# Patient Record
Sex: FEMALE | Race: WHITE | NOT HISPANIC OR LATINO | Employment: OTHER | URBAN - METROPOLITAN AREA
[De-identification: names, ages, dates, MRNs, and addresses within clinical notes are randomized per-mention and may not be internally consistent; named-entity substitution may affect disease eponyms.]

---

## 2017-12-14 ENCOUNTER — TRANSFERRED RECORDS (OUTPATIENT)
Dept: HEALTH INFORMATION MANAGEMENT | Facility: CLINIC | Age: 72
End: 2017-12-14

## 2018-03-21 ENCOUNTER — TRANSFERRED RECORDS (OUTPATIENT)
Dept: HEALTH INFORMATION MANAGEMENT | Facility: CLINIC | Age: 73
End: 2018-03-21

## 2018-03-26 ENCOUNTER — MEDICAL CORRESPONDENCE (OUTPATIENT)
Dept: HEALTH INFORMATION MANAGEMENT | Facility: CLINIC | Age: 73
End: 2018-03-26

## 2018-06-08 NOTE — TELEPHONE ENCOUNTER
FUTURE VISIT INFORMATION      FUTURE VISIT INFORMATION:    Date: 6/13/18    Time: 1400    Location: ORTHO  REFERRAL INFORMATION:    Referring provider:  DR RUGGIERO    Referring providers clinic:  [facility]    Reason for visit/diagnosis  LUMBAR SPINE      RECORDS STATUS        RECORDS RECEIVED FROM: [facility]   DATE RECEIVED: 6/8/18   NOTES STATUS DETAILS   OFFICE NOTE from referring provider Care Everywhere 12/14/17*   OFFICE NOTE from other specialist Care Everywhere 10/27/17*11/13/17*3/15/18*   DISCHARGE SUMMARY from hospital N/A    DISCHARGE REPORT from the ER N/A    OPERATIVE REPORT N/A    MEDICATION LIST Care Everywhere    IMPLANT RECORD/STICKER N/A    LABS     CBC/DIFF Care Everywhere    CULTURES N/A    INJECTIONS DONE IN RADIOLOGY N/A    MRI Care Everywhere 3/21/18*   CT SCAN N/A    XRAYS (IMAGES & REPORTS) Care Everywhere 12/14/17*10/27/17*   TUMOR     PATHOLOGY  Slides & report N/A

## 2018-06-13 ENCOUNTER — PRE VISIT (OUTPATIENT)
Dept: ORTHOPEDICS | Facility: CLINIC | Age: 73
End: 2018-06-13

## 2018-06-13 ENCOUNTER — RADIANT APPOINTMENT (OUTPATIENT)
Dept: GENERAL RADIOLOGY | Facility: CLINIC | Age: 73
End: 2018-06-13
Attending: PHYSICIAN ASSISTANT
Payer: COMMERCIAL

## 2018-06-13 ENCOUNTER — OFFICE VISIT (OUTPATIENT)
Dept: ORTHOPEDICS | Facility: CLINIC | Age: 73
End: 2018-06-13
Payer: COMMERCIAL

## 2018-06-13 VITALS — BODY MASS INDEX: 28.28 KG/M2 | HEIGHT: 66 IN | RESPIRATION RATE: 16 BRPM | WEIGHT: 176 LBS

## 2018-06-13 DIAGNOSIS — M43.10 ACQUIRED SPONDYLOLISTHESIS: ICD-10-CM

## 2018-06-13 DIAGNOSIS — M43.10 ACQUIRED SPONDYLOLISTHESIS: Primary | ICD-10-CM

## 2018-06-13 DIAGNOSIS — Q76.49 TRANSITIONAL VERTEBRA: ICD-10-CM

## 2018-06-13 DIAGNOSIS — M70.61 GREATER TROCHANTERIC BURSITIS, RIGHT: ICD-10-CM

## 2018-06-13 RX ORDER — MULTIVIT WITH MINERALS/LUTEIN
1 TABLET ORAL
COMMUNITY
Start: 2010-04-19 | End: 2020-01-10

## 2018-06-13 RX ORDER — BUSPIRONE HYDROCHLORIDE 10 MG/1
10 TABLET ORAL
COMMUNITY
Start: 2018-01-08 | End: 2020-01-10

## 2018-06-13 RX ORDER — FLUOXETINE 40 MG/1
40 CAPSULE ORAL
COMMUNITY
Start: 2017-07-31 | End: 2020-01-10

## 2018-06-13 RX ORDER — TRETINOIN 0.25 MG/G
CREAM TOPICAL
Refills: 0 | COMMUNITY
Start: 2018-03-08 | End: 2020-01-10

## 2018-06-13 RX ORDER — METHYLPREDNISOLONE ACETATE 40 MG/ML
40 INJECTION, SUSPENSION INTRA-ARTICULAR; INTRALESIONAL; INTRAMUSCULAR; SOFT TISSUE ONCE
Qty: 1 ML | Refills: 0 | OUTPATIENT
Start: 2018-06-13 | End: 2018-06-13

## 2018-06-13 ASSESSMENT — ENCOUNTER SYMPTOMS
INSOMNIA: 1
NERVOUS/ANXIOUS: 1
PANIC: 0
DEPRESSION: 1
DECREASED CONCENTRATION: 0

## 2018-06-13 NOTE — NURSING NOTE
[address] [address]  Dept: [phone]  ______________________________________________________________________________    Patient: Kimberly Stephenson   : 1945   MRN: [id number]   2018    INVASIVE PROCEDURE SAFETY CHECKLIST    Date: 18   Procedure:Right greater trochanteric bursa injection with Depo-Medrol  Patient Name: Kimberly Stephenson  MRN: [id number]  YOB: 1945    Action: Complete sections as appropriate. Any discrepancy results in a HARD COPY until resolved.     PRE PROCEDURE:  Patient ID verified with 2 identifiers (name and  or MRN): Yes  Procedure and site verified with patient/designee (when able): Yes  Accurate consent documentation in medical record: Yes  H&P (or appropriate assessment) documented in medical record: Yes  H&P must be up to 20 days prior to procedure and updates within 24 hours of procedure as applicable: NA  Relevant diagnostic and radiology test results appropriately labeled and displayed as applicable: Yes  Procedure site(s) marked with provider initials: NA    TIMEOUT:  Time-Out performed immediately prior to starting procedure, including verbal and active participation of all team members addressing the following:Yes  * Correct patient identify  * Confirmed that the correct side and site are marked  * An accurate procedure consent form  * Agreement on the procedure to be done  * Correct patient position  * Relevant images and results are properly labeled and appropriately displayed  * The need to administer antibiotics or fluids for irrigation purposes during the procedure as applicable   * Safety precautions based on patient history or medication use    DURING PROCEDURE: Verification of correct person, site, and procedures any time the responsibility for care of the patient is transferred to another member of the care team.     The following medication was given:     MEDICATION:  Depo Medrol  40mg; 4ml of 1% lidocaine; 4ml Bupivacaine   ROUTE: Bursa  SITE: Right hip  DOSE:  Depo Medrol 40mg; 4ml of 1% lidocaine; 4ml Bupivacaine   LOT #: Depo Medrol: [id number]; Lidocaine: [id number]; Bupivacaine: -DK  : Depo-Medrol: Kurtis; Lidocaine: [employer]; Bup  EXPIRATION DATE: Depo Medrol: 3/19; Lidocaine: 2/22; Bupivacaine: 5/20  NDC#: Depo Medrol: 8559-8531-82; Lidocaine: 94034-344-94; Bupivacaine: 1376-2246-49   Was there drug waste? Yes  Amount of drug waste (mL): 1 &6.  Reason for waste:  Single use vial Lidocaine and Bupivacaine       Chapo Diaz, GABRIEL  June 13, 2018

## 2018-06-13 NOTE — PROGRESS NOTES
[facility]  Orthopedics  Stephane Sullivan MD  2018     Name: Kimberly Stephenson  MRN: [id number]  Age: 73 year old  : 1945  Referring provider: Aleah Cortes     Chief Complaint: Back pain     History of Present Illness:   Kimberly Stephenson is a 73 year old female who presents today for evaluation of right sided low back pain.  This started about three months ago without a specific injury.  She localizes the pain to the right side at the level of the belt line.  She also reports right buttock pain and radiation down the lateral aspect of the leg to the ankle.  This occasionally affects the left side as well.  She would divide her pain into 70% back pain and 30% leg pain.  She also feels weaker on the right leg and thinks it may give out on her at times.  These symptoms are much worse at night, and she cannot sleep for more than 2-4 hours at a time.  This is her biggest complaint.  The only position that is helpful is laying on her right side.  Her symptoms are better during the day.  After she has used a heating pad for a few hours in the morning, she can get her tasks done without pain.  She would prefer to sit or stand rather than lay down.  She denies needing to use a shopping cart to lean on at the store.  She has recently tried physical therapy without relief.  She has not tried injections.  She has not tried any other complimentary or alternative treatments.  She would like to avoid surgery if possible.      She is currently taking ibuprofen and turmeric at night for pain control.  She reports a history of diabetes, type 1, and she is taking insulin.  She denies other medical problems, including heart disease, history of stroke, cancer, history of blood clot, kidney disease, and liver disease.  She is not currently taking any blood thinners.    KWASI: 24.4  Ojrrpt92: 27  Pain scale:1    Review of Systems:   A 14-point review of systems was obtained and is negative except for as noted in the HPI.     Answers  for HPI/ROS submitted by the patient on 6/13/2018   General Symptoms: No  Skin Symptoms: No  HENT Symptoms: No  EYE SYMPTOMS: No  HEART SYMPTOMS: No  LUNG SYMPTOMS: No  INTESTINAL SYMPTOMS: No  URINARY SYMPTOMS: No  GYNECOLOGIC SYMPTOMS: No  BREAST SYMPTOMS: No  SKELETAL SYMPTOMS: No  BLOOD SYMPTOMS: No  NERVOUS SYSTEM SYMPTOMS: No  MENTAL HEALTH SYMPTOMS: Yes  Nervous or Anxious: Yes  Depression: Yes  Trouble sleeping: Yes  Trouble thinking or concentrating: No  Mood changes: No  Panic attacks: No    Medications:      aspirin 81 MG tablet, Take  by mouth daily., Disp: , Rfl:      atorvastatin (LIPITOR) 40 MG tablet, Take 40 mg by mouth daily., Disp: , Rfl:      calcium-vitamin D (CALTRATE) 600-400 MG-UNIT per tablet, Take 1 tablet by mouth 2 times daily., Disp: , Rfl:      Citalopram Hydrobromide (CELEXA PO), Take 40 mg by mouth daily., Disp: , Rfl:      fish oil-omega-3 fatty acids 1000 MG capsule, Take 1 g by mouth daily., Disp: , Rfl:      Glucosamine Sulfate 1000 MG TABS, Take  by mouth daily., Disp: , Rfl:      HYDROcodone-acetaminophen 5-325 MG per tablet, Take 1-2 tablets by mouth every 4 hours as needed for other (Moderate to Severe Pain)., Disp: 60 tablet, Rfl: 0     ibuprofen (ADVIL,MOTRIN) 200 MG tablet, Take 200 mg by mouth every 4 hours as needed., Disp: , Rfl:      insulin glargine (LANTUS) 100 UNIT/ML injection, Inject 40 Units Subcutaneous every morning., Disp: , Rfl:      insulin lispro (HUMALOG KWIKPEN 100 UNIT/ML SC SOLN) 100 UNIT/ML injection, Inject  Subcutaneous 4 times daily (with meals and nightly)., Disp: , Rfl:      lisinopril (PRINIVIL,ZESTRIL) 10 MG tablet, Take 10 mg by mouth daily., Disp: , Rfl:      multivitamin, therapeutic with minerals (MULTI-VITAMIN) TABS, Take 1 tablet by mouth daily., Disp: , Rfl:      ORDER FOR DME, [employer]. Patient can use for 6 months, Disp: 1 each, Rfl: 0    Allergies:  Fentanyl   Versed     Past Medical History:  Depression   Tyle 1 diabetes  "mellitus   Degenerative joint disease of left knee  Hyperlipidemia  Hypertension   Postoperative nausea and vomiting   TMJ syndrome     Past Surgical History:  Left knee arthoplasty   Open reduction internal ankle fixation     Social History:  Patient is retired. She admits to former tobacco use and admits to occasional alcohol use.      Family History:  Negative for bleeding or clotting disorders or adverse reactions to anesthesia.    Physical Examination:  Resp. rate 16, height 1.676 m (5' 6\"), weight 79.8 kg (176 lb).   Constitutional - Patient is healthy, well-nourished and appears stated age.   Respiratory - Patient is breathing normally and in no respiratory distress.   Skin - No suspicious rashes or lesions.   Psychiatric - Normal mood and affect.   Cardiovascular - Extremities warm and well perfused.   Eyes - Visual acuity is normal to the written word.   ENT - Hearing intact to the spoken word.   GI - No abdominal distention.   Musculoskeletal - Non-antalgic gait without use of assistive devices.  Stands with slightly stooped posture.  Tender to palpation over the greater trochanter on the right.            Lumbar Spine:    Appearance - Normal    Palpation - Non-tender to palpation in the midline    ROM - Pain with forced extension of the lumbar spine    Motor -        LOWER EXTREMITY Left Right   Hip flexion 5/5 5/5   Knee flexion 5/5 5/5   Knee extension 5/5 5/5   Ankle dorsiflexion 5/5 5/5   Ankle plantarflexion 5/5 5/5   Great toe extension 5/5 5/5        Special tests -     Straight leg raise - Negative bilaterally     Pain with hip ROM - Negative bilaterally     Facet loading - Negative bilaterally     Neurologic - Sensation intact to light touch bilaterally in the lower extremities.      REFLEXES Left Right   Patella 1+ 1+   Ankle jerk 1+ 1+       Imaging:   MRI Spine (03/21/2018) was independently reviewed and demonstrates spinal stenosis at L3-4 and more significant spinal stenosis at L5-S1 with " degenerative spondylolisthesis and Bertolotti's syndrome.     Radiography of the lumbar spine and pelvis was obtained today and demonstrates a wedge compression fracture and arthritic changes in the facets.     I have independently reviewed the above imaging studies; the results were discussed with the patient.     Assessment:   73 year old female with spinal stenosis secondary to degenerative spondylolisthesis, old osteoporotic compression fracture, right trochanteric bursitis, and flat back syndrome.     Plan:   We discussed that we could watch her symptoms if they were not causing significant pain. I recommended alternatively seeing physical therapy to establish a stretching and icing protocol for the bursitis. We could also consider epidural steroid injections. I recommended doing an injection into the right greater trochanter today with anesthetic and steroid. If none of the above interventions improve her pain, we can consider surgical intervention at that time.      Procedure:   After written informed consent was obtained, the patient's right hip was prepped with chloraprep.  A combination of 40 mg of Depo-Medrol and 4cc 1% lidocaine and 4cc of 1/4% marcaine were injected into the right greater trochanteric bursa.  There were no immediate complications.  She tolerated the procedure well.    60 minutes were spent with the patient, >50% of which was spent in discussion regarding the diagnosis, treatment options, and answering questions.    Scribe Disclosure:   I, Roseline Haddad, am serving as a scribe to document services personally performed by Stephane Sullivan MD at this visit, based upon the provider's statements to me. All documentation has been reviewed by the aforementioned provider prior to being entered into the official medical record.     Portions of this medical record were completed by a scribe. UPON MY REVIEW AND AUTHENTICATION BY ELECTRONIC SIGNATURE, this confirms (a) I performed the  applicable clinical services, and (b) the record is accurate.  I saw and evaluated the patient on the day of the visit and I formulated the plan.  MD Stephane Alfonso MD

## 2018-06-13 NOTE — LETTER
2018       RE: Kimberly Stephenson  [address]  [facility] [address]     Dear Colleague,    Thank you for referring your patient, Kimberly Stephenson, to the [facility] at [facility]. Please see a copy of my visit note below.    [facility]  Orthopedics  Stephane Sullivan MD  2018     Name: Kimberly Stephenson  MRN: [id number]  Age: 73 year old  : 1945  Referring provider: Aleah Cortes     Chief Complaint: Back pain     History of Present Illness:   Kimberly Stephenson is a 73 year old female who presents today for evaluation of right sided low back pain.  This started about three months ago without a specific injury.  She localizes the pain to the right side at the level of the belt line.  She also reports right buttock pain and radiation down the lateral aspect of the leg to the ankle.  This occasionally affects the left side as well.  She would divide her pain into 70% back pain and 30% leg pain.  She also feels weaker on the right leg and thinks it may give out on her at times.  These symptoms are much worse at night, and she cannot sleep for more than 2-4 hours at a time.  This is her biggest complaint.  The only position that is helpful is laying on her right side.  Her symptoms are better during the day.  After she has used a heating pad for a few hours in the morning, she can get her tasks done without pain.  She would prefer to sit or stand rather than lay down.  She denies needing to use a shopping cart to lean on at the store.  She has recently tried physical therapy without relief.  She has not tried injections.  She has not tried any other complimentary or alternative treatments.  She would like to avoid surgery if possible.      She is currently taking ibuprofen and turmeric at night for pain control.  She reports a history of diabetes, type 1, and she is taking insulin.  She denies other medical problems, including heart disease, history of  stroke, cancer, history of blood clot, kidney disease, and liver disease.  She is not currently taking any blood thinners.    KWASI: 24.4  Ogplms63: 27  Pain scale:1    Review of Systems:   A 14-point review of systems was obtained and is negative except for as noted in the HPI.     Medications:      aspirin 81 MG tablet, Take  by mouth daily., Disp: , Rfl:      atorvastatin (LIPITOR) 40 MG tablet, Take 40 mg by mouth daily., Disp: , Rfl:      calcium-vitamin D (CALTRATE) 600-400 MG-UNIT per tablet, Take 1 tablet by mouth 2 times daily., Disp: , Rfl:      Citalopram Hydrobromide (CELEXA PO), Take 40 mg by mouth daily., Disp: , Rfl:      fish oil-omega-3 fatty acids 1000 MG capsule, Take 1 g by mouth daily., Disp: , Rfl:      Glucosamine Sulfate 1000 MG TABS, Take  by mouth daily., Disp: , Rfl:      HYDROcodone-acetaminophen 5-325 MG per tablet, Take 1-2 tablets by mouth every 4 hours as needed for other (Moderate to Severe Pain)., Disp: 60 tablet, Rfl: 0     ibuprofen (ADVIL,MOTRIN) 200 MG tablet, Take 200 mg by mouth every 4 hours as needed., Disp: , Rfl:      insulin glargine (LANTUS) 100 UNIT/ML injection, Inject 40 Units Subcutaneous every morning., Disp: , Rfl:      insulin lispro (HUMALOG KWIKPEN 100 UNIT/ML SC SOLN) 100 UNIT/ML injection, Inject  Subcutaneous 4 times daily (with meals and nightly)., Disp: , Rfl:      lisinopril (PRINIVIL,ZESTRIL) 10 MG tablet, Take 10 mg by mouth daily., Disp: , Rfl:      multivitamin, therapeutic with minerals (MULTI-VITAMIN) TABS, Take 1 tablet by mouth daily., Disp: , Rfl:      ORDER FOR DME, [employer]. Patient can use for 6 months, Disp: 1 each, Rfl: 0    Allergies:  Fentanyl   Versed     Past Medical History:  Depression   Tyle 1 diabetes mellitus   Degenerative joint disease of left knee  Hyperlipidemia  Hypertension   Postoperative nausea and vomiting   TMJ syndrome     Past Surgical History:  Left knee arthoplasty   Open reduction internal ankle fixation  "    Social History:  Patient is retired. She admits to former tobacco use and admits to occasional alcohol use.      Family History:  Negative for bleeding or clotting disorders or adverse reactions to anesthesia.    Physical Examination:  Resp. rate 16, height 1.676 m (5' 6\"), weight 79.8 kg (176 lb).   Constitutional - Patient is healthy, well-nourished and appears stated age.   Respiratory - Patient is breathing normally and in no respiratory distress.   Skin - No suspicious rashes or lesions.   Psychiatric - Normal mood and affect.   Cardiovascular - Extremities warm and well perfused.   Eyes - Visual acuity is normal to the written word.   ENT - Hearing intact to the spoken word.   GI - No abdominal distention.   Musculoskeletal - Non-antalgic gait without use of assistive devices.  Stands with slightly stooped posture.  Tender to palpation over the greater trochanter on the right.            Lumbar Spine:    Appearance - Normal    Palpation - Non-tender to palpation in the midline    ROM - Pain with forced extension of the lumbar spine    Motor -        LOWER EXTREMITY Left Right   Hip flexion 5/5 5/5   Knee flexion 5/5 5/5   Knee extension 5/5 5/5   Ankle dorsiflexion 5/5 5/5   Ankle plantarflexion 5/5 5/5   Great toe extension 5/5 5/5        Special tests -     Straight leg raise - Negative bilaterally     Pain with hip ROM - Negative bilaterally     Facet loading - Negative bilaterally     Neurologic - Sensation intact to light touch bilaterally in the lower extremities.      REFLEXES Left Right   Patella 1+ 1+   Ankle jerk 1+ 1+       Imaging:   MRI Spine (03/21/2018) was independently reviewed and demonstrates spinal stenosis at L3-4 and more significant spinal stenosis at L5-S1 with degenerative spondylolisthesis and Bertolotti's syndrome.     Radiography of the lumbar spine and pelvis was obtained today and demonstrates a wedge compression fracture and arthritic changes in the facets.     I have " independently reviewed the above imaging studies; the results were discussed with the patient.     Assessment:   73 year old female with spinal stenosis secondary to degenerative spondylolisthesis, old osteoporotic compression fracture, right trochanteric bursitis, and flat back syndrome.     Plan:   We discussed that we could watch her symptoms if they were not causing significant pain. I recommended alternatively seeing physical therapy to establish a stretching and icing protocol for the bursitis. We could also consider epidural steroid injections. I recommended doing an injection into the right greater trochanter today with anesthetic and steroid. If none of the above interventions improve her pain, we can consider surgical intervention at that time.      Procedure:   After written informed consent was obtained, the patient's right hip was prepped with chloraprep.  A combination of 40 mg of Depo-Medrol and 4cc 1% lidocaine and 4cc of 1/4% marcaine were injected into the right greater trochanteric bursa.  There were no immediate complications.  She tolerated the procedure well.    60 minutes were spent with the patient, >50% of which was spent in discussion regarding the diagnosis, treatment options, and answering questions.    Scribe Disclosure:   I, Roseline Haddad, am serving as a scribe to document services personally performed by Stephane Sullivan MD at this visit, based upon the provider's statements to me. All documentation has been reviewed by the aforementioned provider prior to being entered into the official medical record.     Portions of this medical record were completed by a scribe. UPON MY REVIEW AND AUTHENTICATION BY ELECTRONIC SIGNATURE, this confirms (a) I performed the applicable clinical services, and (b) the record is accurate.    Stephane Sullivan MD

## 2018-06-13 NOTE — MR AVS SNAPSHOT
After Visit Summary   6/13/2018    Kimberly Stephenson    MRN: [id number]           Patient Information     Date Of Birth          1945        Visit Information        Provider Department      6/13/2018 2:00 PM Stephane Sullivan MD [facility]        Today's Diagnoses     Acquired spondylolisthesis    -  1    Transitional vertebra        Greater trochanteric bursitis, right           Follow-ups after your visit        Additional Services     PHYSICAL THERAPY REFERRAL (External-Prints)       Physical Therapy Referral                  Follow-up notes from your care team     Return if symptoms worsen or fail to improve.      Your next 10 appointments already scheduled     Jun 22, 2018  8:50 AM CDT   (Arrive by 8:35 AM)   PARKER Extremity with [facility]nsen [facility]   [facility]    [address] [place] [address] [id number] [phone]            Jun 29, 2018 10:50 AM CDT   PARKER Extremity with Khurram Flynn PT   [facility] ([facility])    [address] [place] [id number] [phone]              Who to contact     Please call your clinic at [phone] to:    Ask questions about your health    Make or cancel appointments    Discuss your medicines    Learn about your test results    Speak to your doctor            Additional Information About Your Visit        MyChart Information     TerraLUX is an electronic gateway that provides easy, online access to your medical records. With TerraLUX, you can request a clinic appointment, read your test results, renew a prescription or communicate with your care team.     To sign up for CheckBonust visit the website at [email or web address]   You will be asked to enter the access code listed below, as well as some personal information. Please follow the directions to create your username and password.     Your access  "code is: [email or web address]  Expires: 2018  6:30 AM     Your access code will  in 90 days. If you need help or a new code, please contact your [facility] or call [phone] for assistance.        Care EveryWhere ID     This is your Care EveryWhere ID. This could be used by other organizations to access your [place] medical records  [id number]        Your Vitals Were     Respirations Height BMI (Body Mass Index)             16 1.676 m (5' 6\") 28.41 kg/m2          Blood Pressure from Last 3 Encounters:   13 92/52   13 146/74    Weight from Last 3 Encounters:   18 79.8 kg (176 lb)   13 77.1 kg (170 lb)   13 77.1 kg (170 lb)              We Performed the Following     Large Joint/Bursa injection and/or drainage - Unilateral (Shoulder, Knee) []     METHYLPREDNISOLONE 40 MG INJ     PHYSICAL THERAPY REFERRAL (External-Prints)          Today's Medication Changes          These changes are accurate as of 18 11:59 PM.  If you have any questions, ask your nurse or doctor.               Start taking these medicines.        Dose/Directions    methylPREDNISolone acetate 40 MG/ML injection   Commonly known as:  DEPO-MEDROL   Used for:  Greater trochanteric bursitis, right   Started by:  Stephane Sullivan MD        Dose:  40 mg   Inject 1 mL (40 mg) into the muscle once for 1 dose   Quantity:  1 mL   Refills:  0            Where to get your medicines      Some of these will need a paper prescription and others can be bought over the counter.  Ask your nurse if you have questions.     You don't need a prescription for these medications     methylPREDNISolone acetate 40 MG/ML injection                Primary Care Provider Office Phone # Fax #    Stephane Galvez [phone]       XX RETIRED XX  [facility] [id number]        Equal Access to Services     OLIVERIO ODELL AH: Conrad Dawson, milton jones, roseann grigsby " tru swain gonzalonehemiah tip mcknightaan ah. So [facility] [phone].    ATENCIÓN: Si harrisla chi, tiene a wolf disposición servicios gratuitos de asistencia lingüística. Ismael al [phone].    We comply with applicable federal civil rights laws and [place] laws. We do not discriminate on the basis of race, color, national origin, age, disability, sex, sexual orientation, or gender identity.            Thank you!     Thank you for choosing [facility]  for your care. Our goal is always to provide you with excellent care. Hearing back from our patients is one way we can continue to improve our services. Please take a few minutes to complete the written survey that you may receive in the mail after your visit with us. Thank you!             Your Updated Medication List - Protect others around you: Learn how to safely use, store and throw away your medicines at www.disposemymeds.org.          This list is accurate as of 6/13/18 11:59 PM.  Always use your most recent med list.                   Brand Name Dispense Instructions for use Diagnosis    apixaban ANTICOAGULANT 5 MG tablet    ELIQUIS     Take 5 mg by mouth        aspirin 81 MG tablet      Take  by mouth daily.        atorvastatin 40 MG tablet    LIPITOR     Take 40 mg by mouth daily.        blood glucose monitoring test strip    no brand specified     Dispense item covered by pt ins. E10.9 IDDM type I - Test 5 times/day. Reason: before meals        busPIRone 10 MG tablet    BUSPAR     Take 10 mg by mouth        calcium-vitamin D 600-400 MG-UNIT per tablet    CALTRATE     Take 1 tablet by mouth 2 times daily.        CELEXA PO      Take 40 mg by mouth daily.        diclofenac 1 % Gel topical gel    VOLTAREN     Apply 1 gram to lower back twice per day as needed        * fish oil-omega-3 fatty acids 1000 MG capsule      Take 1 g by mouth daily.        * FISH OIL PO      Take 2 capsules by mouth        FLUoxetine 40 MG capsule    PROzac      Take 40 mg by mouth        Glucosamine Sulfate 1000 MG Tabs      Take  by mouth daily.        HumaLOG KWIKpen 100 UNIT/ML injection   Generic drug:  insulin lispro      Inject  Subcutaneous 4 times daily (with meals and nightly).        HYDROcodone-acetaminophen 5-325 MG per tablet    NORCO    60 tablet    Take 1-2 tablets by mouth every 4 hours as needed for other (Moderate to Severe Pain).    Closed right ankle fracture       ibuprofen 200 MG tablet    ADVIL/MOTRIN     Take 200 mg by mouth every 4 hours as needed.        insulin glargine 100 UNIT/ML injection    LANTUS     Inject 40 Units Subcutaneous every morning.        lisinopril 10 MG tablet    PRINIVIL/ZESTRIL     Take 10 mg by mouth daily.        methylPREDNISolone acetate 40 MG/ML injection    DEPO-MEDROL    1 mL    Inject 1 mL (40 mg) into the muscle once for 1 dose    Greater trochanteric bursitis, right       * Multi-vitamin Tabs tablet      Take 1 tablet by mouth daily.        * CENTRUM SILVER per tablet      Take 1 tablet by mouth        NovoLOG FLEXPEN 100 UNIT/ML injection   Generic drug:  insulin aspart           order for DME     1 each    Roll-A-Bout Walker. Patient can use for 6 months    Closed right ankle fracture       tretinoin 0.025 % cream    RETIN-A     USE A PEA SIZE AMOUNT AS NEEDED NIGHTLY        VITAMIN D-3 PO           * Notice:  This list has 4 medication(s) that are the same as other medications prescribed for you. Read the directions carefully, and ask your doctor or other care provider to review them with you.

## 2020-01-08 RX ORDER — QUETIAPINE FUMARATE 25 MG/1
25 TABLET, FILM COATED ORAL DAILY
COMMUNITY
End: 2020-01-13

## 2020-01-08 RX ORDER — ACETAMINOPHEN 325 MG/1
650 TABLET ORAL EVERY 4 HOURS PRN
COMMUNITY

## 2020-01-08 RX ORDER — BUSPIRONE HYDROCHLORIDE 7.5 MG/1
7.5 TABLET ORAL 2 TIMES DAILY
COMMUNITY
End: 2021-01-27

## 2020-01-08 RX ORDER — INSULIN LISPRO 100 U/ML
INJECTION, SOLUTION SUBCUTANEOUS
COMMUNITY
End: 2020-02-20

## 2020-01-08 RX ORDER — CEPHALEXIN 500 MG/1
500 CAPSULE ORAL 3 TIMES DAILY
COMMUNITY
Start: 2020-01-08 | End: 2020-01-13

## 2020-01-08 RX ORDER — VENLAFAXINE HYDROCHLORIDE 150 MG/1
150 CAPSULE, EXTENDED RELEASE ORAL EVERY MORNING
COMMUNITY
End: 2020-07-28

## 2020-01-08 RX ORDER — ESCITALOPRAM OXALATE 10 MG/1
10 TABLET ORAL EVERY MORNING
COMMUNITY
End: 2020-07-28

## 2020-01-08 RX ORDER — INSULIN GLARGINE 100 [IU]/ML
INJECTION, SOLUTION SUBCUTANEOUS
COMMUNITY
End: 2020-01-10

## 2020-01-08 RX ORDER — AMLODIPINE BESYLATE 10 MG/1
10 TABLET ORAL EVERY EVENING
COMMUNITY
End: 2020-09-25

## 2020-01-09 ENCOUNTER — NURSING HOME VISIT (OUTPATIENT)
Dept: GERIATRICS | Facility: CLINIC | Age: 75
End: 2020-01-09
Payer: COMMERCIAL

## 2020-01-09 VITALS
SYSTOLIC BLOOD PRESSURE: 140 MMHG | OXYGEN SATURATION: 92 % | HEART RATE: 82 BPM | RESPIRATION RATE: 15 BRPM | DIASTOLIC BLOOD PRESSURE: 69 MMHG | TEMPERATURE: 98.3 F

## 2020-01-09 DIAGNOSIS — R41.89 COGNITIVE DECLINE: ICD-10-CM

## 2020-01-09 DIAGNOSIS — I10 ESSENTIAL HYPERTENSION: ICD-10-CM

## 2020-01-09 DIAGNOSIS — S31.109A WOUND OF LEFT GROIN, INITIAL ENCOUNTER: ICD-10-CM

## 2020-01-09 DIAGNOSIS — A41.2 STAPHYLOCOCCAL SEPSIS (H): ICD-10-CM

## 2020-01-09 DIAGNOSIS — N17.9 AKI (ACUTE KIDNEY INJURY) (H): ICD-10-CM

## 2020-01-09 DIAGNOSIS — G93.40 ACUTE ENCEPHALOPATHY: ICD-10-CM

## 2020-01-09 DIAGNOSIS — I21.4 NSTEMI (NON-ST ELEVATED MYOCARDIAL INFARCTION) (H): ICD-10-CM

## 2020-01-09 DIAGNOSIS — S81.009D OPEN WOUND OF KNEE, LEG, AND ANKLE, UNSPECIFIED LATERALITY, SUBSEQUENT ENCOUNTER: ICD-10-CM

## 2020-01-09 DIAGNOSIS — S91.009D OPEN WOUND OF KNEE, LEG, AND ANKLE, UNSPECIFIED LATERALITY, SUBSEQUENT ENCOUNTER: ICD-10-CM

## 2020-01-09 DIAGNOSIS — E10.11 DIABETIC KETOACIDOSIS WITH COMA ASSOCIATED WITH TYPE 1 DIABETES MELLITUS (H): Primary | ICD-10-CM

## 2020-01-09 DIAGNOSIS — I48.0 PAF (PAROXYSMAL ATRIAL FIBRILLATION) (H): ICD-10-CM

## 2020-01-09 DIAGNOSIS — F41.1 GAD (GENERALIZED ANXIETY DISORDER): ICD-10-CM

## 2020-01-09 DIAGNOSIS — S81.809D OPEN WOUND OF KNEE, LEG, AND ANKLE, UNSPECIFIED LATERALITY, SUBSEQUENT ENCOUNTER: ICD-10-CM

## 2020-01-09 DIAGNOSIS — E10.65 TYPE 1 DIABETES MELLITUS WITH HYPERGLYCEMIA (H): ICD-10-CM

## 2020-01-09 DIAGNOSIS — F32.A DEPRESSION, UNSPECIFIED DEPRESSION TYPE: ICD-10-CM

## 2020-01-09 DIAGNOSIS — Z91.81 PERSONAL HISTORY OF FALL: ICD-10-CM

## 2020-01-09 DIAGNOSIS — Z79.01 LONG TERM CURRENT USE OF ANTICOAGULANT THERAPY: ICD-10-CM

## 2020-01-09 DIAGNOSIS — S52.532D CLOSED COLLES' FRACTURE OF LEFT RADIUS WITH ROUTINE HEALING, SUBSEQUENT ENCOUNTER: ICD-10-CM
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
## 2020-01-09 PROBLEM — B95.7 BACTEREMIA DUE TO COAGULASE-NEGATIVE STAPHYLOCOCCUS: Status: ACTIVE | Noted: 2020-01-02

## 2020-01-09 PROBLEM — R78.81 BACTEREMIA DUE TO COAGULASE-NEGATIVE STAPHYLOCOCCUS: Status: ACTIVE | Noted: 2020-01-02

## 2020-01-09 PROBLEM — E11.11: Status: ACTIVE | Noted: 2019-12-28

## 2020-01-09 PROCEDURE — 99310 SBSQ NF CARE HIGH MDM 45: CPT | Performed by: NURSE PRACTITIONER

## 2020-01-09 NOTE — LETTER
1/9/2020        RE: Kimberly Stephenson  [address]  [facility] [id number]        [facility]  PRIMARY CARE PROVIDER AND CLINIC:  Stephane Galvez MD, XX RETIRED XX / [facility] [id number]  Chief Complaint   Patient presents with     Hospital F/U     [facility] Medical Record Number:  [id number]  Place of Service where encounter took place:  [facility] [[id number]]    Kimberly Stephenson  is a 74 year old  (1945), admitted to the above facility from  [facility] . Hospital stay 12/28/19 through 1/8/2020..  Admitted to this facility for  rehab, medical management and nursing care.    HPI:    HPI information obtained from: facility chart records, facility staff, patient report, [facility] chart review and Care Everywhere [facility] chart review.   Brief Summary of Hospital Course: Kimberly Stephenson has a past medical history of DM type 1 not well controlled, afib on apixaban, hypertension, HLD, depression, anxiety, history of cognitive changes recently, L radius fracture in 9/24/19 due to fall and re-injured on 12/26/19 after slipping on the ice while walking her dog.  S/he was admitted to the hospital after no one had seen/heard from her in 24 h and police were called for welfare check.  She was found down on the floor in her home and was unresponsive with blood sugar >600, hypotensive, hypothermic and found to have DKA, PATRICIA with creat to 3.55, sepsis.  She was intubated 12/28-1/2.  S/he underwent IV hydration, antibiotics.  Troponin was elevated-thought to be demand ischemia and will need cardiology follow up as outpatient . The hospital stay was complicated and she is at high risk of decompensation.    Updates on Status Since Skilled nursing Admission: Kimberly reports she is generally feeling well and is not sure why she is in TCU, she admits significant memory problems and cannot recall recent events clearly.  Daughter in law is present and reports significant  difficulty over last several months, not doing well at home due to memory loss, depression and they were unaware of this until now, plan now to be more involved.  Nursing reports diarrhea over last several days.       CODE STATUS/ADVANCE DIRECTIVES DISCUSSION:   CPR/Full code   Patient's living condition: lives alone  ALLERGIES: Cymbalta; Fentanyl; and Versed [midazolam], blood products  PAST MEDICAL HISTORY:  has a past medical history of Depression, Diabetes mellitus (H), DJD (degenerative joint disease) of knee, Hyperlipidemia, Hypertension, PONV (postoperative nausea and vomiting), and TMJ syndrome.  PAST SURGICAL HISTORY:   has a past surgical history that includes orthopedic surgery; Arthroplasty knee; and Open reduction internal fixation ankle (3/27/2013).  FAMILY HISTORY: family history is not on file.  SOCIAL HISTORY:   reports that she has quit smoking. She does not have any smokeless tobacco history on file. She reports current alcohol use. She reports that she does not use drugs.    Post Discharge Medication Reconciliation Status: discharge medications reconciled and changed, per note/orders (see AVS)    Current Outpatient Medications   Medication Sig Dispense Refill     acetaminophen (TYLENOL) 325 MG tablet Take 650 mg by mouth every 4 hours as needed for mild pain       amLODIPine (NORVASC) 10 MG tablet Take 10 mg by mouth daily       apixaban ANTICOAGULANT (ELIQUIS) 5 MG tablet Take 2.5 mg by mouth 2 times daily        atorvastatin (LIPITOR) 40 MG tablet Take 40 mg by mouth daily.       busPIRone (BUSPAR) 7.5 MG tablet Take 7.5 mg by mouth 2 times daily       cephALEXin (KEFLEX) 500 MG capsule Take 500 mg by mouth 3 times daily       escitalopram (LEXAPRO) 10 MG tablet Take 10 mg by mouth daily       insulin aspart (NOVOLOG PEN) 100 UNIT/ML pen Inject as per sliding scale:if 0 - 69 = - Refer to hypoglycemia treatment protocol;70 - 149 = 0 units No corrective insulin, administer full dose of prandial  insulin if ordered;150 - 199 = 2 units;200 - 249 = 4 units;250 - 299 = 6 units;300 - 349 = 8 units;350 - 399 = 10 units;400 - 999 = 12 units and notify MD,subcutaneously with meals related to TYPE 2 DIABETES MELLITUS WITH KETOACIDOSIS WITHOUT COMA (E11.10)       insulin aspart (NOVOLOG PEN) 100 UNIT/ML pen Inject 4 unit subcutaneously before meals related to TYPE 1 DIABETES MELLITUS WITHOUT COMPLICATIONS (E10.9)       insulin detemir (LEVEMIR PEN) 100 UNIT/ML pen Inject 16 Units Subcutaneous 2 times daily       insulin glargine (BASAGLAR KWIKPEN) 100 UNIT/ML pen INJECT 16 UNITS SUBCUTANEOUSLY TWICE DAILY *THERAPEUTIC INTERCHANGE FOR: LEVEMIR*       insulin lispro (ADMELOG SOLOSTAR) 100 UNIT/ML pen INJECT 4 UNITS SUBCUTANEOUSLY THREE TIMES DAILY BEFORE MEALS *THERAPEUTIC INTERCHANGE FOR: NVOLOG*;INJECT 0-12 UNITS SUBCUTANEOUSLY THREE TI       QUEtiapine (SEROQUEL) 25 MG tablet Take 25 mg by mouth daily       Skin Protectants, Misc. (EUCERIN) cream Apply topically daily       venlafaxine (EFFEXOR-XR) 150 MG 24 hr capsule Take 150 mg by mouth daily       aspirin 81 MG tablet Take  by mouth daily.       blood glucose monitoring (NO BRAND SPECIFIED) test strip Dispense item covered by pt ins. E10.9 IDDM type I - Test 5 times/day. Reason: before meals       busPIRone (BUSPAR) 10 MG tablet Take 10 mg by mouth       calcium-vitamin D (CALTRATE) 600-400 MG-UNIT per tablet Take 1 tablet by mouth 2 times daily.       Cholecalciferol (VITAMIN D-3 PO)        Citalopram Hydrobromide (CELEXA PO) Take 40 mg by mouth daily.       diclofenac (VOLTAREN) 1 % GEL topical gel Apply 1 gram to lower back twice per day as needed       fish oil-omega-3 fatty acids 1000 MG capsule Take 1 g by mouth daily.       FLUoxetine (PROZAC) 40 MG capsule Take 40 mg by mouth       Glucosamine Sulfate 1000 MG TABS Take  by mouth daily.       HYDROcodone-acetaminophen 5-325 MG per tablet Take 1-2 tablets by mouth every 4 hours as needed for other  (Moderate to Severe Pain). (Patient not taking: Reported on 6/13/2018) 60 tablet 0     ibuprofen (ADVIL,MOTRIN) 200 MG tablet Take 200 mg by mouth every 4 hours as needed.       insulin aspart (NOVOLOG FLEXPEN) 100 UNIT/ML injection        insulin glargine (LANTUS) 100 UNIT/ML injection Inject 40 Units Subcutaneous every morning.       insulin lispro (HUMALOG KWIKPEN 100 UNIT/ML SC SOLN) 100 UNIT/ML injection Inject  Subcutaneous 4 times daily (with meals and nightly).       lisinopril (PRINIVIL,ZESTRIL) 10 MG tablet Take 10 mg by mouth daily.       Multiple Vitamins-Minerals (CENTRUM SILVER) per tablet Take 1 tablet by mouth       multivitamin, therapeutic with minerals (MULTI-VITAMIN) TABS Take 1 tablet by mouth daily.       Omega-3 Fatty Acids (FISH OIL PO) Take 2 capsules by mouth       ORDER FOR DME Roll-A-Bout Walker. Patient can use for 6 months (Patient not taking: Reported on 6/13/2018) 1 each 0     tretinoin (RETIN-A) 0.025 % cream USE A PEA SIZE AMOUNT AS NEEDED NIGHTLY  0     ROS:  10 point ROS of systems including Constitutional, Eyes, Respiratory, Cardiovascular, Gastroenterology, Genitourinary, Integumentary, Musculoskeletal, Psychiatric were all negative except for pertinent positives noted in my HPI.    Vitals:  BP (!) 140/69   Pulse 82   Temp 98.3  F (36.8  C)   Resp 15   SpO2 92%   Exam:  GENERAL APPEARANCE:  Alert, in no acute distress , sitting easily in recliner  HEAD:  Normal, normocephalic, atraumatic  EYE EXAM: normal external eye, conjunctiva, lids, MICHEL  NECK EXAM: supple, no JVD  CHEST/RESP:  respiratory effort normal, lung sounds CTA  , no respiratory distress  CV:  Rate reg, rhythm reg, no murmur, trace peripheral edema bilateral feet  M/S:   extremities normal, gait normal-weak, unsteady, normal muscle tone, and range of motion normal   SKIN EXAM:     L lateral ankle- superficial partially scabbed, 50% slough covered with no surrounding erythema, no drainage, about 1 cm in  diameter    L lateral heel- mild blanchable redness with tenderness     R lateral ankle- superficial, partially scabbed, 50% slough covered with no surrounding erythema, no drainage, about 1 cm in diameter    R lateral heel- mild blanchable redness with no tenderness    L groin- small 0.2 cm round open area with slough in the wound bed, dry skin and scratches surrounding.   NEUROLOGIC EXAM: Normal gross motor movement, tone and coordination. No tremor. Cranial nerves 2-12 are normal tested and grossly at patient's baseline  PSYCH:  Alert and oriented to self and family with forgetfulness, affect pleasant      Lab/Diagnostic data:  Recent labs in [facility] reviewed by me today. and Care Everywhere     ASSESSMENT/PLAN:  Diabetic ketoacidosis with coma associated with type 1 diabetes mellitus (H)  Type 1 diabetes mellitus with hyperglycemia (H)  Patient with longstanding history of DM, reported as DM1 with chronic insulin use, reports she developed DM in her 40's.  Recently hospitalized with DKA, blood sugar over 600 on admission, she was comatose and intubated x6 days.  Per review of Care Everywhere, has elevated A1C consistently over last several years (since 2017), trending about 10-11%, indicating marginal blood sugar control.  Currently on levemir BID and aspart meal time replacement as well as sliding scale. Blood sugar since admission 130-140.  Her insulin has been titrated over last months, per review of Care Everywhere,  Most recently at 36 unit(s) daily in am, now 16 unit(s) bid.   -monitor blood sugar closely, titrate insulin for goal A1C of 7-8%  -diabetic education re diet    Staphylococcal sepsis (H)  Developed sepsis, has x3 days of po cephalexin to complete course of treatment, afebrile today and no new symptoms     PATRICIA (acute kidney injury) (H)  Baseline creat 0.8-0.9.  Elevated to 3.55 on hospital admission, thought due to sepsis and DKA.  Resolved with treatment.   -Avoid nephrotoxic medications  -Renal  dosing of medications  -monitor kidney function 1/13/20      NSTEMI (non-ST elevated myocardial infarction) (H)  Hospital records indicate troponin elevation to 9.06 on 12/29/19, thought to be stress/demand ischemia. TTE on 12/29 with normal EF and no wall motion abnormalities.   -consider cardiology consult with lexiscan myoview as outpatient     PAF (paroxysmal atrial fibrillation) (H)  Long term current use of anticoagulant therapy  Has been on apixaban for years, no evidence of afib during most recent hospital stay.  No elevated HR today.  Stable. Continue     Acute encephalopathy  Cognitive decline  Patient with reports of memory loss over last months, has been seen by her PCP, neurology.  CT head with no acute changes and some mild changes consistent with age.  Neurology notes, from [facility], not available to me.  She was to undergo neuropsych testing but failed to show up for this appointment.  She was started on quetiapine during most recent hospital stay due to significant encephalopathy. Today, she is alert, but quite forgetful, able to make her needs known.  Per review of Care Everywhere, she had SLUMS at her PCP clinic on 10/3/19 which was 25/30.  Family reports her friends have been worried about how she is doing at home over last months.   -OT and ST for cognitive testing  -family to make follow up appointment for neuropsych testing     Personal history of fall  Closed Colles' fracture of left radius with routine healing, subsequent encounter  Originally fell on 9/24/19 with fracture to L wrist and was treated with cast/conservatively.  She then fell again on 12/26/19 on the ice, causing re-fracture of the L wrist.  She has had follow up with Dr. Alex, ortho, and is due for follow up with him this week for hard cast placement.  Family is considering moving her ortho care closer to [facility] for ease of transport.     CEILA (Generalized anxiety disorder)  Depression, unspecified depression type  Long  standing history of depression, anxiety. Has been followed closely by psychiatry at her primary care clinic.  Is on lexapro, venlafaxine, buspar at baseline.  She exhibits no anxiety today.  Family is concerned that falling could be related to use of these medications. She reports she is not feeling anxious today, does endorse some depression.   -complete PHQ9  -consider titration of medications during TCU stay    Essential hypertension  Patient with known history of hypertension, on amlodipine, atorvastatin. The current medical regimen is effective;  continue present plan and medications.     bilateral lateral malleolus pressure injury  Lateral heel redness  Bilateral lateral malleolus with small likely pressure related wounds.  She was intubated and in ICU for 6 days during most recent hospital stay-this is likely contributory.  The wounds are clean, partially scabbed and depth is not able to be evaluated completed, but appears to be somewhat superficial.    -mepilex to these areas    Also noted to have bilateral areas of blanchable redness with tenderness on the L lateral heel-unclear etiology for these, could certainly be pressure related but unable to determine now  -monitor, skin prep to these areas    Wound of left groin, initial encounter  Noted small round slough covered open area in the L groin with surrounding dry skin/scratch marks on the left upper thigh. Again, unclear etiology for this, suspect simply irritation.   -mupirocin and dressing til resolved        transcribed by : Annmarie Miller  1. Bilateral Lateral heel skin care BID   - Skin prep red blanchable skin on lateral aspect of each heel  2. Bilateral lateral malleolus open areas every 3 days + prn   -Cleanse    - apply mepilex   3. Left groin small wound in groin fold daily skin care   -cleanse   -Apply mupirocin 2% ointment scant amount   -cover with prim pore  4. Eucerin cream or covered alternative to Right thigh + any areas of  dry skin daily. Dx: Xerosis  5. Lab draw 1/13/20   BMP. Dx: HTN   CBC. Dx: Anemia  6. Diagnosis clarified on OSR  7. Speech therapy to eval + treat.   8.  Imodium 2 mg QID prn po for diarrhea    Total time spent with patient visit at the [facility] nursing facility was 40 min including patient visit, review of past records and in-person discussion with family who is present today. Greater than 50% of total time spent with counseling and coordinating care due to multiple medical comorbid conditions and significant risk for complications, as well as complicated course during most recent hospital stay, concerns re planning ortho, neurology follow up, concerns re ongoing living situation and need for additional services upon discharge, discharge planning with family.     Electronically signed by:  VANDANA Mckinley CNP                         Sincerely,        VANDANA Mckinley CNP

## 2020-01-09 NOTE — PROGRESS NOTES
[facility]  PRIMARY CARE PROVIDER AND CLINIC:  Stephane Galvez MD, XX RETIRED XX / [facility] [address]  Chief Complaint   Patient presents with     Hospital F/U     [place] Medical Record Number:  [id number]  Place of Service where encounter took place:  [facility] [[id number]]    Kimberly Stephenson  is a 74 year old  (1945), admitted to the above facility from  [facility] . Hospital stay 12/28/19 through 1/8/2020..  Admitted to this facility for  rehab, medical management and nursing care.    HPI:    HPI information obtained from: facility chart records, facility staff, patient report, [facility] chart review and Care Everywhere [facility] chart review.   Brief Summary of Hospital Course: Kimberly Stephenson has a past medical history of DM type 1 not well controlled, afib on apixaban, hypertension, HLD, depression, anxiety, history of cognitive changes recently, L radius fracture in 9/24/19 due to fall and re-injured on 12/26/19 after slipping on the ice while walking her dog.  S/he was admitted to the hospital after no one had seen/heard from her in 24 h and police were called for welfare check.  She was found down on the floor in her home and was unresponsive with blood sugar >600, hypotensive, hypothermic and found to have DKA, PATRICIA with creat to 3.55, sepsis.  She was intubated 12/28-1/2.  S/he underwent IV hydration, antibiotics.  Troponin was elevated-thought to be demand ischemia and will need cardiology follow up as outpatient . The hospital stay was complicated and she is at high risk of decompensation.    Updates on Status Since Skilled nursing Admission: Kimberly reports she is generally feeling well and is not sure why she is in TCU, she admits significant memory problems and cannot recall recent events clearly.  Daughter in law is present and reports significant difficulty over last several months, not doing well at home due to memory loss, depression and  they were unaware of this until now, plan now to be more involved.  Nursing reports diarrhea over last several days.       CODE STATUS/ADVANCE DIRECTIVES DISCUSSION:   CPR/Full code   Patient's living condition: lives alone  ALLERGIES: Cymbalta; Fentanyl; and Versed [midazolam], blood products  PAST MEDICAL HISTORY:  has a past medical history of Depression, Diabetes mellitus (H), DJD (degenerative joint disease) of knee, Hyperlipidemia, Hypertension, PONV (postoperative nausea and vomiting), and TMJ syndrome.  PAST SURGICAL HISTORY:   has a past surgical history that includes orthopedic surgery; Arthroplasty knee; and Open reduction internal fixation ankle (3/27/2013).  FAMILY HISTORY: family history is not on file.  SOCIAL HISTORY:   reports that she has quit smoking. She does not have any smokeless tobacco history on file. She reports current alcohol use. She reports that she does not use drugs.    Post Discharge Medication Reconciliation Status: discharge medications reconciled and changed, per note/orders (see AVS)    Current Outpatient Medications   Medication Sig Dispense Refill     acetaminophen (TYLENOL) 325 MG tablet Take 650 mg by mouth every 4 hours as needed for mild pain       amLODIPine (NORVASC) 10 MG tablet Take 10 mg by mouth daily       apixaban ANTICOAGULANT (ELIQUIS) 5 MG tablet Take 2.5 mg by mouth 2 times daily        atorvastatin (LIPITOR) 40 MG tablet Take 40 mg by mouth daily.       busPIRone (BUSPAR) 7.5 MG tablet Take 7.5 mg by mouth 2 times daily       cephALEXin (KEFLEX) 500 MG capsule Take 500 mg by mouth 3 times daily       escitalopram (LEXAPRO) 10 MG tablet Take 10 mg by mouth daily       insulin aspart (NOVOLOG PEN) 100 UNIT/ML pen Inject as per sliding scale:if 0 - 69 = - Refer to hypoglycemia treatment protocol;70 - 149 = 0 units No corrective insulin, administer full dose of prandial insulin if ordered;150 - 199 = 2 units;200 - 249 = 4 units;250 - 299 = 6 units;300 - 349 = 8  units;350 - 399 = 10 units;400 - 999 = 12 units and notify MD,subcutaneously with meals related to TYPE 2 DIABETES MELLITUS WITH KETOACIDOSIS WITHOUT COMA (E11.10)       insulin aspart (NOVOLOG PEN) 100 UNIT/ML pen Inject 4 unit subcutaneously before meals related to TYPE 1 DIABETES MELLITUS WITHOUT COMPLICATIONS (E10.9)       insulin detemir (LEVEMIR PEN) 100 UNIT/ML pen Inject 16 Units Subcutaneous 2 times daily       insulin glargine (BASAGLAR KWIKPEN) 100 UNIT/ML pen INJECT 16 UNITS SUBCUTANEOUSLY TWICE DAILY *THERAPEUTIC INTERCHANGE FOR: LEVEMIR*       insulin lispro (ADMELOG SOLOSTAR) 100 UNIT/ML pen INJECT 4 UNITS SUBCUTANEOUSLY THREE TIMES DAILY BEFORE MEALS *THERAPEUTIC INTERCHANGE FOR: NVOLOG*;INJECT 0-12 UNITS SUBCUTANEOUSLY THREE TI       QUEtiapine (SEROQUEL) 25 MG tablet Take 25 mg by mouth daily       Skin Protectants, Misc. (EUCERIN) cream Apply topically daily       venlafaxine (EFFEXOR-XR) 150 MG 24 hr capsule Take 150 mg by mouth daily       aspirin 81 MG tablet Take  by mouth daily.       blood glucose monitoring (NO BRAND SPECIFIED) test strip Dispense item covered by pt ins. E10.9 IDDM type I - Test 5 times/day. Reason: before meals       busPIRone (BUSPAR) 10 MG tablet Take 10 mg by mouth       calcium-vitamin D (CALTRATE) 600-400 MG-UNIT per tablet Take 1 tablet by mouth 2 times daily.       Cholecalciferol (VITAMIN D-3 PO)        Citalopram Hydrobromide (CELEXA PO) Take 40 mg by mouth daily.       diclofenac (VOLTAREN) 1 % GEL topical gel Apply 1 gram to lower back twice per day as needed       fish oil-omega-3 fatty acids 1000 MG capsule Take 1 g by mouth daily.       FLUoxetine (PROZAC) 40 MG capsule Take 40 mg by mouth       Glucosamine Sulfate 1000 MG TABS Take  by mouth daily.       HYDROcodone-acetaminophen 5-325 MG per tablet Take 1-2 tablets by mouth every 4 hours as needed for other (Moderate to Severe Pain). (Patient not taking: Reported on 6/13/2018) 60 tablet 0     ibuprofen  (ADVIL,MOTRIN) 200 MG tablet Take 200 mg by mouth every 4 hours as needed.       insulin aspart (NOVOLOG FLEXPEN) 100 UNIT/ML injection        insulin glargine (LANTUS) 100 UNIT/ML injection Inject 40 Units Subcutaneous every morning.       insulin lispro (HUMALOG KWIKPEN 100 UNIT/ML SC SOLN) 100 UNIT/ML injection Inject  Subcutaneous 4 times daily (with meals and nightly).       lisinopril (PRINIVIL,ZESTRIL) 10 MG tablet Take 10 mg by mouth daily.       Multiple Vitamins-Minerals (CENTRUM SILVER) per tablet Take 1 tablet by mouth       multivitamin, therapeutic with minerals (MULTI-VITAMIN) TABS Take 1 tablet by mouth daily.       Omega-3 Fatty Acids (FISH OIL PO) Take 2 capsules by mouth       ORDER FOR DME [employer]. Patient can use for 6 months (Patient not taking: Reported on 6/13/2018) 1 each 0     tretinoin (RETIN-A) 0.025 % cream USE A PEA SIZE AMOUNT AS NEEDED NIGHTLY  0     ROS:  10 point ROS of systems including Constitutional, Eyes, Respiratory, Cardiovascular, Gastroenterology, Genitourinary, Integumentary, Musculoskeletal, Psychiatric were all negative except for pertinent positives noted in my HPI.    Vitals:  BP (!) 140/69   Pulse 82   Temp 98.3  F (36.8  C)   Resp 15   SpO2 92%   Exam:  GENERAL APPEARANCE:  Alert, in no acute distress , sitting easily in recliner  HEAD:  Normal, normocephalic, atraumatic  EYE EXAM: normal external eye, conjunctiva, lids, MICHEL  NECK EXAM: supple, no JVD  CHEST/RESP:  respiratory effort normal, lung sounds CTA  , no respiratory distress  CV:  Rate reg, rhythm reg, no murmur, trace peripheral edema bilateral feet  M/S:   extremities normal, gait normal-weak, unsteady, normal muscle tone, and range of motion normal   SKIN EXAM:     L lateral ankle- superficial partially scabbed, 50% slough covered with no surrounding erythema, no drainage, about 1 cm in diameter    L lateral heel- mild blanchable redness with tenderness     R lateral ankle- superficial,  partially scabbed, 50% slough covered with no surrounding erythema, no drainage, about 1 cm in diameter    R lateral heel- mild blanchable redness with no tenderness    L groin- small 0.2 cm round open area with slough in the wound bed, dry skin and scratches surrounding.   NEUROLOGIC EXAM: Normal gross motor movement, tone and coordination. No tremor. Cranial nerves 2-12 are normal tested and grossly at patient's baseline  PSYCH:  Alert and oriented to self and family with forgetfulness, affect pleasant      Lab/Diagnostic data:  Recent labs in [facility] reviewed by me today. and Care Everywhere     ASSESSMENT/PLAN:  Diabetic ketoacidosis with coma associated with type 1 diabetes mellitus (H)  Type 1 diabetes mellitus with hyperglycemia (H)  Patient with longstanding history of DM, reported as DM1 with chronic insulin use, reports she developed DM in her 40's.  Recently hospitalized with DKA, blood sugar over 600 on admission, she was comatose and intubated x6 days.  Per review of Care Everywhere, has elevated A1C consistently over last several years (since 2017), trending about 10-11%, indicating marginal blood sugar control.  Currently on levemir BID and aspart meal time replacement as well as sliding scale. Blood sugar since admission 130-140.  Her insulin has been titrated over last months, per review of Care Everywhere,  Most recently at 36 unit(s) daily in am, now 16 unit(s) bid.   -monitor blood sugar closely, titrate insulin for goal A1C of 7-8%  -diabetic education re diet    Staphylococcal sepsis (H)  Developed sepsis, has x3 days of po cephalexin to complete course of treatment, afebrile today and no new symptoms     PATRICIA (acute kidney injury) (H)  Baseline creat 0.8-0.9.  Elevated to 3.55 on hospital admission, thought due to sepsis and DKA.  Resolved with treatment.   -Avoid nephrotoxic medications  -Renal dosing of medications  -monitor kidney function 1/13/20      NSTEMI (non-ST elevated myocardial  infarction) (H)  Hospital records indicate troponin elevation to 9.06 on 12/29/19, thought to be stress/demand ischemia. TTE on 12/29 with normal EF and no wall motion abnormalities.   -consider cardiology consult with lexiscan myoview as outpatient     PAF (paroxysmal atrial fibrillation) (H)  Long term current use of anticoagulant therapy  Has been on apixaban for years, no evidence of afib during most recent hospital stay.  No elevated HR today.  Stable. Continue     Acute encephalopathy  Cognitive decline  Patient with reports of memory loss over last months, has been seen by her PCP, neurology.  CT head with no acute changes and some mild changes consistent with age.  Neurology notes, from [facility], not available to me.  She was to undergo neuropsych testing but failed to show up for this appointment.  She was started on quetiapine during most recent hospital stay due to significant encephalopathy. Today, she is alert, but quite forgetful, able to make her needs known.  Per review of Care Everywhere, she had SLUMS at her PCP clinic on 10/3/19 which was 25/30.  Family reports her friends have been worried about how she is doing at home over last months.   -OT and ST for cognitive testing  -family to make follow up appointment for neuropsych testing     Personal history of fall  Closed Colles' fracture of left radius with routine healing, subsequent encounter  Originally fell on 9/24/19 with fracture to L wrist and was treated with cast/conservatively.  She then fell again on 12/26/19 on the ice, causing re-fracture of the L wrist.  She has had follow up with Dr. Alex, ortho, and is due for follow up with him this week for hard cast placement.  Family is considering moving her ortho care closer to [facility] for ease of transport.     CELIA (Generalized anxiety disorder)  Depression, unspecified depression type  Long standing history of depression, anxiety. Has been followed closely by psychiatry at her primary  care clinic.  Is on lexapro, venlafaxine, buspar at baseline.  She exhibits no anxiety today.  Family is concerned that falling could be related to use of these medications. She reports she is not feeling anxious today, does endorse some depression.   -complete PHQ9  -consider titration of medications during TCU stay    Essential hypertension  Patient with known history of hypertension, on amlodipine, atorvastatin. The current medical regimen is effective;  continue present plan and medications.     bilateral lateral malleolus pressure injury  Lateral heel redness  Bilateral lateral malleolus with small likely pressure related wounds.  She was intubated and in ICU for 6 days during most recent hospital stay-this is likely contributory.  The wounds are clean, partially scabbed and depth is not able to be evaluated completed, but appears to be somewhat superficial.    -mepilex to these areas    Also noted to have bilateral areas of blanchable redness with tenderness on the L lateral heel-unclear etiology for these, could certainly be pressure related but unable to determine now  -monitor, skin prep to these areas    Wound of left groin, initial encounter  Noted small round slough covered open area in the L groin with surrounding dry skin/scratch marks on the left upper thigh. Again, unclear etiology for this, suspect simply irritation.   -mupirocin and dressing til resolved        transcribed by : Annmarie Miller  1. Bilateral Lateral heel skin care BID   - Skin prep red blanchable skin on lateral aspect of each heel  2. Bilateral lateral malleolus open areas every 3 days + prn   -Cleanse    - apply mepilex   3. Left groin small wound in groin fold daily skin care   -cleanse   -Apply mupirocin 2% ointment scant amount   -cover with prim pore  4. Eucerin cream or covered alternative to Right thigh + any areas of dry skin daily. Dx: Xerosis  5. Lab draw 1/13/20   BMP. Dx: HTN   CBC. Dx: Anemia  6. Diagnosis  clarified on OSR  7. Speech therapy to eval + treat.   8.  Imodium 2 mg QID prn po for diarrhea    Total time spent with patient visit at the skilled nursing facility was 40 min including patient visit, review of past records and in-person discussion with family who is present today. Greater than 50% of total time spent with counseling and coordinating care due to multiple medical comorbid conditions and significant risk for complications, as well as complicated course during most recent hospital stay, concerns re planning ortho, neurology follow up, concerns re ongoing living situation and need for additional services upon discharge, discharge planning with family.     Electronically signed by:  VANDANA Mckinley CNP

## 2020-01-10 ENCOUNTER — HOSPITAL LABORATORY (OUTPATIENT)
Facility: OTHER | Age: 75
End: 2020-01-10

## 2020-01-10 RX ORDER — LOPERAMIDE HCL 2 MG
2 CAPSULE ORAL 4 TIMES DAILY PRN
COMMUNITY
End: 2020-01-13

## 2020-01-11 ENCOUNTER — HOSPITAL LABORATORY (OUTPATIENT)
Facility: OTHER | Age: 75
End: 2020-01-11

## 2020-01-11 LAB
C DIFF TOX B STL QL: NEGATIVE
SPECIMEN SOURCE: NORMAL

## 2020-01-12 VITALS
DIASTOLIC BLOOD PRESSURE: 79 MMHG | OXYGEN SATURATION: 96 % | SYSTOLIC BLOOD PRESSURE: 166 MMHG | RESPIRATION RATE: 18 BRPM | TEMPERATURE: 97.6 F | HEART RATE: 88 BPM | BODY MASS INDEX: 30.02 KG/M2 | WEIGHT: 186 LBS

## 2020-01-12 LAB — PH STL: 7.5 PH (ref 7–7.5)

## 2020-01-13 ENCOUNTER — HOSPITAL LABORATORY (OUTPATIENT)
Facility: OTHER | Age: 75
End: 2020-01-13

## 2020-01-13 ENCOUNTER — NURSING HOME VISIT (OUTPATIENT)
Dept: GERIATRICS | Facility: CLINIC | Age: 75
End: 2020-01-13
Payer: COMMERCIAL

## 2020-01-13 DIAGNOSIS — R19.7 DIARRHEA, UNSPECIFIED TYPE: Primary | ICD-10-CM

## 2020-01-13 DIAGNOSIS — I48.0 PAF (PAROXYSMAL ATRIAL FIBRILLATION) (H): ICD-10-CM

## 2020-01-13 DIAGNOSIS — S52.532D CLOSED COLLES' FRACTURE OF LEFT RADIUS WITH ROUTINE HEALING, SUBSEQUENT ENCOUNTER: ICD-10-CM

## 2020-01-13 DIAGNOSIS — N17.9 AKI (ACUTE KIDNEY INJURY) (H): ICD-10-CM

## 2020-01-13 DIAGNOSIS — E10.65 TYPE 1 DIABETES MELLITUS WITH HYPERGLYCEMIA (H): ICD-10-CM

## 2020-01-13 DIAGNOSIS — E10.11 DIABETIC KETOACIDOSIS WITH COMA ASSOCIATED WITH TYPE 1 DIABETES MELLITUS (H): ICD-10-CM

## 2020-01-13 DIAGNOSIS — Z79.01 LONG TERM CURRENT USE OF ANTICOAGULANT THERAPY: ICD-10-CM

## 2020-01-13 DIAGNOSIS — G93.40 ACUTE ENCEPHALOPATHY: ICD-10-CM

## 2020-01-13 LAB
ANION GAP SERPL CALCULATED.3IONS-SCNC: 7 MMOL/L (ref 3–14)
BUN SERPL-MCNC: 5 MG/DL (ref 7–30)
CALCIUM SERPL-MCNC: 9 MG/DL (ref 8.5–10.1)
CHLORIDE SERPL-SCNC: 101 MMOL/L (ref 94–109)
CO2 SERPL-SCNC: 27 MMOL/L (ref 20–32)
CREAT SERPL-MCNC: 0.76 MG/DL (ref 0.52–1.04)
ERYTHROCYTE [DISTWIDTH] IN BLOOD BY AUTOMATED COUNT: 13.2 % (ref 10–15)
GFR SERPL CREATININE-BSD FRML MDRD: 77 ML/MIN/{1.73_M2}
GLUCOSE SERPL-MCNC: 282 MG/DL (ref 70–99)
HCT VFR BLD AUTO: 39 % (ref 35–47)
HGB BLD-MCNC: 12.8 G/DL (ref 11.7–15.7)
MCH RBC QN AUTO: 30.6 PG (ref 26.5–33)
MCHC RBC AUTO-ENTMCNC: 32.8 G/DL (ref 31.5–36.5)
MCV RBC AUTO: 93 FL (ref 78–100)
PLATELET # BLD AUTO: 336 10E9/L (ref 150–450)
POTASSIUM SERPL-SCNC: 3.2 MMOL/L (ref 3.4–5.3)
RBC # BLD AUTO: 4.18 10E12/L (ref 3.8–5.2)
SODIUM SERPL-SCNC: 135 MMOL/L (ref 133–144)
WBC # BLD AUTO: 5.5 10E9/L (ref 4–11)

## 2020-01-13 PROCEDURE — 99309 SBSQ NF CARE MODERATE MDM 30: CPT | Performed by: NURSE PRACTITIONER

## 2020-01-13 RX ORDER — DIPHENOXYLATE HCL/ATROPINE 2.5-.025MG
2 TABLET ORAL 4 TIMES DAILY PRN
Qty: 30 TABLET | Refills: 3 | Status: SHIPPED | OUTPATIENT
Start: 2020-01-13 | End: 2020-04-04

## 2020-01-13 RX ORDER — DIPHENOXYLATE HCL/ATROPINE 2.5-.025MG
2 TABLET ORAL 4 TIMES DAILY PRN
COMMUNITY
End: 2020-01-13

## 2020-01-13 NOTE — LETTER
1/13/2020        RE: Kimberly Stephenson  [address]  [facility] [address]        [facility]  [place] Medical Record Number:  [id number]  Place of Service where encounter took place:  [facility] ([facility] [[id number]]  Chief Complaint   Patient presents with     Nursing Home Acute       HPI:    Kimberly Stephenson  is a 74 year old (1945), who is being seen today for an episodic care visit.  HPI information obtained from: facility chart records, facility staff, patient report, [facility] chart review and family/first contact daughter in law report. Today's concern is:     Diarrhea, unspecified type  Type 1 diabetes mellitus with hyperglycemia (H)  Diabetic ketoacidosis with coma associated with type 1 diabetes mellitus (H)  PATRICIA (acute kidney injury) (H)  Acute encephalopathy  PAF (paroxysmal atrial fibrillation) (H)  Long term current use of anticoagulant therapy  Closed Colles' fracture of left radius with routine healing, subsequent encounter     Kimberly reports she has had diarrhea for several days, not improved with imodium and is causing her stomach pain, decreased appetite, poor sleep.  She is not having cramps.  Nursing reports cdiff sample negative, also note irratic blood sugar last few days.       Past Medical and Surgical History reviewed in Epic today.    MEDICATIONS:  Current Outpatient Medications   Medication Sig Dispense Refill     acetaminophen (TYLENOL) 325 MG tablet Take 650 mg by mouth every 4 hours as needed for mild pain       amLODIPine (NORVASC) 10 MG tablet Take 10 mg by mouth daily       apixaban ANTICOAGULANT (ELIQUIS) 5 MG tablet Take 2.5 mg by mouth 2 times daily        atorvastatin (LIPITOR) 40 MG tablet Take 40 mg by mouth daily.       busPIRone (BUSPAR) 7.5 MG tablet Take 7.5 mg by mouth 2 times daily       diphenoxylate-atropine (LOMOTIL) 2.5-0.025 MG tablet Take 2 tablets by mouth 4 times daily as needed for diarrhea 30 tablet 3     escitalopram  (LEXAPRO) 10 MG tablet Take 10 mg by mouth daily       insulin aspart (NOVOLOG PEN) 100 UNIT/ML pen Inject as per sliding scale:if 0 - 69 = - Refer to hypoglycemia treatment protocol;70 - 149 = 0 units No corrective insulin, administer full dose of prandial insulin if ordered;150 - 199 = 2 units;200 - 249 = 4 units;250 - 299 = 6 units;300 - 349 = 8 units;350 - 399 = 10 units;400 - 999 = 12 units and notify MD,subcutaneously with meals related to TYPE 2 DIABETES MELLITUS WITH KETOACIDOSIS WITHOUT COMA (E11.10)       insulin aspart (NOVOLOG PEN) 100 UNIT/ML pen Inject 4 unit subcutaneously before meals related to TYPE 1 DIABETES MELLITUS WITHOUT COMPLICATIONS (E10.9)       insulin detemir (LEVEMIR PEN) 100 UNIT/ML pen Inject 16 Units Subcutaneous 2 times daily       insulin lispro (ADMELOG SOLOSTAR) 100 UNIT/ML pen INJECT 4 UNITS SUBCUTANEOUSLY THREE TIMES DAILY BEFORE MEALS *THERAPEUTIC INTERCHANGE FOR: NVOLOG*;INJECT 0-12 UNITS SUBCUTANEOUSLY THREE TI       Skin Protectants, Misc. (EUCERIN) cream Apply topically daily       venlafaxine (EFFEXOR-XR) 150 MG 24 hr capsule Take 150 mg by mouth daily         REVIEW OF SYSTEMS:  4 point ROS including Respiratory, CV, GI and , other than that noted in the HPI,  is negative    Objective:  BP (!) 166/79   Pulse 88   Temp 97.6  F (36.4  C)   Resp 18   Wt 84.4 kg (186 lb)   SpO2 96%   BMI 30.02 kg/m     Exam:  GENERAL APPEARANCE:  Alert, in mild distress - disheveled and fatigued.   HEAD:  Normal, normocephalic, atraumatic  ENT:  Mouth and posterior oropharynx normal, moist mucous membranes, hearing acuity - within normal limits   EYE EXAM:  EOM, conjunctivae, lids, pupils and irises normal   CHEST/RESP:  respiratory effort normal, no respiratory distress, lung sounds CTA    CV:  Rate and rhythm reg, no murmur/rub/gallop, no peripheral edema  M/S:   extremities normal, gait normal-walking a few steps with therapy and staff, digits and nails within normal limits   SKIN:   Bilateral lateral ankles improving in size and depth, no surrounding erythema, no drainage on dressing  NEUROLOGIC EXAM: Normal gross motor movement, tone and coordination. No tremor. Cranial nerves 2-12 are normal tested and grossly at patient's baseline  PSYCH:  Alert and oriented to person-place-time , affect pleasant      Labs:     Most Recent 3 CBC's:  Recent Labs   Lab Test 01/13/20  0945 03/12/13  0547   WBC 5.5 6.4   HGB 12.8 13.3   MCV 93 93    157     Most Recent 3 BMP's:  Recent Labs   Lab Test 01/13/20  0945 03/12/13  0547    141   POTASSIUM 3.2* 4.4   CHLORIDE 101 105   CO2 27 24   BUN 5* 18   CR 0.76 0.60   ANIONGAP 7 11   SLIME 9.0 9.1   * 456*       ASSESSMENT/PLAN:  Diarrhea, unspecified type  Still having diarrhea without evidence of infection, cdiff sample negative.  Labs reassuring - K+ a bit low but likely due to diarrhea.  Imodium has not been helpful. Will try lomotil, clear liquid diet, get flat plate abdomen to rule out obstruction/ileus  - diphenoxylate-atropine (LOMOTIL) 2.5-0.025 MG tablet; Take 2 tablets by mouth 4 times daily as needed for diarrhea  -clear liquid diet  -flat plate abd xray stat  -replace K+  -recheck K+ next lab day  -monitor weight     Type 1 diabetes mellitus with hyperglycemia (H)  Diabetic ketoacidosis with coma associated with type 1 diabetes mellitus (H)  Blood sugar variable, likely due in part to poor appetite due to diarrhea.  Has had some low blood sugar.    -monitor blood sugar closely-high risk for hypoglycemia versus hyperglycemia    PATRICIA (acute kidney injury) (H)  Kidney function improved, with creat today 0.76, now within normal limits.   -resolving PATRICIA    Acute encephalopathy  Resolved, she is alert, oriented, not confused.    -discontinue seroquel-this may be contributing to diarrhea    PAF (paroxysmal atrial fibrillation) (H)  Long term current use of anticoagulant therapy  On apixaban, HR reg today, no palpitations, no new dyspnea.   Stable.     Closed Colles' fracture of left radius with routine healing, subsequent encounter  Stable, has + CMS to L fingers, doing well. Follow up with ortho is scheduled for next week       transcribed by : Annmarie Miller  1. Flat plate abdominal x ray today stat. Dx: diarrhea  2. Clear liquid diet x 12 hours after last diarrhea stool  3. Discontinue Seroquel  4. Lomotil (diphenylate/atropine) 2.5mg/0.025 mg 2 tabs QID prn po diarrhea  5. Discontinue Imodium  6. Weights 3 x this week, then resume routine weekly weights.   7. Potassium 20 mEq every day po. Dx: hypokalemia  8. Recheck Potassium on 1/15/2020.         Electronically signed by:  VANDANA Mckinley CNP               Sincerely,        VANDANA Mckinley CNP

## 2020-01-13 NOTE — PROGRESS NOTES
[place] [facility]  [place] Medical Record Number:  [id number]  Place of Service where encounter took place:  [facility] ([facility]) [[id number]]  Chief Complaint   Patient presents with     Nursing Home Acute       HPI:    Kimberly Stephenson  is a 74 year old (1945), who is being seen today for an episodic care visit.  HPI information obtained from: facility chart records, facility staff, patient report, [facility] chart review and family/first contact daughter in law report. Today's concern is:     Diarrhea, unspecified type  Type 1 diabetes mellitus with hyperglycemia (H)  Diabetic ketoacidosis with coma associated with type 1 diabetes mellitus (H)  PATRICIA (acute kidney injury) (H)  Acute encephalopathy  PAF (paroxysmal atrial fibrillation) (H)  Long term current use of anticoagulant therapy  Closed Colles' fracture of left radius with routine healing, subsequent encounter     Kimberly reports she has had diarrhea for several days, not improved with imodium and is causing her stomach pain, decreased appetite, poor sleep.  She is not having cramps.  Nursing reports cdiff sample negative, also note irratic blood sugar last few days.       Past Medical and Surgical History reviewed in Epic today.    MEDICATIONS:  Current Outpatient Medications   Medication Sig Dispense Refill     acetaminophen (TYLENOL) 325 MG tablet Take 650 mg by mouth every 4 hours as needed for mild pain       amLODIPine (NORVASC) 10 MG tablet Take 10 mg by mouth daily       apixaban ANTICOAGULANT (ELIQUIS) 5 MG tablet Take 2.5 mg by mouth 2 times daily        atorvastatin (LIPITOR) 40 MG tablet Take 40 mg by mouth daily.       busPIRone (BUSPAR) 7.5 MG tablet Take 7.5 mg by mouth 2 times daily       diphenoxylate-atropine (LOMOTIL) 2.5-0.025 MG tablet Take 2 tablets by mouth 4 times daily as needed for diarrhea 30 tablet 3     escitalopram (LEXAPRO) 10 MG tablet Take 10 mg by mouth daily       insulin aspart (NOVOLOG PEN) 100  UNIT/ML pen Inject as per sliding scale:if 0 - 69 = - Refer to hypoglycemia treatment protocol;70 - 149 = 0 units No corrective insulin, administer full dose of prandial insulin if ordered;150 - 199 = 2 units;200 - 249 = 4 units;250 - 299 = 6 units;300 - 349 = 8 units;350 - 399 = 10 units;400 - 999 = 12 units and notify MD,subcutaneously with meals related to TYPE 2 DIABETES MELLITUS WITH KETOACIDOSIS WITHOUT COMA (E11.10)       insulin aspart (NOVOLOG PEN) 100 UNIT/ML pen Inject 4 unit subcutaneously before meals related to TYPE 1 DIABETES MELLITUS WITHOUT COMPLICATIONS (E10.9)       insulin detemir (LEVEMIR PEN) 100 UNIT/ML pen Inject 16 Units Subcutaneous 2 times daily       insulin lispro (ADMELOG SOLOSTAR) 100 UNIT/ML pen INJECT 4 UNITS SUBCUTANEOUSLY THREE TIMES DAILY BEFORE MEALS *THERAPEUTIC INTERCHANGE FOR: NVOLOG*;INJECT 0-12 UNITS SUBCUTANEOUSLY THREE TI       Skin Protectants, Misc. (EUCERIN) cream Apply topically daily       venlafaxine (EFFEXOR-XR) 150 MG 24 hr capsule Take 150 mg by mouth daily         REVIEW OF SYSTEMS:  4 point ROS including Respiratory, CV, GI and , other than that noted in the HPI,  is negative    Objective:  BP (!) 166/79   Pulse 88   Temp 97.6  F (36.4  C)   Resp 18   Wt 84.4 kg (186 lb)   SpO2 96%   BMI 30.02 kg/m    Exam:  GENERAL APPEARANCE:  Alert, in mild distress - disheveled and fatigued.   HEAD:  Normal, normocephalic, atraumatic  ENT:  Mouth and posterior oropharynx normal, moist mucous membranes, hearing acuity - within normal limits   EYE EXAM:  EOM, conjunctivae, lids, pupils and irises normal   CHEST/RESP:  respiratory effort normal, no respiratory distress, lung sounds CTA    CV:  Rate and rhythm reg, no murmur/rub/gallop, no peripheral edema  M/S:   extremities normal, gait normal-walking a few steps with therapy and staff, digits and nails within normal limits   SKIN:  Bilateral lateral ankles improving in size and depth, no surrounding erythema, no  drainage on dressing  NEUROLOGIC EXAM: Normal gross motor movement, tone and coordination. No tremor. Cranial nerves 2-12 are normal tested and grossly at patient's baseline  PSYCH:  Alert and oriented to person-place-time , affect pleasant      Labs:     Most Recent 3 CBC's:  Recent Labs   Lab Test 01/13/20  0945 03/12/13  0547   WBC 5.5 6.4   HGB 12.8 13.3   MCV 93 93    157     Most Recent 3 BMP's:  Recent Labs   Lab Test 01/13/20  0945 03/12/13  0547    141   POTASSIUM 3.2* 4.4   CHLORIDE 101 105   CO2 27 24   BUN 5* 18   CR 0.76 0.60   ANIONGAP 7 11   SLIME 9.0 9.1   * 456*       ASSESSMENT/PLAN:  Diarrhea, unspecified type  Still having diarrhea without evidence of infection, cdiff sample negative.  Labs reassuring - K+ a bit low but likely due to diarrhea.  Imodium has not been helpful. Will try lomotil, clear liquid diet, get flat plate abdomen to rule out obstruction/ileus  - diphenoxylate-atropine (LOMOTIL) 2.5-0.025 MG tablet; Take 2 tablets by mouth 4 times daily as needed for diarrhea  -clear liquid diet  -flat plate abd xray stat  -replace K+  -recheck K+ next lab day  -monitor weight     Type 1 diabetes mellitus with hyperglycemia (H)  Diabetic ketoacidosis with coma associated with type 1 diabetes mellitus (H)  Blood sugar variable, likely due in part to poor appetite due to diarrhea.  Has had some low blood sugar.    -monitor blood sugar closely-high risk for hypoglycemia versus hyperglycemia    PATRICIA (acute kidney injury) (H)  Kidney function improved, with creat today 0.76, now within normal limits.   -resolving PATRICIA    Acute encephalopathy  Resolved, she is alert, oriented, not confused.    -discontinue seroquel-this may be contributing to diarrhea    PAF (paroxysmal atrial fibrillation) (H)  Long term current use of anticoagulant therapy  On apixaban, HR reg today, no palpitations, no new dyspnea.  Stable.     Closed Colles' fracture of left radius with routine healing, subsequent  encounter  Stable, has + CMS to L fingers, doing well. Follow up with ortho is scheduled for next week       transcribed by : Annmarie Miller  1. Flat plate abdominal x ray today stat. Dx: diarrhea  2. Clear liquid diet x 12 hours after last diarrhea stool  3. Discontinue Seroquel  4. Lomotil (diphenylate/atropine) 2.5mg/0.025 mg 2 tabs QID prn po diarrhea  5. Discontinue Imodium  6. Weights 3 x this week, then resume routine weekly weights.   7. Potassium 20 mEq every day po. Dx: hypokalemia  8. Recheck Potassium on 1/15/2020.         Electronically signed by:  VANDANA Mkcinley CNP

## 2020-01-15 ENCOUNTER — HOSPITAL LABORATORY (OUTPATIENT)
Facility: OTHER | Age: 75
End: 2020-01-15

## 2020-01-15 ENCOUNTER — NURSING HOME VISIT (OUTPATIENT)
Dept: GERIATRICS | Facility: CLINIC | Age: 75
End: 2020-01-15
Payer: COMMERCIAL

## 2020-01-15 VITALS
TEMPERATURE: 97.4 F | DIASTOLIC BLOOD PRESSURE: 73 MMHG | WEIGHT: 186 LBS | OXYGEN SATURATION: 96 % | HEART RATE: 85 BPM | SYSTOLIC BLOOD PRESSURE: 139 MMHG | RESPIRATION RATE: 18 BRPM | BODY MASS INDEX: 30.02 KG/M2

## 2020-01-15 DIAGNOSIS — G93.40 ACUTE ENCEPHALOPATHY: ICD-10-CM

## 2020-01-15 DIAGNOSIS — E10.65 TYPE 1 DIABETES MELLITUS WITH HYPERGLYCEMIA (H): ICD-10-CM

## 2020-01-15 DIAGNOSIS — E10.11 DIABETIC KETOACIDOSIS WITH COMA ASSOCIATED WITH TYPE 1 DIABETES MELLITUS (H): ICD-10-CM

## 2020-01-15 DIAGNOSIS — R41.89 COGNITIVE DECLINE: ICD-10-CM

## 2020-01-15 DIAGNOSIS — R19.7 DIARRHEA, UNSPECIFIED TYPE: Primary | ICD-10-CM

## 2020-01-15 LAB — POTASSIUM SERPL-SCNC: 3.6 MMOL/L (ref 3.4–5.3)

## 2020-01-15 PROCEDURE — 99309 SBSQ NF CARE MODERATE MDM 30: CPT | Performed by: NURSE PRACTITIONER

## 2020-01-15 RX ORDER — POTASSIUM CHLORIDE 1.5 G/1.58G
20 POWDER, FOR SOLUTION ORAL EVERY MORNING
COMMUNITY
End: 2021-03-09

## 2020-01-15 NOTE — LETTER
1/15/2020        RE: Kimberly Stephenson  [address]  [facility] [address]        [place] [facility]  [place] Medical Record Number:  [id number]  Place of Service where encounter took place:  [facility] ([facility] [[id number]]  Chief Complaint   Patient presents with     Nursing Home Acute       HPI:    Kimberly Stephenson  is a 74 year old (1945), who is being seen today for an episodic care visit.  HPI information obtained from: facility chart records, facility staff, patient report and [facility] chart review. Today's concern is:     Diarrhea, unspecified type  Type 1 diabetes mellitus with hyperglycemia (H)  Diabetic ketoacidosis with coma associated with type 1 diabetes mellitus (H)  Acute encephalopathy  Cognitive decline     Kimberly reports her diarrhea has improved, and she is generally feeling better. She is sleeping well.  Nursing reports no new concerns .       Past Medical and Surgical History reviewed in Epic today.    MEDICATIONS:  Current Outpatient Medications   Medication Sig Dispense Refill     acetaminophen (TYLENOL) 325 MG tablet Take 650 mg by mouth every 4 hours as needed for mild pain       amLODIPine (NORVASC) 10 MG tablet Take 10 mg by mouth daily       apixaban ANTICOAGULANT (ELIQUIS) 5 MG tablet Take 2.5 mg by mouth 2 times daily        atorvastatin (LIPITOR) 40 MG tablet Take 40 mg by mouth daily.       busPIRone (BUSPAR) 7.5 MG tablet Take 7.5 mg by mouth 2 times daily       diphenoxylate-atropine (LOMOTIL) 2.5-0.025 MG tablet Take 2 tablets by mouth 4 times daily as needed for diarrhea 30 tablet 3     escitalopram (LEXAPRO) 10 MG tablet Take 10 mg by mouth daily       insulin aspart (NOVOLOG PEN) 100 UNIT/ML pen Inject as per sliding scale:if 0 - 69 = - Refer to hypoglycemia treatment protocol;70 - 149 = 0 units No corrective insulin, administer full dose of prandial insulin if ordered;150 - 199 = 2 units;200 - 249 = 4 units;250 - 299 = 6 units;300 - 349 = 8  units;350 - 399 = 10 units;400 - 999 = 12 units and notify MD,subcutaneously with meals related to TYPE 2 DIABETES MELLITUS WITH KETOACIDOSIS WITHOUT COMA (E11.10)       insulin aspart (NOVOLOG PEN) 100 UNIT/ML pen Inject 4 unit subcutaneously before meals related to TYPE 1 DIABETES MELLITUS WITHOUT COMPLICATIONS (E10.9)       [START ON 1/16/2020] insulin glargine (LANTUS PEN) 100 UNIT/ML pen Inject 30 Units Subcutaneous every morning       insulin lispro (ADMELOG SOLOSTAR) 100 UNIT/ML pen INJECT 4 UNITS SUBCUTANEOUSLY THREE TIMES DAILY BEFORE MEALS *THERAPEUTIC INTERCHANGE FOR: NVOLOG*;INJECT 0-12 UNITS SUBCUTANEOUSLY THREE TI       potassium chloride (KLOR-CON) 20 MEQ packet Take 20 mEq by mouth daily       Skin Protectants, Misc. (EUCERIN) cream Apply topically daily       venlafaxine (EFFEXOR-XR) 150 MG 24 hr capsule Take 150 mg by mouth daily       insulin detemir (LEVEMIR PEN) 100 UNIT/ML pen Inject 16 Units Subcutaneous 2 times daily       REVIEW OF SYSTEMS:  4 point ROS including Respiratory, CV, GI and , other than that noted in the HPI,  is negative    Objective:  /73   Pulse 85   Temp 97.4  F (36.3  C)   Resp 18   Wt 84.4 kg (186 lb)   SpO2 96%   BMI 30.02 kg/m     Exam:  GENERAL APPEARANCE:  Alert, in no distress   HEAD:  Normal, normocephalic, atraumatic  ENT:  Mouth and posterior oropharynx normal, moist mucous membranes, hearing acuity - within normal limits   EYE EXAM:  EOM, conjunctivae, lids, pupils and irises normal   CHEST/RESP:  respiratory effort normal, no respiratory distress, lung sounds CTA    CV:  Rate and rhythm reg, no murmur/rub/gallop, no peripheral edema  M/S:   extremities normal, gait normal-walking independently without device, digits and nails within normal limits   NEUROLOGIC EXAM: Normal gross motor movement, tone and coordination. No tremor. Cranial nerves 2-12 are normal tested and grossly at patient's baseline  PSYCH:  Alert and oriented to person-place-time  with mild forgetfulness, affect pleasant and appropriate      Labs:   Potassium   Date Value Ref Range Status   01/15/2020 3.6 3.4 - 5.3 mmol/L Final        ASSESSMENT/PLAN:  Diarrhea, unspecified type  Improved, eating better, feeling better.  Resolved.     Type 1 diabetes mellitus with hyperglycemia (H)  Diabetic ketoacidosis with coma associated with type 1 diabetes mellitus (H)  Fasting blood sugars trending 120 - 450, on levemir and aspart meal time and sliding scale. PTA, she was living alone and was taking long acting insulin once daily.  She was reportedly having difficulty with remembering the details of her activities, question medication compliance with most recent A1C 11.3%.  She is going to need close follow up upon discharge, but simplifying regimen may be helpful for her.   -change levemir to glargine for once daily dosing  -continue to monitor sliding scale and meal time replacement-adjusting prn to improve overall blood sugar control.    Acute encephalopathy  Cognitive decline  Has resolved nicely, with BIMS 14/15, SLUMS 20/30, MOCA 17/30.  Therapy is still working on cognitive testing, CPT scoring to determine next steps and best living arrangements upon discharge from TCU.  -continue cognitive testing      transcribed by : Annmarie Miller  1. Discontinue Levemir 16 units bid  2. Starting 1/16/20 am, give Glargine 30 units subcutaneous every morning. Dx: DM2  3. Labs 1/22/20   BMP. Dx: HTN      Electronically signed by:  VANDANA Mckinley CNP               Sincerely,        VANDANA Mckinley CNP

## 2020-01-15 NOTE — PROGRESS NOTES
[place] [facility]  [place] Medical Record Number:  [id number]  Place of Service where encounter took place:  [facility] ([facility] [[id number]]  Chief Complaint   Patient presents with     Nursing Home Acute       HPI:    Kimberly Stephenson  is a 74 year old (1945), who is being seen today for an episodic care visit.  HPI information obtained from: facility chart records, facility staff, patient report and [facility] chart review. Today's concern is:     Diarrhea, unspecified type  Type 1 diabetes mellitus with hyperglycemia (H)  Diabetic ketoacidosis with coma associated with type 1 diabetes mellitus (H)  Acute encephalopathy  Cognitive decline     Kimberly reports her diarrhea has improved, and she is generally feeling better. She is sleeping well.  Nursing reports no new concerns .       Past Medical and Surgical History reviewed in Epic today.    MEDICATIONS:  Current Outpatient Medications   Medication Sig Dispense Refill     acetaminophen (TYLENOL) 325 MG tablet Take 650 mg by mouth every 4 hours as needed for mild pain       amLODIPine (NORVASC) 10 MG tablet Take 10 mg by mouth daily       apixaban ANTICOAGULANT (ELIQUIS) 5 MG tablet Take 2.5 mg by mouth 2 times daily        atorvastatin (LIPITOR) 40 MG tablet Take 40 mg by mouth daily.       busPIRone (BUSPAR) 7.5 MG tablet Take 7.5 mg by mouth 2 times daily       diphenoxylate-atropine (LOMOTIL) 2.5-0.025 MG tablet Take 2 tablets by mouth 4 times daily as needed for diarrhea 30 tablet 3     escitalopram (LEXAPRO) 10 MG tablet Take 10 mg by mouth daily       insulin aspart (NOVOLOG PEN) 100 UNIT/ML pen Inject as per sliding scale:if 0 - 69 = - Refer to hypoglycemia treatment protocol;70 - 149 = 0 units No corrective insulin, administer full dose of prandial insulin if ordered;150 - 199 = 2 units;200 - 249 = 4 units;250 - 299 = 6 units;300 - 349 = 8 units;350 - 399 = 10 units;400 - 999 = 12 units and notify MD,subcutaneously with meals  related to TYPE 2 DIABETES MELLITUS WITH KETOACIDOSIS WITHOUT COMA (E11.10)       insulin aspart (NOVOLOG PEN) 100 UNIT/ML pen Inject 4 unit subcutaneously before meals related to TYPE 1 DIABETES MELLITUS WITHOUT COMPLICATIONS (E10.9)       [START ON 1/16/2020] insulin glargine (LANTUS PEN) 100 UNIT/ML pen Inject 30 Units Subcutaneous every morning       insulin lispro (ADMELOG SOLOSTAR) 100 UNIT/ML pen INJECT 4 UNITS SUBCUTANEOUSLY THREE TIMES DAILY BEFORE MEALS *THERAPEUTIC INTERCHANGE FOR: NVOLOG*;INJECT 0-12 UNITS SUBCUTANEOUSLY THREE TI       potassium chloride (KLOR-CON) 20 MEQ packet Take 20 mEq by mouth daily       Skin Protectants, Misc. (EUCERIN) cream Apply topically daily       venlafaxine (EFFEXOR-XR) 150 MG 24 hr capsule Take 150 mg by mouth daily       insulin detemir (LEVEMIR PEN) 100 UNIT/ML pen Inject 16 Units Subcutaneous 2 times daily       REVIEW OF SYSTEMS:  4 point ROS including Respiratory, CV, GI and , other than that noted in the HPI,  is negative    Objective:  /73   Pulse 85   Temp 97.4  F (36.3  C)   Resp 18   Wt 84.4 kg (186 lb)   SpO2 96%   BMI 30.02 kg/m    Exam:  GENERAL APPEARANCE:  Alert, in no distress   HEAD:  Normal, normocephalic, atraumatic  ENT:  Mouth and posterior oropharynx normal, moist mucous membranes, hearing acuity - within normal limits   EYE EXAM:  EOM, conjunctivae, lids, pupils and irises normal   CHEST/RESP:  respiratory effort normal, no respiratory distress, lung sounds CTA    CV:  Rate and rhythm reg, no murmur/rub/gallop, no peripheral edema  M/S:   extremities normal, gait normal-walking independently without device, digits and nails within normal limits   NEUROLOGIC EXAM: Normal gross motor movement, tone and coordination. No tremor. Cranial nerves 2-12 are normal tested and grossly at patient's baseline  PSYCH:  Alert and oriented to person-place-time with mild forgetfulness, affect pleasant and appropriate      Labs:   Potassium   Date Value  Ref Range Status   01/15/2020 3.6 3.4 - 5.3 mmol/L Final        ASSESSMENT/PLAN:  Diarrhea, unspecified type  Improved, eating better, feeling better.  Resolved.     Type 1 diabetes mellitus with hyperglycemia (H)  Diabetic ketoacidosis with coma associated with type 1 diabetes mellitus (H)  Fasting blood sugars trending 120 - 450, on levemir and aspart meal time and sliding scale. PTA, she was living alone and was taking long acting insulin once daily.  She was reportedly having difficulty with remembering the details of her activities, question medication compliance with most recent A1C 11.3%.  She is going to need close follow up upon discharge, but simplifying regimen may be helpful for her.   -change levemir to glargine for once daily dosing  -continue to monitor sliding scale and meal time replacement-adjusting prn to improve overall blood sugar control.    Acute encephalopathy  Cognitive decline  Has resolved nicely, with BIMS 14/15, SLUMS 20/30, MOCA 17/30.  Therapy is still working on cognitive testing, CPT scoring to determine next steps and best living arrangements upon discharge from TCU.  -continue cognitive testing      transcribed by : Annmarie Miller  1. Discontinue Levemir 16 units bid  2. Starting 1/16/20 am, give Glargine 30 units subcutaneous every morning. Dx: DM2  3. Labs 1/22/20   BMP. Dx: HTN      Electronically signed by:  VANDANA Mckinley CNP

## 2020-01-16 ENCOUNTER — TELEPHONE (OUTPATIENT)
Dept: GERIATRICS | Facility: CLINIC | Age: 75
End: 2020-01-16

## 2020-01-17 NOTE — TELEPHONE ENCOUNTER
SONIDO Smith called today about hyperglycemia.    She reports Mrs. Stephenson (whom is a DM I) had a lower glucose of 88 at 1710:   Thus they held they planned 4 units Aspart scheduled and she got NO correction scale.   She did end up eating dinner around 17:30.    Now at 2000 glucose is 472, she does not have at bedtime correction scale ordered.    EMR notes ongoing erratic PO intake and DM I issues, been a few changes lately.     RN did report she did get her 30 units of Glargine in the AM today.   RN reported glucoses have been ongoing erratic.   RN does not think she will eat more tonight.    Plan:  -Give 6 units of Aspart x1 now. (20:10)  -Recheck Glucose at 12/midnight.   --If Gluc <150 or >300 call the on call tonight.

## 2020-01-20 VITALS
OXYGEN SATURATION: 93 % | SYSTOLIC BLOOD PRESSURE: 163 MMHG | RESPIRATION RATE: 18 BRPM | TEMPERATURE: 97.6 F | HEART RATE: 90 BPM | DIASTOLIC BLOOD PRESSURE: 84 MMHG | WEIGHT: 183 LBS | BODY MASS INDEX: 29.54 KG/M2

## 2020-01-21 ENCOUNTER — NURSING HOME VISIT (OUTPATIENT)
Dept: GERIATRICS | Facility: CLINIC | Age: 75
End: 2020-01-21
Payer: COMMERCIAL

## 2020-01-21 VITALS
TEMPERATURE: 97.6 F | BODY MASS INDEX: 29.54 KG/M2 | WEIGHT: 183 LBS | RESPIRATION RATE: 18 BRPM | OXYGEN SATURATION: 93 % | SYSTOLIC BLOOD PRESSURE: 163 MMHG | DIASTOLIC BLOOD PRESSURE: 84 MMHG | HEART RATE: 90 BPM

## 2020-01-21 DIAGNOSIS — R41.89 COGNITIVE DECLINE: ICD-10-CM

## 2020-01-21 DIAGNOSIS — I48.0 PAF (PAROXYSMAL ATRIAL FIBRILLATION) (H): ICD-10-CM

## 2020-01-21 DIAGNOSIS — I21.4 NSTEMI (NON-ST ELEVATED MYOCARDIAL INFARCTION) (H): ICD-10-CM

## 2020-01-21 DIAGNOSIS — E10.65 TYPE 1 DIABETES MELLITUS WITH HYPERGLYCEMIA (H): ICD-10-CM

## 2020-01-21 DIAGNOSIS — S81.809D OPEN WOUND OF KNEE, LEG, AND ANKLE, UNSPECIFIED LATERALITY, SUBSEQUENT ENCOUNTER: ICD-10-CM

## 2020-01-21 DIAGNOSIS — F32.5 MAJOR DEPRESSIVE DISORDER WITH SINGLE EPISODE, IN FULL REMISSION (H): ICD-10-CM

## 2020-01-21 DIAGNOSIS — S52.532D CLOSED COLLES' FRACTURE OF LEFT RADIUS WITH ROUTINE HEALING, SUBSEQUENT ENCOUNTER: ICD-10-CM

## 2020-01-21 DIAGNOSIS — S91.009D OPEN WOUND OF KNEE, LEG, AND ANKLE, UNSPECIFIED LATERALITY, SUBSEQUENT ENCOUNTER: ICD-10-CM

## 2020-01-21 DIAGNOSIS — S81.009D OPEN WOUND OF KNEE, LEG, AND ANKLE, UNSPECIFIED LATERALITY, SUBSEQUENT ENCOUNTER: ICD-10-CM

## 2020-01-21 DIAGNOSIS — I10 ESSENTIAL HYPERTENSION: ICD-10-CM

## 2020-01-21 DIAGNOSIS — Z79.01 LONG TERM CURRENT USE OF ANTICOAGULANT THERAPY: ICD-10-CM

## 2020-01-21 DIAGNOSIS — E10.11 DIABETIC KETOACIDOSIS WITH COMA ASSOCIATED WITH TYPE 1 DIABETES MELLITUS (H): Primary | ICD-10-CM

## 2020-01-21 DIAGNOSIS — F41.1 GAD (GENERALIZED ANXIETY DISORDER): ICD-10-CM

## 2020-01-21 PROCEDURE — 99306 1ST NF CARE HIGH MDM 50: CPT | Performed by: FAMILY MEDICINE

## 2020-01-21 NOTE — LETTER
1/21/2020        RE: Kimberly Stephenson  [address]  [facility] [address]        [place] [facility]  PRIMARY CARE PROVIDER AND CLINIC:  Sherri Fitzgerald MD, None  Chief Complaint   Patient presents with     Hospital F/U     [facility] Medical Record Number:  [id number]  Place of Service where encounter took place:  [facility] [[id number]]    Kimberly Stephenson  is a 74 year old  (1945), admitted to the above facility from  [facility] . Hospital stay 12/28/19 through 1/8/20..  Admitted to this facility for  rehab, medical management and nursing care.    HPI:    HPI information obtained from: facility staff, patient's daughter in law and from the patient's limited report, [facility] chart review, Care Everywhere Epic chart review and GNP.     Brief Summary of Hospital Course:   Patient with past medical history of type 1 diabetes, A. fib on Eliquis, progressive cognitive decline,, who was found unresponsive and had uppercase admitted to the hospital with metabolic encephalopathy, required intubation, DKA, AK, treated with IV antibiotic, demand ischemia,     Updates on Status Since Skilled nursing Admission:   - Patient was seen and examined today in the presence of her daughter-in-law who reports that her mother-in-law memory prior to the last hospitalization are gone, and current memory is limited.  -Patient reported that her memory is not that great and has no recall of the events prior to the last hospitalization.  Denies pain in left hand.  Reports therapy is going well, walks independently.  Daughter-in-law reports that they are looking for couple assisted living facilities.  -Reports appetite and sleep are fine.  Daughter-in-law reports that-Ms. Stephenson will be seeing psychiatrist tomorrow.  Added that her  noticed that his mom mood is more stable since she has been taking the meds on regular basis.  Although asking if Ms. Stephenson can be taken off  1 of the medication, seems to be talking about BuSpar.    CODE STATUS/ADVANCE DIRECTIVES DISCUSSION:   CPR/Full code   Patient's living condition: lives alone  ALLERGIES: Cymbalta; Fentanyl; and Versed [midazolam]  PAST MEDICAL HISTORY:  has a past medical history of Anxiety, Depression, Diabetes mellitus (H), DJD (degenerative joint disease) of knee, Hyperlipidemia, Hypertension, PONV (postoperative nausea and vomiting), and TMJ syndrome.  PAST SURGICAL HISTORY:   has a past surgical history that includes orthopedic surgery; Arthroplasty knee; and Open reduction internal fixation ankle (3/27/2013).  FAMILY HISTORY:   Good Health Brother       Cancer Father   lung   Diabetes Father       Other Father   Lung CA   Unknown Maternal Grandfather       Unknown Maternal Grandmother       Cancer-ovarian Mother       Other Mother   stomach CA    Stroke Mother       Unknown Paternal Grandfather       Unknown Paternal Grandmother       Other Sister    at 48 yo of Myotonic dystrophy    Diabetes Son       Good Health Son       Cancer-breast No Family History         Relation Name Status Comments   Brother   Alive     Father    (Age 66)     Maternal Grandfather        Maternal Grandmother        Mother    (Age 62) cancer    Paternal Grandfather        Paternal Grandmother        Sister    (Age 49) myatonic dystrophy   Son   Alive     Son   Alive        SOCIAL HISTORY:   reports that she has quit smoking. She has never used smokeless tobacco. She reports current alcohol use. She reports that she does not use drugs.    Post Discharge Medication Reconciliation Status: discharge medications reconciled and changed, per note/orders (see AVS)  Current Outpatient Medications   Medication Sig Dispense Refill     acetaminophen (TYLENOL) 325 MG tablet Take 650 mg by mouth every 4 hours as needed for mild pain       amLODIPine (NORVASC) 10 MG tablet Take 10 mg by mouth daily        apixaban ANTICOAGULANT (ELIQUIS) 5 MG tablet Take 2.5 mg by mouth 2 times daily        atorvastatin (LIPITOR) 40 MG tablet Take 40 mg by mouth daily.       busPIRone (BUSPAR) 7.5 MG tablet Take 7.5 mg by mouth 2 times daily       diphenoxylate-atropine (LOMOTIL) 2.5-0.025 MG tablet Take 2 tablets by mouth 4 times daily as needed for diarrhea 30 tablet 3     escitalopram (LEXAPRO) 10 MG tablet Take 10 mg by mouth daily       insulin aspart (NOVOLOG PEN) 100 UNIT/ML pen Inject as per sliding scale:if 0 - 69 = - Refer to hypoglycemia treatment protocol;70 - 149 = 0 units No corrective insulin, administer full dose of prandial insulin if ordered;150 - 199 = 2 units;200 - 249 = 4 units;250 - 299 = 6 units;300 - 349 = 8 units;350 - 399 = 10 units;400 - 999 = 12 units and notify MD,subcutaneously with meals related to TYPE 2 DIABETES MELLITUS WITH KETOACIDOSIS WITHOUT COMA (E11.10)       insulin aspart (NOVOLOG PEN) 100 UNIT/ML pen Inject 4 unit subcutaneously before meals related to TYPE 1 DIABETES MELLITUS WITHOUT COMPLICATIONS (E10.9)       insulin glargine (LANTUS PEN) 100 UNIT/ML pen Inject 30 Units Subcutaneous every morning       insulin lispro (ADMELOG SOLOSTAR) 100 UNIT/ML pen INJECT 4 UNITS SUBCUTANEOUSLY THREE TIMES DAILY BEFORE MEALS *THERAPEUTIC INTERCHANGE FOR: NVOLOG*;INJECT 0-12 UNITS SUBCUTANEOUSLY THREE TI       potassium chloride (KLOR-CON) 20 MEQ packet Take 20 mEq by mouth daily       Skin Protectants, Misc. (EUCERIN) cream Apply topically daily       venlafaxine (EFFEXOR-XR) 150 MG 24 hr capsule Take 150 mg by mouth daily       ROS: Very limited due to retrograde amnesia and short term memory    Vitals:  BP (!) 163/84   Pulse 90   Temp 97.6  F (36.4  C)   Resp 18   Wt 83 kg (183 lb)   SpO2 93%   BMI 29.54 kg/m     Exam:  GENERAL APPEARANCE:  in no distress, cooperative  ENT:  Mouth and posterior oropharynx normal, moist mucous membranes, oral mucosa moist, no lesion noted.   EYES:  EOMI, Pupil  rounded and equal.  RESP:  lungs clear to auscultation   CV:  S1S2 audible, regular HR, no murmur appreciated.   ABDOMEN:  soft, NT/ND, BS audible. no mass appreciated on palpation.   M/S:   Cast over left forearm and distal neurovascular bundle intact  SKIN:  No rash.   NEURO:   No NFD appreciated on observation. Hand  5/5 b/l  PSYCH:  poor  insight, judgement and memory, affect and mood normal      Lab/Diagnostic data: Reviewed in the chart and EHR.      ASSESSMENT/PLAN:  ----------------------------------  Diabetic ketoacidosis with coma associated with type 1 diabetes mellitus (H)  Type 1 diabetes mellitus with hyperglycemia (H)  Metabolic encephalopathy, required intubation  - DKA resolved. Diabetes uncontrolled historically based on medical record. Here better.   - recommended level for this patient is around 8 given limited life expectancy.   - Continue to monitor blood glucose while at the facility    PAF (paroxysmal atrial fibrillation) (H)  Long term current use of anticoagulant therapy  - on Eliquis. CVR. Not on rate control    bilateral lateral malleolus pressure injury: , no concern today    General Weakness  Personal history of fall  Closed Colles' fracture of left radius with routine healing, subsequent encounter  - started rehab program, making a progress, continue until desired goal is achieved.   - analgesia optimal    Progressive Memory Loss:  Hx of major depressive d/o with single episode, full remission (H)  - BIMS 14/15, SLUM 20/30, and MOCA 17/30.  Counseled daughter in law to start rebuilding up her memory, a slow process, and patience is required.   - discussed with DIL about meds Kimberly currently is on, lexapro, Buspar and venlafaxine, and advised to follow up with psychiatrist in tomorrow about the best next step of management.   - regarding amnesia, had a follow up with neurlogy. Query cerebral ischemia.     PATRICIA; Sepsis: resolved.   Demand ischemia at the hospital: stable, Cardio consult  on outpatient basis    Order: See above, otherwise, continue the rest of the current POC.     Total time spent with patient visit at the skilled nursing facility was 45 min including patient visit, review of past records and reviewing GNP's notes since admission. Greater than 50% of total time spent with counseling the daughter in law (see above), and discussing above A/P. Questions answered in detail, verbalized understanding. .     Electronically signed by:  Marifer Field MD                         Sincerely,        Marifer Field MD

## 2020-01-21 NOTE — PROGRESS NOTES
[place] [facility]  PRIMARY CARE PROVIDER AND CLINIC:  Sherri Fitzgerald MD, None  Chief Complaint   Patient presents with     Hospital F/U     [facility] Medical Record Number:  [id number]  Place of Service where encounter took place:  [facility] ([facility] [[id number]]    Kimberly Stephenson  is a 74 year old  (1945), admitted to the above facility from  [facility] . Hospital stay 12/28/19 through 1/8/20..  Admitted to this facility for  rehab, medical management and nursing care.    HPI:    HPI information obtained from: facility staff, patient's daughter in law and from the patient's limited report, [facility] chart review, Care Everywhere Epic chart review and GNP.     Brief Summary of Hospital Course:   Patient with past medical history of type 1 diabetes, A. fib on Eliquis, progressive cognitive decline,, who was found unresponsive and had uppercase admitted to the hospital with metabolic encephalopathy, required intubation, DKA, AK, treated with IV antibiotic, demand ischemia,     Updates on Status Since Skilled nursing Admission:   - Patient was seen and examined today in the presence of her daughter-in-law who reports that her mother-in-law memory prior to the last hospitalization are gone, and current memory is limited.  -Patient reported that her memory is not that great and has no recall of the events prior to the last hospitalization.  Denies pain in left hand.  Reports therapy is going well, walks independently.  Daughter-in-law reports that they are looking for couple assisted living facilities.  -Reports appetite and sleep are fine.  Daughter-in-law reports that-Ms. Stephenson will be seeing psychiatrist tomorrow.  Added that her  noticed that his mom mood is more stable since she has been taking the meds on regular basis.  Although asking if Ms. Stephenson can be taken off 1 of the medication, seems to be talking about BuSpar.    CODE STATUS/ADVANCE DIRECTIVES  DISCUSSION:   CPR/Full code   Patient's living condition: lives alone  ALLERGIES: Cymbalta; Fentanyl; and Versed [midazolam]  PAST MEDICAL HISTORY:  has a past medical history of Anxiety, Depression, Diabetes mellitus (H), DJD (degenerative joint disease) of knee, Hyperlipidemia, Hypertension, PONV (postoperative nausea and vomiting), and TMJ syndrome.  PAST SURGICAL HISTORY:   has a past surgical history that includes orthopedic surgery; Arthroplasty knee; and Open reduction internal fixation ankle (3/27/2013).  FAMILY HISTORY:   Good Health Brother       Cancer Father   lung   Diabetes Father       Other Father   Lung CA   Unknown Maternal Grandfather       Unknown Maternal Grandmother       Cancer-ovarian Mother       Other Mother   stomach CA    Stroke Mother       Unknown Paternal Grandfather       Unknown Paternal Grandmother       Other Sister    at 50 yo of Myotonic dystrophy    Diabetes Son       Good Health Son       Cancer-breast No Family History         Relation Name Status Comments   Brother   Alive     Father    (Age 66)     Maternal Grandfather        Maternal Grandmother        Mother    (Age 62) cancer    Paternal Grandfather        Paternal Grandmother        Sister    (Age 49) myatonic dystrophy   Son   Alive     Son   Alive        SOCIAL HISTORY:   reports that she has quit smoking. She has never used smokeless tobacco. She reports current alcohol use. She reports that she does not use drugs.    Post Discharge Medication Reconciliation Status: discharge medications reconciled and changed, per note/orders (see AVS)  Current Outpatient Medications   Medication Sig Dispense Refill     acetaminophen (TYLENOL) 325 MG tablet Take 650 mg by mouth every 4 hours as needed for mild pain       amLODIPine (NORVASC) 10 MG tablet Take 10 mg by mouth daily       apixaban ANTICOAGULANT (ELIQUIS) 5 MG tablet Take 2.5 mg by mouth 2 times daily         atorvastatin (LIPITOR) 40 MG tablet Take 40 mg by mouth daily.       busPIRone (BUSPAR) 7.5 MG tablet Take 7.5 mg by mouth 2 times daily       diphenoxylate-atropine (LOMOTIL) 2.5-0.025 MG tablet Take 2 tablets by mouth 4 times daily as needed for diarrhea 30 tablet 3     escitalopram (LEXAPRO) 10 MG tablet Take 10 mg by mouth daily       insulin aspart (NOVOLOG PEN) 100 UNIT/ML pen Inject as per sliding scale:if 0 - 69 = - Refer to hypoglycemia treatment protocol;70 - 149 = 0 units No corrective insulin, administer full dose of prandial insulin if ordered;150 - 199 = 2 units;200 - 249 = 4 units;250 - 299 = 6 units;300 - 349 = 8 units;350 - 399 = 10 units;400 - 999 = 12 units and notify MD,subcutaneously with meals related to TYPE 2 DIABETES MELLITUS WITH KETOACIDOSIS WITHOUT COMA (E11.10)       insulin aspart (NOVOLOG PEN) 100 UNIT/ML pen Inject 4 unit subcutaneously before meals related to TYPE 1 DIABETES MELLITUS WITHOUT COMPLICATIONS (E10.9)       insulin glargine (LANTUS PEN) 100 UNIT/ML pen Inject 30 Units Subcutaneous every morning       insulin lispro (ADMELOG SOLOSTAR) 100 UNIT/ML pen INJECT 4 UNITS SUBCUTANEOUSLY THREE TIMES DAILY BEFORE MEALS *THERAPEUTIC INTERCHANGE FOR: NVOLOG*;INJECT 0-12 UNITS SUBCUTANEOUSLY THREE TI       potassium chloride (KLOR-CON) 20 MEQ packet Take 20 mEq by mouth daily       Skin Protectants, Misc. (EUCERIN) cream Apply topically daily       venlafaxine (EFFEXOR-XR) 150 MG 24 hr capsule Take 150 mg by mouth daily       ROS: Very limited due to retrograde amnesia and short term memory    Vitals:  BP (!) 163/84   Pulse 90   Temp 97.6  F (36.4  C)   Resp 18   Wt 83 kg (183 lb)   SpO2 93%   BMI 29.54 kg/m    Exam:  GENERAL APPEARANCE:  in no distress, cooperative  ENT:  Mouth and posterior oropharynx normal, moist mucous membranes, oral mucosa moist, no lesion noted.   EYES:  EOMI, Pupil rounded and equal.  RESP:  lungs clear to auscultation   CV:  S1S2 audible, regular HR,  no murmur appreciated.   ABDOMEN:  soft, NT/ND, BS audible. no mass appreciated on palpation.   M/S:   Cast over left forearm and distal neurovascular bundle intact  SKIN:  No rash.   NEURO:   No NFD appreciated on observation. Hand  5/5 b/l  PSYCH:  poor  insight, judgement and memory, affect and mood normal      Lab/Diagnostic data: Reviewed in the chart and EHR.      ASSESSMENT/PLAN:  ----------------------------------  Diabetic ketoacidosis with coma associated with type 1 diabetes mellitus (H)  Type 1 diabetes mellitus with hyperglycemia (H)  Metabolic encephalopathy, required intubation  - DKA resolved. Diabetes uncontrolled historically based on medical record. Here better.   - recommended level for this patient is around 8 given limited life expectancy.   - Continue to monitor blood glucose while at the facility    PAF (paroxysmal atrial fibrillation) (H)  Long term current use of anticoagulant therapy  - on Eliquis. CVR. Not on rate control    bilateral lateral malleolus pressure injury: , no concern today    General Weakness  Personal history of fall  Closed Colles' fracture of left radius with routine healing, subsequent encounter  - started rehab program, making a progress, continue until desired goal is achieved.   - analgesia optimal    Progressive Memory Loss:  Hx of major depressive d/o with single episode, full remission (H)  - BIMS 14/15, SLUM 20/30, and MOCA 17/30.  Counseled daughter in law to start rebuilding up her memory, a slow process, and patience is required.   - discussed with DIL about meds Kimberly currently is on, lexapro, Buspar and venlafaxine, and advised to follow up with psychiatrist in tomorrow about the best next step of management.   - regarding amnesia, had a follow up with neurlogy. Query cerebral ischemia.     PATRICIA; Sepsis: resolved.   Demand ischemia at the hospital: stable, Cardio consult on outpatient basis    Order: See above, otherwise, continue the rest of the current  POC.     Total time spent with patient visit at the [facility] nursing facility was 45 min including patient visit, review of past records and reviewing GNP's notes since admission. Greater than 50% of total time spent with counseling the daughter in law (see above), and discussing above A/P. Questions answered in detail, verbalized understanding. .     Electronically signed by:  Marifer Field MD

## 2020-01-22 ENCOUNTER — DISCHARGE SUMMARY NURSING HOME (OUTPATIENT)
Dept: GERIATRICS | Facility: CLINIC | Age: 75
End: 2020-01-22
Payer: COMMERCIAL

## 2020-01-22 ENCOUNTER — HOSPITAL LABORATORY (OUTPATIENT)
Facility: OTHER | Age: 75
End: 2020-01-22

## 2020-01-22 DIAGNOSIS — A41.2 STAPHYLOCOCCAL SEPSIS (H): ICD-10-CM

## 2020-01-22 DIAGNOSIS — N17.9 AKI (ACUTE KIDNEY INJURY) (H): ICD-10-CM

## 2020-01-22 DIAGNOSIS — S52.532D CLOSED COLLES' FRACTURE OF LEFT RADIUS WITH ROUTINE HEALING, SUBSEQUENT ENCOUNTER: ICD-10-CM

## 2020-01-22 DIAGNOSIS — F41.1 GAD (GENERALIZED ANXIETY DISORDER): ICD-10-CM

## 2020-01-22 DIAGNOSIS — I10 ESSENTIAL HYPERTENSION: ICD-10-CM

## 2020-01-22 DIAGNOSIS — Z79.01 LONG TERM CURRENT USE OF ANTICOAGULANT THERAPY: ICD-10-CM

## 2020-01-22 DIAGNOSIS — F32.5 MAJOR DEPRESSIVE DISORDER WITH SINGLE EPISODE, IN FULL REMISSION (H): ICD-10-CM

## 2020-01-22 DIAGNOSIS — E10.11 DIABETIC KETOACIDOSIS WITH COMA ASSOCIATED WITH TYPE 1 DIABETES MELLITUS (H): Primary | ICD-10-CM

## 2020-01-22 DIAGNOSIS — G93.40 ACUTE ENCEPHALOPATHY: ICD-10-CM

## 2020-01-22 DIAGNOSIS — E10.65 TYPE 1 DIABETES MELLITUS WITH HYPERGLYCEMIA (H): ICD-10-CM

## 2020-01-22 DIAGNOSIS — I48.0 PAF (PAROXYSMAL ATRIAL FIBRILLATION) (H): ICD-10-CM

## 2020-01-22 DIAGNOSIS — R41.89 COGNITIVE DECLINE: ICD-10-CM

## 2020-01-22 DIAGNOSIS — I21.4 NSTEMI (NON-ST ELEVATED MYOCARDIAL INFARCTION) (H): ICD-10-CM

## 2020-01-22 LAB
ANION GAP SERPL CALCULATED.3IONS-SCNC: 4 MMOL/L (ref 3–14)
BUN SERPL-MCNC: 12 MG/DL (ref 7–30)
CALCIUM SERPL-MCNC: 9.5 MG/DL (ref 8.5–10.1)
CHLORIDE SERPL-SCNC: 103 MMOL/L (ref 94–109)
CO2 SERPL-SCNC: 29 MMOL/L (ref 20–32)
CREAT SERPL-MCNC: 0.71 MG/DL (ref 0.52–1.04)
GFR SERPL CREATININE-BSD FRML MDRD: 83 ML/MIN/{1.73_M2}
GLUCOSE SERPL-MCNC: 274 MG/DL (ref 70–99)
POTASSIUM SERPL-SCNC: 4.2 MMOL/L (ref 3.4–5.3)
SODIUM SERPL-SCNC: 136 MMOL/L (ref 133–144)

## 2020-01-22 PROCEDURE — 99316 NF DSCHRG MGMT 30 MIN+: CPT | Performed by: NURSE PRACTITIONER

## 2020-01-22 NOTE — LETTER
1/22/2020        RE: Kimberly Stephenson  [address]  [facility] [address]        [facility] DISCHARGE SUMMARY  PATIENT'S NAME: Kimberly Stephenson  YOB: 1945  MEDICAL RECORD NUMBER:  [id number]  Place of Service where encounter took place:  [facility] ([facility] [[id number]]    PRIMARY CARE PROVIDER AND CLINIC RESPONSIBLE AFTER TRANSFER:   Shreri Fitzgerald MD  [facility] [place]    [address] [place] [address]    [phone] (fax)   Non-[facility] Provider     Transferring providers: VANDANA Mckinley CNP, Dr. Emir MD  Recent Hospitalization/ED:  [facility]  stay 12/28/19 to 1/8/20.  Date of SNF Admission: January / 08 / 2020  Date of SNF (anticipated) Discharge: January / 23 / 2020  Discharged to: home with family  Cognitive Scores: SLUMS: 20/30, CPT: 4.9/5.6 and MOCA: 17/30  Physical Function: Douglas Balance Scale: 54/56, TUG 10 and Ambulating 500 ft with no device  DME: none    CODE STATUS/ADVANCE DIRECTIVES DISCUSSION:  Full Code   ALLERGIES: Cymbalta; Fentanyl; and Versed [midazolam]    DISCHARGE DIAGNOSIS/NURSING FACILITY COURSE:     Kimberly Stephenson has a past medical history of DM type 1 not well controlled, afib on apixaban, hypertension, HLD, depression, anxiety, history of cognitive changes recently, L radius fracture in 9/24/19 due to fall and re-injured on 12/26/19 after slipping on the ice while walking her dog.  S/he was admitted to the hospital after no one had seen/heard from her in 24 h and police were called for welfare check.  She was found down on the floor in her home and was unresponsive with blood sugar >600, hypotensive, hypothermic and found to have DKA, PATRICIA with creat to 3.55, sepsis.  She was intubated 12/28-1/2.  S/he underwent IV hydration, antibiotics.  Troponin was elevated-thought to be demand ischemia and will need cardiology follow up as outpatient.      Diabetic  ketoacidosis with coma associated with type 1 diabetes mellitus (H)  Type 1 diabetes mellitus with hyperglycemia (H)  Long standing history of DM, reported as DM1 with chronic insulin use, reports she developed this in her 40's.  Blood sugar have been somewhat difficult to control.  She is unclear how much insulin she was using at home prior to fall, and family reports they are not sure she was taking good care of herself.  Most recent A1C was 11.3% and has been trending elevated since 2017.  She is having difficulty remembering how/when to administer insulin or check blood sugars, so this will negatively impact her management at home.  In light of this difficulty with management in the past, did change her to basaglar for longer lasting control.  She does continue to receive some meal time replacement and sliding scale insulin as well  Blood sugars as follows   - 415 4 unit(s) and sliding scale 6-12 unit(s) on average  Noon 151 - 203 4 unit(s) and sliding scale 2-4 unit(s) on average  Supper 81 - 181 4 unit(s) and sliding scale 0-2 unit(s) on average   - 330 No sliding scale    -she should have ongoing diabetic education if she will be resuming control of her insulin.   -she will need ongoing close monitoring of her blood sugar and titration of medications as appropriate  -goal is for her to move to assisted living facility, at which she will likely be unable to use sliding scale insulin  -family has been instructed to monitor blood sugar closely for now in order to better calculate ongoing insulin needs    PATRICIA (acute kidney injury) (H)  Baseline creat trending 0.8-0.9.  Elevated to 3.55 on hospital admission, thought due to sepsis and DKA.  Resolved with treatment and creat has trended within normal limits since admission  -Avoid nephrotoxic medications  -Renal dosing of medications  -monitor kidney function routinely      NSTEMI (non-ST elevated myocardial infarction) (H)  Hospital records indicate  troponin elevation t0o 9.06 on 12/29/19, thought stress/demand ischemia.  TTE on 12/29/19 with normal EF and no wall motion abnormalities  -consider cardiology consult with lexiscan myoview as out patient as deemed appropriate by PCP    PAF (paroxysmal atrial fibrillation) (H)  Long term current use of anticoagulant therapy  Chronic afib, on apixaban for years.  She demonstrated no afib during most recent hospital stay.  HR remained stable during TCU stay.     Staphylococcal sepsis (H)  Resolved, no new symptoms, and has completed course of cephalexin.  No further symptoms.     Major depressive disorder with single episode, in full remission (H)  CELIA (generalized anxiety disorder)  Patient with long standing history of depression, anxiety, on lexapro, venlafaxine, buspar at baseline.  She has been followed closely by psychiatry at her primary care clinic-actually seeing him 1/22/2020 as well.  She does not appear to have anxiety, does endorse some depression and most recent PHQ9 done in TCU indicates a score of 6/27. She is on multiple medications, no changes made in TCU, but will defer to psychiatry for ongoing follow up and medication changes.     Cognitive decline  Acute encephalopathy  Patient, her family, and her neighbors have noted some memory loss over last months, has been seen by neurology and by PCP.  SLUMS at her primary care clinic in 10/2019 was 25/30.  CT head with no acute changes and some mild changes consistent with age.  Neurology follow up done at [facility], and those notes are not available to [facility].  She was to undergo neuropsych testing but failed to show up for this appointment.      She did have some delirium during her hospital stay, was started on quetiapine, and this was successfully weaned during her TCU stay to discontinuation.  She has been alert, pleasant, able to make needs known, forgetful.  BIMS 14/15, SLUMS 20/30, CPT 4.9/5.6 which indicates a need for daily check  in.  -family has been instructed to make follow up appointment for neuropsych testing as she failed to appear at last scheduled appointment    -she will be living with family for now, until assisted living facility can be found    Closed Colles' fracture of left radius with routine healing, subsequent encounter  Originally fell on 9/24/19 with fracture to L wrist, treated with a cast.  She then fell again on 12/26/19 causing re-fracture of the L wrist.  She is being followed by Dr. Alex, seeing him 1/22/2020 in follow up.      Essential hypertension  Known history of hypertension, on amlodipine, atrovastatin.  She has had some elevated BP during her TCU stay, most recent BP as follows:    -follow up with PCP and home care-may need titration of her BP medications as outpatient     Past Medical History:  has a past medical history of Anxiety, Depression, Diabetes mellitus (H), DJD (degenerative joint disease) of knee, Hyperlipidemia, Hypertension, PONV (postoperative nausea and vomiting), and TMJ syndrome.    Discharge Medications:  Current Outpatient Medications   Medication Sig Dispense Refill     acetaminophen (TYLENOL) 325 MG tablet Take 650 mg by mouth every 4 hours as needed for mild pain       amLODIPine (NORVASC) 10 MG tablet Take 10 mg by mouth daily       apixaban ANTICOAGULANT (ELIQUIS) 5 MG tablet Take 2.5 mg by mouth 2 times daily        atorvastatin (LIPITOR) 40 MG tablet Take 40 mg by mouth daily.       busPIRone (BUSPAR) 7.5 MG tablet Take 7.5 mg by mouth 2 times daily       diphenoxylate-atropine (LOMOTIL) 2.5-0.025 MG tablet Take 2 tablets by mouth 4 times daily as needed for diarrhea 30 tablet 3     escitalopram (LEXAPRO) 10 MG tablet Take 10 mg by mouth daily       insulin aspart (NOVOLOG PEN) 100 UNIT/ML pen Inject as per sliding scale:if 0 - 69 = - Refer to hypoglycemia treatment protocol;70 - 149 = 0 units No corrective insulin, administer full dose of prandial insulin if ordered;150 - 199 = 2  units;200 - 249 = 4 units;250 - 299 = 6 units;300 - 349 = 8 units;350 - 399 = 10 units;400 - 999 = 12 units and notify MD,subcutaneously with meals related to TYPE 2 DIABETES MELLITUS WITH KETOACIDOSIS WITHOUT COMA (E11.10)       insulin glargine (LANTUS PEN) 100 UNIT/ML pen Inject 30 Units Subcutaneous every morning       insulin lispro (ADMELOG SOLOSTAR) 100 UNIT/ML pen Inject 4 unit subcutaneously before meals for Type 1 DM       potassium chloride (KLOR-CON) 20 MEQ packet Take 20 mEq by mouth daily       Skin Protectants, Misc. (EUCERIN) cream Apply topically daily       venlafaxine (EFFEXOR-XR) 150 MG 24 hr capsule Take 150 mg by mouth daily       Medication Changes/Rationale:     Completed course of cephalexin    Successfully weaned off quetiapine    levemir changed to glargine, titrated to 30 unit(s) subcutaneous daily     Controlled medications sent with patient:   not applicable/none     ROS:   4 point ROS including Respiratory, CV, GI and , other than that noted in the HPI,  is negative    Physical Exam:   Vitals: BP (!) 163/84   Pulse 90   Temp 97.6  F (36.4  C)   Resp 18   Wt 83 kg (183 lb)   SpO2 93%   BMI 29.54 kg/m     BMI= Body mass index is 29.54 kg/m .  GENERAL APPEARANCE:  Alert, in no acute distress   CHEST/RESP:  respiratory effort normal, lung sounds CTA  , no respiratory distress  CV:  Rate and rhythm reg, no murmur, no peripheral edema   PSYCH:  Alert and oriented to self and surroundings with forgetfulnes, affect pleasant , judgement impaired       SNF labs:   Most Recent 3 CBC's:  Recent Labs   Lab Test 01/13/20  0945 03/12/13  0547   WBC 5.5 6.4   HGB 12.8 13.3   MCV 93 93    157     Most Recent 3 BMP's:  Recent Labs   Lab Test 01/22/20  0800 01/15/20  0948 01/13/20  0945 03/12/13  0547     --  135 141   POTASSIUM 4.2 3.6 3.2* 4.4   CHLORIDE 103  --  101 105   CO2 29  --  27 24   BUN 12  --  5* 18   CR 0.71  --  0.76 0.60   ANIONGAP 4  --  7 11   SLIME 9.5  --  9.0 9.1    *  --  282* 456*       DISCHARGE PLAN:    Follow up labs: No labs orders/due    Medical Follow Up:      -Follow up with PCP in 1-2 weeks   -diabetic educator consult    -Consider cardiology consult for demand ischemia   -Follow up with Orthopedics per their recommendations   -Follow up with neuropsych testing as already discussed    MTM referral needed and placed by this provider: No    Current [place] scheduled appointments:    None     Discharge Services: Home Care:  Occupational Therapy, Physical Therapy, Speech Therapy , Registered Nurse, Home Health Aide,  and From:  [facility]        TOTAL DISCHARGE TIME:   Greater than 30 minutes  Electronically signed by:  VANDANA Mckinley CNP     Documentation of Face to Face and Certification for Home Health Services    I certify that patient: Kimberly Stephenson is under my care and that I, or a nurse practitioner or physician's assistant working with me, had a face-to-face encounter that meets the physician face-to-face encounter requirements with this patient on: 1/22/2020.    This encounter with the patient was in whole, or in part, for the following medical condition, which is the primary reason for home health care: Diabetic ketoacidosis with coma associated with type 1 diabetes mellitus (H) [E10.11] .    I certify that, based on my findings, the following services are medically necessary home health services: Nursing, Occupational Therapy, Physical Therapy, Speech Language Therapy, Social Work and HHA.    My clinical findings support the need for the above services because: Nurse is needed: To assess blood sugars, vitals  after changes in medications or other medical regimen.., Occupational Therapy Services are needed to assess and treat cognitive ability and address ADL safety due to impairment in cognition. and Physical Therapy Services are needed to assess and treat the following functional impairments: weakness, balance  concerns.    Further, I certify that my clinical findings support that this patient is homebound (i.e. absences from home require considerable and taxing effort and are for medical reasons or [facility] services or infrequently or of short duration when for other reasons) because: Requires assistance of another person or specialized equipment to access medical services because patient: Requires supervision of another for safe transfer...    Based on the above findings. I certify that this patient is confined to the home and needs intermittent skilled nursing care, physical therapy and/or speech therapy.  The patient is under my care, and I have initiated the establishment of the plan of care.  This patient will be followed by a physician who will periodically review the plan of care.  Physician/Provider to provide follow up care: Sherri Espinal    Attending hospital physician (the Medicare certified [place] provider): VANDANA Mckinley CNP   Physician Signature: See electronic signature associated with these discharge orders.  Date: 1/22/2020                    Sincerely,        VANDANA Mckinley CNP

## 2020-01-22 NOTE — PROGRESS NOTES
[facility] DISCHARGE SUMMARY  PATIENT'S NAME: Kimberly Stephenson  YOB: 1945  MEDICAL RECORD NUMBER:  [id number]  Place of Service where encounter took place:  [facility] ([facility]) [[id number]]    PRIMARY CARE PROVIDER AND CLINIC RESPONSIBLE AFTER TRANSFER:   Sherri Fitzgerald MD  [address] [place] [address]    [phone] (fax)   Non-[facility] Provider     Transferring providers: VANDANA Mckinley CNP, Dr. Emir MD  Recent Hospitalization/ED:  [facility]  stay 12/28/19 to 1/8/20.  Date of SNF Admission: January / 08 / 2020  Date of SNF (anticipated) Discharge: January / 23 / 2020  Discharged to: home with family  Cognitive Scores: SLUMS: 20/30, CPT: 4.9/5.6 and MOCA: 17/30  Physical Function: Douglas Balance Scale: 54/56, TUG 10 and Ambulating 500 ft with no device  DME: none    CODE STATUS/ADVANCE DIRECTIVES DISCUSSION:  Full Code   ALLERGIES: Cymbalta; Fentanyl; and Versed [midazolam]    DISCHARGE DIAGNOSIS/NURSING FACILITY COURSE:     Kimberly Stephenson has a past medical history of DM type 1 not well controlled, afib on apixaban, hypertension, HLD, depression, anxiety, history of cognitive changes recently, L radius fracture in 9/24/19 due to fall and re-injured on 12/26/19 after slipping on the ice while walking her dog.  S/he was admitted to the hospital after no one had seen/heard from her in 24 h and police were called for welfare check.  She was found down on the floor in her home and was unresponsive with blood sugar >600, hypotensive, hypothermic and found to have DKA, PATRICIA with creat to 3.55, sepsis.  She was intubated 12/28-1/2.  S/he underwent IV hydration, antibiotics.  Troponin was elevated-thought to be demand ischemia and will need cardiology follow up as outpatient.      Diabetic ketoacidosis with coma associated with type 1 diabetes mellitus (H)  Type 1 diabetes mellitus  with hyperglycemia (H)  Long standing history of DM, reported as DM1 with chronic insulin use, reports she developed this in her 40's.  Blood sugar have been somewhat difficult to control.  She is unclear how much insulin she was using at home prior to fall, and family reports they are not sure she was taking good care of herself.  Most recent A1C was 11.3% and has been trending elevated since 2017.  She is having difficulty remembering how/when to administer insulin or check blood sugars, so this will negatively impact her management at home.  In light of this difficulty with management in the past, did change her to basaglar for longer lasting control.  She does continue to receive some meal time replacement and sliding scale insulin as well  Blood sugars as follows   - 415 4 unit(s) and sliding scale 6-12 unit(s) on average  Noon 151 - 203 4 unit(s) and sliding scale 2-4 unit(s) on average  Supper 81 - 181 4 unit(s) and sliding scale 0-2 unit(s) on average   - 330 No sliding scale    -she should have ongoing diabetic education if she will be resuming control of her insulin.   -she will need ongoing close monitoring of her blood sugar and titration of medications as appropriate  -goal is for her to move to assisted living facility, at which she will likely be unable to use sliding scale insulin  -family has been instructed to monitor blood sugar closely for now in order to better calculate ongoing insulin needs    PATRICIA (acute kidney injury) (H)  Baseline creat trending 0.8-0.9.  Elevated to 3.55 on hospital admission, thought due to sepsis and DKA.  Resolved with treatment and creat has trended within normal limits since admission  -Avoid nephrotoxic medications  -Renal dosing of medications  -monitor kidney function routinely      NSTEMI (non-ST elevated myocardial infarction) (H)  Hospital records indicate troponin elevation t0o 9.06 on 12/29/19, thought stress/demand ischemia.  TTE on 12/29/19 with  normal EF and no wall motion abnormalities  -consider cardiology consult with lexiscan myoview as out patient as deemed appropriate by PCP    PAF (paroxysmal atrial fibrillation) (H)  Long term current use of anticoagulant therapy  Chronic afib, on apixaban for years.  She demonstrated no afib during most recent hospital stay.  HR remained stable during TCU stay.     Staphylococcal sepsis (H)  Resolved, no new symptoms, and has completed course of cephalexin.  No further symptoms.     Major depressive disorder with single episode, in full remission (H)  CELIA (generalized anxiety disorder)  Patient with long standing history of depression, anxiety, on lexapro, venlafaxine, buspar at baseline.  She has been followed closely by psychiatry at her primary care clinic-actually seeing him 1/22/2020 as well.  She does not appear to have anxiety, does endorse some depression and most recent PHQ9 done in TCU indicates a score of 6/27. She is on multiple medications, no changes made in TCU, but will defer to psychiatry for ongoing follow up and medication changes.     Cognitive decline  Acute encephalopathy  Patient, her family, and her neighbors have noted some memory loss over last months, has been seen by neurology and by PCP.  SLUMS at her primary care clinic in 10/2019 was 25/30.  CT head with no acute changes and some mild changes consistent with age.  Neurology follow up done at [facility], and those notes are not available to [facility].  She was to undergo neuropsych testing but failed to show up for this appointment.      She did have some delirium during her hospital stay, was started on quetiapine, and this was successfully weaned during her TCU stay to discontinuation.  She has been alert, pleasant, able to make needs known, forgetful.  BIMS 14/15, SLUMS 20/30, CPT 4.9/5.6 which indicates a need for daily check in.  -family has been instructed to make follow up appointment for neuropsych testing as she  failed to appear at last scheduled appointment    -she will be living with family for now, until assisted living facility can be found    Closed Colles' fracture of left radius with routine healing, subsequent encounter  Originally fell on 9/24/19 with fracture to L wrist, treated with a cast.  She then fell again on 12/26/19 causing re-fracture of the L wrist.  She is being followed by Dr. Alex, seeing him 1/22/2020 in follow up.      Essential hypertension  Known history of hypertension, on amlodipine, atrovastatin.  She has had some elevated BP during her TCU stay, most recent BP as follows:    -follow up with PCP and home care-may need titration of her BP medications as outpatient     Past Medical History:  has a past medical history of Anxiety, Depression, Diabetes mellitus (H), DJD (degenerative joint disease) of knee, Hyperlipidemia, Hypertension, PONV (postoperative nausea and vomiting), and TMJ syndrome.    Discharge Medications:  Current Outpatient Medications   Medication Sig Dispense Refill     acetaminophen (TYLENOL) 325 MG tablet Take 650 mg by mouth every 4 hours as needed for mild pain       amLODIPine (NORVASC) 10 MG tablet Take 10 mg by mouth daily       apixaban ANTICOAGULANT (ELIQUIS) 5 MG tablet Take 2.5 mg by mouth 2 times daily        atorvastatin (LIPITOR) 40 MG tablet Take 40 mg by mouth daily.       busPIRone (BUSPAR) 7.5 MG tablet Take 7.5 mg by mouth 2 times daily       diphenoxylate-atropine (LOMOTIL) 2.5-0.025 MG tablet Take 2 tablets by mouth 4 times daily as needed for diarrhea 30 tablet 3     escitalopram (LEXAPRO) 10 MG tablet Take 10 mg by mouth daily       insulin aspart (NOVOLOG PEN) 100 UNIT/ML pen Inject as per sliding scale:if 0 - 69 = - Refer to hypoglycemia treatment protocol;70 - 149 = 0 units No corrective insulin, administer full dose of prandial insulin if ordered;150 - 199 = 2 units;200 - 249 = 4 units;250 - 299 = 6 units;300 - 349 = 8 units;350 - 399 = 10 units;400 -  999 = 12 units and notify MD,subcutaneously with meals related to TYPE 2 DIABETES MELLITUS WITH KETOACIDOSIS WITHOUT COMA (E11.10)       insulin glargine (LANTUS PEN) 100 UNIT/ML pen Inject 30 Units Subcutaneous every morning       insulin lispro (ADMELOG SOLOSTAR) 100 UNIT/ML pen Inject 4 unit subcutaneously before meals for Type 1 DM       potassium chloride (KLOR-CON) 20 MEQ packet Take 20 mEq by mouth daily       Skin Protectants, Misc. (EUCERIN) cream Apply topically daily       venlafaxine (EFFEXOR-XR) 150 MG 24 hr capsule Take 150 mg by mouth daily       Medication Changes/Rationale:     Completed course of cephalexin    Successfully weaned off quetiapine    levemir changed to glargine, titrated to 30 unit(s) subcutaneous daily     Controlled medications sent with patient:   not applicable/none     ROS:   4 point ROS including Respiratory, CV, GI and , other than that noted in the HPI,  is negative    Physical Exam:   Vitals: BP (!) 163/84   Pulse 90   Temp 97.6  F (36.4  C)   Resp 18   Wt 83 kg (183 lb)   SpO2 93%   BMI 29.54 kg/m    BMI= Body mass index is 29.54 kg/m .  GENERAL APPEARANCE:  Alert, in no acute distress   CHEST/RESP:  respiratory effort normal, lung sounds CTA  , no respiratory distress  CV:  Rate and rhythm reg, no murmur, no peripheral edema   PSYCH:  Alert and oriented to self and surroundings with forgetfulnes, affect pleasant , judgement impaired       SNF labs:   Most Recent 3 CBC's:  Recent Labs   Lab Test 01/13/20  0945 03/12/13  0547   WBC 5.5 6.4   HGB 12.8 13.3   MCV 93 93    157     Most Recent 3 BMP's:  Recent Labs   Lab Test 01/22/20  0800 01/15/20  0948 01/13/20  0945 03/12/13  0547     --  135 141   POTASSIUM 4.2 3.6 3.2* 4.4   CHLORIDE 103  --  101 105   CO2 29  --  27 24   BUN 12  --  5* 18   CR 0.71  --  0.76 0.60   ANIONGAP 4  --  7 11   SLIME 9.5  --  9.0 9.1   *  --  282* 456*       DISCHARGE PLAN:    Follow up labs: No labs  orders/due    Medical Follow Up:      -Follow up with PCP in 1-2 weeks   -diabetic educator consult    -Consider cardiology consult for demand ischemia   -Follow up with Orthopedics per their recommendations   -Follow up with neuropsych testing as already discussed    MTM referral needed and placed by this provider: Andreia    Current [place] scheduled appointments:    None     Discharge Services: Home Care:  Occupational Therapy, Physical Therapy, Speech Therapy , Registered Nurse, Home Health Aide,  and From:  [facility]        TOTAL DISCHARGE TIME:   Greater than 30 minutes  Electronically signed by:  VANDANA Mckinley CNP     Documentation of Face to Face and Certification for Home Health Services    I certify that patient: Kimberly Stephenson is under my care and that I, or a nurse practitioner or physician's assistant working with me, had a face-to-face encounter that meets the physician face-to-face encounter requirements with this patient on: 1/22/2020.    This encounter with the patient was in whole, or in part, for the following medical condition, which is the primary reason for home health care: Diabetic ketoacidosis with coma associated with type 1 diabetes mellitus (H) [E10.11] .    I certify that, based on my findings, the following services are medically necessary home health services: Nursing, Occupational Therapy, Physical Therapy, Speech Language Therapy, Social Work and HHA.    My clinical findings support the need for the above services because: Nurse is needed: To assess blood sugars, vitals  after changes in medications or other medical regimen.., Occupational Therapy Services are needed to assess and treat cognitive ability and address ADL safety due to impairment in cognition. and Physical Therapy Services are needed to assess and treat the following functional impairments: weakness, balance concerns.    Further, I certify that my clinical findings support that this patient is  homebound (i.e. absences from home require considerable and taxing effort and are for medical reasons or [facility] services or infrequently or of short duration when for other reasons) because: Requires assistance of another person or specialized equipment to access medical services because patient: Requires supervision of another for safe transfer...    Based on the above findings. I certify that this patient is confined to the home and needs intermittent skilled nursing care, physical therapy and/or speech therapy.  The patient is under my care, and I have initiated the establishment of the plan of care.  This patient will be followed by a physician who will periodically review the plan of care.  Physician/Provider to provide follow up care: Sherri Espinal    Attending hospital physician (the Medicare certified [facility] provider): VANDANA Mckinley CNP   Physician Signature: See electronic signature associated with these discharge orders.  Date: 1/22/2020

## 2020-02-18 ENCOUNTER — ASSISTED LIVING VISIT (OUTPATIENT)
Dept: GERIATRICS | Facility: CLINIC | Age: 75
End: 2020-02-18
Payer: COMMERCIAL

## 2020-02-18 VITALS
WEIGHT: 177.2 LBS | RESPIRATION RATE: 16 BRPM | SYSTOLIC BLOOD PRESSURE: 113 MMHG | HEART RATE: 90 BPM | TEMPERATURE: 98.6 F | DIASTOLIC BLOOD PRESSURE: 67 MMHG | BODY MASS INDEX: 28.6 KG/M2

## 2020-02-18 DIAGNOSIS — F41.1 GAD (GENERALIZED ANXIETY DISORDER): ICD-10-CM

## 2020-02-18 DIAGNOSIS — E10.65 TYPE 1 DIABETES MELLITUS WITH HYPERGLYCEMIA (H): Primary | ICD-10-CM

## 2020-02-18 DIAGNOSIS — Z79.01 LONG TERM CURRENT USE OF ANTICOAGULANT THERAPY: ICD-10-CM

## 2020-02-18 DIAGNOSIS — I48.0 PAF (PAROXYSMAL ATRIAL FIBRILLATION) (H): ICD-10-CM

## 2020-02-18 DIAGNOSIS — R41.89 COGNITIVE DECLINE: ICD-10-CM

## 2020-02-18 DIAGNOSIS — F32.5 MAJOR DEPRESSIVE DISORDER WITH SINGLE EPISODE, IN FULL REMISSION (H): ICD-10-CM

## 2020-02-18 DIAGNOSIS — I10 ESSENTIAL HYPERTENSION: ICD-10-CM

## 2020-02-18 DIAGNOSIS — I21.4 NSTEMI (NON-ST ELEVATED MYOCARDIAL INFARCTION) (H): ICD-10-CM

## 2020-02-18 NOTE — LETTER
2/18/2020        RE: Kimberly Stephenson  [facility]  [address] [address]        [facility]  PRIMARY CARE PROVIDER AND CLINIC:  Li Arana NP, [address] [place] [address] / [place] [address]  Chief Complaint   Patient presents with     Establish Care     [facility] Medical Record Number:  [id number]  Place of Service where encounter took place:  [facility] ([facility]) [[id number]]    Kimberly Stephenson  is a 74 year old  (1945), admitted to the above facility from home..  Admitted to this facility for  nursing care.    HPI:    HPI information obtained from: facility chart records, facility staff, patient report, [facility] chart review and Care Everywhere [facility] chart review.     Updates on Status Since Skilled nursing Admission:     Type 1 diabetes mellitus with hyperglycemia (H)  -Recently hospitalized 12/28-1/8 for DKA. Resident was noncompliant with insulin when living independently. Prior to AL admission, she was residing with son and daughter in law x2 weeks. Blood sugars improved and stabilized at that time.   -Currently taking lantus 30 units in AM and novolog 4 untits TID.   -Blood sugars in 200-300s since admission.   -Followed by endocrinology, Dr. Shoshana Bryant.     NSTEMI (non-ST elevated myocardial infarction) (H)  -During hospitalization. Cardiology consulted and felt it was due to possible sepsis/demand ischemia. Resident was to undergo OP lexiscan, but never did. Echo was kelli.     PAF (paroxysmal atrial fibrillation) (H)d  Long term current use of anticoagulant therapy  Pulse Readings from Last 4 Encounters:   03/03/20 90   02/25/20 111   02/23/20 78   02/18/20 90   Denies CP, SOB or lightheadedness. Taking apixaban for anticoagulation.     Major depressive disorder with single episode, in full remission (H)  CELIA (generalized anxiety disorder)  Followed by Dr. Jhoan Fraser. Last seen 1/22/2020. Was scheduled for neuropsych testing in  December but missed appointment due to hospitalization.  -Depressed mood in the context of loneliness and pain. Memory loss started in 2013, depression worsened since.   -Used MoCA in past for anxiety.     -Taking buspar 7.5 mg/day, escitalopram 10 mg/day, venlafaxine 150 mg/day    Cognitive decline  Resident recently admitted due to worsening cognition primarily affecting short term memory. Alert and oriented to person/place/time but unable to report medications or dosages.   -Would like to have day pass tomorrow to spend with friends. Son will transport and give novolog insulin while away.     Essential hypertension  BP Readings from Last 3 Encounters:   03/03/20 113/67   02/25/20 114/64   02/23/20 135/79   Denies CP, SOB or lightheadedness. Takes norvasc 10 mg/day. Feels she has been running low blood pressures more recently.     CODE STATUS/ADVANCE DIRECTIVES DISCUSSION:   CPR/Full code   Patient's living condition: lives in an assisted living facility  ALLERGIES: Cymbalta; Fentanyl; and Versed [midazolam]  PAST MEDICAL HISTORY:  has a past medical history of Anxiety, Depression, Diabetes mellitus (H), DJD (degenerative joint disease) of knee, Hyperlipidemia, Hypertension, PONV (postoperative nausea and vomiting), and TMJ syndrome.  PAST SURGICAL HISTORY:   has a past surgical history that includes orthopedic surgery; Arthroplasty knee; and Open reduction internal fixation ankle (3/27/2013).  FAMILY HISTORY: family history is not on file.  SOCIAL HISTORY:   reports that she has quit smoking. She has never used smokeless tobacco. She reports current alcohol use. She reports that she does not use drugs.    Post Discharge Medication Reconciliation Status: discharge medications reconciled, continue medications without change    Current Outpatient Medications   Medication Sig Dispense Refill     acetaminophen (TYLENOL) 325 MG tablet Take 650 mg by mouth every 4 hours as needed for mild pain       amLODIPine (NORVASC)  10 MG tablet Take 10 mg by mouth daily       apixaban ANTICOAGULANT (ELIQUIS) 5 MG tablet Take 2.5 mg by mouth 2 times daily        atorvastatin (LIPITOR) 40 MG tablet Take 40 mg by mouth daily.       busPIRone (BUSPAR) 7.5 MG tablet Take 7.5 mg by mouth 2 times daily       diphenoxylate-atropine (LOMOTIL) 2.5-0.025 MG tablet Take 2 tablets by mouth 4 times daily as needed for diarrhea 30 tablet 3     escitalopram (LEXAPRO) 10 MG tablet Take 10 mg by mouth daily       insulin glargine (LANTUS PEN) 100 UNIT/ML pen Inject 30 Units Subcutaneous every morning       insulin lispro (ADMELOG SOLOSTAR) 100 UNIT/ML pen Inject 4 unit subcutaneously before meals for Type 1 DM       potassium chloride (KLOR-CON) 20 MEQ packet Take 20 mEq by mouth daily       Skin Protectants, Misc. 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  and 10 point ROS of systems including Constitutional, Eyes, Respiratory, Cardiovascular, Gastroenterology, Genitourinary, Integumentary, Musculoskeletal, Psychiatric were all negative except for pertinent positives noted in my HPI.    Vitals:  /67   Pulse 90   Temp 98.6  F (37  C)   Resp 16   Wt 80.4 kg (177 lb 3.2 oz)   BMI 28.60 kg/m     Exam:  GENERAL APPEARANCE:  Alert, anxious  ENT:  Mouth and posterior oropharynx normal, moist mucous membranes, normal hearing acuity  RESP:  respiratory effort and  palpation of chest normal, lungs clear to auscultation , no respiratory distress  CV:  Palpation and auscultation of heart done   M/S:   Gait and station abnormal .  Gait and station normal  SKIN:  Inspection of skin and subcutaneous tissue baseline, Palpation of skin and subcutaneous tissue baseline  NEURO:   Cranial nerves 2-12 are normal tested and grossly at patient's baseline  PSYCH:  insight and judgement impaired, memory impaired , affect and mood normal    Lab/Diagnostic data:  Labs done in SNF are in [facility]. Please refer to them using [place]/Care Everywhere. and Recent labs in EPIC reviewed by me today.     ASSESSMENT/PLAN:  (E10.65) Type 1 diabetes mellitus with hyperglycemia (H)  (primary encounter diagnosis)  Comment: Recent hospitalization for DKA. Blood sugars 200-300's  Plan:   -Continue current regimen without change. Monitor blood sugars closely and follow up with endocrinology as planned and PRN.   -Not safe to self administer medication 2/2 cognitive impairment.     (I21.4) NSTEMI (non-ST elevated myocardial infarction) (H)  Comment: history of.   Plan: Monitor     (I48.0) PAF (paroxysmal atrial fibrillation) (H)  (Z79.01) Long term current use of anticoagulant therapy  Comment: HR stable.   Plan: Continue apixaban without changes.     (F32.5) Major depressive disorder with single episode, in full remission (H)  (F41.1) CELIA (generalized anxiety disorder)  Comment: Followed closely by psychology. Longstanding history of mental illness.   Plan: Continue current medications and follow up with psychology in 3 months as scheduled.     (R41.89) Cognitive decline  Comment: Progressing, prominent short term memory loss with poor insight and judgement.   Plan: Expect further decline with progression of disease.     (I10) Essential hypertension  Comment: Chronic, managed on norvasc.   Plan:   -No change and adjustments as indicated. Keep SBP> 130 mmHg and DBP > 65 mmHg (levels below these increase  mortality as shown by standard studies and observations).     The current medical regimen is effective; continue present plan and medications.      Total time spent with patient visit at the skilled nursing facility was 35 min including patient visit and review of past records. Greater than 50% of total time spent with counseling and coordinating care due to establishing new patient. .     Electronically signed by:  VANDANA Munguia [facility]         Sincerely,        Li Arana NP

## 2020-02-18 NOTE — PROGRESS NOTES
[facility]  PRIMARY CARE PROVIDER AND CLINIC:  Li Arana, [facility], [address] / [place] [address]  Chief Complaint   Patient presents with     Establish Care     [facility] Record Number:  [id number]  Place of Service where encounter took place:  [facility] [[id number]]    Kimberly Stephenson  is a 74 year old  (1945), admitted to the above facility from home..  Admitted to this facility for  nursing care.    HPI:    HPI information obtained from: facility chart records, facility staff, patient report, [facility] chart review and Care Everywhere [facility] chart review.     Updates on Status Since Skilled nursing Admission:     Type 1 diabetes mellitus with hyperglycemia (H)  -Recently hospitalized 12/28-1/8 for DKA. Resident was noncompliant with insulin when living independently. Prior to AL admission, she was residing with son and daughter in law x2 weeks. Blood sugars improved and stabilized at that time.   -Currently taking lantus 30 units in AM and novolog 4 untits TID.   -Blood sugars in 200-300s since admission.   -Followed by endocrinology, Dr. Shoshana Bryant.     NSTEMI (non-ST elevated myocardial infarction) (H)  -During hospitalization. Cardiology consulted and felt it was due to possible sepsis/demand ischemia. Resident was to undergo OP lexiscan, but never did. Echo was kelli.     PAF (paroxysmal atrial fibrillation) (H)d  Long term current use of anticoagulant therapy  Pulse Readings from Last 4 Encounters:   03/03/20 90   02/25/20 111   02/23/20 78   02/18/20 90   Denies CP, SOB or lightheadedness. Taking apixaban for anticoagulation.     Major depressive disorder with single episode, in full remission (H)  CELIA (generalized anxiety disorder)  Followed by Dr. Jhoan Fraser. Last seen 1/22/2020. Was scheduled for neuropsych testing in December but missed appointment due to hospitalization.  -Depressed mood in the context of loneliness  and pain. Memory loss started in 2013, depression worsened since.   -Used MoCA in past for anxiety.     -Taking buspar 7.5 mg/day, escitalopram 10 mg/day, venlafaxine 150 mg/day    Cognitive decline  Resident recently admitted due to worsening cognition primarily affecting short term memory. Alert and oriented to person/place/time but unable to report medications or dosages.   -Would like to have day pass tomorrow to spend with friends. Son will transport and give novolog insulin while away.     Essential hypertension  BP Readings from Last 3 Encounters:   03/03/20 113/67   02/25/20 114/64   02/23/20 135/79   Denies CP, SOB or lightheadedness. Takes norvasc 10 mg/day. Feels she has been running low blood pressures more recently.     CODE STATUS/ADVANCE DIRECTIVES DISCUSSION:   CPR/Full code   Patient's living condition: lives in an assisted living facility  ALLERGIES: Cymbalta; Fentanyl; and Versed [midazolam]  PAST MEDICAL HISTORY:  has a past medical history of Anxiety, Depression, Diabetes mellitus (H), DJD (degenerative joint disease) of knee, Hyperlipidemia, Hypertension, PONV (postoperative nausea and vomiting), and TMJ syndrome.  PAST SURGICAL HISTORY:   has a past surgical history that includes orthopedic surgery; Arthroplasty knee; and Open reduction internal fixation ankle (3/27/2013).  FAMILY HISTORY: family history is not on file.  SOCIAL HISTORY:   reports that she has quit smoking. She has never used smokeless tobacco. She reports current alcohol use. She reports that she does not use drugs.    Post Discharge Medication Reconciliation Status: discharge medications reconciled, continue medications without change    Current Outpatient Medications   Medication Sig Dispense Refill     acetaminophen (TYLENOL) 325 MG tablet Take 650 mg by mouth every 4 hours as needed for mild pain       amLODIPine (NORVASC) 10 MG tablet Take 10 mg by mouth daily       apixaban ANTICOAGULANT (ELIQUIS) 5 MG tablet Take 2.5 mg  by mouth 2 times daily        atorvastatin (LIPITOR) 40 MG tablet Take 40 mg by mouth daily.       busPIRone (BUSPAR) 7.5 MG tablet Take 7.5 mg by mouth 2 times daily       diphenoxylate-atropine (LOMOTIL) 2.5-0.025 MG tablet Take 2 tablets by mouth 4 times daily as needed for diarrhea 30 tablet 3     escitalopram (LEXAPRO) 10 MG tablet Take 10 mg by mouth daily       insulin glargine (LANTUS PEN) 100 UNIT/ML pen Inject 30 Units Subcutaneous every morning       insulin lispro (ADMELOG SOLOSTAR) 100 UNIT/ML pen Inject 4 unit subcutaneously before meals for Type 1 DM       potassium chloride (KLOR-CON) 20 MEQ packet Take 20 mEq by mouth daily       Skin Protectants, Misc. 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  and 10 point ROS of systems including Constitutional, Eyes, Respiratory, Cardiovascular, Gastroenterology, Genitourinary, Integumentary, Musculoskeletal, Psychiatric were all negative except for pertinent positives noted in my HPI.    Vitals:  /67   Pulse 90   Temp 98.6  F (37  C)   Resp 16   Wt 80.4 kg (177 lb 3.2 oz)   BMI 28.60 kg/m    Exam:  GENERAL APPEARANCE:  Alert, anxious  ENT:  Mouth and posterior oropharynx normal, moist mucous membranes, normal hearing acuity  RESP:  respiratory effort and palpation of chest normal, lungs clear to auscultation , no respiratory distress  CV:  Palpation and  auscultation of heart done   M/S:   Gait and station abnormal .  Gait and station normal  SKIN:  Inspection of skin and subcutaneous tissue baseline, Palpation of skin and subcutaneous tissue baseline  NEURO:   Cranial nerves 2-12 are normal tested and grossly at patient's baseline  PSYCH:  insight and judgement impaired, memory impaired , affect and mood normal    Lab/Diagnostic data:  Labs done in SNF are in [facility]. Please refer to them using EPIC/Care Everywhere. and Recent labs in EPIC reviewed by me today.     ASSESSMENT/PLAN:  (E10.65) Type 1 diabetes mellitus with hyperglycemia (H)  (primary encounter diagnosis)  Comment: Recent hospitalization for DKA. Blood sugars 200-300's  Plan:   -Continue current regimen without change. Monitor blood sugars closely and follow up with endocrinology as planned and PRN.   -Not safe to self administer medication 2/2 cognitive impairment.     (I21.4) NSTEMI (non-ST elevated myocardial infarction) (H)  Comment: history of.   Plan: Monitor     (I48.0) PAF (paroxysmal atrial fibrillation) (H)  (Z79.01) Long term current use of anticoagulant therapy  Comment: HR stable.   Plan: Continue apixaban without changes.     (F32.5) Major depressive disorder with single episode, in full remission (H)  (F41.1) CELIA (generalized anxiety disorder)  Comment: Followed closely by psychology. Longstanding history of mental illness.   Plan: Continue current medications and follow up with psychology in 3 months as scheduled.     (R41.89) Cognitive decline  Comment: Progressing, prominent short term memory loss with poor insight and judgement.   Plan: Expect further decline with progression of disease.     (I10) Essential hypertension  Comment: Chronic, managed on norvasc.   Plan:   -No change and adjustments as indicated. Keep SBP> 130 mmHg and DBP > 65 mmHg (levels below these increase mortality as shown by standard studies and observations).     The current medical regimen is effective;  continue present plan and medications.      Total time spent with patient visit at the skilled nursing facility was 35 min including patient visit and review of past records. Greater than 50% of total time spent with counseling and coordinating care due to establishing new patient. .     Electronically signed by:  VANDANA Munguia [facility]

## 2020-02-20 ENCOUNTER — APPOINTMENT (OUTPATIENT)
Dept: GENERAL RADIOLOGY | Facility: CLINIC | Age: 75
DRG: 638 | End: 2020-02-20
Attending: EMERGENCY MEDICINE
Payer: COMMERCIAL

## 2020-02-20 ENCOUNTER — HOSPITAL ENCOUNTER (INPATIENT)
Facility: CLINIC | Age: 75
LOS: 3 days | Discharge: NURSING, REHAB OR LONG-TERM CARE | DRG: 638 | End: 2020-02-23
Attending: EMERGENCY MEDICINE | Admitting: INTERNAL MEDICINE
Payer: COMMERCIAL

## 2020-02-20 ENCOUNTER — RECORDS - HEALTHEAST (OUTPATIENT)
Dept: LAB | Facility: CLINIC | Age: 75
End: 2020-02-20

## 2020-02-20 DIAGNOSIS — L03.116 CELLULITIS OF LEFT ANKLE: ICD-10-CM

## 2020-02-20 DIAGNOSIS — Z79.4 CURRENT USE OF INSULIN (H): ICD-10-CM

## 2020-02-20 DIAGNOSIS — E10.65 TYPE 1 DIABETES MELLITUS WITH HYPERGLYCEMIA (H): ICD-10-CM

## 2020-02-20 DIAGNOSIS — E10.10 DIABETIC KETOACIDOSIS WITHOUT COMA ASSOCIATED WITH TYPE 1 DIABETES MELLITUS (H): ICD-10-CM

## 2020-02-20 DIAGNOSIS — D70.3 NEUTROPENIA ASSOCIATED WITH INFECTION (H): Primary | ICD-10-CM

## 2020-02-20 LAB
ALBUMIN SERPL-MCNC: 3.5 G/DL (ref 3.4–5)
ALBUMIN UR-MCNC: NEGATIVE MG/DL
ALP SERPL-CCNC: 93 U/L (ref 40–150)
ALT SERPL W P-5'-P-CCNC: 32 U/L (ref 0–50)
ANION GAP SERPL CALCULATED.3IONS-SCNC: 16 MMOL/L (ref 3–14)
ANION GAP SERPL CALCULATED.3IONS-SCNC: 20 MMOL/L (ref 3–14)
ANION GAP SERPL CALCULATED.3IONS-SCNC: 26 MMOL/L (ref 5–18)
APPEARANCE UR: CLEAR
AST SERPL W P-5'-P-CCNC: 29 U/L (ref 0–45)
BASE DEFICIT BLDV-SCNC: 14.3 MMOL/L
BASOPHILS # BLD AUTO: 0 10E9/L (ref 0–0.2)
BASOPHILS NFR BLD AUTO: 0.1 %
BILIRUB SERPL-MCNC: 0.6 MG/DL (ref 0.2–1.3)
BILIRUB UR QL STRIP: NEGATIVE
BUN SERPL-MCNC: 16 MG/DL (ref 7–30)
BUN SERPL-MCNC: 22 MG/DL (ref 7–30)
BUN SERPL-MCNC: 22 MG/DL (ref 8–28)
CALCIUM SERPL-MCNC: 10.3 MG/DL (ref 8.5–10.5)
CALCIUM SERPL-MCNC: 8.8 MG/DL (ref 8.5–10.1)
CALCIUM SERPL-MCNC: 9.3 MG/DL (ref 8.5–10.1)
CHLORIDE BLD-SCNC: 98 MMOL/L (ref 98–107)
CHLORIDE SERPL-SCNC: 100 MMOL/L (ref 94–109)
CHLORIDE SERPL-SCNC: 110 MMOL/L (ref 94–109)
CO2 SERPL-SCNC: 10 MMOL/L (ref 22–31)
CO2 SERPL-SCNC: 11 MMOL/L (ref 20–32)
CO2 SERPL-SCNC: 13 MMOL/L (ref 20–32)
COLOR UR AUTO: ABNORMAL
CREAT SERPL-MCNC: 0.65 MG/DL (ref 0.52–1.04)
CREAT SERPL-MCNC: 0.81 MG/DL (ref 0.52–1.04)
CREAT SERPL-MCNC: 1.48 MG/DL (ref 0.6–1.1)
DIFFERENTIAL METHOD BLD: NORMAL
EOSINOPHIL # BLD AUTO: 0 10E9/L (ref 0–0.7)
EOSINOPHIL NFR BLD AUTO: 0 %
ERYTHROCYTE [DISTWIDTH] IN BLOOD BY AUTOMATED COUNT: 13.3 % (ref 10–15)
GFR SERPL CREATININE-BSD FRML MDRD: 35 ML/MIN/1.73M2
GFR SERPL CREATININE-BSD FRML MDRD: 70 ML/MIN/{1.73_M2}
GFR SERPL CREATININE-BSD FRML MDRD: 86 ML/MIN/{1.73_M2}
GLUCOSE BLD-MCNC: 560 MG/DL (ref 70–125)
GLUCOSE BLDC GLUCOMTR-MCNC: 207 MG/DL (ref 70–99)
GLUCOSE BLDC GLUCOMTR-MCNC: 212 MG/DL (ref 70–99)
GLUCOSE BLDC GLUCOMTR-MCNC: 221 MG/DL (ref 70–99)
GLUCOSE BLDC GLUCOMTR-MCNC: 244 MG/DL (ref 70–99)
GLUCOSE BLDC GLUCOMTR-MCNC: 257 MG/DL (ref 70–99)
GLUCOSE BLDC GLUCOMTR-MCNC: 271 MG/DL (ref 70–99)
GLUCOSE BLDC GLUCOMTR-MCNC: 319 MG/DL (ref 70–99)
GLUCOSE BLDC GLUCOMTR-MCNC: 408 MG/DL (ref 70–99)
GLUCOSE BLDC GLUCOMTR-MCNC: 437 MG/DL (ref 70–99)
GLUCOSE SERPL-MCNC: 262 MG/DL (ref 70–99)
GLUCOSE SERPL-MCNC: 460 MG/DL (ref 70–99)
GLUCOSE UR STRIP-MCNC: >499 MG/DL
HBA1C MFR BLD: 9.9 % (ref 0–5.6)
HCO3 BLDV-SCNC: 12 MMOL/L (ref 21–28)
HCT VFR BLD AUTO: 37.8 % (ref 35–47)
HGB BLD-MCNC: 12.2 G/DL (ref 11.7–15.7)
HGB UR QL STRIP: NEGATIVE
HYALINE CASTS #/AREA URNS LPF: 4 /LPF (ref 0–2)
IMM GRANULOCYTES # BLD: 0 10E9/L (ref 0–0.4)
IMM GRANULOCYTES NFR BLD: 0.2 %
KETONES BLD-SCNC: 5 MMOL/L (ref 0–0.6)
KETONES BLD-SCNC: 6.2 MMOL/L (ref 0–0.6)
KETONES UR STRIP-MCNC: 80 MG/DL
LACTATE BLD-SCNC: 1.9 MMOL/L (ref 0.7–2)
LEUKOCYTE ESTERASE UR QL STRIP: NEGATIVE
LIPASE SERPL-CCNC: 68 U/L (ref 73–393)
LYMPHOCYTES # BLD AUTO: 0.9 10E9/L (ref 0.8–5.3)
LYMPHOCYTES NFR BLD AUTO: 10.7 %
MAGNESIUM SERPL-MCNC: 1.5 MG/DL (ref 1.6–2.3)
MAGNESIUM SERPL-MCNC: 1.5 MG/DL (ref 1.6–2.3)
MCH RBC QN AUTO: 31.1 PG (ref 26.5–33)
MCHC RBC AUTO-ENTMCNC: 32.3 G/DL (ref 31.5–36.5)
MCV RBC AUTO: 96 FL (ref 78–100)
MONOCYTES # BLD AUTO: 0.4 10E9/L (ref 0–1.3)
MONOCYTES NFR BLD AUTO: 4.4 %
MRSA DNA SPEC QL NAA+PROBE: NEGATIVE
MUCOUS THREADS #/AREA URNS LPF: PRESENT /LPF
NEUTROPHILS # BLD AUTO: 6.9 10E9/L (ref 1.6–8.3)
NEUTROPHILS NFR BLD AUTO: 84.6 %
NITRATE UR QL: NEGATIVE
NRBC # BLD AUTO: 0 10*3/UL
NRBC BLD AUTO-RTO: 0 /100
PCO2 BLDV: 30 MM HG (ref 40–50)
PH BLDV: 7.22 PH (ref 7.32–7.43)
PH UR STRIP: 5 PH (ref 5–7)
PHOSPHATE SERPL-MCNC: 3.4 MG/DL (ref 2.5–4.5)
PLATELET # BLD AUTO: 200 10E9/L (ref 150–450)
PO2 BLDV: 60 MM HG (ref 25–47)
POTASSIUM BLD-SCNC: 4.5 MMOL/L (ref 3.5–5)
POTASSIUM SERPL-SCNC: 4.1 MMOL/L (ref 3.4–5.3)
POTASSIUM SERPL-SCNC: 4.7 MMOL/L (ref 3.4–5.3)
POTASSIUM SERPL-SCNC: 5.1 MMOL/L (ref 3.4–5.3)
PROCALCITONIN SERPL-MCNC: 0.5 NG/ML
PROT SERPL-MCNC: 7.4 G/DL (ref 6.8–8.8)
RBC # BLD AUTO: 3.92 10E12/L (ref 3.8–5.2)
RBC #/AREA URNS AUTO: <1 /HPF (ref 0–2)
SODIUM SERPL-SCNC: 131 MMOL/L (ref 133–144)
SODIUM SERPL-SCNC: 134 MMOL/L (ref 136–145)
SODIUM SERPL-SCNC: 139 MMOL/L (ref 133–144)
SOURCE: ABNORMAL
SP GR UR STRIP: 1.01 (ref 1–1.03)
SPECIMEN SOURCE: NORMAL
SQUAMOUS #/AREA URNS AUTO: <1 /HPF (ref 0–1)
TROPONIN I SERPL-MCNC: <0.015 UG/L (ref 0–0.04)
UROBILINOGEN UR STRIP-MCNC: 0 MG/DL (ref 0–2)
WBC # BLD AUTO: 8.1 10E9/L (ref 4–11)
WBC #/AREA URNS AUTO: <1 /HPF (ref 0–5)

## 2020-02-20 PROCEDURE — 87640 STAPH A DNA AMP PROBE: CPT | Performed by: INTERNAL MEDICINE

## 2020-02-20 PROCEDURE — 99291 CRITICAL CARE FIRST HOUR: CPT | Mod: 25 | Performed by: EMERGENCY MEDICINE

## 2020-02-20 PROCEDURE — 83735 ASSAY OF MAGNESIUM: CPT | Performed by: INTERNAL MEDICINE

## 2020-02-20 PROCEDURE — 93005 ELECTROCARDIOGRAM TRACING: CPT | Performed by: EMERGENCY MEDICINE

## 2020-02-20 PROCEDURE — 25000132 ZZH RX MED GY IP 250 OP 250 PS 637: Performed by: INTERNAL MEDICINE

## 2020-02-20 PROCEDURE — 99285 EMERGENCY DEPT VISIT HI MDM: CPT | Mod: 25 | Performed by: EMERGENCY MEDICINE

## 2020-02-20 PROCEDURE — 83605 ASSAY OF LACTIC ACID: CPT | Performed by: FAMILY MEDICINE

## 2020-02-20 PROCEDURE — 80048 BASIC METABOLIC PNL TOTAL CA: CPT | Performed by: INTERNAL MEDICINE

## 2020-02-20 PROCEDURE — 96366 THER/PROPH/DIAG IV INF ADDON: CPT | Performed by: EMERGENCY MEDICINE

## 2020-02-20 PROCEDURE — 25800030 ZZH RX IP 258 OP 636: Performed by: EMERGENCY MEDICINE

## 2020-02-20 PROCEDURE — 87040 BLOOD CULTURE FOR BACTERIA: CPT | Performed by: INTERNAL MEDICINE

## 2020-02-20 PROCEDURE — 00000146 ZZHCL STATISTIC GLUCOSE BY METER IP

## 2020-02-20 PROCEDURE — 99223 1ST HOSP IP/OBS HIGH 75: CPT | Mod: AI | Performed by: INTERNAL MEDICINE

## 2020-02-20 PROCEDURE — 84132 ASSAY OF SERUM POTASSIUM: CPT | Performed by: EMERGENCY MEDICINE

## 2020-02-20 PROCEDURE — 25000132 ZZH RX MED GY IP 250 OP 250 PS 637: Performed by: EMERGENCY MEDICINE

## 2020-02-20 PROCEDURE — 93010 ELECTROCARDIOGRAM REPORT: CPT | Mod: Z6 | Performed by: EMERGENCY MEDICINE

## 2020-02-20 PROCEDURE — 96365 THER/PROPH/DIAG IV INF INIT: CPT | Mod: 59 | Performed by: EMERGENCY MEDICINE

## 2020-02-20 PROCEDURE — 83036 HEMOGLOBIN GLYCOSYLATED A1C: CPT | Performed by: EMERGENCY MEDICINE

## 2020-02-20 PROCEDURE — 80053 COMPREHEN METABOLIC PANEL: CPT | Performed by: FAMILY MEDICINE

## 2020-02-20 PROCEDURE — 25000131 ZZH RX MED GY IP 250 OP 636 PS 637: Performed by: EMERGENCY MEDICINE

## 2020-02-20 PROCEDURE — 96361 HYDRATE IV INFUSION ADD-ON: CPT | Performed by: EMERGENCY MEDICINE

## 2020-02-20 PROCEDURE — 73522 X-RAY EXAM HIPS BI 3-4 VIEWS: CPT

## 2020-02-20 PROCEDURE — 83690 ASSAY OF LIPASE: CPT | Performed by: EMERGENCY MEDICINE

## 2020-02-20 PROCEDURE — 71046 X-RAY EXAM CHEST 2 VIEWS: CPT

## 2020-02-20 PROCEDURE — 84145 PROCALCITONIN (PCT): CPT | Performed by: INTERNAL MEDICINE

## 2020-02-20 PROCEDURE — 82010 KETONE BODYS QUAN: CPT | Performed by: EMERGENCY MEDICINE

## 2020-02-20 PROCEDURE — 25800030 ZZH RX IP 258 OP 636: Performed by: INTERNAL MEDICINE

## 2020-02-20 PROCEDURE — 82010 KETONE BODYS QUAN: CPT | Performed by: INTERNAL MEDICINE

## 2020-02-20 PROCEDURE — 82803 BLOOD GASES ANY COMBINATION: CPT | Performed by: EMERGENCY MEDICINE

## 2020-02-20 PROCEDURE — 87641 MR-STAPH DNA AMP PROBE: CPT | Performed by: INTERNAL MEDICINE

## 2020-02-20 PROCEDURE — 36415 COLL VENOUS BLD VENIPUNCTURE: CPT | Performed by: INTERNAL MEDICINE

## 2020-02-20 PROCEDURE — 20000003 ZZH R&B ICU

## 2020-02-20 PROCEDURE — 25000131 ZZH RX MED GY IP 250 OP 636 PS 637: Performed by: INTERNAL MEDICINE

## 2020-02-20 PROCEDURE — 81001 URINALYSIS AUTO W/SCOPE: CPT | Performed by: EMERGENCY MEDICINE

## 2020-02-20 PROCEDURE — 85025 COMPLETE CBC W/AUTO DIFF WBC: CPT | Performed by: FAMILY MEDICINE

## 2020-02-20 PROCEDURE — 73610 X-RAY EXAM OF ANKLE: CPT | Mod: 50

## 2020-02-20 PROCEDURE — 25000128 H RX IP 250 OP 636: Performed by: EMERGENCY MEDICINE

## 2020-02-20 PROCEDURE — 25000128 H RX IP 250 OP 636: Performed by: INTERNAL MEDICINE

## 2020-02-20 PROCEDURE — 84100 ASSAY OF PHOSPHORUS: CPT | Performed by: EMERGENCY MEDICINE

## 2020-02-20 PROCEDURE — 84484 ASSAY OF TROPONIN QUANT: CPT | Performed by: EMERGENCY MEDICINE

## 2020-02-20 PROCEDURE — 83735 ASSAY OF MAGNESIUM: CPT | Performed by: EMERGENCY MEDICINE

## 2020-02-20 RX ORDER — DEXTROSE MONOHYDRATE 25 G/50ML
25-50 INJECTION, SOLUTION INTRAVENOUS
Status: DISCONTINUED | OUTPATIENT
Start: 2020-02-20 | End: 2020-02-21

## 2020-02-20 RX ORDER — METOPROLOL TARTRATE 25 MG/1
25 TABLET, FILM COATED ORAL 2 TIMES DAILY
Status: DISCONTINUED | OUTPATIENT
Start: 2020-02-20 | End: 2020-02-20

## 2020-02-20 RX ORDER — AMLODIPINE BESYLATE 10 MG/1
10 TABLET ORAL DAILY
Status: DISCONTINUED | OUTPATIENT
Start: 2020-02-20 | End: 2020-02-20

## 2020-02-20 RX ORDER — ATORVASTATIN CALCIUM 20 MG/1
40 TABLET, FILM COATED ORAL DAILY
Status: DISCONTINUED | OUTPATIENT
Start: 2020-02-20 | End: 2020-02-23 | Stop reason: HOSPADM

## 2020-02-20 RX ORDER — SODIUM CHLORIDE AND POTASSIUM CHLORIDE 150; 900 MG/100ML; MG/100ML
INJECTION, SOLUTION INTRAVENOUS CONTINUOUS
Status: DISCONTINUED | OUTPATIENT
Start: 2020-02-20 | End: 2020-02-22

## 2020-02-20 RX ORDER — DEXTROSE MONOHYDRATE, SODIUM CHLORIDE, AND POTASSIUM CHLORIDE 50; 1.49; 4.5 G/1000ML; G/1000ML; G/1000ML
INJECTION, SOLUTION INTRAVENOUS CONTINUOUS
Status: DISCONTINUED | OUTPATIENT
Start: 2020-02-20 | End: 2020-02-22

## 2020-02-20 RX ORDER — ONDANSETRON 2 MG/ML
4 INJECTION INTRAMUSCULAR; INTRAVENOUS EVERY 30 MIN PRN
Status: DISCONTINUED | OUTPATIENT
Start: 2020-02-20 | End: 2020-02-21 | Stop reason: DRUGHIGH

## 2020-02-20 RX ORDER — ESCITALOPRAM OXALATE 10 MG/1
10 TABLET ORAL DAILY
Status: DISCONTINUED | OUTPATIENT
Start: 2020-02-20 | End: 2020-02-23 | Stop reason: HOSPADM

## 2020-02-20 RX ORDER — NICOTINE POLACRILEX 4 MG
15-30 LOZENGE BUCCAL
Status: DISCONTINUED | OUTPATIENT
Start: 2020-02-20 | End: 2020-02-21

## 2020-02-20 RX ORDER — AMLODIPINE BESYLATE 10 MG/1
10 TABLET ORAL DAILY
Status: DISCONTINUED | OUTPATIENT
Start: 2020-02-20 | End: 2020-02-23 | Stop reason: HOSPADM

## 2020-02-20 RX ORDER — BUSPIRONE HYDROCHLORIDE 7.5 MG/1
7.5 TABLET ORAL 2 TIMES DAILY
Status: DISCONTINUED | OUTPATIENT
Start: 2020-02-20 | End: 2020-02-23 | Stop reason: HOSPADM

## 2020-02-20 RX ORDER — CEFTRIAXONE SODIUM 1 G/50ML
1 INJECTION, SOLUTION INTRAVENOUS EVERY 24 HOURS
Status: DISCONTINUED | OUTPATIENT
Start: 2020-02-20 | End: 2020-02-23 | Stop reason: HOSPADM

## 2020-02-20 RX ORDER — ACETAMINOPHEN 325 MG/1
650 TABLET ORAL EVERY 4 HOURS PRN
Status: DISCONTINUED | OUTPATIENT
Start: 2020-02-20 | End: 2020-02-23 | Stop reason: HOSPADM

## 2020-02-20 RX ORDER — VENLAFAXINE HYDROCHLORIDE 150 MG/1
150 CAPSULE, EXTENDED RELEASE ORAL DAILY
Status: DISCONTINUED | OUTPATIENT
Start: 2020-02-20 | End: 2020-02-23 | Stop reason: HOSPADM

## 2020-02-20 RX ORDER — POTASSIUM CL/LIDO/0.9 % NACL 10MEQ/0.1L
10 INTRAVENOUS SOLUTION, PIGGYBACK (ML) INTRAVENOUS ONCE
Status: COMPLETED | OUTPATIENT
Start: 2020-02-20 | End: 2020-02-20

## 2020-02-20 RX ORDER — ONDANSETRON 2 MG/ML
4 INJECTION INTRAMUSCULAR; INTRAVENOUS EVERY 6 HOURS PRN
Status: DISCONTINUED | OUTPATIENT
Start: 2020-02-20 | End: 2020-02-23 | Stop reason: HOSPADM

## 2020-02-20 RX ORDER — ONDANSETRON 4 MG/1
4 TABLET, ORALLY DISINTEGRATING ORAL EVERY 6 HOURS PRN
Status: DISCONTINUED | OUTPATIENT
Start: 2020-02-20 | End: 2020-02-23 | Stop reason: HOSPADM

## 2020-02-20 RX ORDER — ACETAMINOPHEN 325 MG/1
650 TABLET ORAL EVERY 4 HOURS PRN
Status: DISCONTINUED | OUTPATIENT
Start: 2020-02-20 | End: 2020-02-22

## 2020-02-20 RX ORDER — ACETAMINOPHEN 325 MG/1
975 TABLET ORAL ONCE
Status: COMPLETED | OUTPATIENT
Start: 2020-02-20 | End: 2020-02-20

## 2020-02-20 RX ORDER — MAGNESIUM SULFATE HEPTAHYDRATE 40 MG/ML
4 INJECTION, SOLUTION INTRAVENOUS EVERY 4 HOURS PRN
Status: DISCONTINUED | OUTPATIENT
Start: 2020-02-20 | End: 2020-02-22

## 2020-02-20 RX ADMIN — ATORVASTATIN CALCIUM 40 MG: 20 TABLET, FILM COATED ORAL at 19:56

## 2020-02-20 RX ADMIN — Medication 10 MEQ: at 15:45

## 2020-02-20 RX ADMIN — SODIUM CHLORIDE 5.5 UNITS/HR: 9 INJECTION, SOLUTION INTRAVENOUS at 15:49

## 2020-02-20 RX ADMIN — POTASSIUM CHLORIDE AND SODIUM CHLORIDE: 900; 150 INJECTION, SOLUTION INTRAVENOUS at 19:39

## 2020-02-20 RX ADMIN — SODIUM CHLORIDE, POTASSIUM CHLORIDE, SODIUM LACTATE AND CALCIUM CHLORIDE 1000 ML: 600; 310; 30; 20 INJECTION, SOLUTION INTRAVENOUS at 13:50

## 2020-02-20 RX ADMIN — SODIUM CHLORIDE: 9 INJECTION, SOLUTION INTRAVENOUS at 19:35

## 2020-02-20 RX ADMIN — APIXABAN 2.5 MG: 2.5 TABLET, FILM COATED ORAL at 19:56

## 2020-02-20 RX ADMIN — ESCITALOPRAM OXALATE 10 MG: 10 TABLET ORAL at 19:57

## 2020-02-20 RX ADMIN — ACETAMINOPHEN 975 MG: 325 TABLET, FILM COATED ORAL at 15:44

## 2020-02-20 RX ADMIN — SODIUM CHLORIDE 1000 ML: 9 INJECTION, SOLUTION INTRAVENOUS at 19:15

## 2020-02-20 RX ADMIN — SODIUM CHLORIDE 4 UNITS/HR: 9 INJECTION, SOLUTION INTRAVENOUS at 17:43

## 2020-02-20 RX ADMIN — VENLAFAXINE HYDROCHLORIDE 150 MG: 150 CAPSULE, EXTENDED RELEASE ORAL at 19:56

## 2020-02-20 RX ADMIN — MAGNESIUM SULFATE HEPTAHYDRATE 4 G: 40 INJECTION, SOLUTION INTRAVENOUS at 21:37

## 2020-02-20 RX ADMIN — BUSPIRONE HYDROCHLORIDE 7.5 MG: 7.5 TABLET ORAL at 19:56

## 2020-02-20 RX ADMIN — AMLODIPINE BESYLATE 10 MG: 10 TABLET ORAL at 19:56

## 2020-02-20 RX ADMIN — CEFTRIAXONE SODIUM 1 G: 1 INJECTION, SOLUTION INTRAVENOUS at 18:57

## 2020-02-20 RX ADMIN — ACETAMINOPHEN 650 MG: 325 TABLET, FILM COATED ORAL at 22:26

## 2020-02-20 ASSESSMENT — ACTIVITIES OF DAILY LIVING (ADL): ADLS_ACUITY_SCORE: 15

## 2020-02-20 ASSESSMENT — MIFFLIN-ST. JEOR
SCORE: 1315.52
SCORE: 1304.75

## 2020-02-20 ASSESSMENT — ENCOUNTER SYMPTOMS
DIARRHEA: 0
FEVER: 0
CHILLS: 0
NECK PAIN: 0
DYSPHORIC MOOD: 0
DYSURIA: 0
VOMITING: 1
CONSTIPATION: 0
NERVOUS/ANXIOUS: 0
WEAKNESS: 0
ABDOMINAL PAIN: 0
SORE THROAT: 0
HEADACHES: 0
SHORTNESS OF BREATH: 0

## 2020-02-20 NOTE — ED TRIAGE NOTES
Hyperglycemic.  400-500's.  Patient had scheduled Lantus 30U this morning and PRN  Novolog 19U.  Last dose at noon.

## 2020-02-20 NOTE — ED PROVIDER NOTES
History     Chief Complaint   Patient presents with     Hyperglycemia     HPI  Kimberly Stephenson is a 74 year old female with history of type 1 diabetes complicated by DKA, hypertension, dyslipidemia, and NSTEMI, who presents with elevated blood sugar. She presents from [facility] with poorly controlled blood glucose despite insulin regimen. BG greater than 500. No nursing staff on over night. She endorses feeling ok currently. She has been drinking a lot of water. She notes that she vomited twice this morning. No reported fever, chills, nausea, vomiting, or diarrhea. No cough, chest pain or shortness of breath. She also notes bilateral hip pain. She recently found out she is diabetic and was recently hospitalized 12/28/19 with DKA. Most recently saw an endocrinologist 2/6/20. Additional historical information provided by her son and daughter in law.     The patient's PMHx, Surgical Hx, Allergies, and Medications were all reviewed with the patient.    Allergies:  Allergies   Allergen Reactions     Cymbalta      Fentanyl Nausea and Vomiting     Versed [Midazolam] Nausea and Vomiting       Problem List:    Patient Active Problem List    Diagnosis Date Noted     DKA, type 1 (H) 02/20/2020     Priority: Medium     DKA (diabetic ketoacidoses) (H) 02/20/2020     Priority: Medium     PATRICIA (acute kidney injury) (H) 01/09/2020     Priority: Medium     NSTEMI (non-ST elevated myocardial infarction) (H) 01/09/2020     Priority: Medium     PAF (paroxysmal atrial fibrillation) (H) 01/09/2020     Priority: Medium     Personal history of fall 01/09/2020     Priority: Medium     Closed Colles' fracture of left radius with routine healing, subsequent encounter 01/09/2020     Priority: Medium     initial fall 9/24/19, fell again 12/26 and re-fractured       Bacteremia due to coagulase-negative Staphylococcus 01/02/2020     Priority: Medium     Staphylococcal sepsis (H) 01/02/2020     Priority: Medium     Acute  encephalopathy 12/28/2019     Priority: Medium     Diabetic ketoacidosis with coma (H) 12/28/2019     Priority: Medium     Type 1 diabetes mellitus with hyperglycemia (H) 12/28/2019     Priority: Medium     Cognitive decline 12/19/2018     Priority: Medium     Dupuytren's contracture of hand 08/30/2016     Priority: Medium     Depression 08/15/2014     Priority: Medium     CELIA (generalized anxiety disorder) 10/17/2013     Priority: Medium     Dyslipidemia 08/07/2013     Priority: Medium     Ankle joint pain 04/15/2013     Priority: Medium              Edema 04/15/2013     Priority: Medium     Abnormal gait 04/15/2013     Priority: Medium     Closed right ankle fracture 03/19/2013     Priority: Medium     Essential hypertension 12/05/2005     Priority: Medium        Past Medical History:    Past Medical History:   Diagnosis Date     Anxiety      Depression      Diabetes mellitus (H)      DJD (degenerative joint disease) of knee      Hyperlipidemia      Hypertension      PONV (postoperative nausea and vomiting)      TMJ syndrome        Past Surgical History:    Past Surgical History:   Procedure Laterality Date     ARTHROPLASTY KNEE      left     OPEN REDUCTION INTERNAL FIXATION ANKLE  3/27/2013    Procedure: OPEN REDUCTION INTERNAL FIXATION ANKLE;  Right Ankle Open Reduction Internal Fixation ; Spinal anesthesia with block      ;  Surgeon: Dayday Gutiérrez MD;  Location:  OR     ORTHOPEDIC SURGERY         Family History:    No family history on file.    Social History:  Marital Status:   [4]  Social History     Tobacco Use     Smoking status: Former Smoker     Smokeless tobacco: Never Used     Tobacco comment: previous marijuana use    Substance Use Topics     Alcohol use: Yes     Comment: 2-3 per week, wine     Drug use: No        Medications:    No current outpatient medications on file.        Review of Systems   Constitutional: Negative for chills and fever.   HENT: Negative for sore throat.   "  Eyes: Negative for visual disturbance.   Respiratory: Negative for shortness of breath.    Cardiovascular: Positive for leg swelling (unilateral). Negative for chest pain.   Gastrointestinal: Positive for vomiting. Negative for abdominal pain, constipation and diarrhea.   Endocrine: Positive for polyuria.   Genitourinary: Negative for dysuria.   Musculoskeletal: Negative for neck pain.   Skin: Negative for rash.   Allergic/Immunologic: Negative for food allergies.   Neurological: Negative for weakness and headaches.   Psychiatric/Behavioral: Negative for dysphoric mood. The patient is not nervous/anxious.        Physical Exam   BP: (!) 151/58  Pulse: 92  Heart Rate: 86  Temp: 97.6  F (36.4  C)  Resp: 18  Height: 167.6 cm (5' 6\")  Weight: 79.4 kg (175 lb)  SpO2: 99 %    Physical Exam  GEN: Awake, alert, and cooperative. No acute distress.   HENT: MMM. External ears and nose normal bilaterally.  Atraumatic  EYES: EOM intact. Conjunctiva clear.  No discharge.   NECK: Supple, symmetric.  CV : Regular rate and rhythm.  Grade 2 systolic murmur best appreciated RUSB.  Extremities warm well perfused.  PULM: Normal effort.  Normal respiratory rate no wheezes, rales, or rhonchi bilaterally.  ABD: Soft, non-tender, non-distended. No rebound or guarding.   NEURO: Normal speech. Following commands. CN II-XII grossly intact. Answering questions and interacting appropriately.  Oriented to person place and time.  Does seem to have mild memory loss and has difficulty remembering details.  EXT: No gross deformity.  Bilateral pitting edema to mid tibia. Slight piiting edema to mid-tibia.   INT: Warm. No diaphoresis. Normal color.     ED Course        Procedures             EKG Interpretation:      Interpreted by Harrison Luna MD  Time reviewed: 1422  Symptoms at time of EKG: None   Rhythm: sinus arrhythmia  Rate: Normal  Axis: Normal  Ectopy: none  Conduction: normal  ST Segments/ T Waves: Non-specific ST-T wave changes and U " waves present II, V3, V4 and V5  Q Waves: v1 and v2  Comparison to prior: No old EKG available    Clinical Impression: Abnormal ECG. Rate variation. U waves. Non specific ST changes.       Critical Care time:  was 30 minutes for this patient excluding procedures.               Results for orders placed or performed during the hospital encounter of 02/20/20 (from the past 24 hour(s))   Glucose by meter   Result Value Ref Range    Glucose 437 (H) 70 - 99 mg/dL   CBC with platelets, differential   Result Value Ref Range    WBC 8.1 4.0 - 11.0 10e9/L    RBC Count 3.92 3.8 - 5.2 10e12/L    Hemoglobin 12.2 11.7 - 15.7 g/dL    Hematocrit 37.8 35.0 - 47.0 %    MCV 96 78 - 100 fl    MCH 31.1 26.5 - 33.0 pg    MCHC 32.3 31.5 - 36.5 g/dL    RDW 13.3 10.0 - 15.0 %    Platelet Count 200 150 - 450 10e9/L    Diff Method Automated Method     % Neutrophils 84.6 %    % Lymphocytes 10.7 %    % Monocytes 4.4 %    % Eosinophils 0.0 %    % Basophils 0.1 %    % Immature Granulocytes 0.2 %    Nucleated RBCs 0 0 /100    Absolute Neutrophil 6.9 1.6 - 8.3 10e9/L    Absolute Lymphocytes 0.9 0.8 - 5.3 10e9/L    Absolute Monocytes 0.4 0.0 - 1.3 10e9/L    Absolute Eosinophils 0.0 0.0 - 0.7 10e9/L    Absolute Basophils 0.0 0.0 - 0.2 10e9/L    Abs Immature Granulocytes 0.0 0 - 0.4 10e9/L    Absolute Nucleated RBC 0.0    Comprehensive metabolic panel   Result Value Ref Range    Sodium 131 (L) 133 - 144 mmol/L    Potassium 5.1 3.4 - 5.3 mmol/L    Chloride 100 94 - 109 mmol/L    Carbon Dioxide 11 (L) 20 - 32 mmol/L    Anion Gap 20 (H) 3 - 14 mmol/L    Glucose 460 (H) 70 - 99 mg/dL    Urea Nitrogen 22 7 - 30 mg/dL    Creatinine 0.81 0.52 - 1.04 mg/dL    GFR Estimate 70 >60 mL/min/[1.73_m2]    GFR Estimate If Black 82 >60 mL/min/[1.73_m2]    Calcium 9.3 8.5 - 10.1 mg/dL    Bilirubin Total 0.6 0.2 - 1.3 mg/dL    Albumin 3.5 3.4 - 5.0 g/dL    Protein Total 7.4 6.8 - 8.8 g/dL    Alkaline Phosphatase 93 40 - 150 U/L    ALT 32 0 - 50 U/L    AST 29 0 - 45 U/L    Lactic acid whole blood   Result Value Ref Range    Lactic Acid 1.9 0.7 - 2.0 mmol/L   Ketone Beta-Hydroxybutyrate Quantitative   Result Value Ref Range    Ketone Quantitative 6.2 (HH) 0.0 - 0.6 mmol/L   Blood gas venous   Result Value Ref Range    Ph Venous 7.22 (L) 7.32 - 7.43 pH    PCO2 Venous 30 (L) 40 - 50 mm Hg    PO2 Venous 60 (H) 25 - 47 mm Hg    Bicarbonate Venous 12 (L) 21 - 28 mmol/L    Base Deficit Venous 14.3 mmol/L   UA reflex to Microscopic   Result Value Ref Range    Color Urine Straw     Appearance Urine Clear     Glucose Urine >499 (A) NEG^Negative mg/dL    Bilirubin Urine Negative NEG^Negative    Ketones Urine 80 (A) NEG^Negative mg/dL    Specific Gravity Urine 1.013 1.003 - 1.035    Blood Urine Negative NEG^Negative    pH Urine 5.0 5.0 - 7.0 pH    Protein Albumin Urine Negative NEG^Negative mg/dL    Urobilinogen mg/dL 0.0 0.0 - 2.0 mg/dL    Nitrite Urine Negative NEG^Negative    Leukocyte Esterase Urine Negative NEG^Negative    Source Midstream Urine     RBC Urine <1 0 - 2 /HPF    WBC Urine <1 0 - 5 /HPF    Squamous Epithelial /HPF Urine <1 0 - 1 /HPF    Mucous Urine Present (A) NEG^Negative /LPF    Hyaline Casts 4 (H) 0 - 2 /LPF   Hemoglobin A1c   Result Value Ref Range    Hemoglobin A1C 9.9 (H) 0 - 5.6 %   Potassium   Result Value Ref Range    Potassium 4.7 3.4 - 5.3 mmol/L   Phosphorus   Result Value Ref Range    Phosphorus 3.4 2.5 - 4.5 mg/dL   Magnesium   Result Value Ref Range    Magnesium 1.5 (L) 1.6 - 2.3 mg/dL   Lipase   Result Value Ref Range    Lipase 68 (L) 73 - 393 U/L   Troponin I   Result Value Ref Range    Troponin I ES <0.015 0.000 - 0.045 ug/L   Chest XR,  PA & LAT    Narrative    CHEST TWO VIEWS   2/20/2020 2:58 PM     HISTORY: Diabetic ketoacidosis.    COMPARISON: None available.      Impression    IMPRESSION: Two views of the chest were obtained. Cardiomediastinal  silhouette is within normal limits. Minimal bibasilar atelectasis. No  significant pleural effusion or  pneumothorax.    PALMER LEY MD   Glucose by meter   Result Value Ref Range    Glucose 408 (H) 70 - 99 mg/dL   Glucose by meter   Result Value Ref Range    Glucose 319 (H) 70 - 99 mg/dL   XR Hip G/E 2 Views Bilateral    Narrative    EXAM: XR HIP BILATERAL 2 VIEWS EACH  LOCATION: [facility]  DATE/TIME: 2/20/2020 5:08 PM    INDICATION: Bilateral posterior hip pain  COMPARISON: 06/13/2018      Impression    IMPRESSION: No acute fracture or malalignment. Normal bilateral hip joint spacing. Moderate degenerative changes of the lower lumbar spine.   XR Ankle Bilateral G/E 3 Views    Narrative    EXAM: XR ANKLE BILATERAL G/E 3 VW  LOCATION: [facility]  DATE/TIME: 2/20/2020 5:09 PM    INDICATION: Wound over lateral malleolus, acute ankle pain  COMPARISON: 05/07/2013      Impression    IMPRESSION:   Right ankle: Postsurgical changes at the distal fibula and medial malleolus. No hardware complication. No acute fracture or malalignment. The ankle mortise is congruent. The talar dome is unremarkable. Mild tibiotalar joint degenerative changes.   Osteopenia.    Left ankle: No acute fracture or malalignment. The ankle mortise is congruent. The talar dome is unremarkable. Mild tibiotalar joint degenerative changes. Osteopenia. Small plantar calcaneal enthesophyte.   Glucose by meter   Result Value Ref Range    Glucose 271 (H) 70 - 99 mg/dL   Blood culture   Result Value Ref Range    Specimen Description Blood     Special Requests Left Arm     Culture Micro PENDING    Blood culture   Result Value Ref Range    Specimen Description Blood Right Arm     Special Requests Received in aerobic bottle only     Culture Micro PENDING    Magnesium   Result Value Ref Range    Magnesium 1.5 (L) 1.6 - 2.3 mg/dL   Basic metabolic panel   Result Value Ref Range    Sodium 139 133 - 144 mmol/L    Potassium 4.1 3.4 - 5.3 mmol/L    Chloride 110 (H) 94 - 109 mmol/L    Carbon Dioxide 13 (L) 20 - 32 mmol/L    Anion Gap  16 (H) 3 - 14 mmol/L    Glucose 262 (H) 70 - 99 mg/dL    Urea Nitrogen 16 7 - 30 mg/dL    Creatinine 0.65 0.52 - 1.04 mg/dL    GFR Estimate 86 >60 mL/min/[1.73_m2]    GFR Estimate If Black >90 >60 mL/min/[1.73_m2]    Calcium 8.8 8.5 - 10.1 mg/dL   Ketone Beta-Hydroxybutyrate Quantitative   Result Value Ref Range    Ketone Quantitative 5.0 (HH) 0.0 - 0.6 mmol/L   Procalcitonin   Result Value Ref Range    Procalcitonin 0.50 ng/ml   Methicillin Resistant Staph Aureus PCR   Result Value Ref Range    Specimen Description Nares     Methicillin Resist/Sens S. aureus PCR Negative NEG^Negative   Glucose by meter   Result Value Ref Range    Glucose 257 (H) 70 - 99 mg/dL   Glucose by meter   Result Value Ref Range    Glucose 244 (H) 70 - 99 mg/dL   Glucose by meter   Result Value Ref Range    Glucose 221 (H) 70 - 99 mg/dL   Glucose by meter   Result Value Ref Range    Glucose 212 (H) 70 - 99 mg/dL   Glucose by meter   Result Value Ref Range    Glucose 207 (H) 70 - 99 mg/dL       Medications   dextrose 50 % injection 25-50 mL (has no administration in time range)   ondansetron (ZOFRAN) injection 4 mg (has no administration in time range)   acetaminophen (TYLENOL) tablet 650 mg (650 mg Oral Given 2/20/20 2226)   ondansetron (ZOFRAN-ODT) ODT tab 4 mg (has no administration in time range)     Or   ondansetron (ZOFRAN) injection 4 mg (has no administration in time range)   acetaminophen (TYLENOL) tablet 650 mg (has no administration in time range)   apixaban ANTICOAGULANT (ELIQUIS) tablet 2.5 mg (2.5 mg Oral Given 2/20/20 1956)   atorvastatin (LIPITOR) tablet 40 mg (40 mg Oral Given 2/20/20 1956)   busPIRone (BUSPAR) tablet 7.5 mg (7.5 mg Oral Given 2/20/20 1956)   escitalopram (LEXAPRO) tablet 10 mg (10 mg Oral Given 2/20/20 1957)   venlafaxine (EFFEXOR-XR) 24 hr capsule 150 mg (150 mg Oral Given 2/20/20 1956)   insulin 1 unit/1mL in saline (NovoLIN, HumuLIN Regular) drip - DKA algorithm (2.5 Units/hr Intravenous Rate/Dose Change  2/20/20 2000)   glucose gel 15-30 g (has no administration in time range)     Or   dextrose 50 % injection 25-50 mL (has no administration in time range)     Or   glucagon injection 1 mg (has no administration in time range)   Patient is already receiving anticoagulation with heparin, enoxaparin (LOVENOX), warfarin (COUMADIN)  or other anticoagulant medication (has no administration in time range)   0.9% sodium chloride + KCl 20 mEq/L infusion ( Intravenous New Bag 2/20/20 1939)   dextrose 5% and 0.45% NaCl + KCl 20 mEq/L infusion ( Intravenous Hold 2/20/20 2222)   cefTRIAXone in d5w (ROCEPHIN) intermittent infusion 1 g (1 g Intravenous New Bag 2/20/20 1857)   amLODIPine (NORVASC) tablet 10 mg (10 mg Oral Given 2/20/20 1956)   magnesium sulfate 4 g in 100 mL sterile water (premade) (4 g Intravenous New Bag 2/20/20 2137)   potassium phosphate 15 mmol in D5W 250 mL intermittent infusion (has no administration in time range)   potassium phosphate 20 mmol in D5W 500 mL intermittent infusion (has no administration in time range)   potassium phosphate 25 mmol in D5W 500 mL intermittent infusion (has no administration in time range)   lactated ringers BOLUS 1,000 mL (0 mLs Intravenous Stopped 2/20/20 1514)   potassium chloride 10 mEq in 100 mL intermittent infusion with 10 mg lidocaine (0 mEq Intravenous Stopped 2/20/20 1741)   acetaminophen (TYLENOL) tablet 975 mg (975 mg Oral Given 2/20/20 1544)   0.9% sodium chloride BOLUS (1,000 mLs Intravenous New Bag 2/20/20 1915)        1:19 PM Patient assessed.      Assessments & Plan (with Medical Decision Making)   74 year old female with past medical history of poorly controlled DM type 1, DKA, NSTEMI, likely dementia was BIBA for poorly controlled blood sugars. On arrival to Emergency Department, vital signs were notable for BP of 151/58. She had 30 units of lantus this morning and SubQ insulin per sliding scale prior to arrival.  on arrival. Beta-hydroxybutyrate elevated  to 6.2. CBC with pseudohyponatremia, bicarbonate of 11 and anion gap of 20. She meets criteria for DKA. VBG with pH of 7.22, normal lactate, normal CBC. DKA protocol initiated and insulin gtt started in ED. Lactated Ringer's solution. No clear trigger for DKA. She denies any fever, chills, n/v/d. Lungs are clear unremarkable chest Xray. Abdomen is soft and non tender. ECG with no evidence of acute ischemia, troponin below level of detection. She is alert, oriented to person, place, and time. She has difficulty remember details of certain events. Son is concerned about dementia but says she is at her baseline. Upcoming neuro phychiatric testing. Patient's only complaint was posterior right hip pain. She ambulates without difficulty. She will need hospital admission for management of DKA. I discussed the case with Dr. Kelly who accepted the admission. Transition orders placed.     I have reviewed the nursing notes.         Current Discharge Medication List          Final diagnoses:   Diabetic ketoacidosis without coma associated with type 1 diabetes mellitus (H)     Harrison Luna MD    2/20/2020   [facility], [place] EMERGENCY DEPARTMENT    This document serves as a record of the services and decisions personally performed and made by Harrison Luna MD. It was created on HIS/HER behalf by Shaheen Renner, a trained medical scribe. The creation of this document is based the provider's statements to the medical scribe.  Shaheen Renner 1:19 PM 2/20/2020    Provider:   The information in this document, created by the medical scribe for me, accurately reflects the services I personally performed and the decisions made by me. I have reviewed and approved this document for accuracy prior to leaving the patient care area.  Harrison Luna MD 1:19 PM 2/20/2020     Disclaimer: This note consists of words and symbols derived from keyboarding and dictation using voice recognition software.  As a result, there  may be errors that have gone undetected.  Please consider this when interpreting information found in this note.             Harrison Luna MD  02/20/20 [id number]

## 2020-02-21 LAB
ANION GAP SERPL CALCULATED.3IONS-SCNC: 7 MMOL/L (ref 3–14)
BUN SERPL-MCNC: 9 MG/DL (ref 7–30)
CALCIUM SERPL-MCNC: 8.4 MG/DL (ref 8.5–10.1)
CHLORIDE SERPL-SCNC: 113 MMOL/L (ref 94–109)
CO2 SERPL-SCNC: 21 MMOL/L (ref 20–32)
CREAT SERPL-MCNC: 0.58 MG/DL (ref 0.52–1.04)
ERYTHROCYTE [DISTWIDTH] IN BLOOD BY AUTOMATED COUNT: 13.3 % (ref 10–15)
GFR SERPL CREATININE-BSD FRML MDRD: 90 ML/MIN/{1.73_M2}
GLUCOSE BLDC GLUCOMTR-MCNC: 153 MG/DL (ref 70–99)
GLUCOSE BLDC GLUCOMTR-MCNC: 164 MG/DL (ref 70–99)
GLUCOSE BLDC GLUCOMTR-MCNC: 165 MG/DL (ref 70–99)
GLUCOSE BLDC GLUCOMTR-MCNC: 166 MG/DL (ref 70–99)
GLUCOSE BLDC GLUCOMTR-MCNC: 168 MG/DL (ref 70–99)
GLUCOSE BLDC GLUCOMTR-MCNC: 179 MG/DL (ref 70–99)
GLUCOSE BLDC GLUCOMTR-MCNC: 181 MG/DL (ref 70–99)
GLUCOSE BLDC GLUCOMTR-MCNC: 181 MG/DL (ref 70–99)
GLUCOSE BLDC GLUCOMTR-MCNC: 242 MG/DL (ref 70–99)
GLUCOSE BLDC GLUCOMTR-MCNC: 242 MG/DL (ref 70–99)
GLUCOSE BLDC GLUCOMTR-MCNC: 302 MG/DL (ref 70–99)
GLUCOSE BLDC GLUCOMTR-MCNC: 309 MG/DL (ref 70–99)
GLUCOSE SERPL-MCNC: 190 MG/DL (ref 70–99)
HCT VFR BLD AUTO: 31 % (ref 35–47)
HGB BLD-MCNC: 10.4 G/DL (ref 11.7–15.7)
MAGNESIUM SERPL-MCNC: 2.2 MG/DL (ref 1.6–2.3)
MCH RBC QN AUTO: 31.3 PG (ref 26.5–33)
MCHC RBC AUTO-ENTMCNC: 33.5 G/DL (ref 31.5–36.5)
MCV RBC AUTO: 93 FL (ref 78–100)
PHOSPHATE SERPL-MCNC: 2.8 MG/DL (ref 2.5–4.5)
PLATELET # BLD AUTO: 138 10E9/L (ref 150–450)
POTASSIUM SERPL-SCNC: 4 MMOL/L (ref 3.4–5.3)
RBC # BLD AUTO: 3.32 10E12/L (ref 3.8–5.2)
SODIUM SERPL-SCNC: 141 MMOL/L (ref 133–144)
WBC # BLD AUTO: 5 10E9/L (ref 4–11)

## 2020-02-21 PROCEDURE — 20000003 ZZH R&B ICU

## 2020-02-21 PROCEDURE — 80048 BASIC METABOLIC PNL TOTAL CA: CPT | Performed by: INTERNAL MEDICINE

## 2020-02-21 PROCEDURE — 84100 ASSAY OF PHOSPHORUS: CPT | Performed by: INTERNAL MEDICINE

## 2020-02-21 PROCEDURE — 25000128 H RX IP 250 OP 636: Performed by: INTERNAL MEDICINE

## 2020-02-21 PROCEDURE — 85027 COMPLETE CBC AUTOMATED: CPT | Performed by: INTERNAL MEDICINE

## 2020-02-21 PROCEDURE — 25000132 ZZH RX MED GY IP 250 OP 250 PS 637: Performed by: INTERNAL MEDICINE

## 2020-02-21 PROCEDURE — 99233 SBSQ HOSP IP/OBS HIGH 50: CPT | Performed by: INTERNAL MEDICINE

## 2020-02-21 PROCEDURE — 25800030 ZZH RX IP 258 OP 636: Performed by: INTERNAL MEDICINE

## 2020-02-21 PROCEDURE — 25000131 ZZH RX MED GY IP 250 OP 636 PS 637: Performed by: INTERNAL MEDICINE

## 2020-02-21 PROCEDURE — 00000146 ZZHCL STATISTIC GLUCOSE BY METER IP

## 2020-02-21 PROCEDURE — 83735 ASSAY OF MAGNESIUM: CPT | Performed by: INTERNAL MEDICINE

## 2020-02-21 PROCEDURE — 36415 COLL VENOUS BLD VENIPUNCTURE: CPT | Performed by: INTERNAL MEDICINE

## 2020-02-21 RX ORDER — NICOTINE POLACRILEX 4 MG
15-30 LOZENGE BUCCAL
Status: DISCONTINUED | OUTPATIENT
Start: 2020-02-21 | End: 2020-02-23 | Stop reason: HOSPADM

## 2020-02-21 RX ORDER — DEXTROSE MONOHYDRATE 25 G/50ML
25-50 INJECTION, SOLUTION INTRAVENOUS
Status: DISCONTINUED | OUTPATIENT
Start: 2020-02-21 | End: 2020-02-23 | Stop reason: HOSPADM

## 2020-02-21 RX ADMIN — POTASSIUM CHLORIDE, DEXTROSE MONOHYDRATE AND SODIUM CHLORIDE: 150; 5; 450 INJECTION, SOLUTION INTRAVENOUS at 00:42

## 2020-02-21 RX ADMIN — INSULIN ASPART 4 UNITS: 100 INJECTION, SOLUTION INTRAVENOUS; SUBCUTANEOUS at 17:28

## 2020-02-21 RX ADMIN — ESCITALOPRAM OXALATE 10 MG: 10 TABLET ORAL at 08:44

## 2020-02-21 RX ADMIN — BUSPIRONE HYDROCHLORIDE 7.5 MG: 7.5 TABLET ORAL at 08:44

## 2020-02-21 RX ADMIN — BUSPIRONE HYDROCHLORIDE 7.5 MG: 7.5 TABLET ORAL at 19:53

## 2020-02-21 RX ADMIN — CEFTRIAXONE SODIUM 1 G: 1 INJECTION, SOLUTION INTRAVENOUS at 17:28

## 2020-02-21 RX ADMIN — INSULIN ASPART 3 UNITS: 100 INJECTION, SOLUTION INTRAVENOUS; SUBCUTANEOUS at 11:12

## 2020-02-21 RX ADMIN — ACETAMINOPHEN 650 MG: 325 TABLET, FILM COATED ORAL at 21:53

## 2020-02-21 RX ADMIN — INSULIN ASPART 1 UNITS: 100 INJECTION, SOLUTION INTRAVENOUS; SUBCUTANEOUS at 08:53

## 2020-02-21 RX ADMIN — INSULIN GLARGINE 15 UNITS: 100 INJECTION, SOLUTION SUBCUTANEOUS at 08:53

## 2020-02-21 RX ADMIN — VENLAFAXINE HYDROCHLORIDE 150 MG: 150 CAPSULE, EXTENDED RELEASE ORAL at 19:53

## 2020-02-21 RX ADMIN — APIXABAN 2.5 MG: 2.5 TABLET, FILM COATED ORAL at 08:44

## 2020-02-21 RX ADMIN — ATORVASTATIN CALCIUM 40 MG: 20 TABLET, FILM COATED ORAL at 08:44

## 2020-02-21 RX ADMIN — AMLODIPINE BESYLATE 10 MG: 10 TABLET ORAL at 08:44

## 2020-02-21 RX ADMIN — POTASSIUM CHLORIDE, DEXTROSE MONOHYDRATE AND SODIUM CHLORIDE: 150; 5; 450 INJECTION, SOLUTION INTRAVENOUS at 08:47

## 2020-02-21 RX ADMIN — ACETAMINOPHEN 650 MG: 325 TABLET, FILM COATED ORAL at 13:35

## 2020-02-21 RX ADMIN — APIXABAN 2.5 MG: 2.5 TABLET, FILM COATED ORAL at 19:53

## 2020-02-21 ASSESSMENT — ACTIVITIES OF DAILY LIVING (ADL)
ADLS_ACUITY_SCORE: 17
ADLS_ACUITY_SCORE: 16
ADLS_ACUITY_SCORE: 17

## 2020-02-21 ASSESSMENT — MIFFLIN-ST. JEOR: SCORE: 1308.75

## 2020-02-21 ASSESSMENT — PAIN DESCRIPTION - DESCRIPTORS
DESCRIPTORS: ACHING
DESCRIPTORS: DISCOMFORT;NAGGING

## 2020-02-21 NOTE — PLAN OF CARE
Alert but forgetful. VSS. RA, LS clear. Regular diet. Insulin gtt off at 1100, Lantus and sliding scale began this AM. MIV at 75 ml/hr. 2 PIV's. No complaints of pain. Possible discharge to [facility] tomorrow if blood sugars remain stable.

## 2020-02-21 NOTE — H&P
[facility] History and Physical    Kimberly Stephenson MRN# [id number]   Age: 74 year old YOB: 1945     Date of Admission:  2/20/2020    Home clinic: unknown  Primary care provider: Li Arana          Chief Complaint:   High sugars    History is obtained from the patient and patient's family          History of Present Illness:   This patient is a 74 year old  female with a significant past medical history of NSTEMI/ demand ischemia, diabetes and hypertension who presents with elevated BS at AL. Apparently having BS in 200-400 range last few days-up to  600. Family noticed erythema on R ankle2-3 days ago. Pt complains of chronic B buttock pains. No fever/ chills/cp/sob. No dysuria/ hematuria/ hemetemesis/melena. No falls.            Past Medical History:     Patient Active Problem List    Diagnosis Date Noted     PATRICIA (acute kidney injury) (H) 01/09/2020     Priority: Medium     NSTEMI (non-ST elevated myocardial infarction) (H) 01/09/2020     Priority: Medium     PAF (paroxysmal atrial fibrillation) (H) 01/09/2020     Priority: Medium     Personal history of fall 01/09/2020     Priority: Medium     Closed Colles' fracture of left radius with routine healing, subsequent encounter 01/09/2020     Priority: Medium     initial fall 9/24/19, fell again 12/26 and re-fractured       Bacteremia due to coagulase-negative Staphylococcus 01/02/2020     Priority: Medium     Staphylococcal sepsis (H) 01/02/2020     Priority: Medium     Acute encephalopathy 12/28/2019     Priority: Medium     Diabetic ketoacidosis with coma (H) 12/28/2019     Priority: Medium     Type 1 diabetes mellitus with hyperglycemia (H) 12/28/2019     Priority: Medium     Cognitive decline 12/19/2018     Priority: Medium     Dupuytren's contracture of hand 08/30/2016     Priority: Medium     Depression 08/15/2014     Priority: Medium     CELIA (generalized anxiety disorder) 10/17/2013     Priority: Medium     Dyslipidemia  08/07/2013     Priority: Medium     Ankle joint pain 04/15/2013     Priority: Medium              Edema 04/15/2013     Priority: Medium     Abnormal gait 04/15/2013     Priority: Medium     Closed right ankle fracture 03/19/2013     Priority: Medium     Essential hypertension 12/05/2005     Priority: Medium               Past Surgical History:      Past Surgical History:   Procedure Laterality Date     ARTHROPLASTY KNEE      left     OPEN REDUCTION INTERNAL FIXATION ANKLE  3/27/2013    Procedure: OPEN REDUCTION INTERNAL FIXATION ANKLE;  Right Ankle Open Reduction Internal Fixation ; Spinal anesthesia with block      ;  Surgeon: Dayday Gutiérrez MD;  Location:  OR     ORTHOPEDIC SURGERY               Social History:     Social History     Socioeconomic History     Marital status:      Spouse name: Not on file     Number of children: Not on file     Years of education: Not on file     Highest education level: Not on file   Occupational History     Not on file   Social Needs     Financial resource strain: Not on file     Food insecurity:     Worry: Not on file     Inability: Not on file     Transportation needs:     Medical: Not on file     Non-medical: Not on file   Tobacco Use     Smoking status: Former Smoker     Smokeless tobacco: Never Used     Tobacco comment: previous marijuana use    Substance and Sexual Activity     Alcohol use: Yes     Comment: 2-3 per week, wine     Drug use: No     Sexual activity: Not on file   Lifestyle     Physical activity:     Days per week: Not on file     Minutes per session: Not on file     Stress: Not on file   Relationships     Social connections:     Talks on phone: Not on file     Gets together: Not on file     Attends Jainism service: Not on file     Active member of club or organization: Not on file     Attends meetings of clubs or organizations: Not on file     Relationship status: Not on file     Intimate partner violence:     Fear of current or ex  partner: Not on file     Emotionally abused: Not on file     Physically abused: Not on file     Forced sexual activity: Not on file   Other Topics Concern     Not on file   Social History Narrative     Not on file             Family History:   No family history on file.     Neg for contributing issues         Allergies:     Allergies   Allergen Reactions     Cymbalta      Fentanyl Nausea and Vomiting     Versed [Midazolam] Nausea and Vomiting             Medications:     Prior to Admission medications    Medication Sig Last Dose Taking? Auth Provider   amLODIPine (NORVASC) 10 MG tablet Take 10 mg by mouth daily 2/19/2020 at 2030 Yes Reported, Patient   apixaban ANTICOAGULANT (ELIQUIS) 5 MG tablet Take 2.5 mg by mouth 2 times daily  2/20/2020 at 0730 Yes Reported, Patient   atorvastatin (LIPITOR) 40 MG tablet Take 40 mg by mouth daily. 2/19/2020 at 2030 Yes Reported, Patient   busPIRone (BUSPAR) 7.5 MG tablet Take 7.5 mg by mouth 2 times daily 2/20/2020 at 0730 Yes Reported, Patient   escitalopram (LEXAPRO) 10 MG tablet Take 10 mg by mouth daily 2/20/2020 at 0730 Yes Reported, Patient   insulin aspart (NOVOLOG PEN) 100 UNIT/ML pen Inject as per sliding scale:if 0 - 69 = - Refer to hypoglycemia treatment protocol;70 - 149 = 0 units No corrective insulin, administer full dose of prandial insulin if ordered;150 - 199 = 2 units;200 - 249 = 4 units;250 - 299 = 6 units;300 - 349 = 8 units;350 - 399 = 10 units;400 - 999 = 12 units and notify MD,subcutaneously with meals related to TYPE 2 DIABETES MELLITUS WITH KETOACIDOSIS WITHOUT COMA (E11.10) 2/20/2020 at 0730 4 units, 0900 5 units, 1030 5 units, 12 5 units Yes Reported, Patient   insulin glargine (LANTUS PEN) 100 UNIT/ML pen Inject 30 Units Subcutaneous every morning 2/20/2020 at 0700 Yes Reported, Patient   potassium chloride (KLOR-CON) 20 MEQ packet Take 20 mEq by mouth daily 2/20/2020 at 0730 Yes Reported, Patient   venlafaxine (EFFEXOR-XR) 150 MG 24 hr capsule Take  150 mg by mouth daily 2/19/2020 at 2030 Yes Reported, Patient   acetaminophen (TYLENOL) 325 MG tablet Take 650 mg by mouth every 4 hours as needed for mild pain   Reported, Patient   diphenoxylate-atropine (LOMOTIL) 2.5-0.025 MG tablet Take 2 tablets by mouth 4 times daily as needed for diarrhea   Bella Zimmerman APRN CNP   Skin Protectants, Misc. (EUCERIN) cream Apply topically daily   Reported, Patient            Review of Systems:   The Review of Systems is negative in ALL other than noted in the HPI          Physical Exam:   Blood pressure 133/64, pulse 88, temperature 97.6  F (36.4  C), temperature source Oral, weight 79.4 kg (175 lb), SpO2 97 %.  GENERAL APPEARANCE: healthy, alert and no distress  EYES: conjunctiva clear, eyes grossly normal  HENT: external ears and nose normal   NECK: supple, no masses or adenopathy  RESP: lungs clear to auscultation - no rales, rhonchi or wheezes  CV: regular rate and rhythm, normal S1 S2, no S3 or S4 and no murmur, click or rub   ABDOMEN: soft, nontender, no HSM or masses and bowel sounds normal  MS: no clubbing, cyanosis; 1+ edema BLE  SKIN: small open wound L ankle--mild erythema R ankle-scab on wound. Small wound/ bruise below L knee  NEURO: Normal strength and tone, sensory exam grossly normal, mentation intact and speech normal         Data:     Lab Results   Component Value Date    WBC 8.1 02/20/2020    HGB 12.2 02/20/2020    HCT 37.8 02/20/2020    MCV 96 02/20/2020     02/20/2020     Lab Results   Component Value Date     (L) 02/20/2020    CO2 11 (L) 02/20/2020     Lab Results   Component Value Date    BUN 22 02/20/2020     No components found for: SEDRATE  No components found for: DDIMER  No results found for: BNP  No results found for: TSH  No results found for: TROPONIN  UA RESULTS:  Recent Labs   Lab Test 02/20/20  1400   COLOR Straw   APPEARANCE Clear   URINEGLC >499*   URINEBILI Negative   URINEKETONE 80*   SG 1.013   UBLD Negative   URINEPH 5.0    PROTEIN Negative   NITRITE Negative   LEUKEST Negative   RBCU <1   WBCU <1     Liver Function Studies -   Recent Labs   Lab Test 02/20/20  1310   PROTTOTAL 7.4   ALBUMIN 3.5   BILITOTAL 0.6   ALKPHOS 93   AST 29   ALT 32       EKG results:  SR-T inversions precordial leads    RADIOLOGY: CXR/ Xray ankle/ hip-negative.    ECHO 12/29/19-Final Impressions:   1. Normal LV size, mildly increased wall thickness, normal global systolic function with an estimated EF of 65 - 70%.   2. Right ventricular cavity size is normal, global systolic RV function is normal.   3. The ascending aorta is dilated with a maximal diameter of 4.0 cm.   4. Dilated coronary sinus.   5. The tricuspid regurgitant velocity is 2.6 m/s, the estimated right ventricular systolic pressure is 28 mmHg plus right atrial pressure.     A/P  DKA  2nd episode last 2 months. Admitted 12/28-1/8 at [facility] in DKA-? Non compliant at that time. Pt sent to TCU. Now presents from AL-not missing insulin. DKA ppt likely due to cellulitis--?No sx of infection. Does have B ankle wounds-pressure wounds seen during last admit. But erythema R ankle noticed 2-3 days ago by family. Has mild erythema now-R ankle.   Had positive blood cx last admit with staph simonensis-felt to be skin contaminant. Was rx with antbx for a week or so.   -will admit to ICU on DKA protocol. Start rocephin for possible cellulitis. Check PCT/ blood cultures. Consider LS/ ankle MRI if not improving to r/o abscess etc due to wounds,  B hip/buttock pains.     POSSIBLE R ANKLE CELLULITIS  H/o prior surgery '13. rx as above with antbx. Consider MRI if not improving.    PAROXYSMAL AFIB  On eliquis. Not on rate control meds for unknown reasons  -will continue eliquis,  amlodipine -consider changing to metoprolol-after discussing with cards. May be a reason they kept her on it after the NSTEMI as below.    H/O NSTEMI/ DEMAND ISCHEMIA  During last admit. trops as high as 16. Apparently cards seen--was felt to  be due to possible sepsis/ demand ischemia. Was to have OP lexiscan-never did. ECHO as above.  -will continue amlodipine. Need to schedule lexiscan OP on discharge.     HLD  Continue meds    DEPRESSION/ CELIA  Continue meds    DISPO  SW to see. Likely back to AL--? TCU    DVT PROF-on siva Ya MD  [phone]

## 2020-02-21 NOTE — PROGRESS NOTES
"[facility] [place] [facility] AD[facility] NOTE    Patient admitted to room 3-ICU at approximately 1800 via cart from [facility] . Patient was accompanied by  Son.     Verbal SBAR report received from  Shruti SANTO prior to patient arrival.     Patient ambulated. to bed Patient alert and oriented X 4 Pain control: 0-10 Pain Scale: 0. Admission vital signs: Blood pressure 133/64, pulse 88, temperature 98.2  F (36.8  C), temperature source Oral, resp. rate 18, height 1.676 m (5' 6\"), weight 79.3 kg (174 lb 13.2 oz), SpO2 97 %. PATIENT,  was oriented to plan of care, ROOM ORIENTation:\"call light\",\"bed controls\",\"tv\",\"telephone\",\"bathroom\",\"visiting hours\".     Risk Assessment    The following safety risks were identified during admission: safety risks. Yellow risk band applied no.     Skin Initial Assessment    This writer admitted this patient and completed a full skin assessment and Melquiades score in the Adult PCS flowsheet. Appropriate interventions initiated as needed.     Secondary skin check completed by  Elvin Arnett Risk Assessment  Sensory Perception: 4-->no impairment  Moisture: 3-->occasionally moist  Activity: 3-->walks occasionally  Mobility: 3-->slightly limited  Nutrition: 3-->adequate  Friction and Shear: 3-->no apparent problem  Melquiades Score: 19    Education    Patient has a [place] to Observation order: NO   Observation education completed and documented: FINN Ann RN    "

## 2020-02-21 NOTE — PROGRESS NOTES
A&O x 4, forgetful. Makes needs known. C/o b/l buttock pain-relieved with Tylenol and repositioning. VSS, afebrile. LSC on RA. Good appetite. Voiding adequately. Up to bathroom with SBA.     Plan: Continue to monitor BG, possible discharge to [facility] tomorrow if BG remain stable.

## 2020-02-21 NOTE — PROGRESS NOTES
Pt alert and oriented to self, place and situation. Confused at times and forgetful. Up to bathroom with standby assist. Adequate urine output. Following DKA Algorithm 1, blood sugars remained below 200. VSS.    Meron Slaas RN

## 2020-02-21 NOTE — PROGRESS NOTES
[facility] Medicine Progress Note  Date of Service: 02/21/2020    Assessment & Plan   Kimberly Stephenson is a 74 year old female with DM type 1 who presents with high bs and was in DKA.    DKA  2nd episode last 2 months. Admitted 12/28-1/8 at [facility] in DKA-? Non compliant at that time. Pt sent to TCU. Now presents from AL-not missing insulin. DKA  likely due to cellulitis left lateral ankle.Does have B ankle wounds-pressure wounds seen during last admit.   Had positive blood cx last admit with staph simonensis-felt to be skin contaminant. Was rx with antbx for a week or so.   -will admit to ICU on DKA protocol. Start rocephin for possible cellulitis.     2/21- bs in 100's, dka has resolved. Will restart lantus but at 1/2 dose and use insulin sliding scale. Soc worker says that if she goes back to AL, she can't be sliding scale insulin, she needs to be on set dosing     Probable left lateral ANKLE CELLULITIS   started on ceftriaxone. Will follow for improvement. procalcitonin 0.3. will get wocn     PAROXYSMAL AFIB- in nsr now  On eliquis. Not on rate control  But hr in 70's  -will continue eliquis, May be a reason they kept her on it after the NSTEMI as below.     H/O NSTEMI/ DEMAND ISCHEMIA  During last admit. trops as high as 16. Apparently cards seen--was felt to be due to possible sepsis/ demand ischemia. Was to have OP lexiscan-never did. Echo was normal  -will continue amlodipine. Need to schedule lexiscan OP on discharge.      HLD  Continue meds     DEPRESSION/ CELIA  Continue meds    Probable cognitive dysfunction- per family on admit and when I saw pt she repeated the same thing 3 times     DISPO  SW to see. Likely back to AL--? TCU. Start ambulation     DVT PROphylaxis-on eliquis  Rose Mary Schmidt MD       Interval History   Chart reviewed. Admit with dka and probable cellulitis. Feels tired this am . She got tylenol for buttock pain last pm and says it is gone now. No other  sx    Physical Exam   Temp:  [97.6  F (36.4  C)-98.2  F (36.8  C)] 98.1  F (36.7  C)  Pulse:  [75-92] 82  Heart Rate:  [75-96] 79  Resp:  [13-26] 15  BP: (116-158)/(52-67) 124/67  SpO2:  [93 %-99 %] 96 % tele - nsr    Weights:   Vitals:    02/20/20 1306 02/20/20 1800 02/21/20 0400   Weight: 79.4 kg (175 lb) 79.3 kg (174 lb 13.2 oz) 79.7 kg (175 lb 11.3 oz)    Body mass index is 28.36 kg/m .    Constitutional: nad  CV: Regular  Respiratory: CTA bilaterally  GI: Soft, nontender  Skin: Warm and dry  Musculoskeletal:left lateral ankle- small wound with 1 cm surrounding erythema, tender, no discharge.Right lateral ankle -small scab minimal tender, and only minimal erythema      Rose Mary Schmidt MD       Data   Recent Labs   Lab 02/21/20  0454 02/20/20  1831 02/20/20  1430 02/20/20  1310   WBC 5.0  --   --  8.1   HGB 10.4*  --   --  12.2   MCV 93  --   --  96   *  --   --  200    139  --  131*   POTASSIUM 4.0 4.1 4.7 5.1   CHLORIDE 113* 110*  --  100   CO2 21 13*  --  11*   BUN 9 16  --  22   CR 0.58 0.65  --  0.81   ANIONGAP 7 16*  --  20*   SLIME 8.4* 8.8  --  9.3   * 262*  --  460*   ALBUMIN  --   --   --  3.5   PROTTOTAL  --   --   --  7.4   BILITOTAL  --   --   --  0.6   ALKPHOS  --   --   --  93   ALT  --   --   --  32   AST  --   --   --  29   LIPASE  --   --  68*  --    TROPI  --   --  <0.015  --        Recent Labs   Lab 02/21/20  1107 02/21/20  1003 02/21/20  0802 02/21/20  0730 02/21/20  0611 02/21/20  0454  02/20/20  1831  02/20/20  1310   GLC  --   --   --   --   --  190*  --  262*  --  460*   * 242* 153* 164* 179* 181*   < >  --    < >  --     < > = values in this interval not displayed.        Unresulted Labs Ordered in the Past 30 Days of this Admission     Date and Time Order Name Status Description    2/20/2020 1748 Blood culture Preliminary     2/20/2020 1748 Blood culture Preliminary            Imaging  Recent Results (from the past 24 hour(s))   Chest XR,  PA & LAT    Narrative     CHEST TWO VIEWS   2/20/2020 2:58 PM     HISTORY: Diabetic ketoacidosis.    COMPARISON: None available.      Impression    IMPRESSION: Two views of the chest were obtained. Cardiomediastinal  silhouette is within normal limits. Minimal bibasilar atelectasis. No  significant pleural effusion or pneumothorax.    PALMER LEY MD   XR Hip G/E 2 Views Bilateral    Narrative    EXAM: XR HIP BILATERAL 2 VIEWS EACH  LOCATION: [facility]  DATE/TIME: 2/20/2020 5:08 PM    INDICATION: Bilateral posterior hip pain  COMPARISON: 06/13/2018      Impression    IMPRESSION: No acute fracture or malalignment. Normal bilateral hip joint spacing. Moderate degenerative changes of the lower lumbar spine.   XR Ankle Bilateral G/E 3 Views    Narrative    EXAM: XR ANKLE BILATERAL G/E 3 VW  LOCATION: [facility]  DATE/TIME: 2/20/2020 5:09 PM    INDICATION: Wound over lateral malleolus, acute ankle pain  COMPARISON: 05/07/2013      Impression    IMPRESSION:   Right ankle: Postsurgical changes at the distal fibula and medial malleolus. No hardware complication. No acute fracture or malalignment. The ankle mortise is congruent. The talar dome is unremarkable. Mild tibiotalar joint degenerative changes.   Osteopenia.    Left ankle: No acute fracture or malalignment. The ankle mortise is congruent. The talar dome is unremarkable. Mild tibiotalar joint degenerative changes. Osteopenia. Small plantar calcaneal enthesophyte.            Medications     0.9% sodium chloride + KCl 20 mEq/L Stopped (02/21/20 0021)     dextrose 5% and 0.45% NaCl + KCl 20 mEq/L 75 mL/hr at 02/21/20 0847     insulin (regular) Stopped (02/21/20 1100)     - MEDICATION INSTRUCTIONS -         amLODIPine  10 mg Oral Daily     apixaban ANTICOAGULANT  2.5 mg Oral BID     atorvastatin  40 mg Oral Daily     busPIRone  7.5 mg Oral BID     cefTRIAXone  1 g Intravenous Q24H     escitalopram  10 mg Oral Daily     insulin aspart  1-7 Units Subcutaneous TID  AC     insulin aspart  1-5 Units Subcutaneous At Bedtime     insulin glargine  15 Units Subcutaneous QAM AC     venlafaxine  150 mg Oral Daily       Rose Mary Schmidt MD

## 2020-02-21 NOTE — CONSULTS
Care Transition Initial Assessment - RN      Met with: Patient and Family.    DATA  Active Problems:    DKA, type 1 (H)    DKA (diabetic ketoacidoses) (H)       Cognitive Status: awake, alert and confused.  Primary Care Clinic Name: [facility]  Primary Care MD Name: Sumit  Contact information and PCP information verified: Yes  Lives With: facility resident      Quality of Family Relationships: supportive, involved, helpful  Description of Support System: Supportive, Involved   Who is your support system?: Children   Support Assessment: Adequate family and caregiver support   Insurance concerns: No Insurance issues identified        This writer met with pt, and spoke with sonUsman via phone with patient's permission.  I introduced self and role.  Patient currently lives at [facility] (phone: [phone] Fax: [phone]).  Spoke with SONIDO Bush @ [facility].  She recently moved there (2/21).  Her services include medication management and escorts to meals.  She is independent with ambulation.  She does NOT use assistive device.  Discussed discharge planning and medicare guidelines in regards to home care and SNF benefits.  Nursing staff report patient is ambulating well without difficulty.  Usman and patient's goal is for patient to return to [facility] when ready.  [facility] is in agreement and able to accept patient back over the weekend if patient remains at her functional baseline.  Usman will plan to transport patient upon discharge.        PLAN    Return to [facility]    MARGARITA ContrerasN RN  Inpatient Care Coordinator  [facility] [phone]  [facility] [phone]

## 2020-02-22 LAB
ANION GAP SERPL CALCULATED.3IONS-SCNC: 4 MMOL/L (ref 3–14)
BUN SERPL-MCNC: 4 MG/DL (ref 7–30)
CALCIUM SERPL-MCNC: 8.5 MG/DL (ref 8.5–10.1)
CHLORIDE SERPL-SCNC: 105 MMOL/L (ref 94–109)
CO2 SERPL-SCNC: 27 MMOL/L (ref 20–32)
CREAT SERPL-MCNC: 0.5 MG/DL (ref 0.52–1.04)
ERYTHROCYTE [DISTWIDTH] IN BLOOD BY AUTOMATED COUNT: 13.3 % (ref 10–15)
GFR SERPL CREATININE-BSD FRML MDRD: >90 ML/MIN/{1.73_M2}
GLUCOSE BLDC GLUCOMTR-MCNC: 222 MG/DL (ref 70–99)
GLUCOSE BLDC GLUCOMTR-MCNC: 246 MG/DL (ref 70–99)
GLUCOSE BLDC GLUCOMTR-MCNC: 278 MG/DL (ref 70–99)
GLUCOSE BLDC GLUCOMTR-MCNC: 313 MG/DL (ref 70–99)
GLUCOSE BLDC GLUCOMTR-MCNC: 349 MG/DL (ref 70–99)
GLUCOSE SERPL-MCNC: 324 MG/DL (ref 70–99)
HCT VFR BLD AUTO: 33.5 % (ref 35–47)
HGB BLD-MCNC: 11.1 G/DL (ref 11.7–15.7)
KETONES BLD-SCNC: 2.4 MMOL/L (ref 0–0.6)
MCH RBC QN AUTO: 30.5 PG (ref 26.5–33)
MCHC RBC AUTO-ENTMCNC: 33.1 G/DL (ref 31.5–36.5)
MCV RBC AUTO: 92 FL (ref 78–100)
PLATELET # BLD AUTO: 146 10E9/L (ref 150–450)
POTASSIUM SERPL-SCNC: 4 MMOL/L (ref 3.4–5.3)
RBC # BLD AUTO: 3.64 10E12/L (ref 3.8–5.2)
SODIUM SERPL-SCNC: 136 MMOL/L (ref 133–144)
WBC # BLD AUTO: 2.7 10E9/L (ref 4–11)

## 2020-02-22 PROCEDURE — 85027 COMPLETE CBC AUTOMATED: CPT | Performed by: INTERNAL MEDICINE

## 2020-02-22 PROCEDURE — 99233 SBSQ HOSP IP/OBS HIGH 50: CPT | Performed by: INTERNAL MEDICINE

## 2020-02-22 PROCEDURE — 25000132 ZZH RX MED GY IP 250 OP 250 PS 637: Performed by: INTERNAL MEDICINE

## 2020-02-22 PROCEDURE — 25000131 ZZH RX MED GY IP 250 OP 636 PS 637: Performed by: INTERNAL MEDICINE

## 2020-02-22 PROCEDURE — 36415 COLL VENOUS BLD VENIPUNCTURE: CPT | Performed by: INTERNAL MEDICINE

## 2020-02-22 PROCEDURE — 80048 BASIC METABOLIC PNL TOTAL CA: CPT | Performed by: INTERNAL MEDICINE

## 2020-02-22 PROCEDURE — 12000011 ZZH R&B MS OVERFLOW

## 2020-02-22 PROCEDURE — 82010 KETONE BODYS QUAN: CPT | Performed by: INTERNAL MEDICINE

## 2020-02-22 PROCEDURE — 25000128 H RX IP 250 OP 636: Performed by: INTERNAL MEDICINE

## 2020-02-22 PROCEDURE — 00000146 ZZHCL STATISTIC GLUCOSE BY METER IP

## 2020-02-22 RX ADMIN — ESCITALOPRAM OXALATE 10 MG: 10 TABLET ORAL at 08:27

## 2020-02-22 RX ADMIN — CEFTRIAXONE SODIUM 1 G: 1 INJECTION, SOLUTION INTRAVENOUS at 18:37

## 2020-02-22 RX ADMIN — ATORVASTATIN CALCIUM 40 MG: 20 TABLET, FILM COATED ORAL at 08:27

## 2020-02-22 RX ADMIN — APIXABAN 2.5 MG: 2.5 TABLET, FILM COATED ORAL at 08:27

## 2020-02-22 RX ADMIN — INSULIN ASPART 3 UNITS: 100 INJECTION, SOLUTION INTRAVENOUS; SUBCUTANEOUS at 16:22

## 2020-02-22 RX ADMIN — INSULIN ASPART 4 UNITS: 100 INJECTION, SOLUTION INTRAVENOUS; SUBCUTANEOUS at 08:24

## 2020-02-22 RX ADMIN — INSULIN GLARGINE 30 UNITS: 100 INJECTION, SOLUTION SUBCUTANEOUS at 10:36

## 2020-02-22 RX ADMIN — VENLAFAXINE HYDROCHLORIDE 150 MG: 150 CAPSULE, EXTENDED RELEASE ORAL at 20:10

## 2020-02-22 RX ADMIN — BUSPIRONE HYDROCHLORIDE 7.5 MG: 7.5 TABLET ORAL at 20:10

## 2020-02-22 RX ADMIN — APIXABAN 2.5 MG: 2.5 TABLET, FILM COATED ORAL at 20:10

## 2020-02-22 RX ADMIN — INSULIN ASPART 5 UNITS: 100 INJECTION, SOLUTION INTRAVENOUS; SUBCUTANEOUS at 11:22

## 2020-02-22 RX ADMIN — AMLODIPINE BESYLATE 10 MG: 10 TABLET ORAL at 08:27

## 2020-02-22 RX ADMIN — ACETAMINOPHEN 650 MG: 325 TABLET, FILM COATED ORAL at 20:10

## 2020-02-22 RX ADMIN — BUSPIRONE HYDROCHLORIDE 7.5 MG: 7.5 TABLET ORAL at 08:30

## 2020-02-22 ASSESSMENT — ACTIVITIES OF DAILY LIVING (ADL)
ADLS_ACUITY_SCORE: 14

## 2020-02-22 ASSESSMENT — MIFFLIN-ST. JEOR: SCORE: 1314.75

## 2020-02-22 ASSESSMENT — PAIN DESCRIPTION - DESCRIPTORS: DESCRIPTORS: DISCOMFORT

## 2020-02-22 NOTE — PLAN OF CARE
Pt has been happy and pleasant this evening, usually  remembering to use the call light to get out of bed pt steady with good gait,SBA. She has denied pain earlier but now at bedtime c/o buttock pain and she states that tylenol usually helps decrease the discomfort. Covered with insulin per s/s order.

## 2020-02-22 NOTE — PROGRESS NOTES
Right ankle scab CDI. No redness noted. Left ankle scab CDI redness within marker. Pt denies complaints at these areas.

## 2020-02-22 NOTE — PLAN OF CARE
Patient denies discomfort.  Up in room with standby assist.  Ambulated in hallway with standby assist times 2.  Accuchecks remain elevated.  Insulin given as ordered and will continue to monitor.  Patient slightly disoriented at times but reorients quickly.

## 2020-02-23 VITALS
HEART RATE: 78 BPM | HEIGHT: 66 IN | WEIGHT: 177.03 LBS | DIASTOLIC BLOOD PRESSURE: 79 MMHG | SYSTOLIC BLOOD PRESSURE: 135 MMHG | RESPIRATION RATE: 18 BRPM | OXYGEN SATURATION: 97 % | BODY MASS INDEX: 28.45 KG/M2 | TEMPERATURE: 97.5 F

## 2020-02-23 LAB
ANION GAP SERPL CALCULATED.3IONS-SCNC: 5 MMOL/L (ref 3–14)
BASOPHILS # BLD AUTO: 0 10E9/L (ref 0–0.2)
BASOPHILS NFR BLD AUTO: 0.7 %
BUN SERPL-MCNC: 8 MG/DL (ref 7–30)
CALCIUM SERPL-MCNC: 9.5 MG/DL (ref 8.5–10.1)
CHLORIDE SERPL-SCNC: 109 MMOL/L (ref 94–109)
CO2 SERPL-SCNC: 30 MMOL/L (ref 20–32)
CREAT SERPL-MCNC: 0.55 MG/DL (ref 0.52–1.04)
CRP SERPL-MCNC: 2.9 MG/L (ref 0–8)
DIFFERENTIAL METHOD BLD: ABNORMAL
EOSINOPHIL # BLD AUTO: 0 10E9/L (ref 0–0.7)
EOSINOPHIL NFR BLD AUTO: 0.7 %
ERYTHROCYTE [DISTWIDTH] IN BLOOD BY AUTOMATED COUNT: 13.3 % (ref 10–15)
ERYTHROCYTE [SEDIMENTATION RATE] IN BLOOD BY WESTERGREN METHOD: 13 MM/H (ref 0–30)
GFR SERPL CREATININE-BSD FRML MDRD: >90 ML/MIN/{1.73_M2}
GLUCOSE BLDC GLUCOMTR-MCNC: 128 MG/DL (ref 70–99)
GLUCOSE BLDC GLUCOMTR-MCNC: 143 MG/DL (ref 70–99)
GLUCOSE SERPL-MCNC: 132 MG/DL (ref 70–99)
HCT VFR BLD AUTO: 37 % (ref 35–47)
HGB BLD-MCNC: 12.6 G/DL (ref 11.7–15.7)
IMM GRANULOCYTES # BLD: 0 10E9/L (ref 0–0.4)
IMM GRANULOCYTES NFR BLD: 0.3 %
LYMPHOCYTES # BLD AUTO: 1.6 10E9/L (ref 0.8–5.3)
LYMPHOCYTES NFR BLD AUTO: 55.9 %
MCH RBC QN AUTO: 31 PG (ref 26.5–33)
MCHC RBC AUTO-ENTMCNC: 34.1 G/DL (ref 31.5–36.5)
MCV RBC AUTO: 91 FL (ref 78–100)
MONOCYTES # BLD AUTO: 0.3 10E9/L (ref 0–1.3)
MONOCYTES NFR BLD AUTO: 10.1 %
NEUTROPHILS # BLD AUTO: 0.9 10E9/L (ref 1.6–8.3)
NEUTROPHILS NFR BLD AUTO: 32.3 %
NRBC # BLD AUTO: 0 10*3/UL
NRBC BLD AUTO-RTO: 0 /100
PLATELET # BLD AUTO: 157 10E9/L (ref 150–450)
POTASSIUM SERPL-SCNC: 3.5 MMOL/L (ref 3.4–5.3)
RBC # BLD AUTO: 4.06 10E12/L (ref 3.8–5.2)
SODIUM SERPL-SCNC: 144 MMOL/L (ref 133–144)
WBC # BLD AUTO: 2.9 10E9/L (ref 4–11)

## 2020-02-23 PROCEDURE — 25000132 ZZH RX MED GY IP 250 OP 250 PS 637: Performed by: INTERNAL MEDICINE

## 2020-02-23 PROCEDURE — 99238 HOSP IP/OBS DSCHRG MGMT 30/<: CPT | Performed by: INTERNAL MEDICINE

## 2020-02-23 PROCEDURE — 86140 C-REACTIVE PROTEIN: CPT | Performed by: INTERNAL MEDICINE

## 2020-02-23 PROCEDURE — 80048 BASIC METABOLIC PNL TOTAL CA: CPT | Performed by: INTERNAL MEDICINE

## 2020-02-23 PROCEDURE — 36415 COLL VENOUS BLD VENIPUNCTURE: CPT | Performed by: INTERNAL MEDICINE

## 2020-02-23 PROCEDURE — 25000131 ZZH RX MED GY IP 250 OP 636 PS 637: Performed by: INTERNAL MEDICINE

## 2020-02-23 PROCEDURE — 85025 COMPLETE CBC W/AUTO DIFF WBC: CPT | Performed by: INTERNAL MEDICINE

## 2020-02-23 PROCEDURE — 85652 RBC SED RATE AUTOMATED: CPT | Performed by: INTERNAL MEDICINE

## 2020-02-23 PROCEDURE — 00000146 ZZHCL STATISTIC GLUCOSE BY METER IP

## 2020-02-23 RX ORDER — CEPHALEXIN 500 MG/1
500 CAPSULE ORAL 3 TIMES DAILY
Qty: 12 CAPSULE | Refills: 0 | Status: SHIPPED | OUTPATIENT
Start: 2020-02-23 | End: 2020-04-04

## 2020-02-23 RX ADMIN — APIXABAN 2.5 MG: 2.5 TABLET, FILM COATED ORAL at 08:16

## 2020-02-23 RX ADMIN — INSULIN GLARGINE 30 UNITS: 100 INJECTION, SOLUTION SUBCUTANEOUS at 08:13

## 2020-02-23 RX ADMIN — BUSPIRONE HYDROCHLORIDE 7.5 MG: 7.5 TABLET ORAL at 08:16

## 2020-02-23 RX ADMIN — ATORVASTATIN CALCIUM 40 MG: 20 TABLET, FILM COATED ORAL at 08:15

## 2020-02-23 RX ADMIN — ESCITALOPRAM OXALATE 10 MG: 10 TABLET ORAL at 08:10

## 2020-02-23 RX ADMIN — AMLODIPINE BESYLATE 10 MG: 10 TABLET ORAL at 08:15

## 2020-02-23 ASSESSMENT — ACTIVITIES OF DAILY LIVING (ADL)
ADLS_ACUITY_SCORE: 14

## 2020-02-23 NOTE — PLAN OF CARE
Patient restless this shift, up and about in room, ambulating in hallway.  Dressed self, awaiting doctor for Ok to discharge.  VSS. Blood sugars 128 and 143 before breakfast and lunch.  Appetite good.

## 2020-02-23 NOTE — PROGRESS NOTES
Care Transitions Discharge Note:    Name: Kimberly Stephenson    MRN: [id number]    Reason for Hospitalization: Diabetic ketoacidosis without coma associated with type 1 diabetes mellitus (H) [E10.10]    Cognitive/Behavioral Status: awake and alert    Follow-up Appointments: No future appointments.    Discharge Date:  2/23/2020    Patient/Care Partner in agreement and understands the discharge plan:  Yes    Discharge Disposition:  Return to [facility] (phone: [phone] Fax: [phone]) [facility].    Discharge Planner   Discharge Plans in progress: [facility]  Barriers to discharge plan: None  Follow up plan: Follow up with PCP.       Entered by: Akiko Tim 02/23/2020 12:28 PM         Akiko Tim RN Care Coordinator  [phone]

## 2020-02-23 NOTE — PROGRESS NOTES
[facility] [facility] DISCHARGE NOTE    Patient discharged to assisted living at 3:23 PM via ambulation. Accompanied by other: granddaughter and staff. Discharge instructions reviewed with patient, opportunity offered to ask questions. Prescriptions sent to patients preferred pharmacy. All belongings sent with patient.    Gloria Lock RN

## 2020-02-23 NOTE — DISCHARGE SUMMARY
[facility]  Discharge Summary  Hospital Medicine       Date of Admission:  2/20/2020  Date of Discharge:  2/23/2020  3:10 PM  Discharging Provider: River Sanchez MD      Identification and Chief Compaint: Kimberly Stephenson is a 74 year old female who presented on 2/20/2020 with complaint of high blood glucose and found to be in DKA.    Discharge Diagnoses   Diabetic ketoacidosis  Diabetes mellitus type 1  Probable left lateral ankle cellulitis   Neutropenia  Paroxysmal atrial fibrillation   H/o NSTEMI/demand ischemia  Depression  General anxiety disorder  Cognitive decline     Follow-ups Needed After Discharge   Follow-up Appointments     Follow-up and recommended labs and tests       Follow up with primary care provider, Li Arana, this week to   evaluate medication change and for hospital follow- up.  The following   labs/tests are recommended: CBC with differential on Tuesday.             Unresulted Labs Ordered in the Past 30 Days of this Admission     Date and Time Order Name Status Description    2/20/2020 1748 Blood culture Preliminary     2/20/2020 1748 Blood culture Preliminary       These results will be followed up by Dr. Sanchez if positive.    Hospital Course   Kimberly Stephenson is a 74 year old female with DM type 1 who presents with high bs and was in DKA.    Diabetic ketoacidosis    2nd episode last 2 months. Admitted 12/28-1/8 at [facility] in DKA and it was felt patient may have been noncompliant at that time due to her cognitive decline. Patient sent to TCU then home with son and most recently moved into assisted living facility. Staff at the [facility] administer her medications, so does not appear that patient has been missing insulin doses. Presented with DKA with pH  7.22, bicarb 11, AG 20 and ketones 6.2.     Suspect DKA due to cellulitis left lateral ankle, as no other trigger is found, although the patient did not meet sepsis criteria.  Patient had positive blood cx last  admit with [facility]-felt to be skin contaminant. Was rx with antbx for a week or so.     DKA physiology appears resolved. On day of discharge, bicarb is 30 and AG is 5. Since this was the second episode in 2 months and because it is not entirely certain that the ankle cellulitis was the trigger, she should be watched for signs/symptoms of recurrent DKA on home subcutaneous insulin regimen. She is going home on just 3 more units scheduled per day.     Diabetes mellitus type 1    More than 20 years per patient. Was on Lantus 30 and Novolog 4 with meals and no sliding scale insulin at [facility]. They cannot manage a sliding scale. Discussed with son, Usman, on 2/22/2020.     Resumed Lantus 30 in the morning and Novolog 5 units with meals. We did also use sliding scale insulin the first day, 2/22, due to need to get BG down from high 200's to 300's, but then stopped sliding scale insulin. This morning BG was 128 fasting and 143 at lunch. Patient appears well controlled at this point, and she is anxious to discharge. She will have her BG checked at least 4 times daily. If morning BG is dropping lower, then recommend to reduce the Lantus dose. Patient has follow up with her endocrinologist on Wednesday of this week.     Probable left lateral ankle cellulitis     Associated with small wound on left lateral malleolus. Does have B ankle wounds-pressure wounds seen during last admit at OSH. Wound on the left is surrounded by about 2 cm ring of erythema and swelling. Started on ceftriaxone. Procalcitonin 0.3. WBC 8.1 with 85%N. No fever. This did improve with antibiotics. On day of discharge, CRP is normal at 2.9 and ESR is normal 13. Patient will complete a total of 7 days of antibiotic with 4 more days of cephalexin.     Neutropenia    As patient recovered, the WBC came down to 2.7 on 2/22 and 2.9 on day of discharge. The differential today shows moderate neutropenia with ANC 0.9. Suspect this is reactive to the infection,  and this may reflect a more severe infection on presentation. Her other blood counts are fine, and clinically, she appears without signs of infection at time of discharge. I expect this will recover on its own. Recheck CBC in a couple of days after discharge - orders placed.      Paroxysmal atrial fibrillation     On eliquis. Not on rate control. Normal sinus rhythm this admission. Continue eliquis.     H/o NSTEMI/demand ischemia    During admit late December at OSH, troponin level as high as 16. Cardiology consulted and troponin elevation was felt to be due to demand ischemia possibly related to sepsis. Was to have OP lexiscan-never did. Echo was normal. No symptoms to suggest angina or ACS this admission.       Depression  General anxiety disorder    Continue Lexapro, Effexor and BuSpar.     Cognitive decline    At least moderate cognitive decline with short term memory loss.     Consultations This Hospital Stay   None    The discharge plan was discussed with the patient and granddaughter at bedside, and they expressed understanding.     Time Spent on this Encounter   Total time on this discharge was 25 minutes.       River Sanchez MD  [facility]  ______________________________________________________________________    Physical Exam   Vital Signs: Temp: 97.5  F (36.4  C) Temp src: Oral BP: 135/79 Pulse: 78 Heart Rate: 82 Resp: 18 SpO2: 97 % O2 Device: None (Room air)    Weight: 177 lbs .47 oz  Constitutional: alert and oriented to self, in the hospital but not able to say why, knows granddaughter with her, knows she is discharging, NAD, nontoxic. Patient has been pacing about the unit repeatedly asking to leave today. Looks well. Cooperative.   CV: Regular  Respiratory: CTA bilaterally  GI: Soft, nontender  Skin: left lateral malleolus small wound is through the epidermis and is dry and without discharge, local erythema as largely resolved, no fluctuance       Primary Care  Physician   Li Arana  [address] [facility] [address]  [facility] [place] [address]     Discharge Disposition   Discharged to assisted living  Condition at discharge: Stable    Significant Results and Procedures   Results for orders placed or performed during the hospital encounter of 02/20/20   Chest XR,  PA & LAT    Narrative    CHEST TWO VIEWS   2/20/2020 2:58 PM     HISTORY: Diabetic ketoacidosis.    COMPARISON: None available.      Impression    IMPRESSION: Two views of the chest were obtained. Cardiomediastinal  silhouette is within normal limits. Minimal bibasilar atelectasis. No  significant pleural effusion or pneumothorax.    PALMER LEY MD   XR Hip G/E 2 Views Bilateral    Narrative    EXAM: XR HIP BILATERAL 2 VIEWS EACH  LOCATION: [facility]  DATE/TIME: 2/20/2020 5:08 PM    INDICATION: Bilateral posterior hip pain  COMPARISON: 06/13/2018      Impression    IMPRESSION: No acute fracture or malalignment. Normal bilateral hip joint spacing. Moderate degenerative changes of the lower lumbar spine.   XR Ankle Bilateral G/E 3 Views    Narrative    EXAM: XR ANKLE BILATERAL G/E 3 VW  LOCATION: [facility]  DATE/TIME: 2/20/2020 5:09 PM    INDICATION: Wound over lateral malleolus, acute ankle pain  COMPARISON: 05/07/2013      Impression    IMPRESSION:   Right ankle: Postsurgical changes at the distal fibula and medial malleolus. No hardware complication. No acute fracture or malalignment. The ankle mortise is congruent. The talar dome is unremarkable. Mild tibiotalar joint degenerative changes.   Osteopenia.    Left ankle: No acute fracture or malalignment. The ankle mortise is congruent. The talar dome is unremarkable. Mild tibiotalar joint degenerative changes. Osteopenia. Small plantar calcaneal enthesophyte.     Procedures    None    Discharge Orders      CBC with platelets and differential    Last Lab Result: Hemoglobin (g/dL)       Date                     Value                 02/23/2020                12.6             ----------     Reason for your hospital stay    Diabetic ketoacidosis, possibly triggered by left ankle cellulitis (soft tissue infection).     Follow-up and recommended labs and tests     Follow up with primary care provider, Li Arana, this week to evaluate medication change and for hospital follow- up.  The following labs/tests are recommended: CBC with differential on Tuesday.     Activity    Your activity upon discharge: activity as tolerated     Monitor and record    blood glucose 4 times a day, before meals and at bedtime. Also check blood glucose as needed for symptoms of low bl;od glucose.     Diet    Follow this diet upon discharge: Diabetic diet as tolerated     Discharge Medications   Current Discharge Medication List      START taking these medications    Details   cephALEXin (KEFLEX) 500 MG capsule Take 1 capsule (500 mg) by mouth 3 times daily for 4 days  Qty: 12 capsule, Refills: 0    Associated Diagnoses: Cellulitis of left ankle         CONTINUE these medications which have CHANGED    Details   insulin aspart (NOVOLOG PEN) 100 UNIT/ML pen Inject 5 Units Subcutaneous 3 times daily (with meals) Inject as per sliding scale:if 0 - 69 = - Refer to hypoglycemia treatment protocol;70 - 149 = 0 units No corrective insulin, administer full dose of prandial insulin if ordered;150 - 199 = 2 units;200 - 249 = 4 units;250 - 299 = 6 units;300 - 349 = 8 units;350 - 399 = 10 units;400 - 999 = 12 units and notify MD,subcutaneously with meals related to TYPE 2 DIABETES MELLITUS WITH KETOACIDOSIS WITHOUT COMA (E11.10)    Associated Diagnoses: Type 1 diabetes mellitus with hyperglycemia (H)         CONTINUE these medications which have NOT CHANGED    Details   amLODIPine (NORVASC) 10 MG tablet Take 10 mg by mouth daily      apixaban ANTICOAGULANT (ELIQUIS) 5 MG tablet Take 2.5 mg by mouth 2 times daily       atorvastatin (LIPITOR) 40 MG tablet Take 40 mg by mouth daily.      busPIRone  (BUSPAR) 7.5 MG tablet Take 7.5 mg by mouth 2 times daily      escitalopram (LEXAPRO) 10 MG tablet Take 10 mg by mouth daily      insulin glargine (LANTUS PEN) 100 UNIT/ML pen Inject 30 Units Subcutaneous every morning    Comments: If Lantus is not covered by insurance, may substitute Basaglar at same dose and frequency.        potassium chloride (KLOR-CON) 20 MEQ packet Take 20 mEq by mouth daily      venlafaxine (EFFEXOR-XR) 150 MG 24 hr capsule Take 150 mg by mouth daily      acetaminophen (TYLENOL) 325 MG tablet Take 650 mg by mouth every 4 hours as needed for mild pain      diphenoxylate-atropine (LOMOTIL) 2.5-0.025 MG tablet Take 2 tablets by mouth 4 times daily as needed for diarrhea  Qty: 30 tablet, Refills: 3    Associated Diagnoses: Diarrhea, unspecified type      Skin Protectants, Misc. (EUCERIN) cream Apply topically daily           Allergies   Allergies   Allergen Reactions     Cymbalta      Fentanyl Nausea and Vomiting     Versed [Midazolam] Nausea and Vomiting

## 2020-02-23 NOTE — PLAN OF CARE
"Patient is alert and oriented to person and place; does not recall where her house is in [place] that she recently left to come to [facility]. She seems unsure of her diabetes and how this got out of control; thought maybe it is a new problem being a diabetic. She wants to be independent in room which she was in the evening however writer kept bed alarm on when asleep for safety; pt was disturbed by this stating she has been getting up alone previous nights. Does not use call light.     Vital signs:  Temp: 97.9  F (36.6  C) Temp src: Oral BP: (!) 153/81 Pulse: 84 Heart Rate: 82 Resp: 18 SpO2: 96 % O2 Device: None (Room air)   Height: 167.6 cm (5' 6\") Weight: 80.3 kg (177 lb 0.5 oz)  Estimated body mass index is 28.57 kg/m  as calculated from the following:    Height as of this encounter: 1.676 m (5' 6\").    Weight as of this encounter: 80.3 kg (177 lb 0.5 oz).      Continue to assess and implement poc.  "

## 2020-02-24 ENCOUNTER — RECORDS - HEALTHEAST (OUTPATIENT)
Dept: LAB | Facility: CLINIC | Age: 75
End: 2020-02-24

## 2020-02-24 LAB
ANION GAP SERPL CALCULATED.3IONS-SCNC: 13 MMOL/L (ref 5–18)
BASOPHILS # BLD AUTO: 0 THOU/UL (ref 0–0.2)
BASOPHILS NFR BLD AUTO: 1 % (ref 0–2)
BUN SERPL-MCNC: 12 MG/DL (ref 8–28)
CALCIUM SERPL-MCNC: 10 MG/DL (ref 8.5–10.5)
CHLORIDE BLD-SCNC: 101 MMOL/L (ref 98–107)
CO2 SERPL-SCNC: 23 MMOL/L (ref 22–31)
CREAT SERPL-MCNC: 0.93 MG/DL (ref 0.6–1.1)
EOSINOPHIL # BLD AUTO: 0 THOU/UL (ref 0–0.4)
EOSINOPHIL NFR BLD AUTO: 0 % (ref 0–6)
ERYTHROCYTE [DISTWIDTH] IN BLOOD BY AUTOMATED COUNT: 13.6 % (ref 11–14.5)
GFR SERPL CREATININE-BSD FRML MDRD: 59 ML/MIN/1.73M2
GLUCOSE BLD-MCNC: 294 MG/DL (ref 70–125)
HBA1C MFR BLD: 9.7 % (ref 4.2–6.1)
HCT VFR BLD AUTO: 40.5 % (ref 35–47)
HGB BLD-MCNC: 13.4 G/DL (ref 12–16)
LYMPHOCYTES # BLD AUTO: 1.5 THOU/UL (ref 0.8–4.4)
LYMPHOCYTES NFR BLD AUTO: 37 % (ref 20–40)
MCH RBC QN AUTO: 30.5 PG (ref 27–34)
MCHC RBC AUTO-ENTMCNC: 33.1 G/DL (ref 32–36)
MCV RBC AUTO: 92 FL (ref 80–100)
MONOCYTES # BLD AUTO: 0.4 THOU/UL (ref 0–0.9)
MONOCYTES NFR BLD AUTO: 9 % (ref 2–10)
NEUTROPHILS # BLD AUTO: 2.2 THOU/UL (ref 2–7.7)
NEUTROPHILS NFR BLD AUTO: 53 % (ref 50–70)
PLATELET # BLD AUTO: 188 THOU/UL (ref 140–440)
PMV BLD AUTO: 12.7 FL (ref 8.5–12.5)
POTASSIUM BLD-SCNC: 4.1 MMOL/L (ref 3.5–5)
RBC # BLD AUTO: 4.39 MILL/UL (ref 3.8–5.4)
SODIUM SERPL-SCNC: 137 MMOL/L (ref 136–145)
WBC: 4.1 THOU/UL (ref 4–11)

## 2020-02-25 ENCOUNTER — ASSISTED LIVING VISIT (OUTPATIENT)
Dept: GERIATRICS | Facility: CLINIC | Age: 75
End: 2020-02-25
Payer: COMMERCIAL

## 2020-02-25 VITALS
HEART RATE: 111 BPM | SYSTOLIC BLOOD PRESSURE: 114 MMHG | WEIGHT: 177.2 LBS | RESPIRATION RATE: 22 BRPM | TEMPERATURE: 97.2 F | BODY MASS INDEX: 28.6 KG/M2 | DIASTOLIC BLOOD PRESSURE: 64 MMHG

## 2020-02-25 DIAGNOSIS — E10.65 TYPE 1 DIABETES MELLITUS WITH HYPERGLYCEMIA (H): Primary | ICD-10-CM

## 2020-02-25 DIAGNOSIS — M25.572 PAIN IN JOINT, ANKLE AND FOOT, LEFT: ICD-10-CM

## 2020-02-25 DIAGNOSIS — E08.10 DIABETIC KETOACIDOSIS WITHOUT COMA ASSOCIATED WITH DIABETES MELLITUS DUE TO UNDERLYING CONDITION (H): ICD-10-CM

## 2020-02-25 DIAGNOSIS — I10 ESSENTIAL HYPERTENSION: ICD-10-CM

## 2020-02-25 DIAGNOSIS — N17.9 AKI (ACUTE KIDNEY INJURY) (H): ICD-10-CM

## 2020-02-26 LAB
BACTERIA SPEC CULT: NO GROWTH
BACTERIA SPEC CULT: NO GROWTH
Lab: NORMAL
Lab: NORMAL
SPECIMEN SOURCE: NORMAL
SPECIMEN SOURCE: NORMAL

## 2020-03-03 ENCOUNTER — DOCUMENTATION ONLY (OUTPATIENT)
Dept: OTHER | Facility: CLINIC | Age: 75
End: 2020-03-03

## 2020-03-03 ENCOUNTER — ASSISTED LIVING VISIT (OUTPATIENT)
Dept: GERIATRICS | Facility: CLINIC | Age: 75
End: 2020-03-03
Payer: COMMERCIAL

## 2020-03-03 VITALS
HEART RATE: 90 BPM | SYSTOLIC BLOOD PRESSURE: 113 MMHG | TEMPERATURE: 98.6 F | RESPIRATION RATE: 16 BRPM | HEIGHT: 66 IN | WEIGHT: 177 LBS | DIASTOLIC BLOOD PRESSURE: 67 MMHG | BODY MASS INDEX: 28.45 KG/M2

## 2020-03-03 DIAGNOSIS — E10.65 TYPE 1 DIABETES MELLITUS WITH HYPERGLYCEMIA (H): Primary | ICD-10-CM

## 2020-03-03 ASSESSMENT — MIFFLIN-ST. JEOR: SCORE: 1314.62

## 2020-03-03 NOTE — PROGRESS NOTES
[facility]  [facility] Medical Record Number:  [id number]  Place of Service where encounter took place:  [facility] [[id number]]  Chief Complaint   Patient presents with     RECHECK       HPI:    Kimberly Stephenson  is a 74 year old (1945), who is being seen today for an episodic care visit.  HPI information obtained from: facility chart records, facility staff and patient report. Today's concern is:    ICD-10-CM    1. Type 1 diabetes mellitus with hyperglycemia (H)  E10.65    Resident reports doing well, saw endocrinologist and placed on sliding scale as well as lantus 30 units in AM.   -Blood sugar readings range 117-387. Resident denies signs and symptoms.   -Sliding scale as followed  novolog sliding scale as follows  blood sugar 100-150= 8 units   blood sugar 151-200= 9 units  blood sugar 201-250= 10 units  blood sugar 251-300= 11 units  blood sugar 301-350= 12 units   blood sugar 351-400 = 13 units    Past Medical and Surgical History reviewed in Epic today.    MEDICATIONS:    Current Outpatient Medications   Medication Sig Dispense Refill     acetaminophen (TYLENOL) 325 MG tablet Take 650 mg by mouth every 4 hours as needed for mild pain       amLODIPine (NORVASC) 10 MG tablet Take 10 mg by mouth daily       apixaban ANTICOAGULANT (ELIQUIS) 5 MG tablet Take 2.5 mg by mouth 2 times daily        atorvastatin (LIPITOR) 40 MG tablet Take 40 mg by mouth daily.       busPIRone (BUSPAR) 7.5 MG tablet Take 7.5 mg by mouth 2 times daily       diphenoxylate-atropine (LOMOTIL) 2.5-0.025 MG tablet Take 2 tablets by mouth 4 times daily as needed for diarrhea 30 tablet 3     escitalopram (LEXAPRO) 10 MG tablet Take 10 mg by mouth daily       insulin aspart (NOVOLOG PEN) 100 UNIT/ML pen Inject 5 Units Subcutaneous 3 times daily (with meals) Inject as per sliding scale:if 0 - 69 = - Refer to hypoglycemia treatment protocol;70 - 149 = 0 units No corrective insulin,  administer full dose of prandial insulin if ordered;150 - 199 = 2 units;200 - 249 = 4 units;250 - 299 = 6 units;300 - 349 = 8 units;350 - 399 = 10 units;400 - 999 = 12 units and notify MD,subcutaneously with meals related to TYPE 2 DIABETES MELLITUS WITH KETOACIDOSIS WITHOUT COMA (E11.10)       insulin glargine (LANTUS PEN) 100 UNIT/ML pen Inject 30 Units Subcutaneous every morning       potassium chloride (KLOR-CON) 20 MEQ packet Take 20 mEq by mouth daily       Skin Protectants, Misc. (EUCERIN) cream Apply topically daily       venlafaxine (EFFEXOR-XR) 150 MG 24 hr capsule Take 150 mg by mouth daily       REVIEW OF SYSTEMS:  Unobtainable secondary to cognitive impairment.     Objective:  There were no vitals taken for this visit.  Exam:  GENERAL APPEARANCE:  Alert  ENT:  Mouth and posterior oropharynx normal, moist mucous membranes, normal hearing acuity  RESP:  respiratory effort and palpation of chest normal, lungs clear to auscultation   CV:  Palpation and auscultation of heart done , regular rate and rhythm, no murmur, rub, or gallop  M/S:   Gait and station normal  Digits and nails normal  SKIN:  Inspection of skin and subcutaneous tissue baseline, Palpation of skin and subcutaneous tissue baseline  NEURO:   Cranial nerves 2-12 are normal tested and grossly at patient's baseline  PSYCH:  memory impaired , affect and mood normal    Labs:   [facility]. Please refer to them using [email or web address] [email or web address]. and Recent labs in EPIC reviewed by me today.     ASSESSMENT/PLAN:  (E10.65) Type 1 diabetes mellitus with hyperglycemia (H)  (primary encounter diagnosis)  Comment: History of fluctuating blood sugars with 2 recent ICU hospitalizations for DKA.    Plan:   -Doing well on sliding scale and lantus in AM. No changes at this time, will follow closely. Follow up with endocrinologist as planned.         Orders written by provider at facility  The current medical regimen is effective; continue  present plan and medications.      Electronically signed by:  VANDANA Munguia [facility]

## 2020-03-06 ENCOUNTER — RECORDS - HEALTHEAST (OUTPATIENT)
Dept: LAB | Facility: CLINIC | Age: 75
End: 2020-03-06

## 2020-03-06 LAB
ALBUMIN UR-MCNC: NEGATIVE MG/DL
APPEARANCE UR: CLEAR
BACTERIA #/AREA URNS HPF: ABNORMAL HPF
BILIRUB UR QL STRIP: NEGATIVE
COLOR UR AUTO: ABNORMAL
GLUCOSE UR STRIP-MCNC: ABNORMAL MG/DL
HGB UR QL STRIP: NEGATIVE
KETONES UR STRIP-MCNC: ABNORMAL MG/DL
LEUKOCYTE ESTERASE UR QL STRIP: NEGATIVE
NITRATE UR QL: NEGATIVE
PH UR STRIP: 5 [PH] (ref 4.5–8)
RBC #/AREA URNS AUTO: ABNORMAL HPF
SP GR UR STRIP: 1.02 (ref 1–1.03)
SQUAMOUS #/AREA URNS AUTO: ABNORMAL LPF
UROBILINOGEN UR STRIP-ACNC: ABNORMAL
WBC #/AREA URNS AUTO: ABNORMAL HPF

## 2020-03-10 ENCOUNTER — RECORDS - HEALTHEAST (OUTPATIENT)
Dept: LAB | Facility: CLINIC | Age: 75
End: 2020-03-10

## 2020-03-10 LAB
ANION GAP SERPL CALCULATED.3IONS-SCNC: 10 MMOL/L (ref 5–18)
BUN SERPL-MCNC: 9 MG/DL (ref 8–28)
CALCIUM SERPL-MCNC: 10.1 MG/DL (ref 8.5–10.5)
CHLORIDE BLD-SCNC: 106 MMOL/L (ref 98–107)
CO2 SERPL-SCNC: 26 MMOL/L (ref 22–31)
CREAT SERPL-MCNC: 0.68 MG/DL (ref 0.6–1.1)
GFR SERPL CREATININE-BSD FRML MDRD: >60 ML/MIN/1.73M2
GLUCOSE BLD-MCNC: 57 MG/DL (ref 70–125)
POTASSIUM BLD-SCNC: 4.1 MMOL/L (ref 3.5–5)
SODIUM SERPL-SCNC: 142 MMOL/L (ref 136–145)

## 2020-04-04 ENCOUNTER — TRANSFERRED RECORDS (OUTPATIENT)
Dept: HEALTH INFORMATION MANAGEMENT | Facility: CLINIC | Age: 75
End: 2020-04-04

## 2020-04-04 ENCOUNTER — MEDICAL CORRESPONDENCE (OUTPATIENT)
Dept: HEALTH INFORMATION MANAGEMENT | Facility: CLINIC | Age: 75
End: 2020-04-04

## 2020-04-04 ENCOUNTER — HOSPITAL ENCOUNTER (OUTPATIENT)
Facility: CLINIC | Age: 75
Setting detail: OBSERVATION
Discharge: HOME HEALTH | End: 2020-04-05
Attending: FAMILY MEDICINE
Payer: COMMERCIAL

## 2020-04-04 ENCOUNTER — RECORDS - HEALTHEAST (OUTPATIENT)
Dept: LAB | Facility: CLINIC | Age: 75
End: 2020-04-04

## 2020-04-04 DIAGNOSIS — E10.65 TYPE 1 DIABETES MELLITUS WITH HYPERGLYCEMIA (H): ICD-10-CM

## 2020-04-04 DIAGNOSIS — R79.89 KETONEMIA: ICD-10-CM

## 2020-04-04 DIAGNOSIS — Z79.4 ENCOUNTER FOR LONG-TERM (CURRENT) USE OF INSULIN (H): ICD-10-CM

## 2020-04-04 DIAGNOSIS — R73.9 HYPERGLYCEMIA: ICD-10-CM

## 2020-04-04 DIAGNOSIS — R11.11 NON-INTRACTABLE VOMITING WITHOUT NAUSEA, UNSPECIFIED VOMITING TYPE: ICD-10-CM

## 2020-04-04 PROBLEM — E11.11: Status: RESOLVED | Noted: 2019-12-28 | Resolved: 2020-04-04

## 2020-04-04 LAB
ALBUMIN UR-MCNC: NEGATIVE MG/DL
ALBUMIN UR-MCNC: NEGATIVE MG/DL
ANION GAP SERPL CALCULATED.3IONS-SCNC: 8 MMOL/L (ref 3–14)
APPEARANCE UR: CLEAR
APPEARANCE UR: CLEAR
BACTERIA #/AREA URNS HPF: ABNORMAL HPF
BASE DEFICIT BLDV-SCNC: 2.8 MMOL/L
BASOPHILS # BLD AUTO: 0 10E9/L (ref 0–0.2)
BASOPHILS NFR BLD AUTO: 0.2 %
BILIRUB UR QL STRIP: NEGATIVE
BILIRUB UR QL STRIP: NEGATIVE
BUN SERPL-MCNC: 21 MG/DL (ref 7–30)
CALCIUM SERPL-MCNC: 9.5 MG/DL (ref 8.5–10.1)
CHLORIDE SERPL-SCNC: 104 MMOL/L (ref 94–109)
CO2 SERPL-SCNC: 24 MMOL/L (ref 20–32)
COLOR UR AUTO: ABNORMAL
COLOR UR AUTO: ABNORMAL
CREAT SERPL-MCNC: 0.76 MG/DL (ref 0.52–1.04)
DIFFERENTIAL METHOD BLD: NORMAL
EOSINOPHIL # BLD AUTO: 0 10E9/L (ref 0–0.7)
EOSINOPHIL NFR BLD AUTO: 0 %
ERYTHROCYTE [DISTWIDTH] IN BLOOD BY AUTOMATED COUNT: 12.8 % (ref 10–15)
GFR SERPL CREATININE-BSD FRML MDRD: 77 ML/MIN/{1.73_M2}
GLUCOSE BLDC GLUCOMTR-MCNC: 215 MG/DL (ref 70–99)
GLUCOSE BLDC GLUCOMTR-MCNC: 263 MG/DL (ref 70–99)
GLUCOSE BLDC GLUCOMTR-MCNC: 278 MG/DL (ref 70–99)
GLUCOSE SERPL-MCNC: 284 MG/DL (ref 70–99)
GLUCOSE UR STRIP-MCNC: >499 MG/DL
GLUCOSE UR STRIP-MCNC: ABNORMAL MG/DL
HBA1C MFR BLD: 9.6 % (ref 0–5.6)
HCO3 BLDV-SCNC: 23 MMOL/L (ref 21–28)
HCT VFR BLD AUTO: 35.7 % (ref 35–47)
HGB BLD-MCNC: 11.8 G/DL (ref 11.7–15.7)
HGB UR QL STRIP: NEGATIVE
HGB UR QL STRIP: NEGATIVE
IMM GRANULOCYTES # BLD: 0 10E9/L (ref 0–0.4)
IMM GRANULOCYTES NFR BLD: 0.2 %
KETONES BLD-SCNC: 2.4 MMOL/L (ref 0–0.6)
KETONES UR STRIP-MCNC: 20 MG/DL
KETONES UR STRIP-MCNC: ABNORMAL MG/DL
LEUKOCYTE ESTERASE UR QL STRIP: NEGATIVE
LEUKOCYTE ESTERASE UR QL STRIP: NEGATIVE
LYMPHOCYTES # BLD AUTO: 1.5 10E9/L (ref 0.8–5.3)
LYMPHOCYTES NFR BLD AUTO: 25.8 %
MAGNESIUM SERPL-MCNC: 1.6 MG/DL (ref 1.6–2.3)
MCH RBC QN AUTO: 30.7 PG (ref 26.5–33)
MCHC RBC AUTO-ENTMCNC: 33.1 G/DL (ref 31.5–36.5)
MCV RBC AUTO: 93 FL (ref 78–100)
MONOCYTES # BLD AUTO: 0.5 10E9/L (ref 0–1.3)
MONOCYTES NFR BLD AUTO: 8.5 %
MUCOUS THREADS #/AREA URNS LPF: ABNORMAL LPF
MUCOUS THREADS #/AREA URNS LPF: PRESENT /LPF
NEUTROPHILS # BLD AUTO: 3.7 10E9/L (ref 1.6–8.3)
NEUTROPHILS NFR BLD AUTO: 65.3 %
NITRATE UR QL: NEGATIVE
NITRATE UR QL: NEGATIVE
NRBC # BLD AUTO: 0 10*3/UL
NRBC BLD AUTO-RTO: 0 /100
O2/TOTAL GAS SETTING VFR VENT: 0 %
PCO2 BLDV: 44 MM HG (ref 40–50)
PH BLDV: 7.33 PH (ref 7.32–7.43)
PH UR STRIP: 5 PH (ref 5–7)
PH UR STRIP: 5 [PH] (ref 4.5–8)
PHOSPHATE SERPL-MCNC: 3.3 MG/DL (ref 2.5–4.5)
PLATELET # BLD AUTO: 217 10E9/L (ref 150–450)
PO2 BLDV: 35 MM HG (ref 25–47)
POTASSIUM SERPL-SCNC: 4.6 MMOL/L (ref 3.4–5.3)
RBC # BLD AUTO: 3.84 10E12/L (ref 3.8–5.2)
RBC #/AREA URNS AUTO: <1 /HPF (ref 0–2)
RBC #/AREA URNS AUTO: ABNORMAL HPF
SODIUM SERPL-SCNC: 136 MMOL/L (ref 133–144)
SOURCE: ABNORMAL
SP GR UR STRIP: 1 (ref 1–1.03)
SP GR UR STRIP: 1.01 (ref 1–1.03)
SQUAMOUS #/AREA URNS AUTO: <1 /HPF (ref 0–1)
SQUAMOUS #/AREA URNS AUTO: ABNORMAL LPF
UROBILINOGEN UR STRIP-ACNC: ABNORMAL
UROBILINOGEN UR STRIP-MCNC: 0 MG/DL (ref 0–2)
WBC # BLD AUTO: 5.7 10E9/L (ref 4–11)
WBC #/AREA URNS AUTO: 0 /HPF (ref 0–5)
WBC #/AREA URNS AUTO: ABNORMAL HPF

## 2020-04-04 PROCEDURE — 99220 ZZC INITIAL OBSERVATION CARE,LEVL III: CPT | Performed by: PHYSICIAN ASSISTANT

## 2020-04-04 PROCEDURE — 80048 BASIC METABOLIC PNL TOTAL CA: CPT | Performed by: FAMILY MEDICINE

## 2020-04-04 PROCEDURE — 99284 EMERGENCY DEPT VISIT MOD MDM: CPT | Mod: Z6 | Performed by: FAMILY MEDICINE

## 2020-04-04 PROCEDURE — 00000146 ZZHCL STATISTIC GLUCOSE BY METER IP

## 2020-04-04 PROCEDURE — 85025 COMPLETE CBC W/AUTO DIFF WBC: CPT | Performed by: FAMILY MEDICINE

## 2020-04-04 PROCEDURE — 96372 THER/PROPH/DIAG INJ SC/IM: CPT

## 2020-04-04 PROCEDURE — 83036 HEMOGLOBIN GLYCOSYLATED A1C: CPT | Performed by: PHYSICIAN ASSISTANT

## 2020-04-04 PROCEDURE — 96372 THER/PROPH/DIAG INJ SC/IM: CPT | Performed by: FAMILY MEDICINE

## 2020-04-04 PROCEDURE — 84100 ASSAY OF PHOSPHORUS: CPT | Performed by: FAMILY MEDICINE

## 2020-04-04 PROCEDURE — 81001 URINALYSIS AUTO W/SCOPE: CPT | Performed by: FAMILY MEDICINE

## 2020-04-04 PROCEDURE — 99285 EMERGENCY DEPT VISIT HI MDM: CPT | Mod: 25 | Performed by: FAMILY MEDICINE

## 2020-04-04 PROCEDURE — 82803 BLOOD GASES ANY COMBINATION: CPT | Performed by: FAMILY MEDICINE

## 2020-04-04 PROCEDURE — G0378 HOSPITAL OBSERVATION PER HR: HCPCS

## 2020-04-04 PROCEDURE — 96374 THER/PROPH/DIAG INJ IV PUSH: CPT | Performed by: FAMILY MEDICINE

## 2020-04-04 PROCEDURE — 83735 ASSAY OF MAGNESIUM: CPT | Performed by: FAMILY MEDICINE

## 2020-04-04 PROCEDURE — 82010 KETONE BODYS QUAN: CPT | Performed by: FAMILY MEDICINE

## 2020-04-04 PROCEDURE — 25800030 ZZH RX IP 258 OP 636: Performed by: PHYSICIAN ASSISTANT

## 2020-04-04 PROCEDURE — 25000131 ZZH RX MED GY IP 250 OP 636 PS 637: Performed by: FAMILY MEDICINE

## 2020-04-04 PROCEDURE — 25000132 ZZH RX MED GY IP 250 OP 250 PS 637: Performed by: PHYSICIAN ASSISTANT

## 2020-04-04 RX ORDER — ESCITALOPRAM OXALATE 10 MG/1
10 TABLET ORAL EVERY MORNING
Status: DISCONTINUED | OUTPATIENT
Start: 2020-04-05 | End: 2020-04-05 | Stop reason: HOSPADM

## 2020-04-04 RX ORDER — AMLODIPINE BESYLATE 10 MG/1
10 TABLET ORAL EVERY EVENING
Status: DISCONTINUED | OUTPATIENT
Start: 2020-04-04 | End: 2020-04-05 | Stop reason: HOSPADM

## 2020-04-04 RX ORDER — VENLAFAXINE HYDROCHLORIDE 150 MG/1
150 CAPSULE, EXTENDED RELEASE ORAL EVERY MORNING
Status: DISCONTINUED | OUTPATIENT
Start: 2020-04-05 | End: 2020-04-05 | Stop reason: HOSPADM

## 2020-04-04 RX ORDER — BUSPIRONE HYDROCHLORIDE 7.5 MG/1
7.5 TABLET ORAL 2 TIMES DAILY
Status: DISCONTINUED | OUTPATIENT
Start: 2020-04-04 | End: 2020-04-05 | Stop reason: HOSPADM

## 2020-04-04 RX ORDER — ACETAMINOPHEN 325 MG/1
650 TABLET ORAL EVERY 4 HOURS PRN
Status: DISCONTINUED | OUTPATIENT
Start: 2020-04-04 | End: 2020-04-05 | Stop reason: HOSPADM

## 2020-04-04 RX ORDER — ATORVASTATIN CALCIUM 20 MG/1
40 TABLET, FILM COATED ORAL EVERY EVENING
Status: DISCONTINUED | OUTPATIENT
Start: 2020-04-04 | End: 2020-04-05 | Stop reason: HOSPADM

## 2020-04-04 RX ORDER — DEXTROSE MONOHYDRATE 25 G/50ML
25-50 INJECTION, SOLUTION INTRAVENOUS
Status: DISCONTINUED | OUTPATIENT
Start: 2020-04-04 | End: 2020-04-05 | Stop reason: HOSPADM

## 2020-04-04 RX ORDER — POTASSIUM CHLORIDE 1.5 G/1.58G
20 POWDER, FOR SOLUTION ORAL EVERY MORNING
Status: DISCONTINUED | OUTPATIENT
Start: 2020-04-05 | End: 2020-04-05 | Stop reason: HOSPADM

## 2020-04-04 RX ORDER — NICOTINE POLACRILEX 4 MG
15-30 LOZENGE BUCCAL
Status: DISCONTINUED | OUTPATIENT
Start: 2020-04-04 | End: 2020-04-05 | Stop reason: HOSPADM

## 2020-04-04 RX ADMIN — APIXABAN 2.5 MG: 2.5 TABLET, FILM COATED ORAL at 20:26

## 2020-04-04 RX ADMIN — INSULIN HUMAN 4 UNITS: 100 INJECTION, SOLUTION PARENTERAL at 17:26

## 2020-04-04 RX ADMIN — BUSPIRONE HYDROCHLORIDE 7.5 MG: 7.5 TABLET ORAL at 20:27

## 2020-04-04 RX ADMIN — SODIUM CHLORIDE, POTASSIUM CHLORIDE, SODIUM LACTATE AND CALCIUM CHLORIDE 1000 ML: 600; 310; 30; 20 INJECTION, SOLUTION INTRAVENOUS at 18:44

## 2020-04-04 RX ADMIN — ATORVASTATIN CALCIUM 40 MG: 20 TABLET, FILM COATED ORAL at 20:26

## 2020-04-04 RX ADMIN — AMLODIPINE BESYLATE 10 MG: 10 TABLET ORAL at 20:26

## 2020-04-04 ASSESSMENT — MIFFLIN-ST. JEOR: SCORE: 1328.22

## 2020-04-04 NOTE — ED PROVIDER NOTES
"  HPI   The patient is a 74-year-old female presenting by EMS transport from her assisted living environment for concerns of DKA.  She has diabetes and she denies recent medication changes.  She reports consistently high blood sugars \"in the 200s\" over the past week or so.  She denies feeling sick.  She did throw up once this morning \"because I drink so much water overnight.\"  And asked why she was drinking so much water she says, \"because people just encourage me to keep drinking water.\"  She denies having a fever.  No cough or congestion.  No chest pain.  No shortness of breath.  No abdominal pain.  No diarrhea.  No constipation.  No dysuria, urgency, or hematuria.        Allergies:  Allergies   Allergen Reactions     Cymbalta      Fentanyl Nausea and Vomiting     Versed [Midazolam] Nausea and Vomiting     Problem List:    Patient Active Problem List    Diagnosis Date Noted     DKA (diabetic ketoacidoses) (H) 02/20/2020     Priority: Medium     PATRICIA (acute kidney injury) (H) 01/09/2020     Priority: Medium     NSTEMI (non-ST elevated myocardial infarction) (H) 01/09/2020     Priority: Medium     PAF (paroxysmal atrial fibrillation) (H) 01/09/2020     Priority: Medium     Personal history of fall 01/09/2020     Priority: Medium     Closed Colles' fracture of left radius with routine healing, subsequent encounter 01/09/2020     Priority: Medium     initial fall 9/24/19, fell again 12/26 and re-fractured       Bacteremia due to coagulase-negative Staphylococcus 01/02/2020     Priority: Medium     Staphylococcal sepsis (H) 01/02/2020     Priority: Medium     Acute encephalopathy 12/28/2019     Priority: Medium     Diabetic ketoacidosis with coma (H) 12/28/2019     Priority: Medium     Type 1 diabetes mellitus with hyperglycemia (H) 12/28/2019     Priority: Medium     Cognitive decline 12/19/2018     Priority: Medium     Dupuytren's contracture of hand 08/30/2016     Priority: Medium     Depression 08/15/2014     " Priority: Medium     CELIA (generalized anxiety disorder) 10/17/2013     Priority: Medium     Dyslipidemia 08/07/2013     Priority: Medium     Ankle joint pain 04/15/2013     Priority: Medium              Edema 04/15/2013     Priority: Medium     Abnormal gait 04/15/2013     Priority: Medium     Closed right ankle fracture 03/19/2013     Priority: Medium     Essential hypertension 12/05/2005     Priority: Medium      Past Medical History:    Past Medical History:   Diagnosis Date     Anxiety      Depression      Diabetes mellitus (H)      DJD (degenerative joint disease) of knee      Hyperlipidemia      Hypertension      PONV (postoperative nausea and vomiting)      TMJ syndrome      Past Surgical History:    Past Surgical History:   Procedure Laterality Date     ARTHROPLASTY KNEE      left     OPEN REDUCTION INTERNAL FIXATION ANKLE  3/27/2013    Procedure: OPEN REDUCTION INTERNAL FIXATION ANKLE;  Right Ankle Open Reduction Internal Fixation ; Spinal anesthesia with block      ;  Surgeon: Dayday Gutiérrez MD;  Location:  OR     ORTHOPEDIC SURGERY       Family History:    No family history on file.  Social History:  Marital Status:   [4]  Social History     Tobacco Use     Smoking status: Former Smoker     Smokeless tobacco: Never Used     Tobacco comment: previous marijuana use    Substance Use Topics     Alcohol use: Yes     Comment: 2-3 per week, wine     Drug use: No      Medications:    acetaminophen (TYLENOL) 325 MG tablet  amLODIPine (NORVASC) 10 MG tablet  apixaban ANTICOAGULANT (ELIQUIS) 2.5 MG tablet  atorvastatin (LIPITOR) 40 MG tablet  busPIRone (BUSPAR) 7.5 MG tablet  escitalopram (LEXAPRO) 10 MG tablet  insulin aspart (NOVOLOG PEN) 100 UNIT/ML pen  insulin glargine (LANTUS PEN) 100 UNIT/ML pen  potassium chloride (KLOR-CON) 20 MEQ packet  venlafaxine (EFFEXOR-XR) 150 MG 24 hr capsule      Review of Systems   All other systems reviewed and are negative.      PE   BP: (!) 143/100  Pulse:  "83  Heart Rate: 78  Temp: 98.8  F (37.1  C)  Resp: 16  Height: 167.6 cm (5' 6\")  Weight: 81.6 kg (180 lb)  SpO2: 95 %  Physical Exam  Vitals signs reviewed.   Constitutional:       General: She is not in acute distress.     Appearance: She is well-developed.   HENT:      Head: Normocephalic and atraumatic.      Nose: Nose normal.      Mouth/Throat:      Mouth: Mucous membranes are moist.   Eyes:      Conjunctiva/sclera: Conjunctivae normal.   Neck:      Musculoskeletal: Normal range of motion.   Cardiovascular:      Rate and Rhythm: Normal rate and regular rhythm.   Pulmonary:      Effort: Pulmonary effort is normal.   Abdominal:      Palpations: Abdomen is soft.      Tenderness: There is no abdominal tenderness.   Musculoskeletal: Normal range of motion.   Skin:     General: Skin is warm and dry.   Neurological:      Mental Status: She is alert and oriented to person, place, and time.   Psychiatric:         Behavior: Behavior normal.         ED COURSE and [facility]   1518.  The patient has had high blood sugar.  She threw up once this morning.  Lab values pending.  Fluid bolus.  Vital signs unremarkable except for some slight hypertension.    1715.  Patient has hyperglycemia here.  The patient has ketones in her blood.  The patient has ketones in her urine.  She is not acidotic.  Perhaps early DKA?  Perhaps ketones related to hyperglycemia only?  The patient does not have skilled care at her assisted living after 8:00 tonight.  I have concern given her underlying dementia that she would not be able to care for herself well.  The patient will be admitted for observation.  The patient is accepted by the hospitalist team.  No additional orders requested.    LABS  Labs Ordered and Resulted from Time of ED Arrival Up to the Time of Departure from the ED   GLUCOSE BY METER - Abnormal; Notable for the following components:       Result Value    Glucose 263 (*)     All other components within normal limits   BASIC METABOLIC " PANEL - Abnormal; Notable for the following components:    Glucose 284 (*)     All other components within normal limits   KETONE BETA-HYDROXYBUTYRATE QUANTITATIVE - Abnormal; Notable for the following components:    Ketone Quantitative 2.4 (*)     All other components within normal limits   ROUTINE UA WITH MICROSCOPIC REFLEX TO CULTURE - Abnormal; Notable for the following components:    Glucose Urine >499 (*)     Ketones Urine 20 (*)     Specific Gravity Urine 1.002 (*)     Mucous Urine Present (*)     All other components within normal limits   CBC WITH PLATELETS DIFFERENTIAL   BLOOD GAS VENOUS   MAGNESIUM   PHOSPHORUS       IMAGING  Images reviewed by me.  Radiology report also reviewed.  No orders to display       Procedures    Medications   insulin (regular) (HumuLIN R/NovoLIN R) injection 4 Units (has no administration in time range)         IMPRESSION       ICD-10-CM    1. Hyperglycemia  R73.9    2. Ketonemia  R79.89    3. Non-intractable vomiting without nausea, unspecified vomiting type  R11.11             Medication List      There are no discharge medications for this visit.                       Ilan Kim MD  04/04/20 [id number]

## 2020-04-04 NOTE — ED NOTES
Pt comes from [facility] for high blood sugars. In EMS .  upon arrival. States she has been tired today and had 1 emesis this morning.

## 2020-04-04 NOTE — H&P
[facility]    History and Physical - Hospitalist Service       Date of Admission:  4/4/2020    Assessment & Plan   Kimberly Stephenson is a 74 year old female admitted on 4/4/2020. She has history of type 1 diabetes, paroxsymal atrial fibrillation, hypertension, hyperlipidemia, prior troponin elevation, anxiety/depression, and cognitive decline. She presents with hyperglycemia.    Hyperglycemia with ketosis  Elevated blood sugars noted at [facility], recent episodes of DKA. One episode of emesis. Blood glucose 284. Ketones 2.4 with no other clear signs of DKA: Normal anion gap, VBG normal, K 4.6. Last HgA1c 9.9. UA noninfectious. No respiratory symptoms, lungs clear. Appears to be hyperglycemia, recently changes in insulin in February with endocrinologist. May be related to dietary changes vs insulin regimen needing adjustment. No clear signs of DKA. Given lack of support in the evening at [facility], plan for observation of blood sugars, IVF and repeat labs in AM. Received 4 units Novolog prior to eating dinner in the ED though unclear if prandial glucose was not taken (last glucose 284 at 14:37). On admission following dinner blood sugar 278, ordered 3 units based on home sliding scale, plan to monitor.   - AM ketones, CBC, BMP  - 1 L LR bolus  - plan to initially observe on home regimen below, if persistently hyperglycemia will increase coverage    - glucose monitoring as below    Type 1 Diabetes  Follows with endocrinology, recent admissions for DKA. Changed meal time insulin to below regimen along with Lantus 30 units in the morning.  Short acting insulin regimen as following (Novolog) with meals:    100- 150: 8units of Novolog    150-200: 9 units    201-250:10 units    251-300:11 units    301-350: 12 units    351- 400:13 units daily   If without food - please follow this correction scale: 1 unit per 50 above 150 at day and above 200 at night.   150-200:1 unit(s) of Novolog   201-250:2 u   251-300: 3  u   301-350:4 u   351-400:5 u   401-450:6 u   451-500:7u   >500: 8 units and call us/ the provider   - continue custom sliding scale as above  - continue home Lantus 30 units daily   - hypo/hyperglycemia monitoring  - further management as above    Paroxysmal Atrial Fibrillation   Previously diagnosed. Not on rate control.  - continue home apixaban    Hypertension  Chronic. Blood pressures reviewed, elevated upon admission.   - continue home amlodipine    Hyperlipidemia  Chronic.  - continue home atorvastatin    Anxiety and Depression  Chronic.  - Continue home escitalopram, buspirone, venlafaxine     Cognitive Decline  This has been noted in prior admission, concerns about ability to manage insulin prompted move to assisted living facility. Now getting medications through [facility].   - care transitions consult    History of NSTEMI/demand ischemia  History of troponin elevation to 16 at OSH in the setting of sepsis, cardiology consult at that time felt likely demand related but recommended outpatient Lexiscan which was not performed.  - defer to outpatient for management      Diet: Consistent Carbohydrate Diet 3587-3766 Calories: Moderate Consistent CHO (4-6 CHO units/meal)    DVT Prophylaxis: apixaban  Sharpe Catheter: not present  Code Status: Full code    Disposition Plan   Expected discharge: Tomorrow, recommended to prior living arrangement once blood sugars stable and work up complete.  Entered: Madeleine Fang PA-C 04/04/2020, 6:49 PM     The patient's care was discussed with the Attending Physician, Dr. Ovi Solorzano and Patient.    Madeleine Fang PA-C  [facility]  ______________________________________________________________________    Chief Complaint   Hyperglycemia    History is obtained from the patient and ED provider    History of Present Illness   Kimberly Stephenson is a 74 year old female who presents with concerns of hyperglycemia from assisted living.     She is not a  "reliable historian, unclear if history if accurate.     She states this is all a big misunderstanding and she initially was hesitant about staying in the hospital. After speaking with her son in the ED she was agreeable for observation overnight. The main concern was persistently elevated blood sugars, her history of DKA (2 episodes recently) and the presence of ketones in her blood/urine.    Per the ED provider, her blood sugars are checked by [facility] and there was concern that they had been elevated over the past several days. She reports that her blood sugars have been in the high 200s. There was a reading today around lunch time that concerned staff and so she was sent in, she states it said \"1000\". She additionally had an episode of emesis which she reports was due to drinking too much water. She presently denies any fever, chills, cough,  sore throat, shortness of breath, palpitations, chest pain, abdominal pain, nausea, vomiting, diarrhea, changes in urination, rash or wounds. She states she has been in her typical state of health recently.     She does not recall her recent hospitalization at this facility for DKA which occurred on 2/20/20 - 2/22/20. She has since seen her endocrinologist and had her insulin regimen altered. She also now lives in assisted living ([facility]) due to her cognitive decline.    Review of Systems    The 10 point Review of Systems is negative other than noted in the HPI or here.     Past Medical History    I have reviewed this patient's medical history and updated it with pertinent information if needed.   Past Medical History:   Diagnosis Date     Anxiety      Depression      Diabetes mellitus (H)     Diabetes Type 1     DJD (degenerative joint disease) of knee     left     Hyperlipidemia      Hypertension      PONV (postoperative nausea and vomiting)      TMJ syndrome      Past Surgical History   I have reviewed this patient's surgical history and updated it with " pertinent information if needed.  Past Surgical History:   Procedure Laterality Date     ARTHROPLASTY KNEE      left     OPEN REDUCTION INTERNAL FIXATION ANKLE  3/27/2013    Procedure: OPEN REDUCTION INTERNAL FIXATION ANKLE;  Right Ankle Open Reduction Internal Fixation ; Spinal anesthesia with block      ;  Surgeon: Dayday Gutiérrez MD;  Location:  OR     ORTHOPEDIC SURGERY       Social History   I have reviewed this patient's social history and updated it with pertinent information if needed.  Social History     Tobacco Use     Smoking status: Former Smoker     Smokeless tobacco: Never Used     Tobacco comment: previous marijuana use    Substance Use Topics     Alcohol use: Yes     Comment: 2-3 per week, wine     Drug use: No     Family History   I have reviewed this patient's family history and updated it with pertinent information if needed.  Family History   Problem Relation Age of Onset     Stomach Cancer Mother      Ovarian Cancer Mother      Lung Cancer Father      Diabetes Sister      Prior to Admission Medications   Prior to Admission Medications   Prescriptions Last Dose Informant Patient Reported? Taking?   acetaminophen (TYLENOL) 325 MG tablet Past Month at as needed Nursing Home Yes Yes   Sig: Take 650 mg by mouth every 4 hours as needed for mild pain   amLODIPine (NORVASC) 10 MG tablet 4/3/2020 at pm Nursing Home Yes Yes   Sig: Take 10 mg by mouth every evening    apixaban ANTICOAGULANT (ELIQUIS) 2.5 MG tablet 4/4/2020 at 0730 [facility] Yes Yes   Sig: Take 2.5 mg by mouth 2 times daily    atorvastatin (LIPITOR) 40 MG tablet 4/3/2020 at pm Nursing Home Yes Yes   Sig: Take 40 mg by mouth every evening    busPIRone (BUSPAR) 7.5 MG tablet 4/4/2020 at 0730 [facility] Yes Yes   Sig: Take 7.5 mg by mouth 2 times daily   escitalopram (LEXAPRO) 10 MG tablet 4/4/2020 at 0730 [facility] Yes Yes   Sig: Take 10 mg by mouth every morning    insulin aspart (NOVOLOG PEN) 100 UNIT/ML pen 4/4/2020 at  1145, 360 BS, 13 units  No Yes   Sig: Inject 5 Units Subcutaneous 3 times daily (with meals) Inject as per sliding scale:if 0 - 69 = - Refer to hypoglycemia treatment protocol;70 - 149 = 0 units No corrective insulin, administer full dose of prandial insulin if ordered;150 - 199 = 2 units;200 - 249 = 4 units;250 - 299 = 6 units;300 - 349 = 8 units;350 - 399 = 10 units;400 - 999 = 12 units and notify MD,subcutaneously with meals related to TYPE 2 DIABETES MELLITUS WITH KETOACIDOSIS WITHOUT COMA (E11.10)   Patient taking differently: Inject 5 Units Subcutaneous 3 times daily (with meals) Per Sliding Scale:  <100= No Novolog  101-150= 8 units  151-200= 9 units  201-250= 10 units  251-300= 11 units  301-350= 12 units  351-400= 13 units  Must eat within 15 minutes of administration.  Dispense times: 0730, 1140, 1640 daily   insulin glargine (LANTUS PEN) 100 UNIT/ML pen 4/4/2020 at 0730, 30 units Nursing Home Yes Yes   Sig: Inject 30 Units Subcutaneous every morning   potassium chloride (KLOR-CON) 20 MEQ packet 4/4/2020 at 0730 [facility] Yes Yes   Sig: Take 20 mEq by mouth every morning    venlafaxine (EFFEXOR-XR) 150 MG 24 hr capsule 4/4/2020 at 30 [facility] Yes Yes   Sig: Take 150 mg by mouth every morning       Facility-Administered Medications: None     Allergies   Allergies   Allergen Reactions     Cymbalta      Fentanyl Nausea and Vomiting     Versed [Midazolam] Nausea and Vomiting     Physical Exam   Vital Signs: Temp: 97.8  F (36.6  C) Temp src: Oral BP: (!) 189/75 Pulse: 86 Heart Rate: 76 Resp: 18 SpO2: 96 % O2 Device: None (Room air)    Weight: 180 lbs 0 oz    Constitutional: sitting up comfortably in bed, awake, alert, cooperative, no apparent distress, and appears stated age  Eyes: Lids and lashes normal, extra ocular muscles intact, sclera clear, conjunctiva normal  ENT: Normocephalic, without obvious abnormality, atraumatic, oral pharynx with moist mucous membranes.  Hematologic / Lymphatic: no  cervical lymphadenopathy and no supraclavicular lymphadenopathy  Respiratory: No increased work of breathing, good air exchange, clear to auscultation bilaterally, no crackles or wheezing  Cardiovascular: regular rate and rhythm,  and no murmur noted. No lower extremity edema.  GI: normal bowel sounds, soft, non-distended, non-tender  Genitounirinary: deferred  Skin: normal skin color, texture, turgor  Musculoskeletal: Normal bulk and tone. Moves all 4 extremities appropriately.  Neurologic: Awake, alert, oriented to name, place and time.   Neuropsychiatric: calm, pleasant, cooperative, appropriate thought process/content, poor short term memory.    Data   Data reviewed today: I reviewed all medications, new labs and imaging results over the last 24 hours. I personally reviewed no images or EKG's today.    Recent Labs   Lab 04/04/20  1437   WBC 5.7   HGB 11.8   MCV 93         POTASSIUM 4.6   CHLORIDE 104   CO2 24   BUN 21   CR 0.76   ANIONGAP 8   SLIME 9.5   *     No results found for this or any previous visit (from the past 24 hour(s)).

## 2020-04-04 NOTE — PHARMACY
Medication list updated via phone protocol with patient.  List has been updated.      Patient received two doses of Novolog this am:  0745. 512 BS, 13 units  1145, 360 BS, 13 units    Patient also received 30 units of Lantus at 0730.      Medication Reconciliation Completed.

## 2020-04-04 NOTE — ED NOTES
DATE:  4/4/2020   TIME OF RECEIPT FROM LAB:  3298  LAB TEST:  Ketone  LAB VALUE:  2.4  RESULTS GIVEN WITH READ-BACK TO (PROVIDER): Dr Kim  TIME LAB VALUE REPORTED TO PROVIDER:   5687

## 2020-04-05 VITALS
TEMPERATURE: 97.7 F | OXYGEN SATURATION: 96 % | SYSTOLIC BLOOD PRESSURE: 149 MMHG | DIASTOLIC BLOOD PRESSURE: 73 MMHG | WEIGHT: 180 LBS | HEIGHT: 66 IN | HEART RATE: 73 BPM | BODY MASS INDEX: 28.93 KG/M2 | RESPIRATION RATE: 18 BRPM

## 2020-04-05 LAB
ANION GAP SERPL CALCULATED.3IONS-SCNC: 7 MMOL/L (ref 3–14)
BACTERIA SPEC CULT: NO GROWTH
BUN SERPL-MCNC: 12 MG/DL (ref 7–30)
CALCIUM SERPL-MCNC: 9.1 MG/DL (ref 8.5–10.1)
CHLORIDE SERPL-SCNC: 109 MMOL/L (ref 94–109)
CO2 SERPL-SCNC: 25 MMOL/L (ref 20–32)
CREAT SERPL-MCNC: 0.7 MG/DL (ref 0.52–1.04)
ERYTHROCYTE [DISTWIDTH] IN BLOOD BY AUTOMATED COUNT: 13.1 % (ref 10–15)
GFR SERPL CREATININE-BSD FRML MDRD: 84 ML/MIN/{1.73_M2}
GLUCOSE BLDC GLUCOMTR-MCNC: 124 MG/DL (ref 70–99)
GLUCOSE BLDC GLUCOMTR-MCNC: 139 MG/DL (ref 70–99)
GLUCOSE BLDC GLUCOMTR-MCNC: 167 MG/DL (ref 70–99)
GLUCOSE BLDC GLUCOMTR-MCNC: 200 MG/DL (ref 70–99)
GLUCOSE SERPL-MCNC: 235 MG/DL (ref 70–99)
HCT VFR BLD AUTO: 37.2 % (ref 35–47)
HGB BLD-MCNC: 12.4 G/DL (ref 11.7–15.7)
KETONES BLD-SCNC: 1.8 MMOL/L (ref 0–0.6)
MCH RBC QN AUTO: 31 PG (ref 26.5–33)
MCHC RBC AUTO-ENTMCNC: 33.3 G/DL (ref 31.5–36.5)
MCV RBC AUTO: 93 FL (ref 78–100)
PLATELET # BLD AUTO: 215 10E9/L (ref 150–450)
POTASSIUM SERPL-SCNC: 4.2 MMOL/L (ref 3.4–5.3)
RBC # BLD AUTO: 4 10E12/L (ref 3.8–5.2)
SODIUM SERPL-SCNC: 141 MMOL/L (ref 133–144)
WBC # BLD AUTO: 4.6 10E9/L (ref 4–11)

## 2020-04-05 PROCEDURE — 25000131 ZZH RX MED GY IP 250 OP 636 PS 637: Performed by: PHYSICIAN ASSISTANT

## 2020-04-05 PROCEDURE — 80048 BASIC METABOLIC PNL TOTAL CA: CPT | Performed by: INTERNAL MEDICINE

## 2020-04-05 PROCEDURE — 82010 KETONE BODYS QUAN: CPT | Performed by: INTERNAL MEDICINE

## 2020-04-05 PROCEDURE — 36415 COLL VENOUS BLD VENIPUNCTURE: CPT | Performed by: INTERNAL MEDICINE

## 2020-04-05 PROCEDURE — 96372 THER/PROPH/DIAG INJ SC/IM: CPT

## 2020-04-05 PROCEDURE — G0378 HOSPITAL OBSERVATION PER HR: HCPCS

## 2020-04-05 PROCEDURE — 85027 COMPLETE CBC AUTOMATED: CPT | Performed by: INTERNAL MEDICINE

## 2020-04-05 PROCEDURE — 99217 ZZC OBSERVATION CARE DISCHARGE: CPT | Performed by: INTERNAL MEDICINE

## 2020-04-05 PROCEDURE — 00000146 ZZHCL STATISTIC GLUCOSE BY METER IP

## 2020-04-05 PROCEDURE — 25000132 ZZH RX MED GY IP 250 OP 250 PS 637: Performed by: PHYSICIAN ASSISTANT

## 2020-04-05 RX ADMIN — BUSPIRONE HYDROCHLORIDE 7.5 MG: 7.5 TABLET ORAL at 07:57

## 2020-04-05 RX ADMIN — APIXABAN 2.5 MG: 2.5 TABLET, FILM COATED ORAL at 07:52

## 2020-04-05 RX ADMIN — POTASSIUM CHLORIDE 20 MEQ: 1.5 POWDER, FOR SOLUTION ORAL at 07:57

## 2020-04-05 RX ADMIN — ESCITALOPRAM OXALATE 10 MG: 10 TABLET ORAL at 07:53

## 2020-04-05 RX ADMIN — INSULIN ASPART 9 UNITS: 100 INJECTION, SOLUTION INTRAVENOUS; SUBCUTANEOUS at 11:02

## 2020-04-05 RX ADMIN — INSULIN GLARGINE 30 UNITS: 100 INJECTION, SOLUTION SUBCUTANEOUS at 07:56

## 2020-04-05 RX ADMIN — INSULIN ASPART 8 UNITS: 100 INJECTION, SOLUTION INTRAVENOUS; SUBCUTANEOUS at 07:56

## 2020-04-05 RX ADMIN — VENLAFAXINE HYDROCHLORIDE 150 MG: 150 CAPSULE, EXTENDED RELEASE ORAL at 07:53

## 2020-04-05 NOTE — DISCHARGE SUMMARY
[facility]  Hospitalist Discharge Summary       Date of Admission:  4/4/2020  Date of Discharge:  4/5/2020  Discharging Provider: Shad Reyes MD       Discharge Diagnoses     Ketosis due to uncontrolled hyperglycemia  Diabetes mellitus type 1  Paroxysmal atrial fibrillation   Hypertension  Hyperlipidemia  Anxiety and depression  Cognitive decline  History of NSTEMI type 2    Follow-ups Needed After Discharge   Follow-up Appointments     Follow-up and recommended labs and tests      Follow up with primary care provider, Li Arana, within 7 days for   hospital follow- up.  No follow up labs or test are needed.             Discharge Disposition   Discharged to assisted living  Condition at discharge: Stable    Hospital Course   Kimberly Stephenson is a 74 year old female admitted on 4/4/2020. She has history of type 1 diabetes, paroxsymal atrial fibrillation, hypertension, hyperlipidemia, prior troponin elevation, anxiety/depression, and cognitive decline. She presents with hyperglycemia.    Ketosis due to uncontrolled hyperglycemia  Elevated blood sugars noted at [facility], recent episodes of DKA. One episode of emesis. Blood glucose 284. Ketones 2.4 with no other clear signs of DKA: Normal anion gap, VBG normal, K 4.6. Last HgA1c 9.9. UA noninfectious. No respiratory symptoms, lungs clear. Appears to be hyperglycemia, recently changes in insulin in February with endocrinologist. May be related to dietary changes vs insulin regimen needing adjustment. No clear signs of DKA. Given lack of support in the evening at [facility], plan for observation of blood sugars, IVF and repeat labs in AM. Received 4 units Novolog prior to eating dinner in the ED though unclear if prandial glucose was not taken (last glucose 284 at 14:37). On admission following dinner blood sugar 278, ordered 3 units based on home sliding scale, plan to monitor.   - AM ketones, CBC, BMP  - 1 L IV LR bolus given  - plan to  initially observe on home regimen below, if persistently hyperglycemia will increase coverage    - patient was hyperglycemic during the day, so sliding scale with meals was increased.  - ketones decreased to 1.8, and BMP and CBC normal  - patient eating and drinking normally at time of discharge  - Care Coordinator discussed with son, no need for increased services identified.  - Patient discharged back to [facility].    Type 1 Diabetes  Follows with endocrinology, recent admissions for DKA. Changed meal time insulin to below regimen along with Lantus 30 units in the morning.  Short acting insulin regimen increased as following (Novolog) with meals:    100-150: 10 units of Novolog    150-200: 11 units    201-250: 12 units    251-300: 13 units    301-350: 14 units    351-400: 15 units  Bedtime correction scale remains unchanged:    150-200: 1 unit(s) of Novolog   201-250: 2 u   251-300: 3 u   301-350: 4 u   351-400: 5 u   401-450: 6 u   451-500: 7u   >500: 8 units and call us/ the provider   Continue home Lantus 30 units daily     Paroxysmal atrial fibrillation   Previously diagnosed. Not on rate control.  - continue home apixaban    Hypertension  Chronic. Blood pressures reviewed, elevated upon admission.   - continue home amlodipine    Hyperlipidemia  Chronic.  - continue home atorvastatin    Anxiety and depression  Chronic.  - Continue home escitalopram, buspirone, venlafaxine     Cognitive decline  This has been noted in prior admission, concerns about ability to manage insulin prompted move to assisted living facility. Now getting medications through [facility].     History of NSTEMI type 2  History of troponin elevation to 16 at OSH in the setting of sepsis, cardiology consult at that time felt likely demand related but recommended outpatient Lexiscan which was not performed.  - No concerning symptoms during hospitalization  - Defer to outpatient for management     Consultations This Hospital Stay   CARE TRANSITION RN/SW IP  CONSULT    Code Status   Full Code    Time Spent on this Encounter   I, Shad Reyes MD, personally saw the patient today and spent less than or equal to 30 minutes discharging this patient.       Shad Reyes MD  [facility]  ______________________________________________________________________    Physical Exam   Vital Signs: Temp: 97.7  F (36.5  C) Temp src: Oral BP: (!) 149/73 Pulse: 73 Heart Rate: 76 Resp: 18 SpO2: 96 % O2 Device: None (Room air)    Weight: 180 lbs 0 oz       Gen: Well nourished, well developed, alert and oriented x 3, no acute distressed  HEENT: Atraumatic, normocephalic; sclera non-injected, anicterric; oral mucosa moist, no lesion, no exudate  Lungs: Clear to ausculation, no wheezes, no rhonchi, no rales  Heart: Regular rate, regular rhythm, no gallops, no rubs, no murmurs  GI: Bowel sound normal, no hepatosplenomegaly, no masses, non-tender, non-distended, no guarding, no rebound tenderness  Lymph: No lymphadenopathy, no edema  Skin: No rashes, no chronic venous stasis     Primary Care Physician   Li Arana    Discharge Orders      Reason for your hospital stay    This is a 74 year old female admitted with hyperglycemia.     Follow-up and recommended labs and tests    Follow up with primary care provider, Li Arana, within 7 days for hospital follow- up.  No follow up labs or test are needed.     Activity    Your activity upon discharge: activity as tolerated     Full Code     Diet    Follow this diet upon discharge: Orders Placed This Encounter      Consistent Carbohydrate Diet 6329-8518 Calories: Moderate Consistent CHO (4-6 CHO units/meal)       Significant Results and Procedures   Most Recent 3 CBC's:  Recent Labs   Lab Test 04/05/20  0844 04/04/20  1437 02/23/20  0513   WBC 4.6 5.7 2.9*   HGB 12.4 11.8 12.6   MCV 93 93 91    217 157     Most Recent 3 BMP's:  Recent Labs   Lab Test 04/05/20  0844 04/04/20  1437 02/23/20  0513     136 144   POTASSIUM 4.2 4.6 3.5   CHLORIDE 109 104 109   CO2 25 24 30   BUN 12 21 8   CR 0.70 0.76 0.55   ANIONGAP 7 8 5   SLIME 9.1 9.5 9.5   * 284* 132*   ,   Results for orders placed or performed during the hospital encounter of 02/20/20   Chest XR,  PA & LAT    Narrative    CHEST TWO VIEWS   2/20/2020 2:58 PM     HISTORY: Diabetic ketoacidosis.    COMPARISON: None available.      Impression    IMPRESSION: Two views of the chest were obtained. Cardiomediastinal  silhouette is within normal limits. Minimal bibasilar atelectasis. No  significant pleural effusion or pneumothorax.    PALMER LEY MD   XR Hip G/E 2 Views Bilateral    Narrative    EXAM: XR HIP BILATERAL 2 VIEWS EACH  LOCATION: [facility]  DATE/TIME: 2/20/2020 5:08 PM    INDICATION: Bilateral posterior hip pain  COMPARISON: 06/13/2018      Impression    IMPRESSION: No acute fracture or malalignment. Normal bilateral hip joint spacing. Moderate degenerative changes of the lower lumbar spine.   XR Ankle Bilateral G/E 3 Views    Narrative    EXAM: XR ANKLE BILATERAL G/E 3 VW  LOCATION: [facility]  DATE/TIME: 2/20/2020 5:09 PM    INDICATION: Wound over lateral malleolus, acute ankle pain  COMPARISON: 05/07/2013      Impression    IMPRESSION:   Right ankle: Postsurgical changes at the distal fibula and medial malleolus. No hardware complication. No acute fracture or malalignment. The ankle mortise is congruent. The talar dome is unremarkable. Mild tibiotalar joint degenerative changes.   Osteopenia.    Left ankle: No acute fracture or malalignment. The ankle mortise is congruent. The talar dome is unremarkable. Mild tibiotalar joint degenerative changes. Osteopenia. Small plantar calcaneal enthesophyte.       Discharge Medications   Current Discharge Medication List      CONTINUE these medications which have CHANGED    Details   !! insulin aspart (NOVOLOG PEN) 100 UNIT/ML pen Inject 0-7 Units Subcutaneous At  Bedtime  Qty:        !! insulin aspart (NOVOLOG PEN) 100 UNIT/ML pen Inject 0-13 Units Subcutaneous 3 times daily (before meals) Correction Scale - custom DOSING     Do Not give Correction Insulin if Pre-Meal BG less than 140   For Pre-Meal  to 150 give 10 units.  For Pre-Meal  to 200 give 11 units.  For Pre-Meal  to 250 give 12 units.  For Pre-Meal  to 300 give 13 units.  For Pre-Meal  to 350 give 14 units.  For Pre-Meal  to 400 give 15 units.  To be given with prandial insulin, and based on pre-meal blood glucose.   Notify provider if glucose greater than or equal 350 mg/dL after administration. If given at mealtime, administer within 30 minutes of start of meal  Qty:         !! - Potential duplicate medications found. Please discuss with provider.      CONTINUE these medications which have NOT CHANGED    Details   acetaminophen (TYLENOL) 325 MG tablet Take 650 mg by mouth every 4 hours as needed for mild pain      amLODIPine (NORVASC) 10 MG tablet Take 10 mg by mouth every evening       apixaban ANTICOAGULANT (ELIQUIS) 2.5 MG tablet Take 2.5 mg by mouth 2 times daily       atorvastatin (LIPITOR) 40 MG tablet Take 40 mg by mouth every evening       busPIRone (BUSPAR) 7.5 MG tablet Take 7.5 mg by mouth 2 times daily      escitalopram (LEXAPRO) 10 MG tablet Take 10 mg by mouth every morning       insulin glargine (LANTUS PEN) 100 UNIT/ML pen Inject 30 Units Subcutaneous every morning    Comments: If Lantus is not covered by insurance, may substitute Basaglar at same dose and frequency.        potassium chloride (KLOR-CON) 20 MEQ packet Take 20 mEq by mouth every morning       venlafaxine (EFFEXOR-XR) 150 MG 24 hr capsule Take 150 mg by mouth every morning            Allergies   Allergies   Allergen Reactions     Cymbalta      Fentanyl Nausea and Vomiting     Versed [Midazolam] Nausea and Vomiting

## 2020-04-05 NOTE — PLAN OF CARE
[facility] DISCHARGE NOTE    Patient discharged to assisted living at 12:56 PM via wheel chair. Accompanied by significant other and staff. Discharge instructions reviewed with patient and caregiver, opportunity offered to ask questions. Prescriptions sent to patients preferred pharmacy. All belongings sent with patient.    Albin Farooq RN

## 2020-04-05 NOTE — PROGRESS NOTES
Skin affirmation note    Admitting nurse completed full skin assessment, Melquiades score and Melquiades interventions. This writer agrees with the initial skin assessment findings.

## 2020-04-05 NOTE — CONSULTS
Care Transition Initial Assessment - RN      Spoke with patient and sonUsman.    DATA   Principal Problem:    Hyperglycemia  Active Problems:    Cognitive decline    Depression    Dyslipidemia    Essential hypertension    CELIA (generalized anxiety disorder)    Type 1 diabetes mellitus with hyperglycemia (H)    PAF (paroxysmal atrial fibrillation) (H)       Primary Care Clinic Name: [facility]  Primary Care MD Name: Li Gutierrez  Contact information and PCP information verified: Yes    ASSESSMENT  Cognitive Status: awake, alert and appropriate.  Lives With: facility resident       Description of Support System: Supportive, Involved   Who is your support system?: Children, Facility resident(s)/Staff   Support Assessment: Adequate family and caregiver support   Insurance Concerns: No Insurance issues identified      This writer spoke with the patient and her son, Usman with the patient's permission.  I introduced myself and role.  Discussed discharge planning.     The patient lives at [facility] (phone: [phone] Fax: [phone]).  The patient and her son report she receives assistance with medication management, otherwise she is independent.    PLAN    Return to [facility] today at 1230.  Usman will provide transportation.      Akiko Tim RN, Care Coordinator [phone]

## 2020-04-05 NOTE — PLAN OF CARE
"Pt has been cooperative with cares, using call light appropriately. Has asked repeated questions/made statements regarding why she needs to be here. Pt requested SL to be pulled @ 0300, stating it was hurting her as she kept catching it on the blanket. Pt very adamant about it stating \"If you won't do it, I'll just do it myself\". Pt states desire to go home. Reassurance offered re: blood sugars improving. Pt more calm & settled after SL pulled & time spent talking w/ her.   "

## 2020-04-14 VITALS
DIASTOLIC BLOOD PRESSURE: 64 MMHG | HEIGHT: 66 IN | SYSTOLIC BLOOD PRESSURE: 114 MMHG | BODY MASS INDEX: 28.48 KG/M2 | WEIGHT: 177.2 LBS

## 2020-04-14 NOTE — PROGRESS NOTES
"[facility]   Kimberly Stephenson is being evaluated via a billable video visit due to the restrictions of the Covid-19 pandemic.   The patient has been notified of following:  \"This video visit will be conducted via a call between you and your provider. We have found that certain health care needs can be provided without the need for an in-person physical exam.  This service lets us provide the care you need with a video conversation. If during the course of the call the provider feels a video visit is not appropriate, you will not be charged for this service.\"   The provider has received verbal consent for a Video Visit from the patient or first contact? Yes  Patient  or facility staff would like the video invitation sent by: N/A   Video Start Time: 10:02am    [facility] Record Number:  [id number]  Place of Location at the time of visit: [facility]   Chief Complaint   Patient presents with     Video Visit     HPI:  Kimberly Stephenson  is a 74 year old (1945), who is being seen today for a visit.  HPI information obtained from: facility chart records, facility staff and patient report. Today's concern is:  Patient recently hospitalized 4/4/-4/5/20 for DMI with ketosis, sliding scale insulin was adjusted and patient given 1 liter of IVF. Sent back to [facility] the next day  DMII: blood sugars as follows: , 212, 266, 299, noon 212, 266, 328 and , 181, 299, 302 on exam today patient states she is feeling very well, feels her blood sugars have been fairly well controlled  PATRICIA: see labs  HTN/PAF: denies CP, palpitations, SOB, cough, congestion, no edema    Past Medical and Surgical History reviewed in Epic today.  MEDICATIONS:    Current Outpatient Medications   Medication Sig Dispense Refill     acetaminophen (TYLENOL) 325 MG tablet Take 650 mg by mouth every 4 hours as needed for mild pain       amLODIPine (NORVASC) 10 MG tablet Take 10 mg by mouth every " evening        apixaban ANTICOAGULANT (ELIQUIS) 2.5 MG tablet Take 2.5 mg by mouth 2 times daily        atorvastatin (LIPITOR) 40 MG tablet Take 40 mg by mouth every evening        busPIRone (BUSPAR) 7.5 MG tablet Take 7.5 mg by mouth 2 times daily       escitalopram (LEXAPRO) 10 MG tablet Take 10 mg by mouth every morning        insulin aspart (NOVOLOG PEN) 100 UNIT/ML pen Inject 0-7 Units Subcutaneous At Bedtime Do Not give Bedtime Correction Insulin if BG less than 200 For  to 250 give 2 units. For  to 300 give 3 units. For  to 350 give 4 units. For  to 400 give 5 units. For  to 450 give 6 units. For  to 500 give 7 units. Notify provider if glucose greater than or equal to 350 mg/dL after administration. 3 mL 0     insulin aspart (NOVOLOG PEN) 100 UNIT/ML pen Inject 0-13 Units Subcutaneous 3 times daily (before meals) Correction Scale - custom DOSING     Do Not give Correction Insulin if Pre-Meal BG less than 100  For Pre-Meal  to 150 give 10 units.  For Pre-Meal  to 200 give 11 units.  For Pre-Meal  to 250 give 12 units.  For Pre-Meal  to 300 give 13 units.  For Pre-Meal  to 350 give 14 units.  For Pre-Meal  to 400 give 15 units.  To be given with prandial insulin, and based on pre-meal blood glucose.   Notify provider if glucose greater than or equal 350 mg/dL after administration. If given at mealtime, administer within 30 minutes of start of meal 3 mL 0     insulin glargine (LANTUS PEN) 100 UNIT/ML pen Inject 30 Units Subcutaneous every morning       potassium chloride (KLOR-CON) 20 MEQ packet Take 20 mEq by mouth every morning        venlafaxine (EFFEXOR-XR) 150 MG 24 hr capsule Take 150 mg by mouth every morning        REVIEW OF SYSTEMS: 10 point ROS of systems including Constitutional, Eyes, Respiratory, Cardiovascular, Gastroenterology, Genitourinary, Integumentary, Musculoskeletal, Psychiatric were all negative except for pertinent  "positives noted in my HPI.  Objective: /64   Ht 1.676 m (5' 6\")   Wt 80.4 kg (177 lb 3.2 oz)   BMI 28.60 kg/m    Limited visit exam done given COVID-19 precautions.   GENERAL APPEARANCE:  Alert, in no distress  ENT:  Mouth and posterior oropharynx normal, moist mucous membranes, normal hearing acuity  EYES:  EOM, conjunctivae, lids, pupils and irises normal, PERRL  RESP:  no respiratory distress  CV:  no edema  ABDOMEN:  abdomen flat  M/S:   Examination of:   sitting up moving all extremities  .  SKIN:  Inspection of skin and subcutaneous tissue baseline  NEURO:   speech WNL  PSYCH:  affect and mood normal  Labs:     Most Recent 3 CBC's:  Recent Labs   Lab Test 04/05/20  0844 04/04/20  1437 02/23/20  0513   WBC 4.6 5.7 2.9*   HGB 12.4 11.8 12.6   MCV 93 93 91    217 157     Most Recent 3 BMP's:  Recent Labs   Lab Test 04/05/20  0844 04/04/20  1437 02/23/20  0513    136 144   POTASSIUM 4.2 4.6 3.5   CHLORIDE 109 104 109   CO2 25 24 30   BUN 12 21 8   CR 0.70 0.76 0.55   ANIONGAP 7 8 5   SLIME 9.1 9.5 9.5   * 284* 132*       ASSESSMENT/PLAN:  Type 1 diabetes mellitus with hyperglycemia (H)  Hyperglycemia  Acute/ongoing: continue blood sugar monitoring QID, increase lantus to 34 units SQ QAM, continue current sliding scale insulin (novolog)    PATRICIA (acute kidney injury) (H)  Acute/resolved: push fluids, BMP on monday    PAF (paroxysmal atrial fibrillation) (H)  Essential hypertension  Ongoing: no change in RX, continue norvasc 10mg QD, no rate control, continue apixaban 2.5mg BID      Orders written by provider at facility  Increase lantus to 34 units sQ qAM    Electronically signed by:  Tonya Lynn Haase, APRN CNP     Video-Visit Details  Type of service:  Video Visit  Video End Time (time video stopped): 10:08AM  Distant Location (provider location):  [facility]         "

## 2020-04-15 ENCOUNTER — VIRTUAL VISIT (OUTPATIENT)
Dept: GERIATRICS | Facility: CLINIC | Age: 75
End: 2020-04-15
Payer: COMMERCIAL

## 2020-04-15 DIAGNOSIS — R73.9 HYPERGLYCEMIA: ICD-10-CM

## 2020-04-15 DIAGNOSIS — I48.0 PAF (PAROXYSMAL ATRIAL FIBRILLATION) (H): ICD-10-CM

## 2020-04-15 DIAGNOSIS — I10 ESSENTIAL HYPERTENSION: ICD-10-CM

## 2020-04-15 DIAGNOSIS — N17.9 AKI (ACUTE KIDNEY INJURY) (H): ICD-10-CM

## 2020-04-15 DIAGNOSIS — E10.65 TYPE 1 DIABETES MELLITUS WITH HYPERGLYCEMIA (H): Primary | ICD-10-CM

## 2020-04-15 NOTE — LETTER
"    4/15/2020         RE: Kimberly Stephenson  [facility]  [address] [address]        Dear Colleague,    Thank you for referring your patient, Kimberly Stephenson, to the [facility]. Please see a copy of my visit note below.    [place]   Kimberly Stephenson is being evaluated via a billable video visit due to the restrictions of the Covid-19 pandemic.   The patient has been notified of following:  \"This video visit will be conducted via a call between you and your provider. We have found that certain health care needs can be provided without the need for an in-person physical exam.  This service lets us provide the care you need with a video conversation. If during the course of the call the provider feels a video visit is not appropriate, you will not be charged for this service.\"   The provider has received verbal consent for a Video Visit from the patient or first contact? Yes  Patient  or facility staff would like the video invitation sent by: N/A   Video Start Time: 10:02am    [facility] Medical Record Number:  [id number]  Place of Location at the time of visit: [facility]   Chief Complaint   Patient presents with     Video Visit     HPI:  Kimberly Stephenson  is a 74 year old (1945), who is being seen today for a visit.  HPI information obtained from: facility chart records, facility staff and patient report. Today's concern is:  Patient recently hospitalized 4/4/-4/5/20 for DMI with ketosis, sliding scale insulin was adjusted and patient given 1 liter of IVF. Sent back to [facility] the next day  DMII: blood sugars as follows: , 212, 266, 299, noon 212, 266, 328 and , 181, 299, 302 on exam today patient states she is feeling very well, feels her blood sugars have been fairly well controlled  PATRICIA: see labs  HTN/PAF: denies CP, palpitations, SOB, cough, congestion, no edema    Past Medical and Surgical History reviewed in Epic " today.  MEDICATIONS:    Current Outpatient Medications   Medication Sig Dispense Refill     acetaminophen (TYLENOL) 325 MG tablet Take 650 mg by mouth every 4 hours as needed for mild pain       amLODIPine (NORVASC) 10 MG tablet Take 10 mg by mouth every evening        apixaban ANTICOAGULANT (ELIQUIS) 2.5 MG tablet Take 2.5 mg by mouth 2 times daily        atorvastatin (LIPITOR) 40 MG tablet Take 40 mg by mouth every evening        busPIRone (BUSPAR) 7.5 MG tablet Take 7.5 mg by mouth 2 times daily       escitalopram (LEXAPRO) 10 MG tablet Take 10 mg by mouth every morning        insulin aspart (NOVOLOG PEN) 100 UNIT/ML pen Inject 0-7 Units Subcutaneous At Bedtime Do Not give Bedtime Correction Insulin if BG less than 200 For  to 250 give 2 units. For  to 300 give 3 units. For  to 350 give 4 units. For  to 400 give 5 units. For  to 450 give 6 units. For  to 500 give 7 units. Notify provider if glucose greater than or equal to 350 mg/dL after administration. 3 mL 0     insulin aspart (NOVOLOG PEN) 100 UNIT/ML pen Inject 0-13 Units Subcutaneous 3 times daily (before meals) Correction Scale - custom DOSING     Do Not give Correction Insulin if Pre-Meal BG less than 100  For Pre-Meal  to 150 give 10 units.  For Pre-Meal  to 200 give 11 units.  For Pre-Meal  to 250 give 12 units.  For Pre-Meal  to 300 give 13 units.  For Pre-Meal  to 350 give 14 units.  For Pre-Meal  to 400 give 15 units.  To be given with prandial insulin, and based on pre-meal blood glucose.   Notify provider if glucose greater than or equal 350 mg/dL after administration. If given at mealtime, administer within 30 minutes of start of meal 3 mL 0     insulin glargine (LANTUS PEN) 100 UNIT/ML pen Inject 30 Units Subcutaneous every morning       potassium chloride (KLOR-CON) 20 MEQ packet Take 20 mEq by mouth every morning        venlafaxine (EFFEXOR-XR) 150 MG 24 hr capsule Take 150  "mg by mouth every morning        REVIEW OF SYSTEMS: 10 point ROS of systems including Constitutional, Eyes, Respiratory, Cardiovascular, Gastroenterology, Genitourinary, Integumentary, Musculoskeletal, Psychiatric were all negative except for pertinent positives noted in my HPI.  Objective: /64   Ht 1.676 m (5' 6\")   Wt 80.4 kg (177 lb 3.2 oz)   BMI 28.60 kg/m    Limited visit exam done given COVID-19 precautions.   GENERAL APPEARANCE:  Alert, in no distress  ENT:  Mouth and posterior oropharynx normal, moist mucous membranes, normal hearing acuity  EYES:  EOM, conjunctivae, lids, pupils and irises normal, PERRL  RESP:  no respiratory distress  CV:  no edema  ABDOMEN:  abdomen flat  M/S:   Examination of:   sitting up moving all extremities  .  SKIN:  Inspection of skin and subcutaneous tissue baseline  NEURO:   speech WNL  PSYCH:  affect and mood normal  Labs:     Most Recent 3 CBC's:  Recent Labs   Lab Test 04/05/20  0844 04/04/20  1437 02/23/20  0513   WBC 4.6 5.7 2.9*   HGB 12.4 11.8 12.6   MCV 93 93 91    217 157     Most Recent 3 BMP's:  Recent Labs   Lab Test 04/05/20  0844 04/04/20  1437 02/23/20  0513    136 144   POTASSIUM 4.2 4.6 3.5   CHLORIDE 109 104 109   CO2 25 24 30   BUN 12 21 8   CR 0.70 0.76 0.55   ANIONGAP 7 8 5   SLIME 9.1 9.5 9.5   * 284* 132*       ASSESSMENT/PLAN:  Type 1 diabetes mellitus with hyperglycemia (H)  Hyperglycemia  Acute/ongoing: continue blood sugar monitoring QID, increase lantus to 34 units SQ QAM, continue current sliding scale insulin (novolog)    PATRICIA (acute kidney injury) (H)  Acute/resolved: push fluids, BMP on monday    PAF (paroxysmal atrial fibrillation) (H)  Essential hypertension  Ongoing: no change in RX, continue norvasc 10mg QD, no rate control, continue apixaban 2.5mg BID      Orders written by provider at facility  Increase lantus to 34 units sQ qAM    Electronically signed by:  Tonya Lynn Haase, APRN CNP     Video-Visit Details  Type " of service:  Video Visit  Video End Time (time video stopped): 10:08AM  Distant Location (provider location):  [facility]           Again, thank you for allowing me to participate in the care of your patient.        Sincerely,        Tonya Lynn Haase, APRN CNP

## 2020-04-22 ENCOUNTER — TELEPHONE (OUTPATIENT)
Dept: GERIATRICS | Facility: CLINIC | Age: 75
End: 2020-04-22

## 2020-04-22 NOTE — TELEPHONE ENCOUNTER
Patient has been running a high blood sugar for the past two days  Up over 500, she is on sliding scale novolog receives 17 units for Blood sugar of 500  lantus 24 units SQ QAM  Order: push fluids, give novolog 24 units now and increase lantus to 28 units SQ qAM

## 2020-04-23 ENCOUNTER — VIRTUAL VISIT (OUTPATIENT)
Dept: GERIATRICS | Facility: CLINIC | Age: 75
End: 2020-04-23
Payer: COMMERCIAL

## 2020-04-23 VITALS
RESPIRATION RATE: 22 BRPM | HEART RATE: 111 BPM | SYSTOLIC BLOOD PRESSURE: 114 MMHG | TEMPERATURE: 97.1 F | DIASTOLIC BLOOD PRESSURE: 64 MMHG

## 2020-04-23 DIAGNOSIS — I10 ESSENTIAL HYPERTENSION: ICD-10-CM

## 2020-04-23 DIAGNOSIS — R73.9 HYPERGLYCEMIA: ICD-10-CM

## 2020-04-23 DIAGNOSIS — E10.65 TYPE 1 DIABETES MELLITUS WITH HYPERGLYCEMIA (H): Primary | ICD-10-CM

## 2020-04-23 DIAGNOSIS — R41.89 COGNITIVE DECLINE: ICD-10-CM

## 2020-04-23 NOTE — LETTER
"    4/23/2020        RE: Kimberly Stephenson  [facility]  [address] [address]        [place] [facility]   Kimberly Stephenson is being evaluated via a billable video visit due to the restrictions of the Covid-19 pandemic.   The patient has been notified of following:  \"This video visit will be conducted via a call between you and your provider. We have found that certain health care needs can be provided without the need for an in-person physical exam.  This service lets us provide the care you need with a video conversation. If during the course of the call the provider feels a video visit is not appropriate, you will not be charged for this service.\"   The provider has received verbal consent for a Video Visit from the patient or first contact? Yes  Patient  or facility staff would like the video invitation sent by: Text to cell phone: Jana RN staff  Video Start Time: 9:37 AM  [facility] Medical Record Number:  [id number]  Place of Location at the time of visit: [facility]   Chief Complaint   Patient presents with     Hyperglycemia     HPI:  Kimberly Stephenson  is a 74 year old (1945), who is being seen today for a visit.  HPI information obtained from: facility chart records, facility staff, patient report, [facility] chart review and Care Everywhere [facility] chart review.     Today's concern is:  Type 1 diabetes mellitus with hyperglycemia (H)  Hyperglycemia  Resident has had two recent hospitalization for DKA, one requiring ICU stay. Is being followed by endocrinology, but follow up has been delayed due to coronavirus. AM lantus increased to 34 units on 4/15/2020.   -Yesterday RN reported AM blood sugar reading was \"high\". Resident had emesis x3, felt weak and refused to eat. Received lanuts and 24 units of novolog. Blood sugars slowing improved throughout the day with increased doses of novolog. Symptoms resolved and resident was able to eat lunch/dinner " and bedtime snack.   -This AM blood sugar was 78. AM doses of lantus and novolog 2 units were held. Resident is asymptomatic.     - DIETITIAN FOLLOW-UP    Essential hypertension  BP Readings from Last 3 Encounters:   04/23/20 114/64   02/20/20 114/64   04/05/20 (!) 149/73   Denies CP, SOB and lightheadedness.   -Taking amlodipine 10 mg PO daily.    Cognitive decline  Mild cognitive impairment noted. Long-term memory loss more prominent than short-term.   -No behaviors noted by staff.       Past Medical and Surgical History reviewed in Epic today.  MEDICATIONS:  Current Outpatient Medications   Medication Sig Dispense Refill     acetaminophen (TYLENOL) 325 MG tablet Take 650 mg by mouth every 4 hours as needed for mild pain       amLODIPine (NORVASC) 10 MG tablet Take 10 mg by mouth every evening        apixaban ANTICOAGULANT (ELIQUIS) 2.5 MG tablet Take 2.5 mg by mouth 2 times daily        atorvastatin (LIPITOR) 40 MG tablet Take 40 mg by mouth every evening        busPIRone (BUSPAR) 7.5 MG tablet Take 7.5 mg by mouth 2 times daily       escitalopram (LEXAPRO) 10 MG tablet Take 10 mg by mouth every morning        insulin aspart (NOVOLOG PEN) 100 UNIT/ML pen Inject 0-7 Units Subcutaneous At Bedtime Do Not give Bedtime Correction Insulin if BG less than 200 For  to 250 give 2 units. For  to 300 give 3 units. For  to 350 give 4 units. For  to 400 give 5 units. For  to 450 give 6 units. For  to 500 give 7 units. Notify provider if glucose greater than or equal to 350 mg/dL after administration. 3 mL 0     insulin aspart (NOVOLOG PEN) 100 UNIT/ML pen Inject 0-13 Units Subcutaneous 3 times daily (before meals) Correction Scale - custom DOSING     Do Not give Correction Insulin if Pre-Meal BG less than 100  For Pre-Meal  to 150 give 10 units.  For Pre-Meal  to 200 give 11 units.  For Pre-Meal  to 250 give 12 units.  For Pre-Meal  to 300 give 13 units.  For  Pre-Meal  to 350 give 14 units.  For Pre-Meal  to 400 give 15 units.  To be given with prandial insulin, and based on pre-meal blood glucose.   Notify provider if glucose greater than or equal 350 mg/dL after administration. If given at mealtime, administer within 30 minutes of start of meal 3 mL 0     insulin glargine (LANTUS PEN) 100 UNIT/ML pen Inject 30 Units Subcutaneous every morning       potassium chloride (KLOR-CON) 20 MEQ packet Take 20 mEq by mouth every morning        venlafaxine (EFFEXOR-XR) 150 MG 24 hr capsule Take 150 mg by mouth every morning        REVIEW OF SYSTEMS: 4 point ROS including Respiratory, CV, GI and , other than that noted in the HPI,  is negative  Objective: There were no vitals taken for this visit.  Limited visit exam done given COVID-19 precautions.   GENERAL APPEARANCE:  Alert, in no distress  RESP:  no respiratory distress  CV:  no edema  M/S:   Gait and station normal  Digits and nails normal  SKIN:  Inspection of skin and subcutaneous tissue baseline, Palpation of skin and subcutaneous tissue baseline  NEURO:   Cranial nerves 2-12 are normal tested and grossly at patient's baseline  PSYCH:  mild cognitive impairment.   Labs:   Labs done in SNF are in [facility]. Please refer to them using [employer]/Care Everywhere. and Recent labs in EPIC reviewed by me today.     ASSESSMENT/PLAN:  (E10.65) Type 1 diabetes mellitus with hyperglycemia (H)  (primary encounter diagnosis)  (R73.9) Hyperglycemia  Comment: Poor control of blood sugars. Blood sugar range /   Plan:   -DIETITIAN FOLLOW-UP  -Follow up with endocrinology when able  -Continue latus as ordered   -Continue sliding scale novolog without change  -Novolog 2 units with meals on top of sliding scale dose    (I10) Essential hypertension  Comment: Chronic, stable on current regimen   Plan:   -Keep SBP> 130 mmHg and DBP > 65 mmHg (levels below these increase mortality as shown by standard studies and observations).      (R41.78) Cognitive decline  Comment: progressive.   Plan: Expect further decline with progression of diease     See new orders above, faxed to facility      Electronically signed by:  VANDANA Munguia CNP  [facility]     Video-Visit Details  Type of service:  Video Visit  Video End Time (time video stopped): 10:39  Distant Location (provider location):  [facility]             Sincerely,        Li Arana, NP

## 2020-04-23 NOTE — PROGRESS NOTES
"[place] [facility]   Kimberly Stephenson is being evaluated via a billable video visit due to the restrictions of the Covid-19 pandemic.   The patient has been notified of following:  \"This video visit will be conducted via a call between you and your provider. We have found that certain health care needs can be provided without the need for an in-person physical exam.  This service lets us provide the care you need with a video conversation. If during the course of the call the provider feels a video visit is not appropriate, you will not be charged for this service.\"   The provider has received verbal consent for a Video Visit from the patient or first contact? Yes  Patient  or facility staff would like the video invitation sent by: Text to cell phone: Jana RN staff  Video Start Time: 9:37 AM  [place] Medical Record Number:  [id number]  Place of Location at the time of visit: [facility]   Chief Complaint   Patient presents with     Hyperglycemia     HPI:  Kimberly Stephenson  is a 74 year old (1945), who is being seen today for a visit.  HPI information obtained from: facility chart records, facility staff, patient report, [facility] chart review and Care Everywhere [facility] chart review.     Today's concern is:  Type 1 diabetes mellitus with hyperglycemia (H)  Hyperglycemia  Resident has had two recent hospitalization for DKA, one requiring ICU stay. Is being followed by endocrinology, but follow up has been delayed due to coronavirus. AM lantus increased to 34 units on 4/15/2020.   -Yesterday RN reported AM blood sugar reading was \"high\". Resident had emesis x3, felt weak and refused to eat. Received lanuts and 24 units of novolog. Blood sugars slowing improved throughout the day with increased doses of novolog. Symptoms resolved and resident was able to eat lunch/dinner and bedtime snack.   -This AM blood sugar was 78. AM doses of lantus and novolog 2 units were held. " Resident is asymptomatic.     - DIETITIAN FOLLOW-UP    Essential hypertension  BP Readings from Last 3 Encounters:   04/23/20 114/64   02/20/20 114/64   04/05/20 (!) 149/73   Denies CP, SOB and lightheadedness.   -Taking amlodipine 10 mg PO daily.    Cognitive decline  Mild cognitive impairment noted. Long-term memory loss more prominent than short-term.   -No behaviors noted by staff.       Past Medical and Surgical History reviewed in Epic today.  MEDICATIONS:  Current Outpatient Medications   Medication Sig Dispense Refill     acetaminophen (TYLENOL) 325 MG tablet Take 650 mg by mouth every 4 hours as needed for mild pain       amLODIPine (NORVASC) 10 MG tablet Take 10 mg by mouth every evening        apixaban ANTICOAGULANT (ELIQUIS) 2.5 MG tablet Take 2.5 mg by mouth 2 times daily        atorvastatin (LIPITOR) 40 MG tablet Take 40 mg by mouth every evening        busPIRone (BUSPAR) 7.5 MG tablet Take 7.5 mg by mouth 2 times daily       escitalopram (LEXAPRO) 10 MG tablet Take 10 mg by mouth every morning        insulin aspart (NOVOLOG PEN) 100 UNIT/ML pen Inject 0-7 Units Subcutaneous At Bedtime Do Not give Bedtime Correction Insulin if BG less than 200 For  to 250 give 2 units. For  to 300 give 3 units. For  to 350 give 4 units. For  to 400 give 5 units. For  to 450 give 6 units. For  to 500 give 7 units. Notify provider if glucose greater than or equal to 350 mg/dL after administration. 3 mL 0     insulin aspart (NOVOLOG PEN) 100 UNIT/ML pen Inject 0-13 Units Subcutaneous 3 times daily (before meals) Correction Scale - custom DOSING     Do Not give Correction Insulin if Pre-Meal BG less than 100  For Pre-Meal  to 150 give 10 units.  For Pre-Meal  to 200 give 11 units.  For Pre-Meal  to 250 give 12 units.  For Pre-Meal  to 300 give 13 units.  For Pre-Meal  to 350 give 14 units.  For Pre-Meal  to 400 give 15 units.  To be given with prandial  insulin, and based on pre-meal blood glucose.   Notify provider if glucose greater than or equal 350 mg/dL after administration. If given at mealtime, administer within 30 minutes of start of meal 3 mL 0     insulin glargine (LANTUS PEN) 100 UNIT/ML pen Inject 30 Units Subcutaneous every morning       potassium chloride (KLOR-CON) 20 MEQ packet Take 20 mEq by mouth every morning        venlafaxine (EFFEXOR-XR) 150 MG 24 hr capsule Take 150 mg by mouth every morning        REVIEW OF SYSTEMS: 4 point ROS including Respiratory, CV, GI and , other than that noted in the HPI,  is negative  Objective: There were no vitals taken for this visit.  Limited visit exam done given COVID-19 precautions.   GENERAL APPEARANCE:  Alert, in no distress  RESP:  no respiratory distress  CV:  no edema  M/S:   Gait and station normal  Digits and nails normal  SKIN:  Inspection of skin and subcutaneous tissue baseline, Palpation of skin and subcutaneous tissue baseline  NEURO:   Cranial nerves 2-12 are normal tested and grossly at patient's baseline  PSYCH:  mild cognitive impairment.   Labs:   Labs done in SNF are in [place]. Please refer to them using [place]/Care Everywhere. and Recent labs in [facility] reviewed by me today.     ASSESSMENT/PLAN:  (E10.65) Type 1 diabetes mellitus with hyperglycemia (H)  (primary encounter diagnosis)  (R73.9) Hyperglycemia  Comment: Poor control of blood sugars. Blood sugar range /   Plan:   -DIETITIAN FOLLOW-UP  -Follow up with endocrinology when able  -Continue latus as ordered   -Continue sliding scale novolog without change  -Novolog 2 units with meals on top of sliding scale dose    (I10) Essential hypertension  Comment: Chronic, stable on current regimen   Plan:   -Keep SBP> 130 mmHg and DBP > 65 mmHg (levels below these increase mortality as shown by standard studies and observations).     (R41.89) Cognitive decline  Comment: progressive.   Plan: Expect further decline with progression of  diease     See new orders above, faxed to facility      Electronically signed by:  VANDANA Munguia CNP  [facility]     Video-Visit Details  Type of service:  Video Visit  Video End Time (time video stopped): 10:39  Distant Location (provider location):  [place] [facility]

## 2020-04-27 ENCOUNTER — RECORDS - HEALTHEAST (OUTPATIENT)
Dept: LAB | Facility: CLINIC | Age: 75
End: 2020-04-27

## 2020-04-27 LAB
ANION GAP SERPL CALCULATED.3IONS-SCNC: 7 MMOL/L (ref 5–18)
BASOPHILS # BLD AUTO: 0 THOU/UL (ref 0–0.2)
BASOPHILS NFR BLD AUTO: 0 % (ref 0–2)
BUN SERPL-MCNC: 20 MG/DL (ref 8–28)
CALCIUM SERPL-MCNC: 9.2 MG/DL (ref 8.5–10.5)
CHLORIDE BLD-SCNC: 103 MMOL/L (ref 98–107)
CO2 SERPL-SCNC: 27 MMOL/L (ref 22–31)
CREAT SERPL-MCNC: 1.11 MG/DL (ref 0.6–1.1)
EOSINOPHIL # BLD AUTO: 0 THOU/UL (ref 0–0.4)
EOSINOPHIL NFR BLD AUTO: 0 % (ref 0–6)
ERYTHROCYTE [DISTWIDTH] IN BLOOD BY AUTOMATED COUNT: 13.2 % (ref 11–14.5)
GFR SERPL CREATININE-BSD FRML MDRD: 48 ML/MIN/1.73M2
GLUCOSE BLD-MCNC: 421 MG/DL (ref 70–125)
HCT VFR BLD AUTO: 40.6 % (ref 35–47)
HGB BLD-MCNC: 13.1 G/DL (ref 12–16)
LYMPHOCYTES # BLD AUTO: 0.8 THOU/UL (ref 0.8–4.4)
LYMPHOCYTES NFR BLD AUTO: 15 % (ref 20–40)
MCH RBC QN AUTO: 30.8 PG (ref 27–34)
MCHC RBC AUTO-ENTMCNC: 32.3 G/DL (ref 32–36)
MCV RBC AUTO: 95 FL (ref 80–100)
MONOCYTES # BLD AUTO: 0.2 THOU/UL (ref 0–0.9)
MONOCYTES NFR BLD AUTO: 4 % (ref 2–10)
NEUTROPHILS # BLD AUTO: 4 THOU/UL (ref 2–7.7)
NEUTROPHILS NFR BLD AUTO: 81 % (ref 50–70)
PLATELET # BLD AUTO: 201 THOU/UL (ref 140–440)
PMV BLD AUTO: 12.3 FL (ref 8.5–12.5)
POTASSIUM BLD-SCNC: 5.2 MMOL/L (ref 3.5–5)
RBC # BLD AUTO: 4.26 MILL/UL (ref 3.8–5.4)
SODIUM SERPL-SCNC: 137 MMOL/L (ref 136–145)
WBC: 5 THOU/UL (ref 4–11)

## 2020-05-01 ENCOUNTER — RECORDS - HEALTHEAST (OUTPATIENT)
Dept: LAB | Facility: CLINIC | Age: 75
End: 2020-05-01

## 2020-05-04 LAB — POTASSIUM BLD-SCNC: 4.7 MMOL/L (ref 3.5–5)

## 2020-06-01 ENCOUNTER — RECORDS - HEALTHEAST (OUTPATIENT)
Dept: LAB | Facility: CLINIC | Age: 75
End: 2020-06-01

## 2020-06-01 LAB — HBA1C MFR BLD: 10.2 %

## 2020-06-23 ENCOUNTER — RECORDS - HEALTHEAST (OUTPATIENT)
Dept: LAB | Facility: CLINIC | Age: 75
End: 2020-06-23

## 2020-06-26 LAB
SARS-COV-2 BY NAA - HISTORICAL: NOT DETECTED
SARS-COV-2 SOURCE - HISTORICAL: NORMAL

## 2020-07-03 ENCOUNTER — ASSISTED LIVING VISIT (OUTPATIENT)
Dept: GERIATRICS | Facility: CLINIC | Age: 75
End: 2020-07-03
Payer: COMMERCIAL

## 2020-07-03 VITALS
HEART RATE: 72 BPM | BODY MASS INDEX: 30.22 KG/M2 | HEIGHT: 66 IN | TEMPERATURE: 98.9 F | DIASTOLIC BLOOD PRESSURE: 60 MMHG | SYSTOLIC BLOOD PRESSURE: 132 MMHG | RESPIRATION RATE: 15 BRPM | WEIGHT: 188 LBS

## 2020-07-03 DIAGNOSIS — E10.65 TYPE 1 DIABETES MELLITUS WITH HYPERGLYCEMIA (H): Primary | ICD-10-CM

## 2020-07-03 ASSESSMENT — MIFFLIN-ST. JEOR: SCORE: 1359.51

## 2020-07-03 NOTE — PROGRESS NOTES
[place] [facility]  [place] Medical Record Number:  [id number]  Place of Service where encounter took place:  [facility] ([facility]) [[id number]]  Chief Complaint   Patient presents with     Nursing Home Acute       HPI:    Kimberly Stephenson  is a 75 year old (1945), who is being seen today for an episodic care visit.  HPI information obtained from: facility chart records, facility staff and patient report.     Today's concern is:   Diagnosis Comments   1. Type 1 diabetes mellitus with hyperglycemia (H)  Resident's blood sugars vary from 64->500. Is followed by endocrinology who has been recently notified and reports to continue current insulin regimen without change. Resident's blood sugar 564 yesterday and does report she was very thirsty and tired. At baseline today without concerns.          Past Medical and Surgical History reviewed in Epic today.    MEDICATIONS:    Current Outpatient Medications   Medication Sig Dispense Refill     acetaminophen (TYLENOL) 325 MG tablet Take 650 mg by mouth every 4 hours as needed for mild pain       amLODIPine (NORVASC) 10 MG tablet Take 10 mg by mouth every evening        apixaban ANTICOAGULANT (ELIQUIS) 2.5 MG tablet Take 2.5 mg by mouth 2 times daily        atorvastatin (LIPITOR) 40 MG tablet Take 40 mg by mouth every evening        busPIRone (BUSPAR) 7.5 MG tablet Take 7.5 mg by mouth 2 times daily       escitalopram (LEXAPRO) 10 MG tablet Take 10 mg by mouth every morning        insulin aspart (NOVOLOG PEN) 100 UNIT/ML pen Inject 2 Units Subcutaneous 3 times daily (with meals) 3 mL 3     insulin aspart (NOVOLOG PEN) 100 UNIT/ML pen Inject 0-7 Units Subcutaneous At Bedtime Do Not give Bedtime Correction Insulin if BG less than 200 For  to 250 give 2 units. For  to 300 give 3 units. For  to 350 give 4 units. For  to 400 give 5 units. For  to 450 give 6 units. For  to 500 give 7 units. Notify  "provider if glucose greater than or equal to 350 mg/dL after administration. 3 mL 0     insulin aspart (NOVOLOG PEN) 100 UNIT/ML pen Inject 0-13 Units Subcutaneous 3 times daily (before meals) Correction Scale - custom DOSING     Do Not give Correction Insulin if Pre-Meal BG less than 100  For Pre-Meal  to 150 give 10 units.  For Pre-Meal  to 200 give 11 units.  For Pre-Meal  to 250 give 12 units.  For Pre-Meal  to 300 give 13 units.  For Pre-Meal  to 350 give 14 units.  For Pre-Meal  to 400 give 15 units.  To be given with prandial insulin, and based on pre-meal blood glucose.   Notify provider if glucose greater than or equal 350 mg/dL after administration. If given at mealtime, administer within 30 minutes of start of meal 3 mL 0     insulin glargine (LANTUS PEN) 100 UNIT/ML pen Inject 30 Units Subcutaneous every morning       potassium chloride (KLOR-CON) 20 MEQ packet Take 20 mEq by mouth every morning        venlafaxine (EFFEXOR-XR) 150 MG 24 hr capsule Take 150 mg by mouth every morning            REVIEW OF SYSTEMS:  4 point ROS including Respiratory, CV, GI and , other than that noted in the HPI,  is negative    Objective:  /60   Pulse 72   Temp 98.9  F (37.2  C)   Resp 15   Ht 1.676 m (5' 6\")   Wt 85.3 kg (188 lb)   BMI 30.34 kg/m    Exam:  GENERAL APPEARANCE:  Alert  ENT:  Mouth and posterior oropharynx normal, moist mucous membranes, normal hearing acuity  RESP:  respiratory effort and palpation of chest normal, lungs clear to auscultation   CV:  Palpation and auscultation of heart done , regular rate and rhythm, no murmur, rub, or gallop, no edema  PSYCH:  memory impaired , affect and mood normal    Labs:   Labs done in SNF are in [place] EPIC. Please refer to them using [place]/Care Everywhere. and Recent labs in EPIC reviewed by me today.     ASSESSMENT/PLAN:  (E10.65) Type 1 diabetes mellitus with hyperglycemia (H)  (primary encounter " diagnosis)  Comment:   Hemoglobin A1C   Date Value Ref Range Status   04/04/2020 9.6 (H) 0 - 5.6 % Final     Comment:     Normal <5.7% Prediabetes 5.7-6.4%  Diabetes 6.5% or higher - adopted from ADA   consensus guidelines.     Plan:   -No change with insulin regimen  -Follow up with endocrinology as planned  -Continue QID blood sugars and notify NP >500  -A1c on 7/20/20      See new orders above       Electronically signed by:  VANDANA Munguia [facility]

## 2020-07-10 ENCOUNTER — TELEPHONE (OUTPATIENT)
Dept: GERIATRICS | Facility: CLINIC | Age: 75
End: 2020-07-10

## 2020-07-10 DIAGNOSIS — E10.65 TYPE 1 DIABETES MELLITUS WITH HYPERGLYCEMIA (H): Primary | ICD-10-CM

## 2020-07-11 NOTE — TELEPHONE ENCOUNTER
[place] [facility] TELEPHONE ENCOUNTER    Chief Complaint   Patient presents with     Hyperglycemia       Kimberly Stephenson is a 75 year old  (1945),Nurse called today to report: patient is type 1 diabetic. BS tonight is 557, was 435 before dinner and received per meal aspart. Nurse reports that patient is alert, oriented. Nurse reports that patient is very noncompliant with her diabetes, which is cause of elevated BS tonight. No fevers, or concern for infection. Nurse is requesting insulin orders, as her sliding scale insulin at bedtime only goes up to scale for insulin of 500. Patient has no abdominal pain, nausea, vomiting.    ASSESSMENT/PLAN  (E10.65) Type 1 diabetes mellitus with hyperglycemia (H)  (primary encounter diagnosis)  -secondary to poor diet, noncompliance  -Ok to give insulin aspart 8 units at bedtime.   -Recheck BS in 1 hour to ensure BS is improving  -Push fluids  -Encourage patient to ambulate as able  -Inquired to see if staff can check BS overnight, but they report they do not have staff on site overnight to check BS. They will follow up in AM with patient.    Electronically signed by:   VANDANA Hutton CNP

## 2020-07-18 ENCOUNTER — RECORDS - HEALTHEAST (OUTPATIENT)
Dept: LAB | Facility: CLINIC | Age: 75
End: 2020-07-18

## 2020-07-20 LAB — HBA1C MFR BLD: 9.8 %

## 2020-07-23 DIAGNOSIS — F41.1 GAD (GENERALIZED ANXIETY DISORDER): Primary | ICD-10-CM

## 2020-07-28 DIAGNOSIS — F41.1 GAD (GENERALIZED ANXIETY DISORDER): Primary | ICD-10-CM

## 2020-07-28 RX ORDER — VENLAFAXINE HYDROCHLORIDE 150 MG/1
CAPSULE, EXTENDED RELEASE ORAL
Qty: 30 CAPSULE | Refills: 0 | Status: SHIPPED | OUTPATIENT
Start: 2020-07-28 | End: 2020-08-17

## 2020-07-28 RX ORDER — ESCITALOPRAM OXALATE 10 MG/1
TABLET ORAL
Qty: 30 TABLET | Refills: 1 | Status: SHIPPED | OUTPATIENT
Start: 2020-07-28 | End: 2020-08-11

## 2020-08-03 ENCOUNTER — ASSISTED LIVING VISIT (OUTPATIENT)
Dept: GERIATRICS | Facility: CLINIC | Age: 75
End: 2020-08-03
Payer: COMMERCIAL

## 2020-08-03 VITALS
WEIGHT: 188 LBS | HEART RATE: 72 BPM | DIASTOLIC BLOOD PRESSURE: 60 MMHG | TEMPERATURE: 96.9 F | SYSTOLIC BLOOD PRESSURE: 132 MMHG | RESPIRATION RATE: 15 BRPM | BODY MASS INDEX: 30.34 KG/M2

## 2020-08-03 DIAGNOSIS — E10.65 TYPE 1 DIABETES MELLITUS WITH HYPERGLYCEMIA (H): Primary | ICD-10-CM

## 2020-08-03 DIAGNOSIS — F32.5 MAJOR DEPRESSIVE DISORDER WITH SINGLE EPISODE, IN FULL REMISSION (H): ICD-10-CM

## 2020-08-03 DIAGNOSIS — F51.01 PRIMARY INSOMNIA: ICD-10-CM

## 2020-08-03 DIAGNOSIS — F41.1 GAD (GENERALIZED ANXIETY DISORDER): ICD-10-CM

## 2020-08-03 NOTE — PROGRESS NOTES
"[place] [facility]  [place] Medical Record Number:  [id number]  Place of Service where encounter took place:  [facility] ([facility]) [[id number]]  Chief Complaint   Patient presents with     RECHECK       HPI:    Kimberly Stephenson  is a 75 year old (1945), who is being seen today for an episodic care visit.  HPI information obtained from: facility chart records, facility staff and patient report.     Today's concern is:  Type 1 diabetes mellitus with hyperglycemia (H)  Blood sugars have been stable with several dips in afternoon BS. Resident requested no medications adjustments at this time. Denies s/sx of hypoglycemia.     Major depressive disorder with single episode, in full remission (H)  Has been more down than usual. Reports \"I may need more antidepressants\". Currently taking escitalopram. 10 mg/day.     Primary insomnia  Recently having more difficulty with sleep. Now taking naps during the day.       Past Medical and Surgical History reviewed in Epic today.    MEDICATIONS:    Current Outpatient Medications   Medication Sig Dispense Refill     acetaminophen (TYLENOL) 325 MG tablet Take 650 mg by mouth every 4 hours as needed for mild pain       amLODIPine (NORVASC) 10 MG tablet Take 10 mg by mouth every evening        apixaban ANTICOAGULANT (ELIQUIS) 2.5 MG tablet Take 2.5 mg by mouth 2 times daily        atorvastatin (LIPITOR) 40 MG tablet Take 40 mg by mouth every evening        busPIRone (BUSPAR) 7.5 MG tablet Take 7.5 mg by mouth 2 times daily       escitalopram (LEXAPRO) 10 MG tablet take one tablet EVERY DAY 30 tablet 1     insulin aspart (NOVOLOG PEN) 100 UNIT/ML pen Inject 2 Units Subcutaneous 3 times daily (with meals) 3 mL 3     insulin aspart (NOVOLOG PEN) 100 UNIT/ML pen Inject 0-7 Units Subcutaneous At Bedtime Do Not give Bedtime Correction Insulin if BG less than 200 For  to 250 give 2 units. For  to 300 give 3 units. For  to 350 " give 4 units. For  to 400 give 5 units. For  to 450 give 6 units. For  to 500 give 7 units. Notify provider if glucose greater than or equal to 350 mg/dL after administration. 3 mL 0     insulin aspart (NOVOLOG PEN) 100 UNIT/ML pen Inject 0-13 Units Subcutaneous 3 times daily (before meals) Correction Scale - custom DOSING     Do Not give Correction Insulin if Pre-Meal BG less than 100  For Pre-Meal  to 150 give 10 units.  For Pre-Meal  to 200 give 11 units.  For Pre-Meal  to 250 give 12 units.  For Pre-Meal  to 300 give 13 units.  For Pre-Meal  to 350 give 14 units.  For Pre-Meal  to 400 give 15 units.  To be given with prandial insulin, and based on pre-meal blood glucose.   Notify provider if glucose greater than or equal 350 mg/dL after administration. If given at mealtime, administer within 30 minutes of start of meal 3 mL 0     insulin glargine (LANTUS PEN) 100 UNIT/ML pen Inject 30 Units Subcutaneous every morning       potassium chloride (KLOR-CON) 20 MEQ packet Take 20 mEq by mouth every morning        venlafaxine (EFFEXOR-XR) 150 MG 24 hr capsule take 1 capsule EVERY DAY 30 capsule 0         REVIEW OF SYSTEMS:  10 point ROS of systems including Constitutional, Eyes, Respiratory, Cardiovascular, Gastroenterology, Genitourinary, Integumentary, Musculoskeletal, Psychiatric were all negative except for pertinent positives noted in my HPI.    Objective:  /60   Pulse 72   Temp 96.9  F (36.1  C)   Resp 15   Wt 85.3 kg (188 lb)   BMI 30.34 kg/m    Exam:  GENERAL APPEARANCE:  Alert  ENT:  Mouth and posterior oropharynx normal, moist mucous membranes, normal hearing acuity  RESP:  respiratory effort and palpation of chest normal, lungs clear to auscultation   CV:  Palpation and auscultation of heart done , regular rate and rhythm, no murmur, rub, or gallop  M/S:   Gait and station normal  Digits and nails normal  SKIN:  Inspection of skin and  subcutaneous tissue baseline, Palpation of skin and subcutaneous tissue baseline  NEURO:   Cranial nerves 2-12 are normal tested and grossly at patient's baseline  PSYCH:  oriented X 3    Labs:   Labs done in SNF are in [facility]. Please refer to them using [place]/Care Everywhere. and Recent labs in [facility] reviewed by me today.     ASSESSMENT/PLAN:  (E10.65) Type 1 diabetes mellitus with hyperglycemia (H)  (primary encounter diagnosis)  Comment:Chronic, some afternoon dips in blood sugar. Remains asymptomatic.   Plan:   -No changes per resident's request. Will continue to monitor closely and update NP as indicated.     (F32.5) Major depressive disorder with single episode, in full remission (H)  Comment: Longstanding, unstable mood. Feeling more down than usual.   Plan:   -Increase escitalopram 20 mg/day     (F51.01) Primary insomnia  Comment: New onset of worsening sleep. More worrying and increased depressed mood over previous months.   Plan:   -Melatonin 3 mg/HS        Orders written by provider at facility  See above       Electronically signed by:  VANDANA Munguia CNP  [facility]

## 2020-08-06 ENCOUNTER — ASSISTED LIVING VISIT (OUTPATIENT)
Dept: GERIATRICS | Facility: CLINIC | Age: 75
End: 2020-08-06
Payer: COMMERCIAL

## 2020-08-06 VITALS
HEART RATE: 72 BPM | BODY MASS INDEX: 30.34 KG/M2 | RESPIRATION RATE: 15 BRPM | DIASTOLIC BLOOD PRESSURE: 60 MMHG | SYSTOLIC BLOOD PRESSURE: 132 MMHG | WEIGHT: 188 LBS | TEMPERATURE: 93.3 F

## 2020-08-06 DIAGNOSIS — E10.65 TYPE 1 DIABETES MELLITUS WITH HYPERGLYCEMIA (H): Primary | ICD-10-CM

## 2020-08-06 NOTE — PROGRESS NOTES
[place] [facility]  [place] Medical Record Number:  [id number]  Place of Service where encounter took place:  [facility] ([facility]) [[id number]]  Chief Complaint   Patient presents with     RECHECK       HPI:    Kimberly Stephenson  is a 75 year old (1945), who is being seen today for an episodic care visit.  HPI information obtained from: facility chart records and facility staff. Today's concern is:    ICD-10-CM    1. Type 1 diabetes mellitus with hyperglycemia (H)  E10.65      Per staff report, resident is having low blood sugars prior to lunch. BS range: 44-70. Remains asymptomatic.     Past Medical and Surgical History reviewed in Epic today.    MEDICATIONS:    Current Outpatient Medications   Medication Sig Dispense Refill     acetaminophen (TYLENOL) 325 MG tablet Take 650 mg by mouth every 4 hours as needed for mild pain       amLODIPine (NORVASC) 10 MG tablet Take 10 mg by mouth every evening        apixaban ANTICOAGULANT (ELIQUIS) 2.5 MG tablet Take 2.5 mg by mouth 2 times daily        atorvastatin (LIPITOR) 40 MG tablet Take 40 mg by mouth every evening        busPIRone (BUSPAR) 7.5 MG tablet Take 7.5 mg by mouth 2 times daily       escitalopram (LEXAPRO) 20 MG tablet take one tablet EVERY DAY 90 tablet 3     insulin aspart (NOVOLOG PEN) 100 UNIT/ML pen Inject 2 Units Subcutaneous 3 times daily (with meals) 3 mL 3     insulin aspart (NOVOLOG PEN) 100 UNIT/ML pen Inject 0-7 Units Subcutaneous At Bedtime Do Not give Bedtime Correction Insulin if BG less than 200 For  to 250 give 2 units. For  to 300 give 3 units. For  to 350 give 4 units. For  to 400 give 5 units. For  to 450 give 6 units. For  to 500 give 7 units. Notify provider if glucose greater than or equal to 350 mg/dL after administration. 3 mL 0     insulin aspart (NOVOLOG PEN) 100 UNIT/ML pen Inject 0-13 Units Subcutaneous 3 times daily (before meals) Correction Scale -  custom DOSING     Do Not give Correction Insulin if Pre-Meal BG less than 100  For Pre-Meal  to 150 give 10 units.  For Pre-Meal  to 200 give 11 units.  For Pre-Meal  to 250 give 12 units.  For Pre-Meal  to 300 give 13 units.  For Pre-Meal  to 350 give 14 units.  For Pre-Meal  to 400 give 15 units.  To be given with prandial insulin, and based on pre-meal blood glucose.   Notify provider if glucose greater than or equal 350 mg/dL after administration. If given at mealtime, administer within 30 minutes of start of meal 3 mL 0     LANTUS SOLOSTAR 100 UNIT/ML soln INJECT 32 UNITS subcutaneous EVERY DAY 15 mL 0     melatonin 3 MG tablet Take 1 tablet (3 mg) by mouth nightly as needed for sleep 50 tablet 3     NOVOLOG FLEXPEN 100 UNIT/ML soln INJECT FOUR units BEFORE MEALS 15 mL 1     potassium chloride (KLOR-CON) 20 MEQ packet Take 20 mEq by mouth every morning        venlafaxine (EFFEXOR-XR) 150 MG 24 hr capsule take 1 capsule EVERY DAY 30 capsule 0       REVIEW OF SYSTEMS:  Unobtainable secondary to cognitive impairment.  and 4 point ROS including Respiratory, CV, GI and , other than that noted in the HPI,  is negative    Objective:  /60   Pulse 72   Temp 93.3  F (34.1  C)   Resp 15   Wt 85.3 kg (188 lb)   BMI 30.34 kg/m    Exam:  GENERAL APPEARANCE:  Alert  ENT:  Mouth and posterior oropharynx normal, moist mucous membranes, normal hearing acuity  RESP:  respiratory effort and palpation of chest normal, lungs clear to auscultation   CV:  Palpation and auscultation of heart done , regular rate and rhythm, no murmur, rub, or gallop  M/S:   Gait and station normal  Digits and nails normal  SKIN:  Inspection of skin and subcutaneous tissue baseline  NEURO:   Cranial nerves 2-12 are normal tested and grossly at patient's baseline  PSYCH:  oriented X 3, affect and mood normal    Labs:   Labs done in SNF are in [place] EPIC. Please refer to them using [facility]/Care Everywhere.  and Recent labs in EPIC reviewed by me today.     ASSESSMENT/PLAN:  (E10.65) Type 1 diabetes mellitus with hyperglycemia (H)  (primary encounter diagnosis)  Comment: Chronic, low blood sugar prior to lunch, remains asymptomatic.   Plan: Decrease Lantus 30 units qday. Monitor and update NP as indicated.       See above for new orders.       Electronically signed by:  VANDANA Munguia CNP  [facility]

## 2020-08-11 RX ORDER — ESCITALOPRAM OXALATE 20 MG/1
TABLET ORAL
Qty: 90 TABLET | Refills: 3 | Status: SHIPPED | OUTPATIENT
Start: 2020-08-11 | End: 2020-10-07

## 2020-08-11 RX ORDER — LANOLIN ALCOHOL/MO/W.PET/CERES
3 CREAM (GRAM) TOPICAL
Qty: 50 TABLET | Refills: 3 | Status: SHIPPED | OUTPATIENT
Start: 2020-08-11 | End: 2021-12-15 | Stop reason: DRUGHIGH

## 2020-08-12 DIAGNOSIS — E10.65 TYPE 1 DIABETES MELLITUS WITH HYPERGLYCEMIA (H): Primary | ICD-10-CM

## 2020-08-12 RX ORDER — INSULIN ASPART 100 [IU]/ML
INJECTION, SOLUTION INTRAVENOUS; SUBCUTANEOUS
Qty: 15 ML | Refills: 1 | Status: SHIPPED | OUTPATIENT
Start: 2020-08-12 | End: 2020-10-28

## 2020-08-12 RX ORDER — INSULIN GLARGINE 100 [IU]/ML
INJECTION, SOLUTION SUBCUTANEOUS
Qty: 15 ML | Refills: 0 | Status: SHIPPED | OUTPATIENT
Start: 2020-08-12 | End: 2021-03-09

## 2020-08-17 DIAGNOSIS — F41.1 GAD (GENERALIZED ANXIETY DISORDER): ICD-10-CM

## 2020-08-17 RX ORDER — GLUCOSAMINE HCL/CHONDROITIN SU 500-400 MG
CAPSULE ORAL
Qty: 100 EACH | Refills: 3 | Status: SHIPPED | OUTPATIENT
Start: 2020-08-17 | End: 2021-10-29

## 2020-08-17 RX ORDER — VENLAFAXINE HYDROCHLORIDE 150 MG/1
CAPSULE, EXTENDED RELEASE ORAL
Qty: 30 CAPSULE | Refills: 0 | Status: SHIPPED | OUTPATIENT
Start: 2020-08-17 | End: 2020-09-30

## 2020-09-01 ASSESSMENT — MIFFLIN-ST. JEOR
SCORE: 1386.73

## 2020-09-09 ENCOUNTER — TELEPHONE (OUTPATIENT)
Dept: GERIATRICS | Facility: CLINIC | Age: 75
End: 2020-09-09

## 2020-09-10 NOTE — TELEPHONE ENCOUNTER
Patient BG >500 this evening, is a cigarette smoker and marijuana smoker, intake unknown, continue sliding scale and daily lantus.  -no new orders

## 2020-09-25 DIAGNOSIS — I10 ESSENTIAL HYPERTENSION: Primary | ICD-10-CM

## 2020-09-27 RX ORDER — AMLODIPINE BESYLATE 10 MG/1
10 TABLET ORAL EVERY EVENING
Qty: 90 TABLET | Refills: 0 | Status: SHIPPED | OUTPATIENT
Start: 2020-09-27 | End: 2020-12-21

## 2020-09-30 ENCOUNTER — ASSISTED LIVING VISIT (OUTPATIENT)
Dept: GERIATRICS | Facility: CLINIC | Age: 75
End: 2020-09-30
Payer: COMMERCIAL

## 2020-09-30 VITALS
BODY MASS INDEX: 31.18 KG/M2 | HEIGHT: 66 IN | DIASTOLIC BLOOD PRESSURE: 78 MMHG | SYSTOLIC BLOOD PRESSURE: 133 MMHG | TEMPERATURE: 97.7 F | RESPIRATION RATE: 20 BRPM | HEART RATE: 76 BPM | WEIGHT: 194 LBS | OXYGEN SATURATION: 96 %

## 2020-09-30 DIAGNOSIS — F32.5 MAJOR DEPRESSIVE DISORDER WITH SINGLE EPISODE, IN FULL REMISSION (H): Primary | ICD-10-CM

## 2020-09-30 DIAGNOSIS — F51.01 PRIMARY INSOMNIA: ICD-10-CM

## 2020-09-30 DIAGNOSIS — F41.1 GAD (GENERALIZED ANXIETY DISORDER): ICD-10-CM

## 2020-09-30 RX ORDER — VENLAFAXINE HYDROCHLORIDE 150 MG/1
CAPSULE, EXTENDED RELEASE ORAL
Qty: 30 CAPSULE | Refills: 0 | Status: SHIPPED | OUTPATIENT
Start: 2020-09-30 | End: 2020-10-07

## 2020-09-30 NOTE — LETTER
9/30/2020        RE: Kimberly Stephenson  [facility]  [address] [address]        [place] [facility]  [place] Medical Record Number:  [id number]  Place of Service where encounter took place:  [facility] ([facility]) [[id number]]  Chief Complaint   Patient presents with     RECHECK     Routine/ wellness check       HPI:    Kimberly Stephenson  is a 75 year old (1945), who is being seen today for an episodic care visit.  HPI information obtained from: facility chart records, facility staff and patient report.     Today's concern is:    ICD-10-CM    1. Major depressive disorder with single episode, in full remission (H)  F32.5    2. CELIA (generalized anxiety disorder)  F41.1    3. Primary insomnia  F51.01      Resident pleasant upon today's exam. Reporting high level of stress, anxiety and mild depressed mood. States she does participate in recreational drug use including mariajuana frequently. Currently taking lexapro 15 mg/day, venlafaxine 150 mg/day and buspar 7.5 mg/day. Requesting additional medication.     Past Medical and Surgical History reviewed in Epic today.    MEDICATIONS:    Current Outpatient Medications   Medication Sig Dispense Refill     acetaminophen (TYLENOL) 325 MG tablet Take 650 mg by mouth every 4 hours as needed for mild pain       alcohol swab prep pads Use to swab area of injection/gali as directed. 100 each 3     amLODIPine (NORVASC) 10 MG tablet Take 1 tablet (10 mg) by mouth every evening 90 tablet 0     apixaban ANTICOAGULANT (ELIQUIS) 2.5 MG tablet Take 2.5 mg by mouth 2 times daily        atorvastatin (LIPITOR) 40 MG tablet Take 40 mg by mouth every evening        busPIRone (BUSPAR) 7.5 MG tablet Take 7.5 mg by mouth 2 times daily       escitalopram (LEXAPRO) 20 MG tablet take one tablet EVERY DAY 90 tablet 3     insulin aspart (NOVOLOG PEN) 100 UNIT/ML pen Inject 2 Units Subcutaneous 3 times daily (with meals) 3 mL 3      "insulin aspart (NOVOLOG PEN) 100 UNIT/ML pen Inject 0-7 Units Subcutaneous At Bedtime Do Not give Bedtime Correction Insulin if BG less than 200 For  to 250 give 2 units. For  to 300 give 3 units. For  to 350 give 4 units. For  to 400 give 5 units. For  to 450 give 6 units. For  to 500 give 7 units. Notify provider if glucose greater than or equal to 350 mg/dL after administration. 3 mL 0     insulin aspart (NOVOLOG PEN) 100 UNIT/ML pen Inject 0-13 Units Subcutaneous 3 times daily (before meals) Correction Scale - custom DOSING     Do Not give Correction Insulin if Pre-Meal BG less than 100  For Pre-Meal  to 150 give 10 units.  For Pre-Meal  to 200 give 11 units.  For Pre-Meal  to 250 give 12 units.  For Pre-Meal  to 300 give 13 units.  For Pre-Meal  to 350 give 14 units.  For Pre-Meal  to 400 give 15 units.  To be given with prandial insulin, and based on pre-meal blood glucose.   Notify provider if glucose greater than or equal 350 mg/dL after administration. If given at mealtime, administer within 30 minutes of start of meal 3 mL 0     insulin glargine (LANTUS SOLOSTAR) 100 UNIT/ML pen Inject 30 Units Subcutaneous every morning 3 mL 3     LANTUS SOLOSTAR 100 UNIT/ML soln INJECT 32 UNITS subcutaneous EVERY DAY 15 mL 0     melatonin 3 MG tablet Take 1 tablet (3 mg) by mouth nightly as needed for sleep 50 tablet 3     NOVOLOG FLEXPEN 100 UNIT/ML soln INJECT FOUR units BEFORE MEALS 15 mL 1     potassium chloride (KLOR-CON) 20 MEQ packet Take 20 mEq by mouth every morning        venlafaxine (EFFEXOR-XR) 150 MG 24 hr capsule take 1 capsule BY MOUTH EVERY DAY 30 capsule 0     REVIEW OF SYSTEMS:  4 point ROS including Respiratory, CV, GI and , other than that noted in the HPI,  is negative    Objective:  /78   Pulse 76   Temp 97.7  F (36.5  C)   Resp 20   Ht 1.676 m (5' 6\")   Wt 88 kg (194 lb)   SpO2 96%   BMI 31.31 kg/m  "   Exam:  GENERAL APPEARANCE:  Alert, in no distress  ENT:  Mouth and posterior oropharynx normal, moist mucous membranes  RESP:  respiratory effort and palpation of chest normal, lungs clear to auscultation   CV:  Palpation and auscultation of heart done , regular rate and rhythm, no murmur, rub, or gallop  M/S:   Gait and station normal  Digits and nails normal  SKIN:  Inspection of skin and subcutaneous tissue baseline, Palpation of skin and subcutaneous tissue baseline  NEURO:   Cranial nerves 2-12 are normal tested and grossly at patient's baseline  PSYCH:  insight and judgement impaired, memory impaired , affect and mood normal    Labs:   Labs done in SNF are in [facility]. Please refer to them using [employer]/Care Everywhere. and Recent labs in [facility] reviewed by me today.       ASSESSMENT/PLAN:  (F32.5) Major depressive disorder with single episode, in full remission (H)  (primary encounter diagnosis)  (F41.1) CELIA (generalized anxiety disorder)  (F51.01) Primary insomnia  Comment: Worsening anxiety and depression likely 2/2 covid-19 isolation.   Plan:   -Continue buspar 7.5 mg/day   -Increase Lexapro 20 mg/day   -Decrease effexor to 75 mg/day.       Orders:  See above       Electronically signed by:  VANDANA Munguia [facility]           Sincerely,        Li Arana NP

## 2020-09-30 NOTE — PROGRESS NOTES
[place] [facility]  [place] Medical Record Number:  [id number]  Place of Service where encounter took place:  [facility] ([facility]) [[id number]]  Chief Complaint   Patient presents with     RECHECK     Routine/ wellness check       HPI:    Kimberly Stephenson  is a 75 year old (1945), who is being seen today for an episodic care visit.  HPI information obtained from: facility chart records, facility staff and patient report.     Today's concern is:    ICD-10-CM    1. Major depressive disorder with single episode, in full remission (H)  F32.5    2. CELIA (generalized anxiety disorder)  F41.1    3. Primary insomnia  F51.01      Resident pleasant upon today's exam. Reporting high level of stress, anxiety and mild depressed mood. States she does participate in recreational drug use including mariajuana frequently. Currently taking lexapro 15 mg/day, venlafaxine 150 mg/day and buspar 7.5 mg/day. Requesting additional medication.     Past Medical and Surgical History reviewed in Epic today.    MEDICATIONS:    Current Outpatient Medications   Medication Sig Dispense Refill     acetaminophen (TYLENOL) 325 MG tablet Take 650 mg by mouth every 4 hours as needed for mild pain       alcohol swab prep pads Use to swab area of injection/gali as directed. 100 each 3     amLODIPine (NORVASC) 10 MG tablet Take 1 tablet (10 mg) by mouth every evening 90 tablet 0     apixaban ANTICOAGULANT (ELIQUIS) 2.5 MG tablet Take 2.5 mg by mouth 2 times daily        atorvastatin (LIPITOR) 40 MG tablet Take 40 mg by mouth every evening        busPIRone (BUSPAR) 7.5 MG tablet Take 7.5 mg by mouth 2 times daily       escitalopram (LEXAPRO) 20 MG tablet take one tablet EVERY DAY 90 tablet 3     insulin aspart (NOVOLOG PEN) 100 UNIT/ML pen Inject 2 Units Subcutaneous 3 times daily (with meals) 3 mL 3     insulin aspart (NOVOLOG PEN) 100 UNIT/ML pen Inject 0-7 Units Subcutaneous At Bedtime Do Not give Bedtime  "Correction Insulin if BG less than 200 For  to 250 give 2 units. For  to 300 give 3 units. For  to 350 give 4 units. For  to 400 give 5 units. For  to 450 give 6 units. For  to 500 give 7 units. Notify provider if glucose greater than or equal to 350 mg/dL after administration. 3 mL 0     insulin aspart (NOVOLOG PEN) 100 UNIT/ML pen Inject 0-13 Units Subcutaneous 3 times daily (before meals) Correction Scale - custom DOSING     Do Not give Correction Insulin if Pre-Meal BG less than 100  For Pre-Meal  to 150 give 10 units.  For Pre-Meal  to 200 give 11 units.  For Pre-Meal  to 250 give 12 units.  For Pre-Meal  to 300 give 13 units.  For Pre-Meal  to 350 give 14 units.  For Pre-Meal  to 400 give 15 units.  To be given with prandial insulin, and based on pre-meal blood glucose.   Notify provider if glucose greater than or equal 350 mg/dL after administration. If given at mealtime, administer within 30 minutes of start of meal 3 mL 0     insulin glargine (LANTUS SOLOSTAR) 100 UNIT/ML pen Inject 30 Units Subcutaneous every morning 3 mL 3     LANTUS SOLOSTAR 100 UNIT/ML soln INJECT 32 UNITS subcutaneous EVERY DAY 15 mL 0     melatonin 3 MG tablet Take 1 tablet (3 mg) by mouth nightly as needed for sleep 50 tablet 3     NOVOLOG FLEXPEN 100 UNIT/ML soln INJECT FOUR units BEFORE MEALS 15 mL 1     potassium chloride (KLOR-CON) 20 MEQ packet Take 20 mEq by mouth every morning        venlafaxine (EFFEXOR-XR) 150 MG 24 hr capsule take 1 capsule BY MOUTH EVERY DAY 30 capsule 0     REVIEW OF SYSTEMS:  4 point ROS including Respiratory, CV, GI and , other than that noted in the HPI,  is negative    Objective:  /78   Pulse 76   Temp 97.7  F (36.5  C)   Resp 20   Ht 1.676 m (5' 6\")   Wt 88 kg (194 lb)   SpO2 96%   BMI 31.31 kg/m    Exam:  GENERAL APPEARANCE:  Alert, in no distress  ENT:  Mouth and posterior oropharynx normal, moist mucous " membranes  RESP:  respiratory effort and palpation of chest normal, lungs clear to auscultation   CV:  Palpation and auscultation of heart done , regular rate and rhythm, no murmur, rub, or gallop  M/S:   Gait and station normal  Digits and nails normal  SKIN:  Inspection of skin and subcutaneous tissue baseline, Palpation of skin and subcutaneous tissue baseline  NEURO:   Cranial nerves 2-12 are normal tested and grossly at patient's baseline  PSYCH:  insight and judgement impaired, memory impaired , affect and mood normal    Labs:   Labs done in SNF are in [facility]. Please refer to them using [employer]/Care Everywhere. and Recent labs in [facility] reviewed by me today.       ASSESSMENT/PLAN:  (F32.5) Major depressive disorder with single episode, in full remission (H)  (primary encounter diagnosis)  (F41.1) CELIA (generalized anxiety disorder)  (F51.01) Primary insomnia  Comment: Worsening anxiety and depression likely 2/2 covid-19 isolation.   Plan:   -Continue buspar 7.5 mg/day   -Increase Lexapro 20 mg/day   -Decrease effexor to 75 mg/day.       Orders:  See above       Electronically signed by:  VANDANA Munguia CNP  [facility]

## 2020-10-07 RX ORDER — VENLAFAXINE HYDROCHLORIDE 75 MG/1
75 CAPSULE, EXTENDED RELEASE ORAL DAILY
Start: 2020-10-07 | End: 2020-11-04

## 2020-10-07 RX ORDER — ESCITALOPRAM OXALATE 20 MG/1
TABLET ORAL
Qty: 90 TABLET | Refills: 3
Start: 2020-10-07 | End: 2020-11-04

## 2020-10-28 DIAGNOSIS — E10.65 TYPE 1 DIABETES MELLITUS WITH HYPERGLYCEMIA (H): ICD-10-CM

## 2020-10-28 RX ORDER — INSULIN ASPART 100 [IU]/ML
INJECTION, SOLUTION INTRAVENOUS; SUBCUTANEOUS
Qty: 15 ML | Refills: 1 | Status: SHIPPED | OUTPATIENT
Start: 2020-10-28 | End: 2021-01-07

## 2020-11-04 ENCOUNTER — ASSISTED LIVING VISIT (OUTPATIENT)
Dept: GERIATRICS | Facility: CLINIC | Age: 75
End: 2020-11-04
Payer: COMMERCIAL

## 2020-11-04 VITALS — WEIGHT: 194 LBS | HEIGHT: 66 IN | BODY MASS INDEX: 31.18 KG/M2 | TEMPERATURE: 97.3 F | OXYGEN SATURATION: 91 %

## 2020-11-04 DIAGNOSIS — F41.1 GAD (GENERALIZED ANXIETY DISORDER): ICD-10-CM

## 2020-11-04 DIAGNOSIS — F51.01 PRIMARY INSOMNIA: ICD-10-CM

## 2020-11-04 DIAGNOSIS — F32.5 MAJOR DEPRESSIVE DISORDER WITH SINGLE EPISODE, IN FULL REMISSION (H): Primary | ICD-10-CM

## 2020-11-04 RX ORDER — VENLAFAXINE HYDROCHLORIDE 75 MG/1
CAPSULE, EXTENDED RELEASE ORAL
Qty: 30 CAPSULE | Refills: 0 | Status: SHIPPED | OUTPATIENT
Start: 2020-11-04 | End: 2021-03-09

## 2020-11-04 RX ORDER — ESCITALOPRAM OXALATE 20 MG/1
TABLET ORAL
Qty: 30 TABLET | Refills: 0 | Status: SHIPPED | OUTPATIENT
Start: 2020-11-04 | End: 2020-11-11

## 2020-11-04 NOTE — PROGRESS NOTES
[place] [facility]  [place] Medical Record Number:  [id number]  Place of Service where encounter took place:  [facility] ([facility]) [[id number]]  Chief Complaint   Patient presents with     RECHECK     Routine       HPI:    Kimberly Stephenson  is a 75 year old (1945), who is being seen today for an episodic care visit.  HPI information obtained from: facility chart records, facility staff and patient report.     Today's concern is:    ICD-10-CM    1. Major depressive disorder with single episode, in full remission (H)  F32.5    2. CELIA (generalized anxiety disorder)  F41.1    3. Primary insomnia  F51.01      Follow up with resident today regarding increased depression and anxiety. Lexapro was increased on 9/30/20. Reports she is highly anxious and would like further medications. Has difficulty getting to sleep but sleeps throughout the night     BP Readings from Last 3 Encounters:   09/01/20 133/78   08/06/20 132/60   08/03/20 132/60     Pulse Readings from Last 4 Encounters:   09/01/20 76   08/06/20 72   08/03/20 72   07/03/20 72         Past Medical and Surgical History reviewed in Epic today.    MEDICATIONS:    Current Outpatient Medications   Medication Sig Dispense Refill     acetaminophen (TYLENOL) 325 MG tablet Take 650 mg by mouth every 4 hours as needed for mild pain       alcohol swab prep pads Use to swab area of injection/gali as directed. 100 each 3     amLODIPine (NORVASC) 10 MG tablet Take 1 tablet (10 mg) by mouth every evening 90 tablet 0     apixaban ANTICOAGULANT (ELIQUIS) 2.5 MG tablet Take 2.5 mg by mouth 2 times daily        atorvastatin (LIPITOR) 40 MG tablet Take 40 mg by mouth every evening        busPIRone (BUSPAR) 7.5 MG tablet Take 7.5 mg by mouth 2 times daily       escitalopram (LEXAPRO) 20 MG tablet take one tablet EVERY DAY 90 tablet 3     insulin aspart (NOVOLOG PEN) 100 UNIT/ML pen Inject 2 Units Subcutaneous 3 times daily (with meals) 3  "mL 3     insulin aspart (NOVOLOG PEN) 100 UNIT/ML pen Inject 0-7 Units Subcutaneous At Bedtime Do Not give Bedtime Correction Insulin if BG less than 200 For  to 250 give 2 units. For  to 300 give 3 units. For  to 350 give 4 units. For  to 400 give 5 units. For  to 450 give 6 units. For  to 500 give 7 units. Notify provider if glucose greater than or equal to 350 mg/dL after administration. 3 mL 0     insulin aspart (NOVOLOG PEN) 100 UNIT/ML pen Inject 0-13 Units Subcutaneous 3 times daily (before meals) Correction Scale - custom DOSING     Do Not give Correction Insulin if Pre-Meal BG less than 100  For Pre-Meal  to 150 give 10 units.  For Pre-Meal  to 200 give 11 units.  For Pre-Meal  to 250 give 12 units.  For Pre-Meal  to 300 give 13 units.  For Pre-Meal  to 350 give 14 units.  For Pre-Meal  to 400 give 15 units.  To be given with prandial insulin, and based on pre-meal blood glucose.   Notify provider if glucose greater than or equal 350 mg/dL after administration. If given at mealtime, administer within 30 minutes of start of meal 3 mL 0     insulin glargine (LANTUS SOLOSTAR) 100 UNIT/ML pen Inject 30 Units Subcutaneous every morning 3 mL 3     LANTUS SOLOSTAR 100 UNIT/ML soln INJECT 32 UNITS subcutaneous EVERY DAY 15 mL 0     melatonin 3 MG tablet Take 1 tablet (3 mg) by mouth nightly as needed for sleep 50 tablet 3     NOVOLOG FLEXPEN 100 UNIT/ML soln INJECT FOUR units BEFORE MEALS 15 mL 1     potassium chloride (KLOR-CON) 20 MEQ packet Take 20 mEq by mouth every morning        venlafaxine (EFFEXOR-XR) 75 MG 24 hr capsule Take 1 capsule (75 mg) by mouth daily       REVIEW OF SYSTEMS:  4 point ROS including Respiratory, CV, GI and , other than that noted in the HPI,  is negative    Objective:  Temp 97.3  F (36.3  C)   Ht 1.676 m (5' 6\")   Wt 88 kg (194 lb)   SpO2 91%   BMI 31.31 kg/m    Exam:  GENERAL APPEARANCE:  Alert, in no " distress  ENT:  Mouth and posterior oropharynx normal, moist mucous membranes, normal hearing acuity  RESP:  respiratory effort and palpation of chest normal, lungs clear to auscultation   CV:  Palpation and auscultation of heart done , regular rate and rhythm, no murmur, rub, or gallop  M/S:   Gait and station normal  Digits and nails normal  SKIN:  Inspection of skin and subcutaneous tissue baseline, Palpation of skin and subcutaneous tissue baseline  NEURO:   Cranial nerves 2-12 are normal tested and grossly at patient's baseline  PSYCH:  oriented X 3    Labs:   Labs done in SNF are in [facility]. Please refer to them using [employer]/Care Everywhere. and Recent labs in [facility] reviewed by me today.     .FGSA  ASSESSMENT/PLAN:  \(F32.5) Major depressive disorder with single episode, in full remission (H)  (primary encounter diagnosis)  (F41.1) CELIA (generalized anxiety disorder)  Comment: Longstanding history, remains highly anxious and having difficulty falling asleep.   Plan: Increase lexapro 20 mg/day    (F51.01) Primary insomnia  Comment: No further medication adjustments at this time. Resident would like to monitor x2 weeks.   Plan: no change         Orders written by provider at facility  Increase lexapro 20 mg/day      Electronically signed by:  VANDANA Munguia [facility]

## 2020-11-11 ENCOUNTER — ASSISTED LIVING VISIT (OUTPATIENT)
Dept: GERIATRICS | Facility: CLINIC | Age: 75
End: 2020-11-11
Payer: COMMERCIAL

## 2020-11-11 VITALS — OXYGEN SATURATION: 93 % | TEMPERATURE: 97.3 F

## 2020-11-11 DIAGNOSIS — F32.1 MODERATE MAJOR DEPRESSION (H): ICD-10-CM

## 2020-11-11 DIAGNOSIS — F41.1 GAD (GENERALIZED ANXIETY DISORDER): ICD-10-CM

## 2020-11-11 DIAGNOSIS — F51.01 PRIMARY INSOMNIA: ICD-10-CM

## 2020-11-11 DIAGNOSIS — M25.561 ACUTE PAIN OF RIGHT KNEE: Primary | ICD-10-CM

## 2020-11-11 RX ORDER — ESCITALOPRAM OXALATE 10 MG/1
20 TABLET ORAL DAILY
Start: 2020-11-11 | End: 2021-03-09 | Stop reason: DRUGHIGH

## 2020-11-11 NOTE — PROGRESS NOTES
[place] [facility]  [place] Medical Record Number:  [id number]  Place of Service where encounter took place:  [facility] ([facility] [[id number]]  Chief Complaint   Patient presents with     RECHECK     right knee pain       HPI:    Kimberly Stephenson  is a 75 year old (1945), who is being seen today for an episodic care visit.  HPI information obtained from: facility chart records, facility staff and patient report.     Today's concern is:  Acute pain of right knee  New onset of right knee pain. No redness, swelling or warmth noted. Denies injury. Onset roughly 5 days ago. Has been elevating and alternating heat/ice.     Moderate major depression (H)  Some improvement seen with increased dose of escitalopram. Additionally taking buspar and venlafaxine.     Primary insomnia  Stable on melatonin 3 mg/day.       Past Medical and Surgical History reviewed in Epic today.    MEDICATIONS:  Current Outpatient Medications   Medication Sig Dispense Refill     acetaminophen (TYLENOL) 325 MG tablet Take 650 mg by mouth every 4 hours as needed for mild pain       alcohol swab prep pads Use to swab area of injection/gali as directed. 100 each 3     amLODIPine (NORVASC) 10 MG tablet Take 1 tablet (10 mg) by mouth every evening 90 tablet 0     apixaban ANTICOAGULANT (ELIQUIS) 2.5 MG tablet Take 2.5 mg by mouth 2 times daily        atorvastatin (LIPITOR) 40 MG tablet Take 40 mg by mouth every evening        busPIRone (BUSPAR) 7.5 MG tablet Take 7.5 mg by mouth 2 times daily       escitalopram (LEXAPRO) 20 MG tablet take one tablet EVERY DAY 30 tablet 0     insulin aspart (NOVOLOG PEN) 100 UNIT/ML pen Inject 2 Units Subcutaneous 3 times daily (with meals) 3 mL 3     insulin aspart (NOVOLOG PEN) 100 UNIT/ML pen Inject 0-7 Units Subcutaneous At Bedtime Do Not give Bedtime Correction Insulin if BG less than 200 For  to 250 give 2 units. For  to 300 give 3 units. For  to 350  give 4 units. For  to 400 give 5 units. For  to 450 give 6 units. For  to 500 give 7 units. Notify provider if glucose greater than or equal to 350 mg/dL after administration. 3 mL 0     insulin aspart (NOVOLOG PEN) 100 UNIT/ML pen Inject 0-13 Units Subcutaneous 3 times daily (before meals) Correction Scale - custom DOSING     Do Not give Correction Insulin if Pre-Meal BG less than 100  For Pre-Meal  to 150 give 10 units.  For Pre-Meal  to 200 give 11 units.  For Pre-Meal  to 250 give 12 units.  For Pre-Meal  to 300 give 13 units.  For Pre-Meal  to 350 give 14 units.  For Pre-Meal  to 400 give 15 units.  To be given with prandial insulin, and based on pre-meal blood glucose.   Notify provider if glucose greater than or equal 350 mg/dL after administration. If given at mealtime, administer within 30 minutes of start of meal 3 mL 0     insulin glargine (LANTUS SOLOSTAR) 100 UNIT/ML pen Inject 30 Units Subcutaneous every morning 3 mL 3     LANTUS SOLOSTAR 100 UNIT/ML soln INJECT 32 UNITS subcutaneous EVERY DAY 15 mL 0     melatonin 3 MG tablet Take 1 tablet (3 mg) by mouth nightly as needed for sleep 50 tablet 3     NOVOLOG FLEXPEN 100 UNIT/ML soln INJECT FOUR units BEFORE MEALS 15 mL 1     potassium chloride (KLOR-CON) 20 MEQ packet Take 20 mEq by mouth every morning        venlafaxine (EFFEXOR-XR) 75 MG 24 hr capsule take 1 capsule BY MOUTH EVERY DAY 30 capsule 0     REVIEW OF SYSTEMS:  4 point ROS including Respiratory, CV, GI and , other than that noted in the HPI,  is negative    Objective:  Temp 97.3  F (36.3  C)   SpO2 93%   Exam:  GENERAL APPEARANCE:  Alert, in no distress  ENT:  Mouth and posterior oropharynx normal, moist mucous membranes  RESP:  respiratory effort and palpation of chest normal, lungs clear to auscultation , no respiratory distress  CV:  Palpation and auscultation of heart done , regular rate and rhythm, no murmur, rub, or gallop  M/S:    Gait and station normal  Digits and nails normal  SKIN:  Inspection of skin and subcutaneous tissue baseline, Palpation of skin and subcutaneous tissue baseline  NEURO:   Cranial nerves 2-12 are normal tested and grossly at patient's baseline  PSYCH:  oriented X 3    Labs:   Labs done in SNF are in [facility]. Please refer to them using [employer]/Care Everywhere. and Recent labs in [facility] reviewed by me today.     ASSESSMENT/PLAN:  (M25.561) Acute pain of right knee  (primary encounter diagnosis)  Comment: New onset of right knee pain. No trauma noted by resident.   Plan:   -X-ray right knee related to onset of pain.   (Update- x-ray negative for fracture)    (F32.1) Moderate major depression (H)  (F51.01) Primary insomnia  (F41.1) CELIA (generalized anxiety disorder)  Comment: Longstanding history of major depression disorder. Reports improvement with buspar and increased dose of escitalopram.   Plan:   -No changes at this time and adjustments as clinically indicated.     Orders:  -X-ray right knee   -Encourage elevation and ice  -weight-bearing as tolerated.       Electronically signed by:  VANDANA Munguia CNP  [facility]

## 2020-12-21 DIAGNOSIS — I10 ESSENTIAL HYPERTENSION: ICD-10-CM

## 2020-12-21 DIAGNOSIS — F41.1 GAD (GENERALIZED ANXIETY DISORDER): ICD-10-CM

## 2020-12-21 RX ORDER — ESCITALOPRAM OXALATE 20 MG/1
TABLET ORAL
Qty: 30 TABLET | Refills: 0 | Status: SHIPPED | OUTPATIENT
Start: 2020-12-21 | End: 2021-01-07

## 2020-12-21 RX ORDER — AMLODIPINE BESYLATE 10 MG/1
10 TABLET ORAL EVERY EVENING
Qty: 90 TABLET | Refills: 0 | Status: SHIPPED | OUTPATIENT
Start: 2020-12-21 | End: 2021-03-10

## 2021-01-04 ENCOUNTER — TELEPHONE (OUTPATIENT)
Dept: GERIATRICS | Facility: CLINIC | Age: 76
End: 2021-01-04

## 2021-01-04 DIAGNOSIS — F33.1 MODERATE EPISODE OF RECURRENT MAJOR DEPRESSIVE DISORDER (H): ICD-10-CM

## 2021-01-04 NOTE — TELEPHONE ENCOUNTER
[place] [facility] TELEPHONE ENCOUNTER    Chief Complaint   Patient presents with     [place] Depression       Kimberly Stephenson is a 75 year old  (1945),Nurse called today to report: acute exacerbation recurrent major depression moderate to severe.  She reported to the nurse she afraid she will die at [facility].  She has no plan to harm self.  Reviewed medications and is on both Escitalopram and Venlafaxine as well as Buspar.    ASSESSMENT/PLAN  (F33.1) Moderate episode of recurrent major depressive disorder (H)  Comment: see [place] message  Plan: follow up with psychologist  Could increase Venlafaxine   Continue to provide 1:1 support PRN      Electronically signed by:   VANDANA Cabello CNP     Nursing notes reviewed and accepted.      Dorothea Valdivia is a 6 month old female who presents for 6 month well child exam.  Patient presents with Father and Mother.    Concerns raised today include: check dry skin     Diet/feeding: Mom is still breastfeeding and then giving some pumped breastmilk.  Has been taking some cereal  Elimination Patterns: Normal wet diapers and bms  Sleep: Not sleeping through the night, waking every 2-3 hours but sometimes doesn't need to eat and just needs to be resettled  Social/Childcare: No     [x]  YES    []  NO Sits briefly, leaning forward  [x]  YES    []  NO Rolls over  [x]  YES    []  NO Uses visual exploration  [x]  YES    []  NO Beginning to use oral exploration  -  YES    -  NO Uses string of vowels  [x]  YES    []  NO Beginning to recognize own name  [x]  YES    []  NO Enjoys vocal turn taking  [x]  YES    []  NO Shows pleasure from interactions with others    Birth history, medical history, surgical history, and family history reviewed and updated.    PHYSICAL EXAM:  Pulse 136, temperature 97.3 °F (36.3 °C), temperature source Temporal Artery, resp. rate 32, height 26.75\" (67.9 cm), weight 7.229 kg, head circumference 43 cm (16.93\").  GENERAL:  Well appearing female infant, nontoxic, no acute distress.  Alert and     interactive.  SKIN: Warm, normal turgor.  No cyanosis.  No bruises or lesions. Dry patches on the trunk  HEAD:  Normocephalic, atraumatic.  Anterior fontanel open, soft and flat.  EYES:  Conjunctivae appear normal, non-injected, non-icteric.  NOSE:  Appears normal, no flaring.  EARS:  Normal pinnae, no preauricular skin tags or pit.  Tympanic membranes are transparent with good landmarks.  THROAT:  Oropharynx with moist mucous membranes and no lesions.  NECK:  Supple, no lymphadenopathy or masses.  HEART:  Regular rate and rhythm.  Quiet precordium.  Normal S1, S2.  No murmurs, rubs, gallops. Equal femoral pulses.  LUNGS:  Clear to auscultation  bilaterally.  No wheezes, rales, rhonchi.  Normal work of breathing.  ABDOMEN:  Soft, nontender.  No organomegaly or masses.  GENITOURINARY: Jaun 1, No hernias present, No vaginal discharge present and Normal vaginal appearance without labial adhesions  MUSCULOSKELETAL:  Hips within normal range of motion.  Negative Landrum, Ortolani.  Spine straight.  Normal sacrum.  EXTREMITIES:  Warm, dry, without abnormalities.  NEUROLOGIC:  Normal tone, bulk, strength.    ASSESSMENT:  6 month old female well infant.  Atopic dermatitis    PLAN:  1) Immunization counseling provided at this visit for dtap, ipv, hep B, prevnar, and rotavirus.  Discussed with parent/guardian the most common risks of the immunizations given today and the benefits provided by the immunizations.  All questions addressed with parents approving of the immunizations that will be administered.  2) Advance solids.  3. Continue with good moisturizing of skin and use over-the-counter hydrocortisone as needed to more severely affected areas. Call for prescription topical steroid if there is no improvements or eczema is worsening    All parental concerns and questions discussed.  Anticipatory guidance provided, handout given.              Development              Starting solids              Accident prevention: scalding, falls, small toys, smothering              Phase out bottle              Analgesics/antipyretics              Sun exposure              Tobacco-free home              Dental care              Lead exposure risk: none              Vitamin D supplementation if breast feeding              Fluoride supplementation discussed    Immunizations per orders.  Risks, benefits, and side effects discussed.  Return to clinic for 9 month well child examination or sooner prn illness/concerns.

## 2021-01-07 DIAGNOSIS — E10.65 TYPE 1 DIABETES MELLITUS WITH HYPERGLYCEMIA (H): ICD-10-CM

## 2021-01-07 DIAGNOSIS — F41.1 GAD (GENERALIZED ANXIETY DISORDER): ICD-10-CM

## 2021-01-07 RX ORDER — INSULIN ASPART 100 [IU]/ML
INJECTION, SOLUTION INTRAVENOUS; SUBCUTANEOUS
Qty: 15 ML | Refills: 1 | Status: SHIPPED | OUTPATIENT
Start: 2021-01-07 | End: 2021-02-26

## 2021-01-07 RX ORDER — ESCITALOPRAM OXALATE 20 MG/1
TABLET ORAL
Qty: 30 TABLET | Refills: 0 | Status: SHIPPED | OUTPATIENT
Start: 2021-01-07 | End: 2021-02-04

## 2021-01-07 RX ORDER — BUSPIRONE HYDROCHLORIDE 15 MG/1
TABLET ORAL
Qty: 90 TABLET | Refills: 0 | Status: SHIPPED | OUTPATIENT
Start: 2021-01-07 | End: 2021-03-09 | Stop reason: DRUGHIGH

## 2021-01-08 NOTE — PLAN OF CARE
"[facility] [place]    Patient admitted to room 2302 at approximately 1830 via wheel chair from emergency room. Patient was accompanied by transport tech.     Verbal SBAR report received from ED prior to patient arrival.     Patient ambulated to bed with stand-by assist. Patient alert and oriented X 3. The patient is not having any pain.  . Admission vital signs: Blood pressure (!) 189/75, pulse 86, temperature 97.8  F (36.6  C), temperature source Oral, resp. rate 18, height 1.676 m (5' 6\"), weight 81.6 kg (180 lb), SpO2 96 %. Patient was oriented to plan of care, call light, bed controls, tv, telephone, bathroom and visiting hours.     Risk Assessment    The following safety risks were identified during admission: none. Yellow risk band applied: NO.     Skin Initial Assessment    This writer admitted this patient and completed a full skin assessment and Melquiades score in the Adult PCS flowsheet. Appropriate interventions initiated as needed.     Secondary skin check completed by danielle.         Education    Patient has a [place] to Observation order: Yes  Observation education completed and documented: Yes      Albin Farooq RN    " [facility] calling to schedule the patient for [facility] follow up/ R colectomy/ Screened green 1/8/21 per [facility]. Attempting to schedule this patient for 1/15/21@ 11:00am. Advised to nurse. As I hit the schedule button I received an error message \"Slot in Use\" Another appointment is being booked in this slot. Hospital had already hung up. [facility] Ph: [phone]  Attempted to reach the patient linda to schedule. Please call the patient. Needs follow up appointment.  Being discharged today

## 2021-01-13 ENCOUNTER — ASSISTED LIVING VISIT (OUTPATIENT)
Dept: GERIATRICS | Facility: CLINIC | Age: 76
End: 2021-01-13
Payer: COMMERCIAL

## 2021-01-13 VITALS — OXYGEN SATURATION: 91 % | HEIGHT: 66 IN | BODY MASS INDEX: 31.18 KG/M2 | WEIGHT: 194 LBS | TEMPERATURE: 97.2 F

## 2021-01-13 DIAGNOSIS — F33.1 MODERATE EPISODE OF RECURRENT MAJOR DEPRESSIVE DISORDER (H): Primary | ICD-10-CM

## 2021-01-13 NOTE — PROGRESS NOTES
[place] [facility]  [place] Medical Record Number:  [id number]  Place of Service where encounter took place:  [facility] [[id number]]  Chief Complaint   Patient presents with     Nursing Home Acute     New FVP, depression, med f/u       HPI:    Kimberly Stephenson  is a 75 year old (1945), who is being seen today for an episodic care visit.  HPI information obtained from: facility chart records, facility staff and patient report.     Today's concern is:    ICD-10-CM    1. Moderate episode of recurrent major depressive disorder (H)  F33.1        Some improvement seen. Was started on busperone 7.5 mg/day on 1/7/20201.     No concerns today by resident.     BP Readings from Last 3 Encounters:   09/01/20 133/78   08/06/20 132/60   08/03/20 132/60     Wt Readings from Last 4 Encounters:   09/01/20 88 kg (194 lb)   09/01/20 88 kg (194 lb)   09/01/20 88 kg (194 lb)   08/06/20 85.3 kg (188 lb)     Pulse Readings from Last 4 Encounters:   09/01/20 76   08/06/20 72   08/03/20 72   07/03/20 72           Past Medical and Surgical History reviewed in Epic today.    MEDICATIONS:    Current Outpatient Medications   Medication Sig Dispense Refill     acetaminophen (TYLENOL) 325 MG tablet Take 650 mg by mouth every 4 hours as needed for mild pain       alcohol swab prep pads Use to swab area of injection/gali as directed. 100 each 3     amLODIPine (NORVASC) 10 MG tablet Take 1 tablet (10 mg) by mouth every evening 90 tablet 0     apixaban ANTICOAGULANT (ELIQUIS) 2.5 MG tablet Take 2.5 mg by mouth 2 times daily        atorvastatin (LIPITOR) 40 MG tablet Take 40 mg by mouth every evening        busPIRone (BUSPAR) 15 MG tablet take 1/2 tablet(7.5mg) DAILY 90 tablet 0     busPIRone (BUSPAR) 7.5 MG tablet Take 7.5 mg by mouth 2 times daily       escitalopram (LEXAPRO) 10 MG tablet Take 2 tablets (20 mg) by mouth daily       escitalopram (LEXAPRO) 20 MG tablet take one tablet EVERY  DAY 30 tablet 0     insulin aspart (NOVOLOG PEN) 100 UNIT/ML pen Inject 2 Units Subcutaneous 3 times daily (with meals) 3 mL 3     insulin aspart (NOVOLOG PEN) 100 UNIT/ML pen Inject 0-7 Units Subcutaneous At Bedtime Do Not give Bedtime Correction Insulin if BG less than 200 For  to 250 give 2 units. For  to 300 give 3 units. For  to 350 give 4 units. For  to 400 give 5 units. For  to 450 give 6 units. For  to 500 give 7 units. Notify provider if glucose greater than or equal to 350 mg/dL after administration. 3 mL 0     insulin aspart (NOVOLOG PEN) 100 UNIT/ML pen Inject 0-13 Units Subcutaneous 3 times daily (before meals) Correction Scale - custom DOSING     Do Not give Correction Insulin if Pre-Meal BG less than 100  For Pre-Meal  to 150 give 10 units.  For Pre-Meal  to 200 give 11 units.  For Pre-Meal  to 250 give 12 units.  For Pre-Meal  to 300 give 13 units.  For Pre-Meal  to 350 give 14 units.  For Pre-Meal  to 400 give 15 units.  To be given with prandial insulin, and based on pre-meal blood glucose.   Notify provider if glucose greater than or equal 350 mg/dL after administration. If given at mealtime, administer within 30 minutes of start of meal 3 mL 0     insulin glargine (LANTUS SOLOSTAR) 100 UNIT/ML pen Inject 30 Units Subcutaneous every morning 3 mL 3     LANTUS SOLOSTAR 100 UNIT/ML soln INJECT 32 UNITS subcutaneous EVERY DAY 15 mL 0     melatonin 3 MG tablet Take 1 tablet (3 mg) by mouth nightly as needed for sleep 50 tablet 3     NOVOLOG FLEXPEN 100 UNIT/ML soln INJECT FOUR units BEFORE MEALS 15 mL 1     potassium chloride (KLOR-CON) 20 MEQ packet Take 20 mEq by mouth every morning        venlafaxine (EFFEXOR-XR) 75 MG 24 hr capsule take 1 capsule BY MOUTH EVERY DAY 30 capsule 0         REVIEW OF SYSTEMS:  4 point ROS including Respiratory, CV, GI and , other than that noted in the HPI,  is negative    Objective:  Temp 97.2  F  (36.2  C)   SpO2 91%   Exam:  GENERAL APPEARANCE:  Alert, in no distress  ENT:  Mouth and posterior oropharynx normal, moist mucous membranes, normal hearing acuity  RESP:  respiratory effort and palpation of chest normal, lungs clear to auscultation   CV:  Palpation and auscultation of heart done , regular rate and rhythm, no murmur, rub, or gallop  M/S:   Gait and station normal  Digits and nails normal  SKIN:  Inspection of skin and subcutaneous tissue baseline, Palpation of skin and subcutaneous tissue baseline  NEURO:   Cranial nerves 2-12 are normal tested and grossly at patient's baseline  PSYCH:  oriented X 3    Labs:   Labs done in SNF are in [facility]. Please refer to them using [employer]/Care Everywhere. and Recent labs in [facility] reviewed by me today.     The health plan new enrollment has happened. I have reviewed the  MDS, the preventative needs,  and facility care plan. The level of care is appropriate. I have reviewed the code status/advanced directives.     ASSESSMENT/PLAN:  (F33.1) Moderate episode of recurrent major depressive disorder (H)  (primary encounter diagnosis)  Comment: improving with addition of Buspar to medication regimen.   Plan:     Continue buspirone 7.5 mg/day, Effexor and lexipro. Monitor and update NP with changes.     May benefit from psychologist consult.         Patient has lab appointment.  Need orders ASAP.  Thank you!        Electronically signed by:  VANDANA Munguia CNP  [facility]

## 2021-01-23 ENCOUNTER — RECORDS - HEALTHEAST (OUTPATIENT)
Dept: LAB | Facility: CLINIC | Age: 76
End: 2021-01-23

## 2021-01-23 LAB
SARS-COV-2 PCR COMMENT: NORMAL
SARS-COV-2 RNA SPEC QL NAA+PROBE: NEGATIVE
SARS-COV-2 VIRUS SPECIMEN SOURCE: NORMAL

## 2021-01-26 DIAGNOSIS — E10.65 TYPE 1 DIABETES MELLITUS WITH HYPERGLYCEMIA (H): ICD-10-CM

## 2021-01-26 RX ORDER — INSULIN GLARGINE 100 [IU]/ML
INJECTION, SOLUTION SUBCUTANEOUS
Qty: 15 ML | Refills: 3 | Status: SHIPPED | OUTPATIENT
Start: 2021-01-26 | End: 2021-08-03

## 2021-01-29 ENCOUNTER — RECORDS - HEALTHEAST (OUTPATIENT)
Dept: LAB | Facility: CLINIC | Age: 76
End: 2021-01-29

## 2021-01-30 ENCOUNTER — PATIENT OUTREACH (OUTPATIENT)
Dept: GERIATRIC MEDICINE | Facility: CLINIC | Age: 76
End: 2021-01-30

## 2021-01-30 NOTE — PROGRESS NOTES
[facility] [employer] Initial enrollment    Member was assigned to [facility] for [employer] Medicare Case Management on: 1-1-21  Review of member's Epic chart completed.  No triggering events noted  CC will follow up as needed.    Jyoti Almanzar MA [facility] Care Coordinator   [phone] - pdin cell phone   [phone] - haji fax

## 2021-02-02 NOTE — PROGRESS NOTES
[place] [facility]  [facility] Medical Record Number:  [id number]  Place of Service where encounter took place:  [facility] ([facility]) [[id number]]  Chief Complaint   Patient presents with     Nursing Home Acute       HPI:    Kimberly Stephenson  is a 75 year old (1945), who is being seen today for an episodic care visit.  HPI information obtained from: facility chart records, facility staff and patient report.     Today's concern is:    ICD-10-CM    1. Moderate episode of recurrent major depressive disorder (H)  F33.1      Resident reports her mood is stable. No concerns at this time regarding chronic diseases.     -Requesting paperwork for drivers license. Writer reported it isn't appropriate at this time as resident is involved with recreational drug use.     Pulse Readings from Last 4 Encounters:   09/01/20 76   08/06/20 72   08/03/20 72   07/03/20 72     BP Readings from Last 3 Encounters:   09/01/20 133/78   08/06/20 132/60   08/03/20 132/60     Wt Readings from Last 4 Encounters:   09/01/20 88 kg (194 lb)   09/01/20 88 kg (194 lb)   09/01/20 88 kg (194 lb)   08/06/20 85.3 kg (188 lb)         Past Medical and Surgical History reviewed in Epic today.    MEDICATIONS:    Current Outpatient Medications   Medication Sig Dispense Refill     acetaminophen (TYLENOL) 325 MG tablet Take 650 mg by mouth every 4 hours as needed for mild pain       alcohol swab prep pads Use to swab area of injection/gali as directed. 100 each 3     amLODIPine (NORVASC) 10 MG tablet Take 1 tablet (10 mg) by mouth every evening 90 tablet 0     apixaban ANTICOAGULANT (ELIQUIS) 2.5 MG tablet Take 2.5 mg by mouth 2 times daily        atorvastatin (LIPITOR) 40 MG tablet Take 40 mg by mouth every evening        busPIRone (BUSPAR) 15 MG tablet take 1/2 tablet(7.5mg) DAILY 90 tablet 0     escitalopram (LEXAPRO) 10 MG tablet Take 2 tablets (20 mg) by mouth daily       escitalopram (LEXAPRO) 20 MG tablet  take one tablet EVERY DAY 30 tablet 0     insulin aspart (NOVOLOG PEN) 100 UNIT/ML pen Inject 2 Units Subcutaneous 3 times daily (with meals) 3 mL 3     insulin aspart (NOVOLOG PEN) 100 UNIT/ML pen Inject 0-7 Units Subcutaneous At Bedtime Do Not give Bedtime Correction Insulin if BG less than 200 For  to 250 give 2 units. For  to 300 give 3 units. For  to 350 give 4 units. For  to 400 give 5 units. For  to 450 give 6 units. For  to 500 give 7 units. Notify provider if glucose greater than or equal to 350 mg/dL after administration. 3 mL 0     insulin aspart (NOVOLOG PEN) 100 UNIT/ML pen Inject 0-13 Units Subcutaneous 3 times daily (before meals) Correction Scale - custom DOSING     Do Not give Correction Insulin if Pre-Meal BG less than 100  For Pre-Meal  to 150 give 10 units.  For Pre-Meal  to 200 give 11 units.  For Pre-Meal  to 250 give 12 units.  For Pre-Meal  to 300 give 13 units.  For Pre-Meal  to 350 give 14 units.  For Pre-Meal  to 400 give 15 units.  To be given with prandial insulin, and based on pre-meal blood glucose.   Notify provider if glucose greater than or equal 350 mg/dL after administration. If given at mealtime, administer within 30 minutes of start of meal 3 mL 0     LANTUS SOLOSTAR 100 UNIT/ML soln Inject 30 Units Subcutaneous every morning 15 mL 3     LANTUS SOLOSTAR 100 UNIT/ML soln INJECT 32 UNITS subcutaneous EVERY DAY 15 mL 0     melatonin 3 MG tablet Take 1 tablet (3 mg) by mouth nightly as needed for sleep 50 tablet 3     NOVOLOG FLEXPEN 100 UNIT/ML soln INJECT FOUR units BEFORE MEALS 15 mL 1     potassium chloride (KLOR-CON) 20 MEQ packet Take 20 mEq by mouth every morning        venlafaxine (EFFEXOR-XR) 75 MG 24 hr capsule take 1 capsule BY MOUTH EVERY DAY 30 capsule 0         REVIEW OF SYSTEMS:  10 point ROS of systems including Constitutional, Eyes, Respiratory, Cardiovascular, Gastroenterology, Genitourinary,  Integumentary, Musculoskeletal, Psychiatric were all negative except for pertinent positives noted in my HPI.    Objective:  Temp 97.3  F (36.3  C)   Exam:  GENERAL APPEARANCE:  Alert, in no distress  ENT:  Mouth and posterior oropharynx normal, moist mucous membranes, normal hearing acuity  RESP:  respiratory effort and palpation of chest normal, lungs clear to auscultation   CV:  Palpation and auscultation of heart done , regular rate and rhythm, no murmur, rub, or gallop  M/S:   Gait and station normal  Digits and nails normal  SKIN:  Inspection of skin and subcutaneous tissue baseline, Palpation of skin and subcutaneous tissue baseline  NEURO:   Cranial nerves 2-12 are normal tested and grossly at patient's baseline  PSYCH:  memory impaired , affect and mood normal    Labs:   Labs done in SNF are in [facility]. Please refer to them using [employer]/Care Everywhere. and Recent labs in EPIC reviewed by me today.     ASSESSMENT/PLAN:  (F33.1) Moderate episode of recurrent major depressive disorder (H)  (primary encounter diagnosis)  Comment: Longstanding history, improved.   Plan:   -No changes at this time   -Paperwork will not be signed by writer for application for 's license.     The current medical regimen is effective; continue present plan and medications.      Electronically signed by:  VANDANA Munguia CNP  [facility]

## 2021-02-03 ENCOUNTER — ASSISTED LIVING VISIT (OUTPATIENT)
Dept: GERIATRICS | Facility: CLINIC | Age: 76
End: 2021-02-03
Payer: COMMERCIAL

## 2021-02-03 VITALS — TEMPERATURE: 97.3 F

## 2021-02-03 DIAGNOSIS — F41.1 GAD (GENERALIZED ANXIETY DISORDER): ICD-10-CM

## 2021-02-03 DIAGNOSIS — F33.1 MODERATE EPISODE OF RECURRENT MAJOR DEPRESSIVE DISORDER (H): Primary | ICD-10-CM

## 2021-02-03 DIAGNOSIS — R73.9 HYPERGLYCEMIA: Primary | ICD-10-CM

## 2021-02-03 NOTE — LETTER
2/3/2021        RE: Kimberly Stephenson  [facility]  [address] [address]        [place] [facility]  [place] Medical Record Number:  [id number]  Place of Service where encounter took place:  [facility] [[id number]]  Chief Complaint   Patient presents with     Nursing Home Acute       HPI:    Kimberly Stephenson  is a 75 year old (1945), who is being seen today for an episodic care visit.  HPI information obtained from: facility chart records, facility staff and patient report.     Today's concern is:    ICD-10-CM    1. Moderate episode of recurrent major depressive disorder (H)  F33.1      Resident reports her mood is stable. No concerns at this time regarding chronic diseases.     -Requesting paperwork for drivers license. Writer reported it isn't appropriate at this time as resident is involved with recreational drug use.     Pulse Readings from Last 4 Encounters:   09/01/20 76   08/06/20 72   08/03/20 72   07/03/20 72     BP Readings from Last 3 Encounters:   09/01/20 133/78   08/06/20 132/60   08/03/20 132/60     Wt Readings from Last 4 Encounters:   09/01/20 88 kg (194 lb)   09/01/20 88 kg (194 lb)   09/01/20 88 kg (194 lb)   08/06/20 85.3 kg (188 lb)         Past Medical and Surgical History reviewed in Epic today.    MEDICATIONS:    Current Outpatient Medications   Medication Sig Dispense Refill     acetaminophen (TYLENOL) 325 MG tablet Take 650 mg by mouth every 4 hours as needed for mild pain       alcohol swab prep pads Use to swab area of injection/gali as directed. 100 each 3     amLODIPine (NORVASC) 10 MG tablet Take 1 tablet (10 mg) by mouth every evening 90 tablet 0     apixaban ANTICOAGULANT (ELIQUIS) 2.5 MG tablet Take 2.5 mg by mouth 2 times daily        atorvastatin (LIPITOR) 40 MG tablet Take 40 mg by mouth every evening        busPIRone (BUSPAR) 15 MG tablet take 1/2 tablet(7.5mg) DAILY 90 tablet 0      escitalopram (LEXAPRO) 10 MG tablet Take 2 tablets (20 mg) by mouth daily       escitalopram (LEXAPRO) 20 MG tablet take one tablet EVERY DAY 30 tablet 0     insulin aspart (NOVOLOG PEN) 100 UNIT/ML pen Inject 2 Units Subcutaneous 3 times daily (with meals) 3 mL 3     insulin aspart (NOVOLOG PEN) 100 UNIT/ML pen Inject 0-7 Units Subcutaneous At Bedtime Do Not give Bedtime Correction Insulin if BG less than 200 For  to 250 give 2 units. For  to 300 give 3 units. For  to 350 give 4 units. For  to 400 give 5 units. For  to 450 give 6 units. For  to 500 give 7 units. Notify provider if glucose greater than or equal to 350 mg/dL after administration. 3 mL 0     insulin aspart (NOVOLOG PEN) 100 UNIT/ML pen Inject 0-13 Units Subcutaneous 3 times daily (before meals) Correction Scale - custom DOSING     Do Not give Correction Insulin if Pre-Meal BG less than 100  For Pre-Meal  to 150 give 10 units.  For Pre-Meal  to 200 give 11 units.  For Pre-Meal  to 250 give 12 units.  For Pre-Meal  to 300 give 13 units.  For Pre-Meal  to 350 give 14 units.  For Pre-Meal  to 400 give 15 units.  To be given with prandial insulin, and based on pre-meal blood glucose.   Notify provider if glucose greater than or equal 350 mg/dL after administration. If given at mealtime, administer within 30 minutes of start of meal 3 mL 0     LANTUS SOLOSTAR 100 UNIT/ML soln Inject 30 Units Subcutaneous every morning 15 mL 3     LANTUS SOLOSTAR 100 UNIT/ML soln INJECT 32 UNITS subcutaneous EVERY DAY 15 mL 0     melatonin 3 MG tablet Take 1 tablet (3 mg) by mouth nightly as needed for sleep 50 tablet 3     NOVOLOG FLEXPEN 100 UNIT/ML soln INJECT FOUR units BEFORE MEALS 15 mL 1     potassium chloride (KLOR-CON) 20 MEQ packet Take 20 mEq by mouth every morning        venlafaxine (EFFEXOR-XR) 75 MG 24 hr capsule take 1 capsule BY MOUTH EVERY DAY 30 capsule 0         REVIEW OF  SYSTEMS:  10 point ROS of systems including Constitutional, Eyes, Respiratory, Cardiovascular, Gastroenterology, Genitourinary, Integumentary, Musculoskeletal, Psychiatric were all negative except for pertinent positives noted in my HPI.    Objective:  Temp 97.3  F (36.3  C)   Exam:  GENERAL APPEARANCE:  Alert, in no distress  ENT:  Mouth and posterior oropharynx normal, moist mucous membranes, normal hearing acuity  RESP:  respiratory effort and palpation of chest normal, lungs clear to auscultation   CV:  Palpation and auscultation of heart done , regular rate and rhythm, no murmur, rub, or gallop  M/S:   Gait and station normal  Digits and nails normal  SKIN:  Inspection of skin and subcutaneous tissue baseline, Palpation of skin and subcutaneous tissue baseline  NEURO:   Cranial nerves 2-12 are normal tested and grossly at patient's baseline  PSYCH:  memory impaired , affect and mood normal    Labs:   Labs done in SNF are in [facility] EPIC. Please refer to them using [employer]/Care Everywhere. and Recent labs in EPIC reviewed by me today.     ASSESSMENT/PLAN:  (F33.1) Moderate episode of recurrent major depressive disorder (H)  (primary encounter diagnosis)  Comment: Longstanding history, improved.   Plan:   -No changes at this time   -Paperwork will not be signed by writer for application for 's license.     The current medical regimen is effective; continue present plan and medications.      Electronically signed by:  VANDANA Munguia [facility]           Sincerely,        Li Arana NP

## 2021-02-04 RX ORDER — ESCITALOPRAM OXALATE 20 MG/1
TABLET ORAL
Qty: 30 TABLET | Refills: 0 | Status: SHIPPED | OUTPATIENT
Start: 2021-02-04 | End: 2021-03-04

## 2021-02-04 RX ORDER — ATORVASTATIN CALCIUM 40 MG/1
TABLET, FILM COATED ORAL
Qty: 30 TABLET | Refills: 0 | Status: SHIPPED | OUTPATIENT
Start: 2021-02-04 | End: 2021-03-04

## 2021-02-10 ENCOUNTER — ASSISTED LIVING VISIT (OUTPATIENT)
Dept: GERIATRICS | Facility: CLINIC | Age: 76
End: 2021-02-10
Payer: COMMERCIAL

## 2021-02-10 VITALS — OXYGEN SATURATION: 94 % | TEMPERATURE: 95.5 F

## 2021-02-10 DIAGNOSIS — F33.1 MODERATE EPISODE OF RECURRENT MAJOR DEPRESSIVE DISORDER (H): Primary | ICD-10-CM

## 2021-02-10 DIAGNOSIS — F51.01 PRIMARY INSOMNIA: ICD-10-CM

## 2021-02-10 DIAGNOSIS — F41.1 GAD (GENERALIZED ANXIETY DISORDER): ICD-10-CM

## 2021-02-10 NOTE — PROGRESS NOTES
[facility]  [place] Medical Record Number:  [id number]  Place of Service where encounter took place:  [facility]) [[id number]]  Chief Complaint   Patient presents with     RECHECK       HPI:    Kimberly Stephenson  is a 75 year old (1945), who is being seen today for an episodic care visit.  HPI information obtained from: facility chart records, facility staff and patient report.     Today's concern is:    ICD-10-CM    1. Moderate episode of recurrent major depressive disorder (H)  F33.1    2. CELIA (generalized anxiety disorder)  F41.1    3. Primary insomnia  F51.01      Resident resting in recliner upon visit. Appears comfortable and without acute pain.   -Had requested a visit to have writer sign paperwork to renew [place]rivers Licence.  -Apartment odor indicative of use of recreational drugs. Resident admitted to using mariajuana. Denied use in apartment and does not wish to quit at this time. Helps with anxiety. Discussed increase in medication to help with anxiety but resident declined.     BP Readings from Last 3 Encounters:   09/01/20 133/78   08/06/20 132/60   08/03/20 132/60     Pulse Readings from Last 4 Encounters:   09/01/20 76   08/06/20 72   08/03/20 72   07/03/20 72     Wt Readings from Last 4 Encounters:   09/01/20 88 kg (194 lb)   09/01/20 88 kg (194 lb)   09/01/20 88 kg (194 lb)   08/06/20 85.3 kg (188 lb)           Past Medical and Surgical History reviewed in Epic today.    MEDICATIONS:    Current Outpatient Medications   Medication Sig Dispense Refill     acetaminophen (TYLENOL) 325 MG tablet Take 650 mg by mouth every 4 hours as needed for mild pain       alcohol swab prep pads Use to swab area of injection/gali as directed. 100 each 3     amLODIPine (NORVASC) 10 MG tablet Take 1 tablet (10 mg) by mouth every evening 90 tablet 0     apixaban ANTICOAGULANT (ELIQUIS) 2.5 MG tablet Take 2.5 mg by mouth 2 times daily        atorvastatin  (LIPITOR) 40 MG tablet take one tablet EVERY DAY 30 tablet 0     busPIRone (BUSPAR) 15 MG tablet take 1/2 tablet(7.5mg) DAILY 90 tablet 0     escitalopram (LEXAPRO) 10 MG tablet Take 2 tablets (20 mg) by mouth daily       escitalopram (LEXAPRO) 20 MG tablet take one tablet EVERY DAY 30 tablet 0     insulin aspart (NOVOLOG PEN) 100 UNIT/ML pen Inject 2 Units Subcutaneous 3 times daily (with meals) 3 mL 3     insulin aspart (NOVOLOG PEN) 100 UNIT/ML pen Inject 0-7 Units Subcutaneous At Bedtime Do Not give Bedtime Correction Insulin if BG less than 200 For  to 250 give 2 units. For  to 300 give 3 units. For  to 350 give 4 units. For  to 400 give 5 units. For  to 450 give 6 units. For  to 500 give 7 units. Notify provider if glucose greater than or equal to 350 mg/dL after administration. 3 mL 0     insulin aspart (NOVOLOG PEN) 100 UNIT/ML pen Inject 0-13 Units Subcutaneous 3 times daily (before meals) Correction Scale - custom DOSING     Do Not give Correction Insulin if Pre-Meal BG less than 100  For Pre-Meal  to 150 give 10 units.  For Pre-Meal  to 200 give 11 units.  For Pre-Meal  to 250 give 12 units.  For Pre-Meal  to 300 give 13 units.  For Pre-Meal  to 350 give 14 units.  For Pre-Meal  to 400 give 15 units.  To be given with prandial insulin, and based on pre-meal blood glucose.   Notify provider if glucose greater than or equal 350 mg/dL after administration. If given at mealtime, administer within 30 minutes of start of meal 3 mL 0     LANTUS SOLOSTAR 100 UNIT/ML soln Inject 30 Units Subcutaneous every morning 15 mL 3     LANTUS SOLOSTAR 100 UNIT/ML soln INJECT 32 UNITS subcutaneous EVERY DAY 15 mL 0     melatonin 3 MG tablet Take 1 tablet (3 mg) by mouth nightly as needed for sleep 50 tablet 3     NOVOLOG FLEXPEN 100 UNIT/ML soln INJECT FOUR units BEFORE MEALS 15 mL 1     potassium chloride (KLOR-CON) 20 MEQ packet Take 20 mEq by mouth  every morning        venlafaxine (EFFEXOR-XR) 75 MG 24 hr capsule take 1 capsule BY MOUTH EVERY DAY 30 capsule 0     REVIEW OF SYSTEMS:  4 point ROS including Respiratory, CV, GI and , other than that noted in the HPI,  is negative    Objective:  Temp 95.5  F (35.3  C)   SpO2 94%   Exam:  GENERAL APPEARANCE:  Alert, in no distress  ENT:  Mouth and posterior oropharynx normal, moist mucous membranes, hearing acuity normal hearing  EYES:  EOM, conjunctivae, lids, pupils and irises normal  NECK:  No adenopathy,masses or thyromegaly  RESP:  respiratory effort and palpation of chest normal, no respiratory distress, Lung sounds clear  CV:  Palpation and auscultation of heart done , rate and rhythm regular, no murmur, no rub or gallop, Edema none  ABDOMEN:  normal bowel sounds, soft, nontender, no hepatosplenomegaly or other masses  M/S:   Gait and station baseline, Digits and nails WNL  SKIN:  Inspection/Palpation of skin and subcutaneous tissue WNL  NEURO: 2-12 in normal limits and at patient's baseline  PSYCH:  insight and judgement, memory intermittent confusion but alert and oriented x3 at exam , affect and mood normal      Labs:   Labs done in SNF are in [facility]. Please refer to them using [employer]/Care Everywhere. and Recent labs in EPIC reviewed by me today.     ASSESSMENT/PLAN:  (F33.1) Moderate episode of recurrent major depressive disorder (H)  (primary encounter diagnosis)  (F41.1) CELIA (generalized anxiety disorder)  (F51.01) Primary insomnia    Resident appears at baseline cognition and mood. Writer will not fill paperwork for renewal of drivers licence given use of recreational mariajuana and intermittent cognitive impairment. Resident aware and demonstrated understanding.     Continues to require AL assist with cares. No changes at this time.        The current medical regimen is effective; continue present plan and medications.        Electronically signed by:  VANDANA Munguia [facility]  Geriatric Services

## 2021-02-10 NOTE — LETTER
2/10/2021        RE: Kimberly Stephenson  [facility]  [address] [address]        [facility]  [place] Medical Record Number:  [id number]  Place of Service where encounter took place:  [facility] ([facility]) [[id number]]  Chief Complaint   Patient presents with     RECHECK       HPI:    Kimberly Stephenson  is a 75 year old (1945), who is being seen today for an episodic care visit.  HPI information obtained from: facility chart records, facility staff and patient report.     Today's concern is:    ICD-10-CM    1. Moderate episode of recurrent major depressive disorder (H)  F33.1    2. CELIA (generalized anxiety disorder)  F41.1    3. Primary insomnia  F51.01      Resident resting in recliner upon visit. Appears comfortable and without acute pain.   -Had requested a visit to have writer sign paperwork to [place].  -Apartment odor indicative of use of recreational drugs. Resident admitted to using mariajuana. Denied use in apartment and does not wish to quit at this time. Helps with anxiety. Discussed increase in medication to help with anxiety but resident declined.     BP Readings from Last 3 Encounters:   09/01/20 133/78   08/06/20 132/60   08/03/20 132/60     Pulse Readings from Last 4 Encounters:   09/01/20 76   08/06/20 72   08/03/20 72   07/03/20 72     Wt Readings from Last 4 Encounters:   09/01/20 88 kg (194 lb)   09/01/20 88 kg (194 lb)   09/01/20 88 kg (194 lb)   08/06/20 85.3 kg (188 lb)           Past Medical and Surgical History reviewed in Epic today.    MEDICATIONS:    Current Outpatient Medications   Medication Sig Dispense Refill     acetaminophen (TYLENOL) 325 MG tablet Take 650 mg by mouth every 4 hours as needed for mild pain       alcohol swab prep pads Use to swab area of injection/gali as directed. 100 each 3     amLODIPine (NORVASC) 10 MG tablet Take 1 tablet (10 mg) by mouth every  evening 90 tablet 0     apixaban ANTICOAGULANT (ELIQUIS) 2.5 MG tablet Take 2.5 mg by mouth 2 times daily        atorvastatin (LIPITOR) 40 MG tablet take one tablet EVERY DAY 30 tablet 0     busPIRone (BUSPAR) 15 MG tablet take 1/2 tablet(7.5mg) DAILY 90 tablet 0     escitalopram (LEXAPRO) 10 MG tablet Take 2 tablets (20 mg) by mouth daily       escitalopram (LEXAPRO) 20 MG tablet take one tablet EVERY DAY 30 tablet 0     insulin aspart (NOVOLOG PEN) 100 UNIT/ML pen Inject 2 Units Subcutaneous 3 times daily (with meals) 3 mL 3     insulin aspart (NOVOLOG PEN) 100 UNIT/ML pen Inject 0-7 Units Subcutaneous At Bedtime Do Not give Bedtime Correction Insulin if BG less than 200 For  to 250 give 2 units. For  to 300 give 3 units. For  to 350 give 4 units. For  to 400 give 5 units. For  to 450 give 6 units. For  to 500 give 7 units. Notify provider if glucose greater than or equal to 350 mg/dL after administration. 3 mL 0     insulin aspart (NOVOLOG PEN) 100 UNIT/ML pen Inject 0-13 Units Subcutaneous 3 times daily (before meals) Correction Scale - custom DOSING     Do Not give Correction Insulin if Pre-Meal BG less than 100  For Pre-Meal  to 150 give 10 units.  For Pre-Meal  to 200 give 11 units.  For Pre-Meal  to 250 give 12 units.  For Pre-Meal  to 300 give 13 units.  For Pre-Meal  to 350 give 14 units.  For Pre-Meal  to 400 give 15 units.  To be given with prandial insulin, and based on pre-meal blood glucose.   Notify provider if glucose greater than or equal 350 mg/dL after administration. If given at mealtime, administer within 30 minutes of start of meal 3 mL 0     LANTUS SOLOSTAR 100 UNIT/ML soln Inject 30 Units Subcutaneous every morning 15 mL 3     LANTUS SOLOSTAR 100 UNIT/ML soln INJECT 32 UNITS subcutaneous EVERY DAY 15 mL 0     melatonin 3 MG tablet Take 1 tablet (3 mg) by mouth nightly as needed for sleep 50 tablet 3     NOVOLOG FLEXPEN  100 UNIT/ML soln INJECT FOUR units BEFORE MEALS 15 mL 1     potassium chloride (KLOR-CON) 20 MEQ packet Take 20 mEq by mouth every morning        venlafaxine (EFFEXOR-XR) 75 MG 24 hr capsule take 1 capsule BY MOUTH EVERY DAY 30 capsule 0     REVIEW OF SYSTEMS:  4 point ROS including Respiratory, CV, GI and , other than that noted in the HPI,  is negative    Objective:  Temp 95.5  F (35.3  C)   SpO2 94%   Exam:  GENERAL APPEARANCE:  Alert, in no distress  ENT:  Mouth and posterior oropharynx normal, moist mucous membranes, hearing acuity normal hearing  EYES:  EOM, conjunctivae, lids, pupils and irises normal  NECK:  No adenopathy,masses or thyromegaly  RESP:  respiratory effort and palpation of chest normal, no respiratory distress, Lung sounds clear  CV:  Palpation and auscultation of heart done , rate and rhythm regular, no murmur, no rub or gallop, Edema none  ABDOMEN:  normal bowel sounds, soft, nontender, no hepatosplenomegaly or other masses  M/S:   Gait and station baseline, Digits and nails WNL  SKIN:  Inspection/Palpation of skin and subcutaneous tissue WNL  NEURO: 2-12 in normal limits and at patient's baseline  PSYCH:  insight and judgement, memory intermittent confusion but alert and oriented x3 at exam , affect and mood normal      Labs:   Labs done in SNF are in [facility]. Please refer to them using [employer]/Care Everywhere. and Recent labs in EPIC reviewed by me today.     ASSESSMENT/PLAN:  (F33.1) Moderate episode of recurrent major depressive disorder (H)  (primary encounter diagnosis)  (F41.1) CELIA (generalized anxiety disorder)  (F51.01) Primary insomnia    Resident appears at baseline cognition and mood. Writer will not fill paperwork for renewal of drivers licence given use of recreational mariajuana and intermittent cognitive impairment. Resident aware and demonstrated understanding.     Continues to require AL assist with cares. No changes at this time.        The current medical regimen is  effective; continue present plan and medications.        Electronically signed by:  VANDANA Munguia [facility]           Sincerely,        Li Arana NP

## 2021-02-17 ENCOUNTER — ASSISTED LIVING VISIT (OUTPATIENT)
Dept: GERIATRICS | Facility: CLINIC | Age: 76
End: 2021-02-17
Payer: COMMERCIAL

## 2021-02-17 VITALS — OXYGEN SATURATION: 95 % | TEMPERATURE: 95.5 F

## 2021-02-17 DIAGNOSIS — F51.01 PRIMARY INSOMNIA: ICD-10-CM

## 2021-02-17 DIAGNOSIS — F32.A ANXIETY AND DEPRESSION: Primary | ICD-10-CM

## 2021-02-17 DIAGNOSIS — F41.9 ANXIETY AND DEPRESSION: Primary | ICD-10-CM

## 2021-02-17 DIAGNOSIS — F33.1 MODERATE EPISODE OF RECURRENT MAJOR DEPRESSIVE DISORDER (H): ICD-10-CM

## 2021-02-17 NOTE — PROGRESS NOTES
[facility]  [place] Medical Record Number:  [id number]  Place of Service where encounter took place:  [facility] ([facility]) [[id number]]  Chief Complaint   Patient presents with     RECHECK     anxiety/depression        HPI:    Kimberly Stephenson  is a 75 year old (1945), who is being seen today for an episodic care visit.  HPI information obtained from: facility chart records, facility staff and patient report.     Today's concern is:    ICD-10-CM    1. Anxiety and depression  F41.9     F32.9    2. Moderate episode of recurrent major depressive disorder (H)  F33.1    3. Primary insomnia  F51.01      Resident requested to see provider. Upon arrival, resident having an acute panic attack. After several minutes to calm breathing and demeanor Kimberly was able to articulate her anxiety regarding future furniture shopping with another resident the following day.   -Has successfully taken ativan in the past and ok with trying     BP Readings from Last 3 Encounters:   09/01/20 133/78   08/06/20 132/60   08/03/20 132/60     Pulse Readings from Last 4 Encounters:   09/01/20 76   08/06/20 72   08/03/20 72   07/03/20 72         Past Medical and Surgical History reviewed in Epic today.    MEDICATIONS:    Current Outpatient Medications   Medication Sig Dispense Refill     acetaminophen (TYLENOL) 325 MG tablet Take 650 mg by mouth every 4 hours as needed for mild pain       alcohol swab prep pads Use to swab area of injection/gali as directed. 100 each 3     amLODIPine (NORVASC) 10 MG tablet Take 1 tablet (10 mg) by mouth every evening 90 tablet 0     apixaban ANTICOAGULANT (ELIQUIS) 2.5 MG tablet Take 2.5 mg by mouth 2 times daily        atorvastatin (LIPITOR) 40 MG tablet take one tablet EVERY DAY 30 tablet 0     busPIRone (BUSPAR) 15 MG tablet take 1/2 tablet(7.5mg) DAILY 90 tablet 0     escitalopram (LEXAPRO) 10 MG tablet Take 2 tablets (20 mg) by mouth daily        escitalopram (LEXAPRO) 20 MG tablet take one tablet EVERY DAY 30 tablet 0     insulin aspart (NOVOLOG PEN) 100 UNIT/ML pen Inject 2 Units Subcutaneous 3 times daily (with meals) 3 mL 3     insulin aspart (NOVOLOG PEN) 100 UNIT/ML pen Inject 0-7 Units Subcutaneous At Bedtime Do Not give Bedtime Correction Insulin if BG less than 200 For  to 250 give 2 units. For  to 300 give 3 units. For  to 350 give 4 units. For  to 400 give 5 units. For  to 450 give 6 units. For  to 500 give 7 units. Notify provider if glucose greater than or equal to 350 mg/dL after administration. 3 mL 0     insulin aspart (NOVOLOG PEN) 100 UNIT/ML pen Inject 0-13 Units Subcutaneous 3 times daily (before meals) Correction Scale - custom DOSING     Do Not give Correction Insulin if Pre-Meal BG less than 100  For Pre-Meal  to 150 give 10 units.  For Pre-Meal  to 200 give 11 units.  For Pre-Meal  to 250 give 12 units.  For Pre-Meal  to 300 give 13 units.  For Pre-Meal  to 350 give 14 units.  For Pre-Meal  to 400 give 15 units.  To be given with prandial insulin, and based on pre-meal blood glucose.   Notify provider if glucose greater than or equal 350 mg/dL after administration. If given at mealtime, administer within 30 minutes of start of meal 3 mL 0     LANTUS SOLOSTAR 100 UNIT/ML soln Inject 30 Units Subcutaneous every morning 15 mL 3     LANTUS SOLOSTAR 100 UNIT/ML soln INJECT 32 UNITS subcutaneous EVERY DAY 15 mL 0     melatonin 3 MG tablet Take 1 tablet (3 mg) by mouth nightly as needed for sleep 50 tablet 3     NOVOLOG FLEXPEN 100 UNIT/ML soln INJECT FOUR units BEFORE MEALS 15 mL 1     potassium chloride (KLOR-CON) 20 MEQ packet Take 20 mEq by mouth every morning        venlafaxine (EFFEXOR-XR) 75 MG 24 hr capsule take 1 capsule BY MOUTH EVERY DAY 30 capsule 0         REVIEW OF SYSTEMS:  10 point ROS of systems including Constitutional, Eyes, Respiratory,  Cardiovascular, Gastroenterology, Genitourinary, Integumentary, Musculoskeletal, Psychiatric were all negative except for pertinent positives noted in my HPI.    Objective:  Temp 95.5  F (35.3  C)   SpO2 95%   Exam:  GENERAL APPEARANCE:  Alert, in no distress  ENT:  Mouth and posterior oropharynx normal, moist mucous membranes, normal hearing acuity  RESP:  respiratory effort and palpation of chest normal, lungs clear to auscultation   CV:  Palpation and auscultation of heart done , regular rate and rhythm, no murmur, rub, or gallop  M/S:   Gait and station normal  Digits and nails normal  SKIN:  Inspection of skin and subcutaneous tissue baseline  NEURO:   Cranial nerves 2-12 are normal tested and grossly at patient's baseline  PSYCH:  oriented X 3    Labs:   Labs done in SNF are in [facility]. Please refer to them using [employer]/Care Everywhere. and Recent labs in [facility] reviewed by me today.     ASSESSMENT/PLAN:  (F41.9,  F32.9) Anxiety and depression  (primary encounter diagnosis)  (F33.1) Moderate episode of recurrent major depressive disorder (H)  (F51.01) Primary insomnia  Comment: Longstanding history of depression and anxiety, now with acute panic attacks. Very distressed demeanor.   Plan:   -Ativan 0.25 mg PO BID- anxiety  -Continue Buspar, lexapro as ordered.     See new orders    Electronically signed by:  VANDANA Munguia CNP  [facility]

## 2021-02-18 DIAGNOSIS — F41.9 ANXIETY: Primary | ICD-10-CM

## 2021-02-18 RX ORDER — LORAZEPAM 0.5 MG/1
0.25 TABLET ORAL 2 TIMES DAILY
Qty: 60 TABLET | Refills: 1 | Status: SHIPPED | OUTPATIENT
Start: 2021-02-18 | End: 2021-03-09

## 2021-02-22 ENCOUNTER — RECORDS - HEALTHEAST (OUTPATIENT)
Dept: LAB | Facility: CLINIC | Age: 76
End: 2021-02-22

## 2021-02-23 ASSESSMENT — MIFFLIN-ST. JEOR: SCORE: 1466.56

## 2021-02-24 ENCOUNTER — ASSISTED LIVING VISIT (OUTPATIENT)
Dept: GERIATRICS | Facility: CLINIC | Age: 76
End: 2021-02-24
Payer: COMMERCIAL

## 2021-02-24 VITALS — HEIGHT: 66 IN | OXYGEN SATURATION: 93 % | BODY MASS INDEX: 34.01 KG/M2 | WEIGHT: 211.6 LBS | TEMPERATURE: 97.3 F

## 2021-02-24 DIAGNOSIS — F41.9 ANXIETY AND DEPRESSION: Primary | ICD-10-CM

## 2021-02-24 DIAGNOSIS — F33.1 MODERATE EPISODE OF RECURRENT MAJOR DEPRESSIVE DISORDER (H): ICD-10-CM

## 2021-02-24 DIAGNOSIS — F32.A ANXIETY AND DEPRESSION: Primary | ICD-10-CM

## 2021-02-24 DIAGNOSIS — F41.1 GAD (GENERALIZED ANXIETY DISORDER): ICD-10-CM

## 2021-02-24 RX ORDER — BUSPIRONE HYDROCHLORIDE 15 MG/1
7.5 TABLET ORAL 2 TIMES DAILY
COMMUNITY
End: 2021-04-02

## 2021-02-24 RX ORDER — ESCITALOPRAM OXALATE 20 MG/1
20 TABLET ORAL DAILY
COMMUNITY
End: 2021-11-21

## 2021-02-24 RX ORDER — ACETAMINOPHEN 500 MG
1000 TABLET ORAL DAILY
COMMUNITY
End: 2021-11-12

## 2021-02-24 NOTE — LETTER
2/24/2021        [facility]  [address] [address]        [facility]  [place] Medical Record Number:  [id number]  [facility] [[id number]]  Chief Complaint   Patient presents with     RECHECK     psych meds f/u       HPI:    Kimberly Stephenson  is a 75 year old (1945), who is being seen today for an episodic care visit.  HPI information obtained from: facility chart records, facility staff and patient report.     Today's concern is:    ICD-10-CM    1. Anxiety and depression  F41.9     F32.9    2. CELIA (generalized anxiety disorder)  F41.1    3. Moderate episode of recurrent major depressive disorder (H)  F33.1      Resident remains highly anxious. Does not want to take ativan at this time and requesting dose adjustments of anxiety and depression medication.   -Unable to articulate triggers at this time.     Past Medical and Surgical History reviewed in Epic today.    MEDICATIONS:    Current Outpatient Medications   Medication Sig Dispense Refill     acetaminophen (TYLENOL) 325 MG tablet Take 650 mg by mouth every 4 hours as needed for mild pain       acetaminophen (TYLENOL) 500 MG tablet Take 1,000 mg by mouth daily       alcohol swab prep pads Use to swab area of injection/gali as directed. 100 each 3     amLODIPine (NORVASC) 10 MG tablet Take 1 tablet (10 mg) by mouth every evening 90 tablet 0     apixaban ANTICOAGULANT (ELIQUIS) 2.5 MG tablet Take 2.5 mg by mouth 2 times daily        atorvastatin (LIPITOR) 40 MG tablet take one tablet EVERY DAY 30 tablet 0     busPIRone (BUSPAR) 15 MG tablet Take 7.5 mg by mouth 2 times daily       escitalopram (LEXAPRO) 20 MG tablet Take 20 mg by mouth daily       insulin aspart (NOVOLOG PEN) 100 UNIT/ML pen Inject 2 Units Subcutaneous 3 times daily (with meals) 3 mL 3     insulin aspart (NOVOLOG PEN) 100  UNIT/ML pen Inject 0-7 Units Subcutaneous At Bedtime Do Not give Bedtime Correction Insulin if BG less than 200 For  to 250 give 2 units. For  to 300 give 3 units. For  to 350 give 4 units. For  to 400 give 5 units. For  to 450 give 6 units. For  to 500 give 7 units. Notify provider if glucose greater than or equal to 350 mg/dL after administration. 3 mL 0     insulin aspart (NOVOLOG PEN) 100 UNIT/ML pen Inject 0-13 Units Subcutaneous 3 times daily (before meals) Correction Scale - custom DOSING     Do Not give Correction Insulin if Pre-Meal BG less than 100  For Pre-Meal  to 150 give 10 units.  For Pre-Meal  to 200 give 11 units.  For Pre-Meal  to 250 give 12 units.  For Pre-Meal  to 300 give 13 units.  For Pre-Meal  to 350 give 14 units.  For Pre-Meal  to 400 give 15 units.  To be given with prandial insulin, and based on pre-meal blood glucose.   Notify provider if glucose greater than or equal 350 mg/dL after administration. If given at mealtime, administer within 30 minutes of start of meal 3 mL 0     LANTUS SOLOSTAR 100 UNIT/ML soln Inject 30 Units Subcutaneous every morning 15 mL 3     melatonin 3 MG tablet Take 1 tablet (3 mg) by mouth nightly as needed for sleep 50 tablet 3     NOVOLOG FLEXPEN 100 UNIT/ML soln INJECT FOUR units BEFORE MEALS 15 mL 1     busPIRone (BUSPAR) 15 MG tablet take 1/2 tablet(7.5mg) DAILY (Patient not taking: Reported on 2/24/2021) 90 tablet 0     escitalopram (LEXAPRO) 10 MG tablet Take 2 tablets (20 mg) by mouth daily (Patient not taking: Reported on 2/24/2021)       escitalopram (LEXAPRO) 20 MG tablet take one tablet EVERY DAY (Patient not taking: Reported on 2/24/2021) 30 tablet 0     LANTUS SOLOSTAR 100 UNIT/ML soln INJECT 32 UNITS subcutaneous EVERY DAY (Patient not taking: Reported on 2/24/2021) 15 mL 0     LORazepam (ATIVAN) 0.5 MG tablet Take 0.5 tablets (0.25 mg) by mouth 2 times daily (Patient not  "taking: Reported on 2/24/2021) 60 tablet 1     potassium chloride (KLOR-CON) 20 MEQ packet Take 20 mEq by mouth every morning        venlafaxine (EFFEXOR-XR) 75 MG 24 hr capsule take 1 capsule BY MOUTH EVERY DAY (Patient not taking: Reported on 2/24/2021) 30 capsule 0     REVIEW OF SYSTEMS:  4 point ROS including Respiratory, CV, GI and , other than that noted in the HPI,  is negative    Objective:  Temp 97.3  F (36.3  C)   Ht 1.676 m (5' 6\")   Wt 96 kg (211 lb 9.6 oz)   SpO2 93%   BMI 34.15 kg/m    Exam:  RESP:  respiratory effort and palpation of chest normal  CV:  Palpation and auscultation of heart done , regular rate and rhythm, no murmur, rub, or gallop  M/S:   Gait and station normal  Digits and nails normal  SKIN:  Inspection of skin and subcutaneous tissue baseline, Palpation of skin and subcutaneous tissue baseline  NEURO:   Cranial nerves 2-12 are normal tested and grossly at patient's baseline  PSYCH:  anxious    Labs:   Labs done in SNF are in [facility]. Please refer to them using [employer]IC/Care Everywhere. and Recent labs in EPIC reviewed by me today.     ASSESSMENT/PLAN:    ICD-10-CM    1. Anxiety and depression  F41.9     F32.9    2. CELIA (generalized anxiety disorder)  F41.1    3. Moderate episode of recurrent major depressive disorder (H)  F33.1        Increase buspar 15 mg/BID    Discontinue effexor    Hold ativan at this time. Will re-evaluated and discontinue as needed in 14 days.     See new orders above     Electronically signed by:  VANDANA Munguia CNP  [facility]           Sincerely,        Li Arana NP    "

## 2021-02-24 NOTE — PROGRESS NOTES
[place] [facility]  [place] Medical Record Number:  [id number]  Place of Service where encounter took place:  [facility] ([facility]) [[id number]]  Chief Complaint   Patient presents with     RECHECK     psych meds f/u       HPI:    Kimberly Stephenson  is a 75 year old (1945), who is being seen today for an episodic care visit.  HPI information obtained from: facility chart records, facility staff and patient report.     Today's concern is:    ICD-10-CM    1. Anxiety and depression  F41.9     F32.9    2. CELIA (generalized anxiety disorder)  F41.1    3. Moderate episode of recurrent major depressive disorder (H)  F33.1      Resident remains highly anxious. Does not want to take ativan at this time and requesting dose adjustments of anxiety and depression medication.   -Unable to articulate triggers at this time.     Past Medical and Surgical History reviewed in Epic today.    MEDICATIONS:    Current Outpatient Medications   Medication Sig Dispense Refill     acetaminophen (TYLENOL) 325 MG tablet Take 650 mg by mouth every 4 hours as needed for mild pain       acetaminophen (TYLENOL) 500 MG tablet Take 1,000 mg by mouth daily       alcohol swab prep pads Use to swab area of injection/gali as directed. 100 each 3     amLODIPine (NORVASC) 10 MG tablet Take 1 tablet (10 mg) by mouth every evening 90 tablet 0     apixaban ANTICOAGULANT (ELIQUIS) 2.5 MG tablet Take 2.5 mg by mouth 2 times daily        atorvastatin (LIPITOR) 40 MG tablet take one tablet EVERY DAY 30 tablet 0     busPIRone (BUSPAR) 15 MG tablet Take 7.5 mg by mouth 2 times daily       escitalopram (LEXAPRO) 20 MG tablet Take 20 mg by mouth daily       insulin aspart (NOVOLOG PEN) 100 UNIT/ML pen Inject 2 Units Subcutaneous 3 times daily (with meals) 3 mL 3     insulin aspart (NOVOLOG PEN) 100 UNIT/ML pen Inject 0-7 Units Subcutaneous At Bedtime Do Not give Bedtime Correction Insulin if BG less than 200 For  to  250 give 2 units. For  to 300 give 3 units. For  to 350 give 4 units. For  to 400 give 5 units. For  to 450 give 6 units. For  to 500 give 7 units. Notify provider if glucose greater than or equal to 350 mg/dL after administration. 3 mL 0     insulin aspart (NOVOLOG PEN) 100 UNIT/ML pen Inject 0-13 Units Subcutaneous 3 times daily (before meals) Correction Scale - custom DOSING     Do Not give Correction Insulin if Pre-Meal BG less than 100  For Pre-Meal  to 150 give 10 units.  For Pre-Meal  to 200 give 11 units.  For Pre-Meal  to 250 give 12 units.  For Pre-Meal  to 300 give 13 units.  For Pre-Meal  to 350 give 14 units.  For Pre-Meal  to 400 give 15 units.  To be given with prandial insulin, and based on pre-meal blood glucose.   Notify provider if glucose greater than or equal 350 mg/dL after administration. If given at mealtime, administer within 30 minutes of start of meal 3 mL 0     LANTUS SOLOSTAR 100 UNIT/ML soln Inject 30 Units Subcutaneous every morning 15 mL 3     melatonin 3 MG tablet Take 1 tablet (3 mg) by mouth nightly as needed for sleep 50 tablet 3     NOVOLOG FLEXPEN 100 UNIT/ML soln INJECT FOUR units BEFORE MEALS 15 mL 1     busPIRone (BUSPAR) 15 MG tablet take 1/2 tablet(7.5mg) DAILY (Patient not taking: Reported on 2/24/2021) 90 tablet 0     escitalopram (LEXAPRO) 10 MG tablet Take 2 tablets (20 mg) by mouth daily (Patient not taking: Reported on 2/24/2021)       escitalopram (LEXAPRO) 20 MG tablet take one tablet EVERY DAY (Patient not taking: Reported on 2/24/2021) 30 tablet 0     LANTUS SOLOSTAR 100 UNIT/ML soln INJECT 32 UNITS subcutaneous EVERY DAY (Patient not taking: Reported on 2/24/2021) 15 mL 0     LORazepam (ATIVAN) 0.5 MG tablet Take 0.5 tablets (0.25 mg) by mouth 2 times daily (Patient not taking: Reported on 2/24/2021) 60 tablet 1     potassium chloride (KLOR-CON) 20 MEQ packet Take 20 mEq by mouth every morning     "    venlafaxine (EFFEXOR-XR) 75 MG 24 hr capsule take 1 capsule BY MOUTH EVERY DAY (Patient not taking: Reported on 2/24/2021) 30 capsule 0     REVIEW OF SYSTEMS:  4 point ROS including Respiratory, CV, GI and , other than that noted in the HPI,  is negative    Objective:  Temp 97.3  F (36.3  C)   Ht 1.676 m (5' 6\")   Wt 96 kg (211 lb 9.6 oz)   SpO2 93%   BMI 34.15 kg/m    Exam:  RESP:  respiratory effort and palpation of chest normal  CV:  Palpation and auscultation of heart done , regular rate and rhythm, no murmur, rub, or gallop  M/S:   Gait and station normal  Digits and nails normal  SKIN:  Inspection of skin and subcutaneous tissue baseline, Palpation of skin and subcutaneous tissue baseline  NEURO:   Cranial nerves 2-12 are normal tested and grossly at patient's baseline  PSYCH:  anxious    Labs:   Labs done in SNF are in [facility]. Please refer to them using [employer]/Care Everywhere. and Recent labs in [facility] reviewed by me today.     ASSESSMENT/PLAN:    ICD-10-CM    1. Anxiety and depression  F41.9     F32.9    2. CELIA (generalized anxiety disorder)  F41.1    3. Moderate episode of recurrent major depressive disorder (H)  F33.1        Increase buspar 15 mg/BID    Discontinue effexor    Hold ativan at this time. Will re-evaluated and discontinue as needed in 14 days.     See new orders above     Electronically signed by:  VANDANA Munguia [facility]     "

## 2021-02-25 DIAGNOSIS — E10.65 TYPE 1 DIABETES MELLITUS WITH HYPERGLYCEMIA (H): ICD-10-CM

## 2021-02-26 RX ORDER — INSULIN ASPART 100 [IU]/ML
INJECTION, SOLUTION INTRAVENOUS; SUBCUTANEOUS
Qty: 15 ML | Refills: 1 | Status: SHIPPED | OUTPATIENT
Start: 2021-02-26 | End: 2021-05-25

## 2021-03-04 DIAGNOSIS — F41.1 GAD (GENERALIZED ANXIETY DISORDER): ICD-10-CM

## 2021-03-04 DIAGNOSIS — R73.9 HYPERGLYCEMIA: ICD-10-CM

## 2021-03-04 RX ORDER — ATORVASTATIN CALCIUM 40 MG/1
TABLET, FILM COATED ORAL
Qty: 30 TABLET | Refills: 0 | Status: SHIPPED | OUTPATIENT
Start: 2021-03-04 | End: 2021-04-02

## 2021-03-04 RX ORDER — ESCITALOPRAM OXALATE 20 MG/1
TABLET ORAL
Qty: 30 TABLET | Refills: 0 | Status: SHIPPED | OUTPATIENT
Start: 2021-03-04 | End: 2021-05-04

## 2021-03-10 DIAGNOSIS — I10 ESSENTIAL HYPERTENSION: ICD-10-CM

## 2021-03-10 RX ORDER — AMLODIPINE BESYLATE 10 MG/1
10 TABLET ORAL EVERY EVENING
Qty: 90 TABLET | Refills: 0 | Status: SHIPPED | OUTPATIENT
Start: 2021-03-10 | End: 2021-06-15

## 2021-03-17 ENCOUNTER — TELEPHONE (OUTPATIENT)
Dept: GERIATRICS | Facility: CLINIC | Age: 76
End: 2021-03-17

## 2021-03-17 NOTE — TELEPHONE ENCOUNTER
Order:  Ok to check blood sugar and administer insulin prior to patient leaving facility on 3/18/21. Please ensure patient eats shortly after insulin is administered

## 2021-03-17 NOTE — TELEPHONE ENCOUNTER
"[place] [facility] BRIDGE COMMUNICATION DOCUMENTATION ENCOUNTER    Kimberly Stephenson is a 75 year old  (1945), Nurse messaged [facility] via the \"Bridge\" today to report:     03/17/2021 4:17:57 PM - By: Luana Toure       Kimberly is wanting to go out for lunch with some high school friends at 11am tomorrow (3/18/21).   Her blood sugar check is at 11:40am and NovoLOG FlexPen 100 UNIT/ML SOPN sliding scale to be administered at 11:40am also.  She states she will be eating shortly after she leaves at 11am.   Can we get an order for a one time dosage time change?    ASSESSMENT/PLAN  Sent to NP on Call    Please respond in the Bridge to the attached orders    Electronically signed by:   Renita Lipscomb RN  "

## 2021-03-22 ASSESSMENT — MIFFLIN-ST. JEOR: SCORE: 1479.26

## 2021-03-25 ENCOUNTER — RECORDS - HEALTHEAST (OUTPATIENT)
Dept: LAB | Facility: CLINIC | Age: 76
End: 2021-03-25

## 2021-04-02 DIAGNOSIS — F41.9 ANXIETY AND DEPRESSION: Primary | ICD-10-CM

## 2021-04-02 DIAGNOSIS — R73.9 HYPERGLYCEMIA: ICD-10-CM

## 2021-04-02 DIAGNOSIS — F32.A ANXIETY AND DEPRESSION: Primary | ICD-10-CM

## 2021-04-02 RX ORDER — BUSPIRONE HYDROCHLORIDE 15 MG/1
TABLET ORAL
Qty: 60 TABLET | Refills: 0 | Status: SHIPPED | OUTPATIENT
Start: 2021-04-02 | End: 2021-05-11

## 2021-04-02 RX ORDER — ATORVASTATIN CALCIUM 40 MG/1
TABLET, FILM COATED ORAL
Qty: 30 TABLET | Refills: 0 | Status: SHIPPED | OUTPATIENT
Start: 2021-04-02 | End: 2021-05-11

## 2021-04-21 ENCOUNTER — RECORDS - HEALTHEAST (OUTPATIENT)
Dept: LAB | Facility: CLINIC | Age: 76
End: 2021-04-21

## 2021-04-28 ENCOUNTER — RECORDS - HEALTHEAST (OUTPATIENT)
Dept: LAB | Facility: CLINIC | Age: 76
End: 2021-04-28

## 2021-04-30 DIAGNOSIS — F41.1 GAD (GENERALIZED ANXIETY DISORDER): ICD-10-CM

## 2021-05-04 RX ORDER — ESCITALOPRAM OXALATE 20 MG/1
TABLET ORAL
Qty: 30 TABLET | Refills: 0 | Status: SHIPPED | OUTPATIENT
Start: 2021-05-04 | End: 2021-05-25

## 2021-05-05 ENCOUNTER — RECORDS - HEALTHEAST (OUTPATIENT)
Dept: LAB | Facility: CLINIC | Age: 76
End: 2021-05-05

## 2021-05-11 ENCOUNTER — RECORDS - HEALTHEAST (OUTPATIENT)
Dept: LAB | Facility: CLINIC | Age: 76
End: 2021-05-11

## 2021-05-11 DIAGNOSIS — F32.A ANXIETY AND DEPRESSION: ICD-10-CM

## 2021-05-11 DIAGNOSIS — F41.9 ANXIETY AND DEPRESSION: ICD-10-CM

## 2021-05-11 DIAGNOSIS — R73.9 HYPERGLYCEMIA: ICD-10-CM

## 2021-05-11 RX ORDER — ATORVASTATIN CALCIUM 40 MG/1
TABLET, FILM COATED ORAL
Qty: 30 TABLET | Refills: 0 | Status: SHIPPED | OUTPATIENT
Start: 2021-05-11 | End: 2021-06-15

## 2021-05-11 RX ORDER — BUSPIRONE HYDROCHLORIDE 15 MG/1
TABLET ORAL
Qty: 60 TABLET | Refills: 0 | Status: SHIPPED | OUTPATIENT
Start: 2021-05-11 | End: 2021-05-25

## 2021-05-11 NOTE — PROGRESS NOTES
[place] [facility]  Chief Complaint   Patient presents with     Annual Visit     FVP Care Coordination - Home Visit-Annual Assessment     [place] Medical Record Number:  [id number]  Place of Service where encounter took place:  [facility] ([facility]) [[id number]]    HPI:    Kimberly Stephenson  is a 75 year old  (1945), who is being seen today for an annual comprehensive visit. HPI information obtained from: facility chart records, facility staff and patient report.      Today's concerns are:  Type 1 diabetes mellitus with hyperglycemia (H)  Resident has fluctuating blood sugars. Currently taking lantus and novolog sliding scale. Followed by endocrinology. Denies lightheadedness, nausea upon today's visit.      PAF (paroxysmal atrial fibrillation) (H)  Long term current use of anticoagulant therapy  Taking eliquis. Not on rate controller at this time. Heart rate range 66-70 bpm.     Major depressive disorder with single episode, in full remission (H)  CELAI (generalized anxiety disorder)  Reports feeling better with recent dose increases, currently taking buspirone, lexipro, lorazepam and melatonin.     Essential hypertension  Denies CP, SOB or lightheadedness. Recent blood pressure 139/69  161/81  -Currently taking amlodipine 10 mg/day       ALLERGIES: Cymbalta, Fentanyl, and Versed [midazolam]  PAST MEDICAL HISTORY:  has a past medical history of Anxiety, Depression, Diabetes mellitus (H), DJD (degenerative joint disease) of knee, Hyperlipidemia, Hypertension, PONV (postoperative nausea and vomiting), and TMJ syndrome.  PAST SURGICAL HISTORY:  has a past surgical history that includes orthopedic surgery; Arthroplasty knee; and Open reduction internal fixation ankle (3/27/2013).  IMMUNIZATIONS:  Immunization History   Administered Date(s) Administered     COVID-19,PF,Moderna 01/13/2021, 02/08/2021     Flu 65+ Years 09/25/2017, 09/17/2018, 09/13/2019     Influenza (High Dose)  3 valent vaccine 08/30/2016, 09/17/2018     Influenza (IIV3) PF 11/28/2005, 10/08/2007, 11/23/2010, 12/07/2011, 09/20/2012, 11/15/2013     Influenza Vaccine IM > 6 months Valent IIV4 09/08/2015     Influenza, Quad, High Dose, Pf, 65yr + 09/22/2020     Pneumo Conj 13-V (2010&after) 08/19/2015     Pneumococcal 23 valent 10/02/2009     TD (ADULT, 7+) 01/01/2004     TDAP Vaccine (Adacel) 09/20/2012, 02/22/2014     Above immunizations pulled from [employer]. MIIC and facility records also reconciled. Outstanding information sent to  to update [employer] .  Future immunizations are not needed at this point as all recommended immunizations are up to date.     Current Outpatient Medications   Medication Sig Dispense Refill     acetaminophen (TYLENOL) 325 MG tablet Take 650 mg by mouth every 4 hours as needed for mild pain       acetaminophen (TYLENOL) 500 MG tablet Take 1,000 mg by mouth daily       alcohol swab prep pads Use to swab area of injection/gali as directed. 100 each 3     amLODIPine (NORVASC) 10 MG tablet Take 1 tablet (10 mg) by mouth every evening 90 tablet 0     apixaban ANTICOAGULANT (ELIQUIS) 2.5 MG tablet Take 2.5 mg by mouth 2 times daily        atorvastatin (LIPITOR) 40 MG tablet take one tablet EVERY DAY 30 tablet 0     busPIRone (BUSPAR) 15 MG tablet take one tablet TWICE DAILY 60 tablet 0     escitalopram (LEXAPRO) 20 MG tablet take one tablet EVERY DAY 30 tablet 0     escitalopram (LEXAPRO) 20 MG tablet Take 20 mg by mouth daily       insulin aspart (NOVOLOG PEN) 100 UNIT/ML pen Inject 2 Units Subcutaneous 3 times daily (with meals) 3 mL 3     insulin aspart (NOVOLOG PEN) 100 UNIT/ML pen Inject 0-7 Units Subcutaneous At Bedtime Do Not give Bedtime Correction Insulin if BG less than 200 For  to 250 give 2 units. For  to 300 give 3 units. For  to 350 give 4 units. For  to 400 give 5 units. For  to 450 give 6 units. For  to 500 give 7 units.  Notify provider if glucose greater than or equal to 350 mg/dL after administration. 3 mL 0     insulin aspart (NOVOLOG PEN) 100 UNIT/ML pen Inject 0-13 Units Subcutaneous 3 times daily (before meals) Correction Scale - custom DOSING     Do Not give Correction Insulin if Pre-Meal BG less than 100  For Pre-Meal  to 150 give 10 units.  For Pre-Meal  to 200 give 11 units.  For Pre-Meal  to 250 give 12 units.  For Pre-Meal  to 300 give 13 units.  For Pre-Meal  to 350 give 14 units.  For Pre-Meal  to 400 give 15 units.  To be given with prandial insulin, and based on pre-meal blood glucose.   Notify provider if glucose greater than or equal 350 mg/dL after administration. If given at mealtime, administer within 30 minutes of start of meal 3 mL 0     LANTUS SOLOSTAR 100 UNIT/ML soln Inject 30 Units Subcutaneous every morning 15 mL 3     melatonin 3 MG tablet Take 1 tablet (3 mg) by mouth nightly as needed for sleep 50 tablet 3     NOVOLOG FLEXPEN 100 UNIT/ML soln INJECT FOUR units BEFORE MEALS 15 mL 1         Case Management:  I have reviewed the Assisted Living care plan, current immunizations and preventive care/cancer screening. .Future cancer screening is not clinically indicated secondary to age/goals of care Patient's desire to return to the community is present, but is not able due to care needs . Current Level of Care is appropriate.    Advance Directive Discussion:    I reviewed the current advanced directives as reflected in EPIC, the POLST and the facility chart, and verified the congruency of orders . I contacted the first party Kimberly Stephenson and discussed the plan of Care.  I did review the advance directives with the resident.     Team Discussion:  I communicated with the appropriate disciplines involved with the Plan of Care:   Nursing    Patient's goal is pain control and comfort.  Information reviewed:  Medications, vital signs, orders, and nursing notes.    ROS:  10 point  "ROS of systems including Constitutional, Eyes, Respiratory, Cardiovascular, Gastroenterology, Genitourinary, Integumentary, Musculoskeletal, Psychiatric were all negative except for pertinent positives noted in my HPI.    Vitals:  /67   Pulse 75   Temp 97.1  F (36.2  C)   Resp 16   Ht 1.676 m (5' 6\")   Wt 97.3 kg (214 lb 6.4 oz)   SpO2 93%   BMI 34.61 kg/m   Body mass index is 34.61 kg/m .  Exam:  GENERAL APPEARANCE:  Alert, in no distress  ENT:  Mouth and posterior oropharynx normal, moist mucous membranes, normal hearing acuity  RESP:  respiratory effort and palpation of chest normal, lungs clear to auscultation   CV:  Palpation and auscultation of heart done , regular rate and rhythm, no murmur, rub, or gallop  ABDOMEN:  normal bowel sounds, soft, nontender, no hepatosplenomegaly or other masses  M/S:   Gait and station normal  Digits and nails normal  SKIN:  Inspection of skin and subcutaneous tissue baseline, Palpation of skin and subcutaneous tissue baseline  NEURO:   Cranial nerves 2-12 are normal tested and grossly at patient's baseline  PSYCH:  oriented X 3 normal affect     Lab/Diagnostic data:   Labs done in SNF are in [facility]. Please refer to them using [employer]/Care Everywhere. and Recent labs in [facility] reviewed by me today.     ASSESSMENT/PLAN  (E10.65) Type 1 diabetes mellitus with hyperglycemia (H)  (primary encounter diagnosis)  Comment: Chronic, managed on current regimen.   Plan:   -Follow daily blood sugars   -Continue novolog sliding scale and lantus.   -Follow with endocrinology as planned.     (I48.0) PAF (paroxysmal atrial fibrillation) (H)  (Z79.01) Long term current use of anticoagulant therapy  Comment: Chronic, stable off rate controller. Anticoagulated with eliquis.   Plan:   -No change     (F32.5) Major depressive disorder with single episode, in full remission (H)  (F41.1) CELIA (generalized anxiety disorder)  Comment: Longstanding history. stable  Plan:   -Buspirone, " lexipro, lorazepam as ordered. No change due to stability.     (I10) Essential hypertension  Comment: Chronic, blood pressure stable.   Plan:   -No change  -Keep SBP> 130 mmHg and DBP > 65 mmHg (levels below these increase mortality as shown by standard studies and observations).     The current medical regimen is effective; continue present plan and medications.      Electronically signed by:  VANDANA Munguia [facility]

## 2021-05-12 ENCOUNTER — ASSISTED LIVING VISIT (OUTPATIENT)
Dept: GERIATRICS | Facility: CLINIC | Age: 76
End: 2021-05-12
Payer: COMMERCIAL

## 2021-05-12 VITALS
HEART RATE: 75 BPM | RESPIRATION RATE: 16 BRPM | OXYGEN SATURATION: 93 % | WEIGHT: 214.4 LBS | DIASTOLIC BLOOD PRESSURE: 67 MMHG | TEMPERATURE: 97.1 F | BODY MASS INDEX: 34.46 KG/M2 | SYSTOLIC BLOOD PRESSURE: 138 MMHG | HEIGHT: 66 IN

## 2021-05-12 DIAGNOSIS — F41.1 GAD (GENERALIZED ANXIETY DISORDER): ICD-10-CM

## 2021-05-12 DIAGNOSIS — I48.0 PAF (PAROXYSMAL ATRIAL FIBRILLATION) (H): ICD-10-CM

## 2021-05-12 DIAGNOSIS — Z79.01 LONG TERM CURRENT USE OF ANTICOAGULANT THERAPY: ICD-10-CM

## 2021-05-12 DIAGNOSIS — E10.65 TYPE 1 DIABETES MELLITUS WITH HYPERGLYCEMIA (H): Primary | ICD-10-CM

## 2021-05-12 DIAGNOSIS — F32.5 MAJOR DEPRESSIVE DISORDER WITH SINGLE EPISODE, IN FULL REMISSION (H): ICD-10-CM

## 2021-05-12 DIAGNOSIS — I10 ESSENTIAL HYPERTENSION: ICD-10-CM

## 2021-05-12 NOTE — LETTER
5/12/2021        RE: Kimberly Stephenson  [facility]  [address] [address]        [place] [facility]  Chief Complaint   Patient presents with     Annual Visit     FVP Care Coordination - Home Visit-Annual Assessment     [place] Medical Record Number:  [id number]  Place of Service where encounter took place:  [facility] ([facility]) [[id number]]    HPI:    Kimberly Stephenson  is a 75 year old  (1945), who is being seen today for an annual comprehensive visit. HPI information obtained from: facility chart records, facility staff and patient report.      Today's concerns are:  Type 1 diabetes mellitus with hyperglycemia (H)  Resident has fluctuating blood sugars. Currently taking lantus and novolog sliding scale. Followed by endocrinology. Denies lightheadedness, nausea upon today's visit.      PAF (paroxysmal atrial fibrillation) (H)  Long term current use of anticoagulant therapy  Taking eliquis. Not on rate controller at this time. Heart rate range 66-70 bpm.     Major depressive disorder with single episode, in full remission (H)  CELIA (generalized anxiety disorder)  Reports feeling better with recent dose increases, currently taking buspirone, lexipro, lorazepam and melatonin.     Essential hypertension  Denies CP, SOB or lightheadedness. Recent blood pressure 139/69  161/81  -Currently taking amlodipine 10 mg/day       ALLERGIES: Cymbalta, Fentanyl, and Versed [midazolam]  PAST MEDICAL HISTORY:  has a past medical history of Anxiety, Depression, Diabetes mellitus (H), DJD (degenerative joint disease) of knee, Hyperlipidemia, Hypertension, PONV (postoperative nausea and vomiting), and TMJ syndrome.  PAST SURGICAL HISTORY:  has a past surgical history that includes orthopedic surgery; Arthroplasty knee; and Open reduction internal fixation ankle (3/27/2013).  IMMUNIZATIONS:  Immunization History   Administered Date(s) Administered      COVID-19,PF,Moderna 01/13/2021, 02/08/2021     Flu 65+ Years 09/25/2017, 09/17/2018, 09/13/2019     Influenza (High Dose) 3 valent vaccine 08/30/2016, 09/17/2018     Influenza (IIV3) PF 11/28/2005, 10/08/2007, 11/23/2010, 12/07/2011, 09/20/2012, 11/15/2013     Influenza Vaccine IM > 6 months Valent IIV4 09/08/2015     Influenza, Quad, High Dose, Pf, 65yr + 09/22/2020     Pneumo Conj 13-V (2010&after) 08/19/2015     Pneumococcal 23 valent 10/02/2009     TD (ADULT, 7+) 01/01/2004     TDAP Vaccine (Adacel) 09/20/2012, 02/22/2014     Above immunizations pulled from [place] [employer]. MIIC and facility records also reconciled. Outstanding information sent to  to update [employer] .  Future immunizations are not needed at this point as all recommended immunizations are up to date.     Current Outpatient Medications   Medication Sig Dispense Refill     acetaminophen (TYLENOL) 325 MG tablet Take 650 mg by mouth every 4 hours as needed for mild pain       acetaminophen (TYLENOL) 500 MG tablet Take 1,000 mg by mouth daily       alcohol swab prep pads Use to swab area of injection/gali as directed. 100 each 3     amLODIPine (NORVASC) 10 MG tablet Take 1 tablet (10 mg) by mouth every evening 90 tablet 0     apixaban ANTICOAGULANT (ELIQUIS) 2.5 MG tablet Take 2.5 mg by mouth 2 times daily        atorvastatin (LIPITOR) 40 MG tablet take one tablet EVERY DAY 30 tablet 0     busPIRone (BUSPAR) 15 MG tablet take one tablet TWICE DAILY 60 tablet 0     escitalopram (LEXAPRO) 20 MG tablet take one tablet EVERY DAY 30 tablet 0     escitalopram (LEXAPRO) 20 MG tablet Take 20 mg by mouth daily       insulin aspart (NOVOLOG PEN) 100 UNIT/ML pen Inject 2 Units Subcutaneous 3 times daily (with meals) 3 mL 3     insulin aspart (NOVOLOG PEN) 100 UNIT/ML pen Inject 0-7 Units Subcutaneous At Bedtime Do Not give Bedtime Correction Insulin if BG less than 200 For  to 250 give 2 units. For  to 300 give 3 units. For BG  301 to 350 give 4 units. For  to 400 give 5 units. For  to 450 give 6 units. For  to 500 give 7 units. Notify provider if glucose greater than or equal to 350 mg/dL after administration. 3 mL 0     insulin aspart (NOVOLOG PEN) 100 UNIT/ML pen Inject 0-13 Units Subcutaneous 3 times daily (before meals) Correction Scale - custom DOSING     Do Not give Correction Insulin if Pre-Meal BG less than 100  For Pre-Meal  to 150 give 10 units.  For Pre-Meal  to 200 give 11 units.  For Pre-Meal  to 250 give 12 units.  For Pre-Meal  to 300 give 13 units.  For Pre-Meal  to 350 give 14 units.  For Pre-Meal  to 400 give 15 units.  To be given with prandial insulin, and based on pre-meal blood glucose.   Notify provider if glucose greater than or equal 350 mg/dL after administration. If given at mealtime, administer within 30 minutes of start of meal 3 mL 0     LANTUS SOLOSTAR 100 UNIT/ML soln Inject 30 Units Subcutaneous every morning 15 mL 3     melatonin 3 MG tablet Take 1 tablet (3 mg) by mouth nightly as needed for sleep 50 tablet 3     NOVOLOG FLEXPEN 100 UNIT/ML soln INJECT FOUR units BEFORE MEALS 15 mL 1         Case Management:  I have reviewed the Assisted Living care plan, current immunizations and preventive care/cancer screening. .Future cancer screening is not clinically indicated secondary to age/goals of care Patient's desire to return to the community is present, but is not able due to care needs . Current Level of Care is appropriate.    Advance Directive Discussion:    I reviewed the current advanced directives as reflected in EPIC, the POLST and the facility chart, and verified the congruency of orders . I contacted the first party Kimberly Stephenson and discussed the plan of Care.  I did review the advance directives with the resident.     Team Discussion:  I communicated with the appropriate disciplines involved with the Plan of Care:   Nursing    Patient's goal is  "pain control and comfort.  Information reviewed:  Medications, vital signs, orders, and nursing notes.    ROS:  10 point ROS of systems including Constitutional, Eyes, Respiratory, Cardiovascular, Gastroenterology, Genitourinary, Integumentary, Musculoskeletal, Psychiatric were all negative except for pertinent positives noted in my HPI.    Vitals:  /67   Pulse 75   Temp 97.1  F (36.2  C)   Resp 16   Ht 1.676 m (5' 6\")   Wt 97.3 kg (214 lb 6.4 oz)   SpO2 93%   BMI 34.61 kg/m   Body mass index is 34.61 kg/m .  Exam:  GENERAL APPEARANCE:  Alert, in no distress  ENT:  Mouth and posterior oropharynx normal, moist mucous membranes, normal hearing acuity  RESP:  respiratory effort and palpation of chest normal, lungs clear to auscultation   CV:  Palpation and auscultation of heart done , regular rate and rhythm, no murmur, rub, or gallop  ABDOMEN:  normal bowel sounds, soft, nontender, no hepatosplenomegaly or other masses  M/S:   Gait and station normal  Digits and nails normal  SKIN:  Inspection of skin and subcutaneous tissue baseline, Palpation of skin and subcutaneous tissue baseline  NEURO:   Cranial nerves 2-12 are normal tested and grossly at patient's baseline  PSYCH:  oriented X 3 normal affect     Lab/Diagnostic data:   Labs done in SNF are in [facility]. Please refer to them using [employer]/Care Everywhere. and Recent labs in [facility] reviewed by me today.     ASSESSMENT/PLAN  (E10.65) Type 1 diabetes mellitus with hyperglycemia (H)  (primary encounter diagnosis)  Comment: Chronic, managed on current regimen.   Plan:   -Follow daily blood sugars   -Continue novolog sliding scale and lantus.   -Follow with endocrinology as planned.     (I48.0) PAF (paroxysmal atrial fibrillation) (H)  (Z79.01) Long term current use of anticoagulant therapy  Comment: Chronic, stable off rate controller. Anticoagulated with eliquis.   Plan:   -No change     (F32.5) Major depressive disorder with single episode, in full " remission (H)  (F41.1) CELIA (generalized anxiety disorder)  Comment: Longstanding history. stable  Plan:   -Buspirone, lexipro, lorazepam as ordered. No change due to stability.     (I10) Essential hypertension  Comment: Chronic, blood pressure stable.   Plan:   -No change  -Keep SBP> 130 mmHg and DBP > 65 mmHg (levels below these increase mortality as shown by standard studies and observations).     The current medical regimen is effective; continue present plan and medications.      Electronically signed by:  VANDANA Munguia [facility]           Sincerely,        Li Arana NP

## 2021-05-14 ENCOUNTER — RECORDS - HEALTHEAST (OUTPATIENT)
Dept: LAB | Facility: CLINIC | Age: 76
End: 2021-05-14

## 2021-05-17 LAB
ANION GAP SERPL CALCULATED.3IONS-SCNC: 12 MMOL/L (ref 5–18)
BUN SERPL-MCNC: 18 MG/DL (ref 8–28)
CALCIUM SERPL-MCNC: 8.9 MG/DL (ref 8.5–10.5)
CHLORIDE BLD-SCNC: 104 MMOL/L (ref 98–107)
CO2 SERPL-SCNC: 24 MMOL/L (ref 22–31)
CREAT SERPL-MCNC: 0.79 MG/DL (ref 0.6–1.1)
GFR SERPL CREATININE-BSD FRML MDRD: >60 ML/MIN/1.73M2
GLUCOSE BLD-MCNC: 182 MG/DL (ref 70–125)
POTASSIUM BLD-SCNC: 4.3 MMOL/L (ref 3.5–5)
SODIUM SERPL-SCNC: 140 MMOL/L (ref 136–145)

## 2021-05-25 DIAGNOSIS — F41.9 ANXIETY AND DEPRESSION: ICD-10-CM

## 2021-05-25 DIAGNOSIS — F32.A ANXIETY AND DEPRESSION: ICD-10-CM

## 2021-05-25 DIAGNOSIS — F41.1 GAD (GENERALIZED ANXIETY DISORDER): ICD-10-CM

## 2021-05-25 DIAGNOSIS — E10.65 TYPE 1 DIABETES MELLITUS WITH HYPERGLYCEMIA (H): ICD-10-CM

## 2021-05-25 RX ORDER — ESCITALOPRAM OXALATE 20 MG/1
TABLET ORAL
Qty: 30 TABLET | Refills: 0 | Status: SHIPPED | OUTPATIENT
Start: 2021-05-25 | End: 2021-07-06

## 2021-05-25 RX ORDER — BUSPIRONE HYDROCHLORIDE 15 MG/1
TABLET ORAL
Qty: 60 TABLET | Refills: 0 | Status: SHIPPED | OUTPATIENT
Start: 2021-05-25 | End: 2021-06-25

## 2021-05-25 RX ORDER — INSULIN ASPART 100 [IU]/ML
INJECTION, SOLUTION INTRAVENOUS; SUBCUTANEOUS
Qty: 15 ML | Refills: 1 | Status: SHIPPED | OUTPATIENT
Start: 2021-05-25 | End: 2021-08-25

## 2021-06-12 DIAGNOSIS — I10 ESSENTIAL HYPERTENSION: ICD-10-CM

## 2021-06-12 DIAGNOSIS — R73.9 HYPERGLYCEMIA: ICD-10-CM

## 2021-06-15 RX ORDER — AMLODIPINE BESYLATE 10 MG/1
10 TABLET ORAL EVERY EVENING
Qty: 90 TABLET | Refills: 0 | Status: SHIPPED | OUTPATIENT
Start: 2021-06-15 | End: 2021-09-08

## 2021-06-15 RX ORDER — ATORVASTATIN CALCIUM 40 MG/1
TABLET, FILM COATED ORAL
Qty: 30 TABLET | Refills: 0 | Status: SHIPPED | OUTPATIENT
Start: 2021-06-15 | End: 2021-06-25

## 2021-06-20 ASSESSMENT — MIFFLIN-ST. JEOR: SCORE: 1594.92

## 2021-06-24 ENCOUNTER — APPOINTMENT (OUTPATIENT)
Dept: MRI IMAGING | Facility: CLINIC | Age: 76
End: 2021-06-24
Attending: EMERGENCY MEDICINE
Payer: COMMERCIAL

## 2021-06-24 ENCOUNTER — HOSPITAL ENCOUNTER (EMERGENCY)
Facility: CLINIC | Age: 76
Discharge: HOME | End: 2021-06-24
Attending: EMERGENCY MEDICINE | Admitting: EMERGENCY MEDICINE
Payer: COMMERCIAL

## 2021-06-24 VITALS
TEMPERATURE: 97.4 F | WEIGHT: 215 LBS | SYSTOLIC BLOOD PRESSURE: 128 MMHG | OXYGEN SATURATION: 94 % | BODY MASS INDEX: 34.7 KG/M2 | HEART RATE: 73 BPM | DIASTOLIC BLOOD PRESSURE: 84 MMHG | RESPIRATION RATE: 15 BRPM

## 2021-06-24 DIAGNOSIS — F41.9 ANXIETY AND DEPRESSION: ICD-10-CM

## 2021-06-24 DIAGNOSIS — R47.1 DYSARTHRIA: ICD-10-CM

## 2021-06-24 DIAGNOSIS — R73.9 HYPERGLYCEMIA: ICD-10-CM

## 2021-06-24 DIAGNOSIS — F32.A ANXIETY AND DEPRESSION: ICD-10-CM

## 2021-06-24 LAB
ANION GAP SERPL CALCULATED.3IONS-SCNC: 7 MMOL/L (ref 3–14)
APTT PPP: 30 SEC (ref 22–37)
BASOPHILS # BLD AUTO: 0 10E9/L (ref 0–0.2)
BASOPHILS NFR BLD AUTO: 0.6 %
BUN SERPL-MCNC: 18 MG/DL (ref 7–30)
CALCIUM SERPL-MCNC: 9.2 MG/DL (ref 8.5–10.1)
CHLORIDE SERPL-SCNC: 106 MMOL/L (ref 94–109)
CO2 SERPL-SCNC: 27 MMOL/L (ref 20–32)
CREAT SERPL-MCNC: 0.82 MG/DL (ref 0.52–1.04)
DIFFERENTIAL METHOD BLD: NORMAL
EOSINOPHIL # BLD AUTO: 0.1 10E9/L (ref 0–0.7)
EOSINOPHIL NFR BLD AUTO: 1.2 %
ERYTHROCYTE [DISTWIDTH] IN BLOOD BY AUTOMATED COUNT: 13.8 % (ref 10–15)
GFR SERPL CREATININE-BSD FRML MDRD: 69 ML/MIN/{1.73_M2}
GLUCOSE BLDC GLUCOMTR-MCNC: 186 MG/DL (ref 70–99)
GLUCOSE SERPL-MCNC: 198 MG/DL (ref 70–99)
HBA1C MFR BLD: 10.2 % (ref 0–5.6)
HCT VFR BLD AUTO: 39.8 % (ref 35–47)
HGB BLD-MCNC: 13.2 G/DL (ref 11.7–15.7)
IMM GRANULOCYTES # BLD: 0 10E9/L (ref 0–0.4)
IMM GRANULOCYTES NFR BLD: 0.2 %
INR PPP: 1.08 (ref 0.86–1.14)
LDLC SERPL DIRECT ASSAY-MCNC: 64 MG/DL
LYMPHOCYTES # BLD AUTO: 1.7 10E9/L (ref 0.8–5.3)
LYMPHOCYTES NFR BLD AUTO: 36 %
MCH RBC QN AUTO: 30.6 PG (ref 26.5–33)
MCHC RBC AUTO-ENTMCNC: 33.2 G/DL (ref 31.5–36.5)
MCV RBC AUTO: 92 FL (ref 78–100)
MONOCYTES # BLD AUTO: 0.5 10E9/L (ref 0–1.3)
MONOCYTES NFR BLD AUTO: 9.9 %
NEUTROPHILS # BLD AUTO: 2.5 10E9/L (ref 1.6–8.3)
NEUTROPHILS NFR BLD AUTO: 52.1 %
NRBC # BLD AUTO: 0 10*3/UL
NRBC BLD AUTO-RTO: 0 /100
PLATELET # BLD AUTO: 190 10E9/L (ref 150–450)
POTASSIUM SERPL-SCNC: 4.8 MMOL/L (ref 3.4–5.3)
RBC # BLD AUTO: 4.31 10E12/L (ref 3.8–5.2)
SODIUM SERPL-SCNC: 140 MMOL/L (ref 133–144)
TROPONIN I SERPL-MCNC: <0.015 UG/L (ref 0–0.04)
WBC # BLD AUTO: 4.8 10E9/L (ref 4–11)

## 2021-06-24 PROCEDURE — 85730 THROMBOPLASTIN TIME PARTIAL: CPT | Performed by: EMERGENCY MEDICINE

## 2021-06-24 PROCEDURE — 70544 MR ANGIOGRAPHY HEAD W/O DYE: CPT

## 2021-06-24 PROCEDURE — 70549 MR ANGIOGRAPH NECK W/O&W/DYE: CPT

## 2021-06-24 PROCEDURE — 999N001017 HC STATISTIC GLUCOSE BY METER IP

## 2021-06-24 PROCEDURE — 99285 EMERGENCY DEPT VISIT HI MDM: CPT | Mod: 25 | Performed by: EMERGENCY MEDICINE

## 2021-06-24 PROCEDURE — A9585 GADOBUTROL INJECTION: HCPCS | Performed by: EMERGENCY MEDICINE

## 2021-06-24 PROCEDURE — 93010 ELECTROCARDIOGRAM REPORT: CPT | Performed by: EMERGENCY MEDICINE

## 2021-06-24 PROCEDURE — 83036 HEMOGLOBIN GLYCOSYLATED A1C: CPT | Performed by: EMERGENCY MEDICINE

## 2021-06-24 PROCEDURE — 255N000002 HC RX 255 OP 636: Performed by: EMERGENCY MEDICINE

## 2021-06-24 PROCEDURE — 84484 ASSAY OF TROPONIN QUANT: CPT | Performed by: EMERGENCY MEDICINE

## 2021-06-24 PROCEDURE — 80048 BASIC METABOLIC PNL TOTAL CA: CPT | Performed by: EMERGENCY MEDICINE

## 2021-06-24 PROCEDURE — 93005 ELECTROCARDIOGRAM TRACING: CPT | Performed by: EMERGENCY MEDICINE

## 2021-06-24 PROCEDURE — 85025 COMPLETE CBC W/AUTO DIFF WBC: CPT | Performed by: EMERGENCY MEDICINE

## 2021-06-24 PROCEDURE — 85610 PROTHROMBIN TIME: CPT | Performed by: EMERGENCY MEDICINE

## 2021-06-24 PROCEDURE — 83721 ASSAY OF BLOOD LIPOPROTEIN: CPT | Mod: GZ | Performed by: EMERGENCY MEDICINE

## 2021-06-24 PROCEDURE — 70553 MRI BRAIN STEM W/O & W/DYE: CPT

## 2021-06-24 RX ORDER — GADOBUTROL 604.72 MG/ML
10 INJECTION INTRAVENOUS ONCE
Status: COMPLETED | OUTPATIENT
Start: 2021-06-24 | End: 2021-06-24

## 2021-06-24 RX ADMIN — GADOBUTROL 10 ML: 604.72 INJECTION INTRAVENOUS at 17:06

## 2021-06-24 NOTE — ED PROVIDER NOTES
Seen in room 3 upon arrival for stroke evaluation  History     Chief Complaint   Patient presents with     Aphasia     lasted about 30 seconds and has since resolved     HPI  Kimberly Stephenson is a 76 year old female who presents from home secondary to brief, less than 1 minute, dysarthria.  She knew what she wanted to say but she had jumbled words that were not understandable.  This occurred at 1340.  Denies any significant headache, no visual change, notes difficulty swallowing, no numbness weakness or imbalance.  Denies history of stroke.  Chronic anticoagulation apixaban secondary to atrial fibrillation reports compliance with same.  Denies recent head injury.  Denies recent febrile illness cough congestion sore throat nausea vomiting diarrhea.    Allergies:  Allergies   Allergen Reactions     Cymbalta      Fentanyl Nausea and Vomiting     Versed [Midazolam] Nausea and Vomiting       Problem List:    Patient Active Problem List    Diagnosis Date Noted     Moderate episode of recurrent major depressive disorder (H) 01/04/2021     Priority: Medium     Hyperglycemia 04/04/2020     Priority: Medium     DKA (diabetic ketoacidoses) (H) 02/20/2020     Priority: Medium     PATRICIA (acute kidney injury) (H) 01/09/2020     Priority: Medium     NSTEMI (non-ST elevated myocardial infarction) (H) 01/09/2020     Priority: Medium     PAF (paroxysmal atrial fibrillation) (H) 01/09/2020     Priority: Medium     Personal history of fall 01/09/2020     Priority: Medium     Closed Colles' fracture of left radius with routine healing, subsequent encounter 01/09/2020     Priority: Medium     initial fall 9/24/19, fell again 12/26 and re-fractured       Bacteremia due to coagulase-negative Staphylococcus 01/02/2020     Priority: Medium     Staphylococcal sepsis (H) 01/02/2020     Priority: Medium     Acute encephalopathy 12/28/2019     Priority: Medium     Type 1 diabetes mellitus with hyperglycemia (H) 12/28/2019     Priority: Medium      Cognitive decline 12/19/2018     Priority: Medium     Dupuytren's contracture of hand 08/30/2016     Priority: Medium     Depression 08/15/2014     Priority: Medium     CELIA (generalized anxiety disorder) 10/17/2013     Priority: Medium     Dyslipidemia 08/07/2013     Priority: Medium     Ankle joint pain 04/15/2013     Priority: Medium              Edema 04/15/2013     Priority: Medium     Abnormal gait 04/15/2013     Priority: Medium     Closed right ankle fracture 03/19/2013     Priority: Medium     Essential hypertension 12/05/2005     Priority: Medium        Past Medical History:    Past Medical History:   Diagnosis Date     Anxiety      Depression      Diabetes mellitus (H)      DJD (degenerative joint disease) of knee      Hyperlipidemia      Hypertension      PONV (postoperative nausea and vomiting)      TMJ syndrome        Past Surgical History:    Past Surgical History:   Procedure Laterality Date     ARTHROPLASTY KNEE      left     OPEN REDUCTION INTERNAL FIXATION ANKLE  3/27/2013    Procedure: OPEN REDUCTION INTERNAL FIXATION ANKLE;  Right Ankle Open Reduction Internal Fixation ; Spinal anesthesia with block      ;  Surgeon: Dayday Gutiérrez MD;  Location: [facility]     ORTHOPEDIC SURGERY         Family History:    Family History   Problem Relation Age of Onset     Stomach Cancer Mother      Ovarian Cancer Mother      Lung Cancer Father      Diabetes Sister        Social History:  Marital Status:   [4]  Social History     Tobacco Use     Smoking status: Former Smoker     Smokeless tobacco: Never Used     Tobacco comment: previous marijuana use    Substance Use Topics     Alcohol use: Yes     Comment: 2-3 per week, wine     Drug use: No        Medications:    acetaminophen (TYLENOL) 325 MG tablet  acetaminophen (TYLENOL) 500 MG tablet  alcohol swab prep pads  amLODIPine (NORVASC) 10 MG tablet  apixaban ANTICOAGULANT (ELIQUIS) 2.5 MG tablet  atorvastatin (LIPITOR) 40 MG tablet  busPIRone (BUSPAR)  15 MG tablet  escitalopram (LEXAPRO) 20 MG tablet  escitalopram (LEXAPRO) 20 MG tablet  insulin aspart (NOVOLOG PEN) 100 UNIT/ML pen  insulin aspart (NOVOLOG PEN) 100 UNIT/ML pen  insulin aspart (NOVOLOG PEN) 100 UNIT/ML pen  LANTUS SOLOSTAR 100 UNIT/ML soln  melatonin 3 MG tablet  NOVOLOG FLEXPEN 100 UNIT/ML soln          Review of Systems  All other systems reviewed and are negative.    Physical Exam   BP: (!) 175/73  Pulse: 71  Temp: 97.4  F (36.3  C)  Resp: 18  Weight: 97.5 kg (215 lb)  SpO2: 96 %      Physical Exam  Nontoxic-appearing no respiratory distress alert and oriented x3. GCS 15 on arrival, throughout stay and at discharge.    Head atraumatic normocephalic    Cranial nerves; vision baseline fields intact, PERRL, EOMI, facial sensation intact to light touch, facial muscle tone intact and symmetrical, hearing grossly intact,swallowing without difficulty, voice baseline and normal, SCM  strength intact, tongue protrudes midline.  Palatal elevation symmetric    TM's unremarkable, EACs clear, oropharynx moist without lesions or erythema.    Lungs clear to auscultation no rales rhonchi or wheezes    Heart regular no murmur S3 or rub    Abdomen soft nontender bowel sounds positive no masses or HSM    Strength and sensation intact throughout the extremities, skin clear from rash or lesion.    Extremities are atraumatic, full painless active range of motion all joints    There is no ataxia no dysmetria  ED Course        Procedures  ECG: Normal rate and rhythm, axis and intervals within normal limits, no acute ST or T wave changes. Read by Dr. Kristian Cornell             Results for orders placed or performed during the hospital encounter of 06/24/21   MR Brain w/o & w Contrast     Status: None    Narrative    MRI BRAIN WITHOUT AND WITH CONTRAST  6/24/2021 6:34 PM    HISTORY:  Dysarthria     TECHNIQUE:  Multiplanar, multisequence MRI of the brain without and  with 10 ML Gadavist.    COMPARISON:  None.    FINDINGS:  Mild volume loss is present. Few scattered white matter T2  hyperintensities likely represent mild chronic small vessel ischemic  change. No evidence of acute ischemia, hemorrhage, mass, mass effect,  or hydrocephalus. No abnormal enhancement or diffusion restriction.  Major intracranial flow voids are maintained.    Marrow signal is within normal limits. Trace opacification bilateral  mastoid cavities. Trace paranasal sinus mucosal thickening.      Impression    IMPRESSION:    1. Unremarkable MRI of the head.  2. No evidence of acute ischemia or hemorrhage.    OLIVIA LOJA MD   MRA Neck (Carotids) wo & w Contrast     Status: None    Narrative    MRA NECK WITHOUT AND WITH CONTRAST  6/24/2021 6:34 PM     HISTORY: Dysarthria    TECHNIQUE: 2D time-of-flight MR angiogram of the neck without contrast  and 3D MR angiogram of the neck with  10 ML Gadavist. Estimates of  carotid stenoses are made relative to the distal internal carotid  artery diameters except as noted.    COMPARISON: None.    FINDINGS:    The bilateral common carotid, internal carotid, external carotid, and  vertebral arteries are patent. No evidence of large vessel occlusion  or high-grade stenosis. No evidence of dissection. Poor visualization  of the proximal left common carotid artery on postcontrast imaging,  likely artifactual.      Impression    IMPRESSION:  Unremarkable MRA of the neck.        OLIVIA LOJA MD   MRA Brain ([place]) wo Contrast     Status: None    Narrative    MR ANGIOGRAM OF THE HEAD WITHOUT CONTRAST   6/24/2021 6:00 PM     HISTORY: Dysarthria    TECHNIQUE:  3D time-of-flight MR angiogram of the head without  contrast.    COMPARISON: None.    FINDINGS: Mild motion artifact The vertebral arteries, basilar artery,  and posterior cerebral arteries are patent. The internal carotid  arteries, anterior cerebral arteries, middle cerebral arteries are  patent. No evidence of large vessel occlusion or  high-grade stenosis.  No evidence of aneurysm or vascular malformation.      Impression    IMPRESSION: Unremarkable MRA of the head.    OLIVIA LOJA MD   Glucose by meter     Status: Abnormal   Result Value Ref Range    Glucose 186 (H) 70 - 99 mg/dL   CBC with Platelets & Differential     Status: None   Result Value Ref Range    WBC 4.8 4.0 - 11.0 10e9/L    RBC Count 4.31 3.8 - 5.2 10e12/L    Hemoglobin 13.2 11.7 - 15.7 g/dL    Hematocrit 39.8 35.0 - 47.0 %    MCV 92 78 - 100 fl    MCH 30.6 26.5 - 33.0 pg    MCHC 33.2 31.5 - 36.5 g/dL    RDW 13.8 10.0 - 15.0 %    Platelet Count 190 150 - 450 10e9/L    Diff Method Automated Method     % Neutrophils 52.1 %    % Lymphocytes 36.0 %    % Monocytes 9.9 %    % Eosinophils 1.2 %    % Basophils 0.6 %    % Immature Granulocytes 0.2 %    Nucleated RBCs 0 0 /100    Absolute Neutrophil 2.5 1.6 - 8.3 10e9/L    Absolute Lymphocytes 1.7 0.8 - 5.3 10e9/L    Absolute Monocytes 0.5 0.0 - 1.3 10e9/L    Absolute Eosinophils 0.1 0.0 - 0.7 10e9/L    Absolute Basophils 0.0 0.0 - 0.2 10e9/L    Abs Immature Granulocytes 0.0 0 - 0.4 10e9/L    Absolute Nucleated RBC 0.0    Basic metabolic panel     Status: Abnormal   Result Value Ref Range    Sodium 140 133 - 144 mmol/L    Potassium 4.8 3.4 - 5.3 mmol/L    Chloride 106 94 - 109 mmol/L    Carbon Dioxide 27 20 - 32 mmol/L    Anion Gap 7 3 - 14 mmol/L    Glucose 198 (H) 70 - 99 mg/dL    Urea Nitrogen 18 7 - 30 mg/dL    Creatinine 0.82 0.52 - 1.04 mg/dL    GFR Estimate 69 >60 mL/min/[1.73_m2]    GFR Estimate If Black 80 >60 mL/min/[1.73_m2]    Calcium 9.2 8.5 - 10.1 mg/dL   INR     Status: None   Result Value Ref Range    INR 1.08 0.86 - 1.14   Partial thromboplastin time     Status: None   Result Value Ref Range    PTT 30 22 - 37 sec   Troponin I     Status: None   Result Value Ref Range    Troponin I ES <0.015 0.000 - 0.045 ug/L   LDL cholesterol direct     Status: None   Result Value Ref Range    LDL Cholesterol Direct 64 <100 mg/dL    Hemaglobin A1C     Status: Abnormal   Result Value Ref Range    Hemoglobin A1C 10.2 (H) 0 - 5.6 %         Critical Care time:  none               Results for orders placed or performed during the hospital encounter of 06/24/21 (from the past 24 hour(s))   Glucose by meter   Result Value Ref Range    Glucose 186 (H) 70 - 99 mg/dL   CBC with Platelets & Differential   Result Value Ref Range    WBC 4.8 4.0 - 11.0 10e9/L    RBC Count 4.31 3.8 - 5.2 10e12/L    Hemoglobin 13.2 11.7 - 15.7 g/dL    Hematocrit 39.8 35.0 - 47.0 %    MCV 92 78 - 100 fl    MCH 30.6 26.5 - 33.0 pg    MCHC 33.2 31.5 - 36.5 g/dL    RDW 13.8 10.0 - 15.0 %    Platelet Count 190 150 - 450 10e9/L    Diff Method Automated Method     % Neutrophils 52.1 %    % Lymphocytes 36.0 %    % Monocytes 9.9 %    % Eosinophils 1.2 %    % Basophils 0.6 %    % Immature Granulocytes 0.2 %    Nucleated RBCs 0 0 /100    Absolute Neutrophil 2.5 1.6 - 8.3 10e9/L    Absolute Lymphocytes 1.7 0.8 - 5.3 10e9/L    Absolute Monocytes 0.5 0.0 - 1.3 10e9/L    Absolute Eosinophils 0.1 0.0 - 0.7 10e9/L    Absolute Basophils 0.0 0.0 - 0.2 10e9/L    Abs Immature Granulocytes 0.0 0 - 0.4 10e9/L    Absolute Nucleated RBC 0.0    Basic metabolic panel   Result Value Ref Range    Sodium 140 133 - 144 mmol/L    Potassium 4.8 3.4 - 5.3 mmol/L    Chloride 106 94 - 109 mmol/L    Carbon Dioxide 27 20 - 32 mmol/L    Anion Gap 7 3 - 14 mmol/L    Glucose 198 (H) 70 - 99 mg/dL    Urea Nitrogen 18 7 - 30 mg/dL    Creatinine 0.82 0.52 - 1.04 mg/dL    GFR Estimate 69 >60 mL/min/[1.73_m2]    GFR Estimate If Black 80 >60 mL/min/[1.73_m2]    Calcium 9.2 8.5 - 10.1 mg/dL   INR   Result Value Ref Range    INR 1.08 0.86 - 1.14   Partial thromboplastin time   Result Value Ref Range    PTT 30 22 - 37 sec   Troponin I   Result Value Ref Range    Troponin I ES <0.015 0.000 - 0.045 ug/L   LDL cholesterol direct   Result Value Ref Range    LDL Cholesterol Direct 64 <100 mg/dL   Hemaglobin A1C   Result Value  Ref Range    Hemoglobin A1C 10.2 (H) 0 - 5.6 %   MRA Brain ([place]) wo Contrast    Narrative    MR ANGIOGRAM OF THE HEAD WITHOUT CONTRAST   6/24/2021 6:00 PM     HISTORY: Dysarthria    TECHNIQUE:  3D time-of-flight MR angiogram of the head without  contrast.    COMPARISON: None.    FINDINGS: Mild motion artifact The vertebral arteries, basilar artery,  and posterior cerebral arteries are patent. The internal carotid  arteries, anterior cerebral arteries, middle cerebral arteries are  patent. No evidence of large vessel occlusion or high-grade stenosis.  No evidence of aneurysm or vascular malformation.      Impression    IMPRESSION: Unremarkable MRA of the head.    OLIVIA LOJA MD   MRA Neck (Carotids) wo & w Contrast    Narrative    MRA NECK WITHOUT AND WITH CONTRAST  6/24/2021 6:34 PM     HISTORY: Dysarthria    TECHNIQUE: 2D time-of-flight MR angiogram of the neck without contrast  and 3D MR angiogram of the neck with  10 ML Gadavist. Estimates of  carotid stenoses are made relative to the distal internal carotid  artery diameters except as noted.    COMPARISON: None.    FINDINGS:    The bilateral common carotid, internal carotid, external carotid, and  vertebral arteries are patent. No evidence of large vessel occlusion  or high-grade stenosis. No evidence of dissection. Poor visualization  of the proximal left common carotid artery on postcontrast imaging,  likely artifactual.      Impression    IMPRESSION:  Unremarkable MRA of the neck.        OLIVIA LOJA MD   MR Brain w/o & w Contrast    Narrative    MRI BRAIN WITHOUT AND WITH CONTRAST  6/24/2021 6:34 PM    HISTORY:  Dysarthria     TECHNIQUE:  Multiplanar, multisequence MRI of the brain without and  with 10 ML Gadavist.    COMPARISON: None.    FINDINGS:  Mild volume loss is present. Few scattered white matter T2  hyperintensities likely represent mild chronic small vessel ischemic  change. No evidence of acute ischemia, hemorrhage, mass, mass  effect,  or hydrocephalus. No abnormal enhancement or diffusion restriction.  Major intracranial flow voids are maintained.    Marrow signal is within normal limits. Trace opacification bilateral  mastoid cavities. Trace paranasal sinus mucosal thickening.      Impression    IMPRESSION:    1. Unremarkable MRI of the head.  2. No evidence of acute ischemia or hemorrhage.    OLIVIA LOJA MD       Medications   gadobutrol (GADAVIST) injection 10 mL (10 mLs Intravenous Given 6/24/21 1706)   sodium chloride (PF) 0.9% PF flush 50 mL (50 mLs Intracatheter Given 6/24/21 1707)       Assessments & Plan (with Medical Decision Making)  76-year-old female presents with dysarthria jumbled words lasting less than a minute.  Her exam here is normal, NIH stroke scale is 0 and remains same during stay.  Initially called as code stroke, reviewed with stroke neurology who recommends MRI, MRA head and neck, prolonged wait for studies, ultimately reported as unremarkable.  Possible TIA.  Chronic anticoagulation apixaban secondary to atrial fibrillation.  No indication for antiplatelet therapy.  Patient resides in memory care unit and will remain there.  Findings are reviewed with her son who expressed understanding.  Continue treatment of diabetes and hyperlipidemia.  Outpatient echo.  Return criteria reviewed     I have reviewed the nursing notes.    I have reviewed the findings, diagnosis, plan and need for follow up with the patient.       Discharge Medication List as of 6/24/2021  7:18 PM          Final diagnoses:   Dysarthria       6/24/2021   [facility] EMERGENCY DEPT     Kristian Cornell MD  06/25/21 [id number]

## 2021-06-24 NOTE — ED TRIAGE NOTES
About 1340 pt had difficulty word finding and was unable to speak. This lasted about 1 min and then everything started getting better and going back to normal, but felt she needed to get seen. No numbness no tingling no facial droop. Felt a slight pressure in her head which has since also resolved.

## 2021-06-24 NOTE — ED NOTES
"Pt arrived via triage, accomp by son, ambulatory, in no acute distress.   Pt reports talking none senesicall words briefly this afternoon, while at an [employer] social.  Pt states words came out freely, but made no sense.  This lasted very briefly, \"1 minute or so\".  Pt reported slight pressure in her head at that time as well.   Pt went to the nurses station and was evaluated, and then her son was called to come and get her to take to ED.        Full neuro exam competed with no deficits noted. See flowsheet.  Pt placed on full monitors.   ECG completed on arrival, NSR.  18g IV started in R AC and labs drawn and sent.  Pt and son poor health historians, but NH list show A-fib and is on apixaban blood thinner.  LUNGS:  CTA bilat.  No cough, no sob  Heart:  S1, S2, couldn't appreciate any MRG.  NSR on monitor  Abd:  Soft, non tender, +BS.      Pt denies and CP, SOB, chills, fever, UTI symptoms.  No complaints.  MD in room for assessment and exam.  Orders received. Pt to have MRI.   Pt undressed fully, and in MRI gown.  Asked son to help pt fill out paperwork.  PLAN:  WIll continue to monitor, pt to MRI in 1 hr approx.  Pt and son aware of plan and agree.  "

## 2021-06-24 NOTE — ED NOTES
"[facility]    Stroke Telephone Note    I was called by Kristian Cornell Md on 06/24/21 regarding patient Kimberly Stephenson. The patient is a 76 year old female with afib on apixaban. Today around 1:40pm she had <1 minute of dysarthria vs expressive aphasia that spontaneously resolved. Now back to her baseline. NIHSS 0. Has never had anything similar in the past. Calling for workup/management recommendations.    Impression  TIA vs minor stroke vs non-vascular event    Recommendations   -MRI brain w wo, MRA head wo, MRA neck w wo in ER  -Continue apixaban 5mg BID unless imaging shows intracranial hemorrhage (very low suspicion for this)  -Send LDL, A1c  -SPB <220  -Q4 neurochecks  -If stroke is present on MRI, will need admission for further workup of apixaban failure/breakthrough vs non-cardioembolic etiology of her stroke    Please call with questions when imaging results or with any other concerns    My recommendations are based on the information provided over the phone by Kimberly Stephenson's in-person providers. They are not intended to replace the clinical judgment of her in-person providers. I was not requested to personally see or examine the patient at this time.    Serean Dumont MD  Vascular Neurology  To page me or covering stroke neurology team member, click here: [facility]  Choose \"On Call\" tab at top, then search dropdown box for \"Neurology Adult\", select location, press Enter, then look for stroke/neuro ICU/Telestroke.           "

## 2021-06-25 RX ORDER — BUSPIRONE HYDROCHLORIDE 15 MG/1
TABLET ORAL
Qty: 60 TABLET | Refills: 0 | Status: SHIPPED | OUTPATIENT
Start: 2021-06-25 | End: 2021-07-18

## 2021-06-25 RX ORDER — ATORVASTATIN CALCIUM 40 MG/1
TABLET, FILM COATED ORAL
Qty: 30 TABLET | Refills: 0 | Status: SHIPPED | OUTPATIENT
Start: 2021-06-25 | End: 2021-08-03

## 2021-06-25 NOTE — DISCHARGE INSTRUCTIONS
Continue current maintenance medications including apixaban    Follow-up primary care regarding ongoing management of diabetes and hyperlipidemia as well as outpatient work-up including cardiac ultrasound.    Return here for visual change, difficulty speaking or swallowing, numbness or weakness, imbalance or any other concern.

## 2021-06-26 DIAGNOSIS — F41.1 GAD (GENERALIZED ANXIETY DISORDER): ICD-10-CM

## 2021-07-06 RX ORDER — ESCITALOPRAM OXALATE 20 MG/1
TABLET ORAL
Qty: 30 TABLET | Refills: 0 | Status: SHIPPED | OUTPATIENT
Start: 2021-07-06 | End: 2021-08-10

## 2021-07-15 DIAGNOSIS — F41.9 ANXIETY AND DEPRESSION: ICD-10-CM

## 2021-07-15 DIAGNOSIS — F32.A ANXIETY AND DEPRESSION: ICD-10-CM

## 2021-07-18 RX ORDER — BUSPIRONE HYDROCHLORIDE 15 MG/1
TABLET ORAL
Qty: 60 TABLET | Refills: 0 | Status: SHIPPED | OUTPATIENT
Start: 2021-07-18 | End: 2021-08-24

## 2021-07-27 DIAGNOSIS — E10.65 TYPE 1 DIABETES MELLITUS WITH HYPERGLYCEMIA (H): ICD-10-CM

## 2021-07-30 DIAGNOSIS — R73.9 HYPERGLYCEMIA: ICD-10-CM

## 2021-08-03 RX ORDER — INSULIN GLARGINE 100 [IU]/ML
INJECTION, SOLUTION SUBCUTANEOUS
Qty: 15 ML | Refills: 3 | Status: SHIPPED | OUTPATIENT
Start: 2021-08-03 | End: 2021-08-11

## 2021-08-03 RX ORDER — ATORVASTATIN CALCIUM 40 MG/1
TABLET, FILM COATED ORAL
Qty: 30 TABLET | Refills: 0 | Status: SHIPPED | OUTPATIENT
Start: 2021-08-03 | End: 2021-08-31

## 2021-08-04 ENCOUNTER — ASSISTED LIVING VISIT (OUTPATIENT)
Dept: GERIATRICS | Facility: CLINIC | Age: 76
End: 2021-08-04
Payer: COMMERCIAL

## 2021-08-04 VITALS
TEMPERATURE: 97.8 F | OXYGEN SATURATION: 94 % | WEIGHT: 241 LBS | RESPIRATION RATE: 20 BRPM | DIASTOLIC BLOOD PRESSURE: 79 MMHG | HEART RATE: 71 BPM | HEIGHT: 66 IN | SYSTOLIC BLOOD PRESSURE: 143 MMHG | BODY MASS INDEX: 38.73 KG/M2

## 2021-08-04 DIAGNOSIS — E10.65 TYPE 1 DIABETES MELLITUS WITH HYPERGLYCEMIA (H): Primary | ICD-10-CM

## 2021-08-04 NOTE — PROGRESS NOTES
"[facility] GERIATRIC SERVICES    Chief Complaint   Patient presents with     RECHECK     BS f/u     HPI:  Kimberly Stephenson is a 76 year old  (1945), who is being seen today for an episodic care visit at: [facility] ([facility]) [[id number]]. Today's concern is:     Resident with low blood sugars daily at lunchtime, remains asymptomatic. Currently taking lantus 30 units day, novolog QID. No longer followed by endocrinology.     Allergies, and PMH/PSH reviewed in [facility] today.  REVIEW OF SYSTEMS:  4 point ROS including Respiratory, CV, GI and , other than that noted in the HPI,  is negative    Objective:   BP (!) 143/79   Pulse 71   Temp 97.8  F (36.6  C)   Resp 20   Ht 1.676 m (5' 6\")   Wt 109.3 kg (241 lb)   SpO2 94%   BMI 38.90 kg/m    GENERAL APPEARANCE:  Alert  ENT:  Mouth and posterior oropharynx normal, moist mucous membranes, normal hearing acuity  RESP:  respiratory effort and palpation of chest normal, lungs clear to auscultation   CV:  Palpation and auscultation of heart done , regular rate and rhythm, no murmur, rub, or gallop  M/S:   Gait and station normal  Digits and nails normal  SKIN:  Inspection of skin and subcutaneous tissue baseline, Palpation of skin and subcutaneous tissue baseline  NEURO:   Cranial nerves 2-12 are normal tested and grossly at patient's baseline  PSYCH:  oriented X 3    Labs done in SNF are in [facility]. Please refer to them using [facility]/Care Everywhere. and Recent labs in [facility] reviewed by me today.     Assessment/Plan:  (E10.65) Type 1 diabetes mellitus with hyperglycemia (H)  (primary encounter diagnosis)  Comment: Chronic, hypoglycemia mid-day without symptoms.   Plan:     Reduce Lantus 25 units qAM    Blood sugars QID    Novolog sliding scale with meals and HS      Orders:  1. Decrease lantus 25 units (from 30) daily AM    Electronically signed by:   VANDANA Munguia [facility]       "

## 2021-08-08 DIAGNOSIS — F41.1 GAD (GENERALIZED ANXIETY DISORDER): ICD-10-CM

## 2021-08-10 RX ORDER — ESCITALOPRAM OXALATE 20 MG/1
TABLET ORAL
Qty: 30 TABLET | Refills: 0 | Status: SHIPPED | OUTPATIENT
Start: 2021-08-10 | End: 2021-09-08

## 2021-08-11 DIAGNOSIS — I21.4 NSTEMI (NON-ST ELEVATED MYOCARDIAL INFARCTION) (H): Primary | ICD-10-CM

## 2021-08-11 PROBLEM — E66.01 MORBID OBESITY (H): Status: ACTIVE | Noted: 2021-08-11

## 2021-08-15 RX ORDER — APIXABAN 2.5 MG/1
TABLET, FILM COATED ORAL
Qty: 180 TABLET | Refills: 0 | Status: SHIPPED | OUTPATIENT
Start: 2021-08-15 | End: 2021-11-12

## 2021-08-22 ASSESSMENT — MIFFLIN-ST. JEOR: SCORE: 1474.26

## 2021-08-24 DIAGNOSIS — F41.9 ANXIETY AND DEPRESSION: ICD-10-CM

## 2021-08-24 DIAGNOSIS — F32.A ANXIETY AND DEPRESSION: ICD-10-CM

## 2021-08-24 RX ORDER — BUSPIRONE HYDROCHLORIDE 15 MG/1
TABLET ORAL
Qty: 60 TABLET | Refills: 0 | Status: SHIPPED | OUTPATIENT
Start: 2021-08-24 | End: 2021-09-20

## 2021-08-25 DIAGNOSIS — E10.65 TYPE 1 DIABETES MELLITUS WITH HYPERGLYCEMIA (H): ICD-10-CM

## 2021-08-25 RX ORDER — INSULIN ASPART 100 [IU]/ML
INJECTION, SOLUTION INTRAVENOUS; SUBCUTANEOUS
Qty: 15 ML | Refills: 1 | Status: SHIPPED | OUTPATIENT
Start: 2021-08-25 | End: 2021-10-24

## 2021-08-27 DIAGNOSIS — R73.9 HYPERGLYCEMIA: ICD-10-CM

## 2021-08-31 RX ORDER — ATORVASTATIN CALCIUM 40 MG/1
TABLET, FILM COATED ORAL
Qty: 30 TABLET | Refills: 0 | Status: SHIPPED | OUTPATIENT
Start: 2021-08-31 | End: 2021-11-26

## 2021-09-02 ENCOUNTER — ASSISTED LIVING VISIT (OUTPATIENT)
Dept: GERIATRICS | Facility: CLINIC | Age: 76
End: 2021-09-02
Payer: COMMERCIAL

## 2021-09-02 VITALS
TEMPERATURE: 94 F | BODY MASS INDEX: 34.46 KG/M2 | HEIGHT: 66 IN | OXYGEN SATURATION: 95 % | HEART RATE: 75 BPM | WEIGHT: 214.4 LBS | RESPIRATION RATE: 24 BRPM | DIASTOLIC BLOOD PRESSURE: 72 MMHG | SYSTOLIC BLOOD PRESSURE: 161 MMHG

## 2021-09-02 DIAGNOSIS — E10.65 TYPE 1 DIABETES MELLITUS WITH HYPERGLYCEMIA (H): Primary | ICD-10-CM

## 2021-09-02 DIAGNOSIS — F41.9 ANXIETY AND DEPRESSION: ICD-10-CM

## 2021-09-02 DIAGNOSIS — F32.A ANXIETY AND DEPRESSION: ICD-10-CM

## 2021-09-02 NOTE — PROGRESS NOTES
"[facility] GERIATRIC SERVICES    Chief Complaint   Patient presents with     RECHECK     depression     HPI:  Kimberly Stephenson is a 76 year old  (1945), who is being seen today for an episodic care visit at: [facility] ([facility]) [[id number]].     Today's concern is:   -Blood sugars remain now in afternoon. Range 56-82 at noon hour. Remains asymptomatic. Currently taking lantus 25 units daily, novolog sliding scale.     Allergies, and PMH/PSH reviewed in [facility] today.  REVIEW OF SYSTEMS:  10 point ROS of systems including Constitutional, Eyes, Respiratory, Cardiovascular, Gastroenterology, Genitourinary, Integumentary, Musculoskeletal, Psychiatric were all negative except for pertinent positives noted in my HPI.    Objective:   BP (!) 161/72   Pulse 75   Temp (!) 94  F (34.4  C)   Resp 24   Ht 1.676 m (5' 6\")   Wt 97.3 kg (214 lb 6.4 oz)   SpO2 95%   BMI 34.61 kg/m    GENERAL APPEARANCE:  Alert, in no distress  ENT:  Mouth and posterior oropharynx normal, moist mucous membranes, normal hearing acuity  RESP:  respiratory effort and palpation of chest normal, lungs clear to auscultation   CV:  Palpation and auscultation of heart done , regular rate and rhythm, no murmur, rub, or gallop  M/S:   Gait and station normal  Digits and nails normal  SKIN:  Inspection of skin and subcutaneous tissue baseline, Palpation of skin and subcutaneous tissue baseline  NEURO:   Cranial nerves 2-12 are normal tested and grossly at patient's baseline    Labs done in SNF are in [facility]. Please refer to them using [facility]/Care Everywhere. and Recent labs in [facility] reviewed by me today.     Assessment/Plan:    ICD-10-CM    1. Type 1 diabetes mellitus with hyperglycemia (H)  E10.65    2. Anxiety and depression  F41.9     F32.9      Low afternoon blood sugars, remains asymptomatic.      Reduce AM lantus 20 units.     Orders:  1. Reduce lantus 20 units/day    Electronically signed by:   Li Arana, " [facility]

## 2021-09-06 DIAGNOSIS — F41.1 GAD (GENERALIZED ANXIETY DISORDER): ICD-10-CM

## 2021-09-06 DIAGNOSIS — I10 ESSENTIAL HYPERTENSION: ICD-10-CM

## 2021-09-07 DIAGNOSIS — E10.65 TYPE 1 DIABETES MELLITUS WITH HYPERGLYCEMIA (H): Primary | ICD-10-CM

## 2021-09-07 RX ORDER — PEN NEEDLE, DIABETIC, SAFETY 30 GX3/16"
NEEDLE, DISPOSABLE MISCELLANEOUS
Qty: 100 EACH | Refills: 3 | Status: SHIPPED | OUTPATIENT
Start: 2021-09-07 | End: 2021-10-29

## 2021-09-08 RX ORDER — ESCITALOPRAM OXALATE 20 MG/1
TABLET ORAL
Qty: 30 TABLET | Refills: 0 | Status: SHIPPED | OUTPATIENT
Start: 2021-09-08 | End: 2021-10-29

## 2021-09-08 RX ORDER — AMLODIPINE BESYLATE 10 MG/1
10 TABLET ORAL EVERY EVENING
Qty: 90 TABLET | Refills: 0 | Status: SHIPPED | OUTPATIENT
Start: 2021-09-08 | End: 2021-11-26

## 2021-09-18 DIAGNOSIS — F41.9 ANXIETY AND DEPRESSION: ICD-10-CM

## 2021-09-18 DIAGNOSIS — F32.A ANXIETY AND DEPRESSION: ICD-10-CM

## 2021-09-20 RX ORDER — BUSPIRONE HYDROCHLORIDE 15 MG/1
TABLET ORAL
Qty: 60 TABLET | Refills: 11 | Status: SHIPPED | OUTPATIENT
Start: 2021-09-20 | End: 2022-07-18

## 2021-09-22 ASSESSMENT — MIFFLIN-ST. JEOR: SCORE: 1490.59

## 2021-10-20 ENCOUNTER — ASSISTED LIVING VISIT (OUTPATIENT)
Dept: GERIATRICS | Facility: CLINIC | Age: 76
End: 2021-10-20
Payer: COMMERCIAL

## 2021-10-20 DIAGNOSIS — R11.2 INTRACTABLE VOMITING WITH NAUSEA, UNSPECIFIED VOMITING TYPE: Primary | ICD-10-CM

## 2021-10-20 DIAGNOSIS — E10.65 TYPE 1 DIABETES MELLITUS WITH HYPERGLYCEMIA (H): ICD-10-CM

## 2021-10-20 DIAGNOSIS — F41.9 ANXIETY: ICD-10-CM

## 2021-10-21 VITALS
HEIGHT: 66 IN | WEIGHT: 218 LBS | HEART RATE: 75 BPM | RESPIRATION RATE: 24 BRPM | TEMPERATURE: 98.4 F | OXYGEN SATURATION: 95 % | BODY MASS INDEX: 35.03 KG/M2 | DIASTOLIC BLOOD PRESSURE: 72 MMHG | SYSTOLIC BLOOD PRESSURE: 161 MMHG

## 2021-10-21 NOTE — PROGRESS NOTES
"[facility] GERIATRIC SERVICES    Chief Complaint   Patient presents with     Nursing Home Acute     nausea, vomiting, diarrhea     HPI:  Kimberly Stephenson is a 76 year old  (1945), who is being seen today for an episodic care visit at: [facility] ([facility]) [[id number]].     Today's concern is:   -Resident complaining of nausea and vomiting. Several other cases throughout building. Denies fever but does comment having chills and feeling cold.   -Blood sugar before lunch 416 and after \"reading high\". Is having the n/v noted above. Reports she does not feel as though her blood sugars are causing issues. Son was update and would like resident treated at [facility]. She received her lunch time dose of insulin- blood sugar improved to 199.  -Reporting increased anxiety in AMs. Has PRN ativan available but has only utilized dose 2 times during month of October. Requesting that it is scheduled daily- explained concerned regarding the medication side effects, safety concerns and needing to trial taking daily PRN. Patient demonstrated understanding. Refusing to increase daily dose of lexapro or buspirone.       Allergies, and PMH/PSH reviewed in [employer] today.  REVIEW OF SYSTEMS:  4 point ROS including Respiratory, CV, GI and , other than that noted in the HPI,  is negative    Objective:   BP (!) 161/72   Pulse 75   Temp 98.4  F (36.9  C)   Resp 24   Ht 1.676 m (5' 6\")   Wt 98.9 kg (218 lb)   SpO2 95%   BMI 35.19 kg/m    GENERAL APPEARANCE:  Alert  ENT:  Mouth and posterior oropharynx normal, moist mucous membranes, normal hearing acuity  RESP:  respiratory effort and palpation of chest normal, lungs clear to auscultation , no respiratory distress  CV:  Palpation and auscultation of heart done , regular rate and rhythm, no murmur, rub, or gallop  ABDOMEN:  normal bowel sounds, soft, nontender, no hepatosplenomegaly or other masses  M/S:   Gait and station normal  Digits and nails normal  SKIN: "  Inspection of skin and subcutaneous tissue baseline, Palpation of skin and subcutaneous tissue baseline  NEURO:   Cranial nerves 2-12 are normal tested and grossly at patient's baseline  PSYCH:  oriented X 3    Labs done in SNF are in [facility]. Please refer to them using [facility]/Care Everywhere. and Recent labs in [facility] reviewed by me today.     Assessment/Plan:  (R11.2) Intractable vomiting with nausea, unspecified vomiting type  (primary encounter diagnosis)  Comment: Acute, likely 2/2 viral illness. Tolerating small sips of water and crackers.   Plan:   -Contact isolation   -Encourage small sips of water   -Zofran 4 mg PO q6 hours PRN    (E10.65) Type 1 diabetes mellitus with hyperglycemia (H)  Comment: Chronic, elevated readings today likely 2/2 acute viral infection.   Plan:   -Improved blood sugar with sliding scale insulin. Will continue without changes and monitor closely given new onset of N/V.   -Would like to continue treatment here at AL vs. ED evaluation.     (F41.9) Anxiety  Comment: Longstanding history of anxiety with panic attacks. Requesting scheduled ativan, given situation discussed need to try PRN as previously ordered.   Plan:   -Encourage use of PRN ativan.               Orders:  1. Zofran 4 mg PO q6 hours PRN  2. Stool sample r/o norovirus   3. Contact isolation     Electronically signed by:  VANDANA Munguia CNP  [facility]

## 2021-10-22 DIAGNOSIS — E10.65 TYPE 1 DIABETES MELLITUS WITH HYPERGLYCEMIA (H): ICD-10-CM

## 2021-10-22 PROCEDURE — 87506 IADNA-DNA/RNA PROBE TQ 6-11: CPT | Mod: ORL | Performed by: NURSE PRACTITIONER

## 2021-10-23 ENCOUNTER — LAB REQUISITION (OUTPATIENT)
Dept: LAB | Facility: CLINIC | Age: 76
End: 2021-10-23
Payer: COMMERCIAL

## 2021-10-23 DIAGNOSIS — A08.11 ACUTE GASTROENTEROPATHY DUE TO NORWALK AGENT: ICD-10-CM

## 2021-10-24 RX ORDER — INSULIN ASPART 100 [IU]/ML
INJECTION, SOLUTION INTRAVENOUS; SUBCUTANEOUS
Qty: 15 ML | Refills: 1 | Status: SHIPPED | OUTPATIENT
Start: 2021-10-24 | End: 2022-01-13

## 2021-10-29 RX ORDER — LORAZEPAM 0.5 MG/1
0.25 TABLET ORAL 2 TIMES DAILY PRN
Start: 2021-10-29 | End: 2021-12-15 | Stop reason: DRUGHIGH

## 2021-11-01 PROCEDURE — U0005 INFEC AGEN DETEC AMPLI PROBE: HCPCS | Mod: ORL | Performed by: NURSE PRACTITIONER

## 2021-11-02 ENCOUNTER — LAB REQUISITION (OUTPATIENT)
Dept: LAB | Facility: CLINIC | Age: 76
End: 2021-11-02
Payer: COMMERCIAL

## 2021-11-02 DIAGNOSIS — U07.1 COVID-19: ICD-10-CM

## 2021-11-02 LAB — SARS-COV-2 RNA RESP QL NAA+PROBE: NEGATIVE

## 2021-11-11 ENCOUNTER — LAB REQUISITION (OUTPATIENT)
Dept: LAB | Facility: CLINIC | Age: 76
End: 2021-11-11
Payer: COMMERCIAL

## 2021-11-11 DIAGNOSIS — U07.1 COVID-19: ICD-10-CM

## 2021-11-12 DIAGNOSIS — I21.4 NSTEMI (NON-ST ELEVATED MYOCARDIAL INFARCTION) (H): ICD-10-CM

## 2021-11-12 PROCEDURE — U0005 INFEC AGEN DETEC AMPLI PROBE: HCPCS | Mod: ORL | Performed by: NURSE PRACTITIONER

## 2021-11-12 RX ORDER — PSEUDOEPHED/ACETAMINOPH/DIPHEN 30MG-500MG
TABLET ORAL
Qty: 60 TABLET | Refills: 0 | Status: SHIPPED | OUTPATIENT
Start: 2021-11-12 | End: 2021-11-26

## 2021-11-12 RX ORDER — APIXABAN 2.5 MG/1
TABLET, FILM COATED ORAL
Qty: 180 TABLET | Refills: 0 | Status: SHIPPED | OUTPATIENT
Start: 2021-11-12 | End: 2022-01-27

## 2021-11-13 DIAGNOSIS — E10.65 TYPE 1 DIABETES MELLITUS WITH HYPERGLYCEMIA (H): ICD-10-CM

## 2021-11-14 LAB — SARS-COV-2 RNA RESP QL NAA+PROBE: NEGATIVE

## 2021-11-16 ENCOUNTER — LAB REQUISITION (OUTPATIENT)
Dept: LAB | Facility: CLINIC | Age: 76
End: 2021-11-16
Payer: COMMERCIAL

## 2021-11-16 DIAGNOSIS — U07.1 COVID-19: ICD-10-CM

## 2021-11-16 RX ORDER — PEN NEEDLE, DIABETIC, SAFETY 30 GX3/16"
NEEDLE, DISPOSABLE MISCELLANEOUS
Qty: 100 EACH | Refills: 3 | Status: SHIPPED | OUTPATIENT
Start: 2021-11-16 | End: 2022-02-10

## 2021-11-18 PROCEDURE — U0005 INFEC AGEN DETEC AMPLI PROBE: HCPCS | Mod: ORL | Performed by: NURSE PRACTITIONER

## 2021-11-19 LAB
SARS-COV-2 RNA RESP QL NAA+PROBE: NORMAL
SARS-COV-2 RNA RESP QL NAA+PROBE: NOT DETECTED

## 2021-11-20 DIAGNOSIS — F41.9 ANXIETY AND DEPRESSION: Primary | ICD-10-CM

## 2021-11-20 DIAGNOSIS — F32.A ANXIETY AND DEPRESSION: Primary | ICD-10-CM

## 2021-11-21 RX ORDER — ESCITALOPRAM OXALATE 20 MG/1
TABLET ORAL
Qty: 30 TABLET | Refills: 0 | Status: SHIPPED | OUTPATIENT
Start: 2021-11-21 | End: 2021-11-26

## 2021-11-22 ASSESSMENT — MIFFLIN-ST. JEOR: SCORE: 1592.65

## 2021-11-25 DIAGNOSIS — I21.4 NSTEMI (NON-ST ELEVATED MYOCARDIAL INFARCTION) (H): ICD-10-CM

## 2021-11-25 DIAGNOSIS — F41.9 ANXIETY AND DEPRESSION: ICD-10-CM

## 2021-11-25 DIAGNOSIS — F32.A ANXIETY AND DEPRESSION: ICD-10-CM

## 2021-11-25 DIAGNOSIS — R73.9 HYPERGLYCEMIA: ICD-10-CM

## 2021-11-25 DIAGNOSIS — I10 ESSENTIAL HYPERTENSION: ICD-10-CM

## 2021-11-26 RX ORDER — ESCITALOPRAM OXALATE 20 MG/1
TABLET ORAL
Qty: 30 TABLET | Refills: 11 | Status: SHIPPED | OUTPATIENT
Start: 2021-11-26 | End: 2022-11-04

## 2021-11-26 RX ORDER — AMLODIPINE BESYLATE 10 MG/1
10 TABLET ORAL EVERY EVENING
Qty: 90 TABLET | Refills: 3 | Status: SHIPPED | OUTPATIENT
Start: 2021-11-26 | End: 2022-11-04

## 2021-11-26 RX ORDER — ATORVASTATIN CALCIUM 40 MG/1
TABLET, FILM COATED ORAL
Qty: 30 TABLET | Refills: 11 | Status: SHIPPED | OUTPATIENT
Start: 2021-11-26 | End: 2022-10-03

## 2021-11-26 RX ORDER — PSEUDOEPHED/ACETAMINOPH/DIPHEN 30MG-500MG
TABLET ORAL
Qty: 60 TABLET | Refills: 11 | Status: SHIPPED | OUTPATIENT
Start: 2021-11-26 | End: 2022-11-04

## 2021-12-08 ENCOUNTER — TELEPHONE (OUTPATIENT)
Dept: GERIATRICS | Facility: CLINIC | Age: 76
End: 2021-12-08
Payer: COMMERCIAL

## 2021-12-08 DIAGNOSIS — E10.65 TYPE 1 DIABETES MELLITUS WITH HYPERGLYCEMIA (H): Primary | ICD-10-CM

## 2021-12-09 NOTE — TELEPHONE ENCOUNTER
Nursing reports patient blood sugar late this afternoon was 570's she was given 17 units of novolog and her blood sugar was 340, nursing called because patient has a Hx of diabetic coma and ketoacidosis.   Nursing did an assessment, vitals are stable, patient afebrile, no c/o dysuria, frequency or urgency, denies cough, congestion no signs of infection, no skin breakdown.   Order: push fluids tonight and update NP in AM

## 2021-12-15 ENCOUNTER — ASSISTED LIVING VISIT (OUTPATIENT)
Dept: GERIATRICS | Facility: CLINIC | Age: 76
End: 2021-12-15
Payer: COMMERCIAL

## 2021-12-15 VITALS — OXYGEN SATURATION: 95 % | TEMPERATURE: 96.2 F | HEIGHT: 66 IN | BODY MASS INDEX: 38.65 KG/M2 | WEIGHT: 240.5 LBS

## 2021-12-15 DIAGNOSIS — E10.65 TYPE 1 DIABETES MELLITUS WITH HYPERGLYCEMIA (H): ICD-10-CM

## 2021-12-15 DIAGNOSIS — F41.9 ANXIETY: Primary | ICD-10-CM

## 2021-12-15 DIAGNOSIS — R73.9 HYPERGLYCEMIA: ICD-10-CM

## 2021-12-15 RX ORDER — ALPRAZOLAM 0.25 MG
0.25 TABLET ORAL 2 TIMES DAILY
Qty: 60 TABLET | Refills: 0 | Status: SHIPPED | OUTPATIENT
Start: 2021-12-15 | End: 2022-01-30

## 2021-12-15 NOTE — PROGRESS NOTES
"[facility] GERIATRIC SERVICES    Chief Complaint   Patient presents with     RECHECK     BS elevated     HPI:  Kimberly Stephenson is a 76 year old  (1945), who is being seen today for an episodic care visit at: [facility] ([facility]) [[id number]]. Today's concern is: Resident resting in room today. We discussed her high blood sugar readings, she claims she has been eating a lot of sugar and is not concerned. Refused increasing insulin and declined a endocrinology follow up. Has been having increased anxiety and taking PRN ativan 3-4 times a week. Would like something longer acting. Last hemoglobin A1c 9.8    BP Readings from Last 3 Encounters:   08/22/21 (!) 161/72   08/22/21 (!) 161/72   07/30/21 (!) 143/79     240 lbs 8 oz  Pulse Readings from Last 4 Encounters:   08/22/21 75   08/22/21 75   07/30/21 71   06/24/21 73             Allergies, and PMH/PSH reviewed in [employer] today.  REVIEW OF SYSTEMS:  4 point ROS including Respiratory, CV, GI and , other than that noted in the HPI,  is negative    Objective:   Temp (!) 96.2  F (35.7  C)   Ht 1.676 m (5' 6\")   Wt 109.1 kg (240 lb 8 oz)   SpO2 95%   BMI 38.82 kg/m    GENERAL APPEARANCE:  Alert  RESP:  respiratory effort and palpation of chest normal, lungs clear to auscultation   CV:  Palpation and auscultation of heart done , regular rate and rhythm, no murmur, rub, or gallop, no edema  M/S:   Gait and station normal  Digits and nails normal  SKIN:  Inspection of skin and subcutaneous tissue baseline, Palpation of skin and subcutaneous tissue baseline  NEURO:   Cranial nerves 2-12 are normal tested and grossly at patient's baseline  PSYCH:  oriented X 3    Labs done in SNF are in [facility]. Please refer to them using EPIC/Care Everywhere. and Recent labs in [facility] reviewed by me today.     Assessment/Plan:  (F41.9) Anxiety  (primary encounter diagnosis)  Comment: Acute/chronic anxiety, has been taking PRN ativan.   Plan: "   ALPRAZolam (XANAX) 0.25 MG tablet  Discontinue PRN Ativan    (E10.65) Type 1 diabetes mellitus with hyperglycemia (H)  (R73.9) Hyperglycemia  Comment: Chronic, intermittent hyperglycemia without symptoms. A1c has been stable. Denies follow up with endocrinology.   Plan:   -A1c next lab day    Orders:  1. Xanax 0.25 mg po BID  2. Discontinue PRN ativan   3. A1c next lab day.     Electronically signed by:   VANDANA Munguia CNP  [facility]

## 2021-12-15 NOTE — LETTER
"    12/15/2021        RE: Kimberly Stephenson  [facility] acute [facility]  [address] [address]        [facility]    Chief Complaint   Patient presents with     RECHECK     BS elevated     HPI:  Kimberly Stephenson is a 76 year old  (1945), who is being seen today for an episodic care visit at: [facility] ([facility]) [[id number]]. Today's concern is: Resident resting in room today. We discussed her high blood sugar readings, she claims she has been eating a lot of sugar and is not concerned. Refused increasing insulin and declined a endocrinology follow up. Has been having increased anxiety and taking PRN ativan 3-4 times a week. Would like something longer acting. Last hemoglobin A1c 9.8    BP Readings from Last 3 Encounters:   08/22/21 (!) 161/72   08/22/21 (!) 161/72   07/30/21 (!) 143/79     240 lbs 8 oz  Pulse Readings from Last 4 Encounters:   08/22/21 75   08/22/21 75   07/30/21 71   06/24/21 73             Allergies, and PMH/PSH reviewed in EPIC today.  REVIEW OF SYSTEMS:  4 point ROS including Respiratory, CV, GI and , other than that noted in the HPI,  is negative    Objective:   Temp (!) 96.2  F (35.7  C)   Ht 1.676 m (5' 6\")   Wt 109.1 kg (240 lb 8 oz)   SpO2 95%   BMI 38.82 kg/m    GENERAL APPEARANCE:  Alert  RESP:  respiratory effort and palpation of chest normal, lungs clear to auscultation   CV:  Palpation and auscultation of heart done , regular rate and rhythm, no murmur, rub, or gallop, no edema  M/S:   Gait and station normal  Digits and nails normal  SKIN:  Inspection of skin and subcutaneous tissue baseline, Palpation of skin and subcutaneous tissue baseline  NEURO:   Cranial nerves 2-12 are normal tested and grossly at patient's baseline  PSYCH:  oriented X 3    Labs done in SNF are in [facility]. Please refer to them using EPIC/Care Everywhere. and Recent labs in [facility] reviewed by me today. "     Assessment/Plan:  (F41.9) Anxiety  (primary encounter diagnosis)  Comment: Acute/chronic anxiety, has been taking PRN ativan.   Plan:   ALPRAZolam (XANAX) 0.25 MG tablet  Discontinue PRN Ativan    (E10.65) Type 1 diabetes mellitus with hyperglycemia (H)  (R73.9) Hyperglycemia  Comment: Chronic, intermittent hyperglycemia without symptoms. A1c has been stable. Denies follow up with endocrinology.   Plan:   -A1c next lab day    Orders:  1. Xanax 0.25 mg po BID  2. Discontinue PRN ativan   3. A1c next lab day.     Electronically signed by:   VANDANA Munguia [facility]             Sincerely,        Li Arana NP

## 2021-12-17 ENCOUNTER — LAB REQUISITION (OUTPATIENT)
Dept: LAB | Facility: CLINIC | Age: 76
End: 2021-12-17
Payer: COMMERCIAL

## 2021-12-17 DIAGNOSIS — Z79.4 LONG TERM (CURRENT) USE OF INSULIN (H): ICD-10-CM

## 2021-12-20 LAB — HBA1C MFR BLD: 10.5 %

## 2021-12-20 PROCEDURE — P9603 ONE-WAY ALLOW PRORATED MILES: HCPCS | Mod: ORL | Performed by: NURSE PRACTITIONER

## 2021-12-20 PROCEDURE — 83036 HEMOGLOBIN GLYCOSYLATED A1C: CPT | Mod: ORL | Performed by: NURSE PRACTITIONER

## 2021-12-20 PROCEDURE — 36415 COLL VENOUS BLD VENIPUNCTURE: CPT | Mod: ORL | Performed by: NURSE PRACTITIONER

## 2022-01-03 ENCOUNTER — TELEPHONE (OUTPATIENT)
Dept: GERIATRICS | Facility: CLINIC | Age: 77
End: 2022-01-03
Payer: COMMERCIAL

## 2022-01-03 NOTE — CONFIDENTIAL NOTE
"[facility] Triage Nurse Telephone Encounter    Provider: VANDANA Solano CNP  Facility: [facility] Facility Type:  AL    Caller: Luana  Call Back Number: [phone]    Allergies:    Allergies   Allergen Reactions     Cymbalta      Fentanyl Nausea and Vomiting     Versed [Midazolam] Nausea and Vomiting        Reason for call: Nurse reports that patient's BS before breakfast was 513.  17 units of Novolog given and Lantus 20 units.  BS was rechecked an hour later and was 560.  BS before lunch was 493 and another 17 units of Novolog given per sliding scale.  Patient is asymptomatic.  Patient stated she is \"tired\" and that she has had increased urination at night.  Patient wanted vivas and eggs for lunch but nurse educated the importance of a Diabetic diet to follow since her BS is already high.        Verbal Order/Direction given by Provider: Increase fluids and monitor cognitive status.  Update if patient develops any other symptoms.      Provider giving Order:  Marifer Field MD    Verbal Order given to: Luana Lama RN      "

## 2022-01-07 ENCOUNTER — LAB REQUISITION (OUTPATIENT)
Dept: LAB | Facility: CLINIC | Age: 77
End: 2022-01-07
Payer: COMMERCIAL

## 2022-01-07 DIAGNOSIS — U07.1 COVID-19: ICD-10-CM

## 2022-01-11 PROCEDURE — U0003 INFECTIOUS AGENT DETECTION BY NUCLEIC ACID (DNA OR RNA); SEVERE ACUTE RESPIRATORY SYNDROME CORONAVIRUS 2 (SARS-COV-2) (CORONAVIRUS DISEASE [COVID-19]), AMPLIFIED PROBE TECHNIQUE, MAKING USE OF HIGH THROUGHPUT TECHNOLOGIES AS DESCRIBED BY CMS-2020-01-R: HCPCS | Mod: ORL | Performed by: NURSE PRACTITIONER

## 2022-01-12 LAB — SARS-COV-2 RNA RESP QL NAA+PROBE: NEGATIVE

## 2022-01-13 DIAGNOSIS — E10.65 TYPE 1 DIABETES MELLITUS WITH HYPERGLYCEMIA (H): ICD-10-CM

## 2022-01-14 ENCOUNTER — TRANSFERRED RECORDS (OUTPATIENT)
Dept: HEALTH INFORMATION MANAGEMENT | Facility: CLINIC | Age: 77
End: 2022-01-14
Payer: COMMERCIAL

## 2022-01-14 LAB — RETINOPATHY: POSITIVE

## 2022-01-16 RX ORDER — INSULIN ASPART 100 [IU]/ML
INJECTION, SOLUTION INTRAVENOUS; SUBCUTANEOUS
Qty: 15 ML | Refills: 11 | Status: SHIPPED | OUTPATIENT
Start: 2022-01-16 | End: 2023-03-06

## 2022-01-17 ENCOUNTER — LAB REQUISITION (OUTPATIENT)
Dept: LAB | Facility: CLINIC | Age: 77
End: 2022-01-17
Payer: COMMERCIAL

## 2022-01-17 DIAGNOSIS — U07.1 COVID-19: ICD-10-CM

## 2022-01-18 PROCEDURE — U0003 INFECTIOUS AGENT DETECTION BY NUCLEIC ACID (DNA OR RNA); SEVERE ACUTE RESPIRATORY SYNDROME CORONAVIRUS 2 (SARS-COV-2) (CORONAVIRUS DISEASE [COVID-19]), AMPLIFIED PROBE TECHNIQUE, MAKING USE OF HIGH THROUGHPUT TECHNOLOGIES AS DESCRIBED BY CMS-2020-01-R: HCPCS | Mod: ORL | Performed by: NURSE PRACTITIONER

## 2022-01-20 LAB — SARS-COV-2 RNA RESP QL NAA+PROBE: NEGATIVE

## 2022-01-21 ENCOUNTER — LAB REQUISITION (OUTPATIENT)
Dept: LAB | Facility: CLINIC | Age: 77
End: 2022-01-21
Payer: COMMERCIAL

## 2022-01-21 DIAGNOSIS — U07.1 COVID-19: ICD-10-CM

## 2022-01-25 ENCOUNTER — LAB REQUISITION (OUTPATIENT)
Dept: LAB | Facility: CLINIC | Age: 77
End: 2022-01-25
Payer: COMMERCIAL

## 2022-01-25 DIAGNOSIS — U07.1 COVID-19: ICD-10-CM

## 2022-01-26 ENCOUNTER — TELEPHONE (OUTPATIENT)
Dept: GERIATRICS | Facility: CLINIC | Age: 77
End: 2022-01-26
Payer: COMMERCIAL

## 2022-01-26 PROCEDURE — U0005 INFEC AGEN DETEC AMPLI PROBE: HCPCS | Mod: ORL | Performed by: NURSE PRACTITIONER

## 2022-01-26 NOTE — TELEPHONE ENCOUNTER
[facility] Triage Nurse Telephone Encounter    Provider: VANDANA Solano CNP  Facility: [facility] Facility Type:  AL    Caller: Gem  Call Back Number: [phone]    Allergies:    Allergies   Allergen Reactions     Cymbalta      Fentanyl Nausea and Vomiting     Versed [Midazolam] Nausea and Vomiting        Reason for call: Staff to report a med error.  Patient was given Tramadol 25mg inadvertently.  VSS.  No listed allergy to Tramadol.  Patient also has orders for Xanax 0.25mg BID, but the nurse was hesitant to give it due to the Tramadol error.       Verbal Order/Direction given by Provider: Ok to give Xanax as ordered.      Provider giving Order:  VANDANA Solano CNP    Verbal Order given to: Gem Lipscomb RN

## 2022-01-27 DIAGNOSIS — F41.9 ANXIETY: ICD-10-CM

## 2022-01-27 DIAGNOSIS — I21.4 NSTEMI (NON-ST ELEVATED MYOCARDIAL INFARCTION) (H): ICD-10-CM

## 2022-01-27 RX ORDER — APIXABAN 2.5 MG/1
TABLET, FILM COATED ORAL
Qty: 60 TABLET | Refills: 0 | Status: SHIPPED | OUTPATIENT
Start: 2022-01-27 | End: 2022-03-02

## 2022-01-28 ENCOUNTER — LAB REQUISITION (OUTPATIENT)
Dept: LAB | Facility: CLINIC | Age: 77
End: 2022-01-28
Payer: COMMERCIAL

## 2022-01-28 DIAGNOSIS — U07.1 COVID-19: ICD-10-CM

## 2022-01-28 LAB — SARS-COV-2 RNA RESP QL NAA+PROBE: NEGATIVE

## 2022-01-30 RX ORDER — ALPRAZOLAM 0.25 MG
0.25 TABLET ORAL 2 TIMES DAILY
Qty: 60 TABLET | Refills: 0 | Status: SHIPPED | OUTPATIENT
Start: 2022-01-30 | End: 2022-04-12

## 2022-01-31 LAB — SARS-COV-2 RNA RESP QL NAA+PROBE: NORMAL

## 2022-01-31 PROCEDURE — U0005 INFEC AGEN DETEC AMPLI PROBE: HCPCS | Mod: ORL | Performed by: NURSE PRACTITIONER

## 2022-02-01 LAB — SARS-COV-2 RNA RESP QL NAA+PROBE: NOT DETECTED

## 2022-02-04 ENCOUNTER — LAB REQUISITION (OUTPATIENT)
Dept: LAB | Facility: CLINIC | Age: 77
End: 2022-02-04
Payer: COMMERCIAL

## 2022-02-04 DIAGNOSIS — U07.1 COVID-19: ICD-10-CM

## 2022-02-07 ENCOUNTER — DOCUMENTATION ONLY (OUTPATIENT)
Dept: OTHER | Facility: CLINIC | Age: 77
End: 2022-02-07
Payer: COMMERCIAL

## 2022-02-07 PROCEDURE — U0005 INFEC AGEN DETEC AMPLI PROBE: HCPCS | Mod: ORL | Performed by: NURSE PRACTITIONER

## 2022-02-08 LAB — SARS-COV-2 RNA RESP QL NAA+PROBE: NOT DETECTED

## 2022-02-10 DIAGNOSIS — E10.65 TYPE 1 DIABETES MELLITUS WITH HYPERGLYCEMIA (H): ICD-10-CM

## 2022-02-10 RX ORDER — PEN NEEDLE, DIABETIC, SAFETY 30 GX3/16"
NEEDLE, DISPOSABLE MISCELLANEOUS
Qty: 100 EACH | Refills: 3 | Status: SHIPPED | OUTPATIENT
Start: 2022-02-10 | End: 2022-06-13

## 2022-02-14 ENCOUNTER — TRANSFERRED RECORDS (OUTPATIENT)
Dept: HEALTH INFORMATION MANAGEMENT | Facility: CLINIC | Age: 77
End: 2022-02-14
Payer: COMMERCIAL

## 2022-02-14 LAB — RETINOPATHY: POSITIVE

## 2022-02-17 PROBLEM — E11.10 DKA (DIABETIC KETOACIDOSIS) (H): Status: ACTIVE | Noted: 2020-02-20

## 2022-03-02 ENCOUNTER — ASSISTED LIVING VISIT (OUTPATIENT)
Dept: GERIATRICS | Facility: CLINIC | Age: 77
End: 2022-03-02
Payer: COMMERCIAL

## 2022-03-02 VITALS
SYSTOLIC BLOOD PRESSURE: 156 MMHG | TEMPERATURE: 97.3 F | WEIGHT: 212.6 LBS | HEART RATE: 83 BPM | OXYGEN SATURATION: 95 % | DIASTOLIC BLOOD PRESSURE: 86 MMHG | BODY MASS INDEX: 34.17 KG/M2 | RESPIRATION RATE: 18 BRPM | HEIGHT: 66 IN

## 2022-03-02 DIAGNOSIS — Z79.01 LONG TERM CURRENT USE OF ANTICOAGULANT THERAPY: ICD-10-CM

## 2022-03-02 DIAGNOSIS — F32.A ANXIETY AND DEPRESSION: ICD-10-CM

## 2022-03-02 DIAGNOSIS — I10 ESSENTIAL HYPERTENSION: ICD-10-CM

## 2022-03-02 DIAGNOSIS — I48.0 PAF (PAROXYSMAL ATRIAL FIBRILLATION) (H): ICD-10-CM

## 2022-03-02 DIAGNOSIS — F51.01 PRIMARY INSOMNIA: ICD-10-CM

## 2022-03-02 DIAGNOSIS — M25.561 ACUTE PAIN OF RIGHT KNEE: ICD-10-CM

## 2022-03-02 DIAGNOSIS — I21.4 NSTEMI (NON-ST ELEVATED MYOCARDIAL INFARCTION) (H): ICD-10-CM

## 2022-03-02 DIAGNOSIS — F41.9 ANXIETY AND DEPRESSION: ICD-10-CM

## 2022-03-02 DIAGNOSIS — E10.65 TYPE 1 DIABETES MELLITUS WITH HYPERGLYCEMIA (H): Primary | ICD-10-CM

## 2022-03-02 NOTE — PROGRESS NOTES
[facility] GERIATRICS  Chief Complaint   Patient presents with     Annual Comprehensive Nursing Home     [facility] Care Coordination - Home Visit-Annual Assessment     [facility] Record Number:  [id number]  Place of Service where encounter took place:  [facility] ([facility]) [[id number]]    HPI:    Kimberly Stephenson  is a 76 year old  (1945), who is being seen today for an annual comprehensive visit. HPI information obtained from: facility chart records, facility staff and patient report.     Patient resting in room sitting in chair. Denies symptoms of hyper/hypoglycemia. No longer wishes to follow endocrinology. Reports anxiety has improved. No further concerns at this time. Staff reports resident is doing welll.     ALLERGIES: Cymbalta, Fentanyl, and Versed [midazolam]  PAST MEDICAL HISTORY:   Past Medical History:   Diagnosis Date     Anxiety      Depression      Diabetes mellitus (H)     Diabetes Type 1     DJD (degenerative joint disease) of knee     left     Hyperlipidemia      Hypertension      PONV (postoperative nausea and vomiting)      TMJ syndrome       PAST SURGICAL HISTORY:  has a past surgical history that includes orthopedic surgery; Arthroplasty knee; and Open reduction internal fixation ankle (3/27/2013).  IMMUNIZATIONS:  Immunization History   Administered Date(s) Administered     COVID-19,PF,Moderna 01/13/2021, 02/08/2021     Flu 65+ Years 09/25/2017, 09/17/2018, 09/13/2019     Influenza (High Dose) 3 valent vaccine 08/30/2016, 09/17/2018     Influenza (IIV3) PF 11/28/2005, 10/08/2007, 11/23/2010, 12/07/2011, 09/20/2012, 11/15/2013     Influenza Vaccine IM > 6 months Valent IIV4 (Alfuria,Fluzone) 09/08/2015     Influenza, Quad, High Dose, Pf, 65yr+ (Fluzone HD) 09/22/2020, 10/07/2021     Pneumo Conj 13-V (2010&after) 08/19/2015     Pneumococcal 23 valent 10/02/2009     TD (ADULT, 7+) 01/01/2004     TDAP Vaccine (Adacel) 09/20/2012, 02/22/2014     Above  immunizations pulled from [facility]. [facility] and facility records also reconciled. Outstanding information sent to  to update [facility].  Future immunizations are not needed at this point as all recommended immunizations are up to date.       Current Outpatient Medications:      acetaminophen (TYLENOL) 325 MG tablet, Take 650 mg by mouth every 4 hours as needed for mild pain, Disp: , Rfl:      ACETAMINOPHEN EXTRA STRENGTH 500 MG tablet, take 2 tablets (1000mg) EVERY DAY, Disp: 60 tablet, Rfl: 11     ALPRAZolam (XANAX) 0.25 MG tablet, Take 1 tablet (0.25 mg) by mouth 2 times daily, Disp: 60 tablet, Rfl: 0     amLODIPine (NORVASC) 10 MG tablet, Take 1 tablet (10 mg) by mouth every evening, Disp: 90 tablet, Rfl: 3     atorvastatin (LIPITOR) 40 MG tablet, take one tablet EVERY DAY, Disp: 30 tablet, Rfl: 11     BD AUTOSHIELD DUO 30G X 5 MM, AS DIRECTED, Disp: 100 each, Rfl: 3     busPIRone (BUSPAR) 15 MG tablet, take one tablet TWICE DAILY, Disp: 60 tablet, Rfl: 11     ELIQUIS ANTICOAGULANT 2.5 MG tablet, take one tablet TWICE DAILY, Disp: 60 tablet, Rfl: 0     escitalopram (LEXAPRO) 20 MG tablet, take one tablet EVERY DAY, Disp: 30 tablet, Rfl: 11     insulin aspart (NOVOLOG PEN) 100 UNIT/ML pen, Inject 2 Units Subcutaneous 3 times daily (with meals), Disp: 3 mL, Rfl: 3     insulin aspart (NOVOLOG PEN) 100 UNIT/ML pen, Inject 0-7 Units Subcutaneous At Bedtime Do Not give Bedtime Correction Insulin if BG less than 200 For  to 250 give 2 units. For  to 300 give 3 units. For  to 350 give 4 units. For  to 400 give 5 units. For  to 450 give 6 units. For  to 500 give 7 units. Notify provider if glucose greater than or equal to 350 mg/dL after administration., Disp: 3 mL, Rfl: 0     insulin aspart (NOVOLOG PEN) 100 UNIT/ML pen, Inject 0-13 Units Subcutaneous 3 times daily (before meals) Correction Scale - custom DOSING    Do Not give Correction Insulin if Pre-Meal BG  "less than 100 For Pre-Meal  to 150 give 10 units. For Pre-Meal  to 200 give 11 units. For Pre-Meal  to 250 give 12 units. For Pre-Meal  to 300 give 13 units. For Pre-Meal  to 350 give 14 units. For Pre-Meal  to 400 give 15 units. To be given with prandial insulin, and based on pre-meal blood glucose.  Notify provider if glucose greater than or equal 350 mg/dL after administration. If given at mealtime, administer within 30 minutes of start of meal, Disp: 3 mL, Rfl: 0     insulin glargine (LANTUS SOLOSTAR) 100 UNIT/ML pen, Inject 20 Units Subcutaneous every morning, Disp: 15 mL, Rfl: 3     NOVOLOG FLEXPEN 100 UNIT/ML soln, USE AS DIRECTED AT SCHEDULED TIME 7:30AM,11:40AM AND 4:40PM AND 8:30PM tdd 75units, Disp: 15 mL, Rfl: 11     Case Management:  I have reviewed the Assisted Living care plan, current immunizations and preventive care/cancer screening.. Future cancer screening is not clinically indicated secondary to age/goals of care Patient's desire to return to the community is present, but is not able due to care needs . Current Level of Care is appropriate.    Advance Directive Discussion:    I reviewed the current advanced directives as reflected in EPIC, the POLST and the facility chart, and verified the congruency of orders. I did not contacted the first party and discussed the plan of Care.  I did review the advance directives with the resident. Patient's goal is pain control and comfort.    Team Discussion:  I communicated with the appropriate disciplines involved with the Plan of Care:   Nursing  ,    and Dietitian    Information reviewed:  Medications, vital signs, orders, and nursing notes.    ROS:  4 point ROS including Respiratory, CV, GI and , other than that noted in the HPI,  is negative    Vitals:  BP (!) 156/86   Pulse 83   Temp 97.3  F (36.3  C)   Resp 18   Ht 1.676 m (5' 6\")   Wt 96.4 kg (212 lb 9.6 oz)   SpO2 95%   BMI 34.31 kg/m   Body " mass index is 34.31 kg/m .  Exam:  Exam:  Constitutional: healthy, alert and no distress  Head: Normocephalic. No masses, lesions, tenderness or abnormalities  Neck: Neck supple. No adenopathy. Thyroid symmetric, normal size,, Carotids without bruits., negative findings: no asymmetry, masses, or scars  ENT: ENT exam normal, no neck nodes or sinus tenderness  Cardiovascular: negative, PMI normal. No lifts, heaves, or thrills. RRR. No murmurs, clicks gallops or rub  Respiratory: negative, Percussion normal. Good diaphragmatic excursion. Lungs clear  Gastrointestinal: Abdomen soft, non-tender. BS normal. No masses, organomegaly  : Deferred  Musculoskeletal: extremities normal- no gross deformities noted, gait normal and normal muscle tone  Skin: no suspicious lesions or rashes  Neurologic: Gait normal. Reflexes normal and symmetric. Sensation grossly WNL.  Psychiatric: mentation appears normal and affect normal/bright  Hematologic/Lymphatic/Immunologic: Normal cervical lymph nodes      Lab/Diagnostic data:   Labs done in [facility] are in [facility]. Please refer to them using [employer] [facility]/Care Everywhere. and Recent labs in [facility] reviewed by me today.     ASSESSMENT/PLAN  1. Type 1 diabetes mellitus with hyperglycemia (E10.65)   [Novolog sliding scale insulin, Lantus 20 units daily]  Chronic,  Managed at this time, no further follow up with endocrinology. Continue insulin as ordered.   -A1c next lab day   -Continue blood sugar checks   -Atorvastatin for secondary prevention     2. Anxiety and Depression (F41.9, F32.A)  [Xanax 0.25 mg/BID, Buspar 15 mg/BID, lexapro 20 mg/day]  Longstanding history, managed with current medication regimen. Failed dose reduction in past, no adjustments at this time.   -BMP next lab day     3. PAF (I48.0)   Long term use of anticoagulant therapy (Z79.01)  [eliquis 2.5 mg BID, HR stable off medications]  -Continue without change     4. Essential hypertension (I10)  [Norvasc 10 mg/day]  Chronic, blood  pressure stable on current medications. Continue norvasc 10 mg/day, monitor and make adjustments as clinically indicated. Goal BP <150/90.     5. Primary Insomnia (F51.01)  No longer taking medication. Sleep monitoring and update NP with changes.     6. Acute pain of right knee (M25.561)  [Scheduled and PRN acetaminophen]  Managed, no change.       Orders:  CBC, TSH, A1c, BMP, LFTs next lab day.     Electronically signed by:  VANDANA Munguia CNP  [facility]

## 2022-03-03 RX ORDER — APIXABAN 2.5 MG/1
TABLET, FILM COATED ORAL
Qty: 60 TABLET | Refills: 11 | Status: SHIPPED | OUTPATIENT
Start: 2022-03-03 | End: 2023-02-20

## 2022-03-14 ENCOUNTER — LAB REQUISITION (OUTPATIENT)
Dept: LAB | Facility: CLINIC | Age: 77
End: 2022-03-14
Payer: COMMERCIAL

## 2022-03-14 DIAGNOSIS — Z79.899 OTHER LONG TERM (CURRENT) DRUG THERAPY: ICD-10-CM

## 2022-03-14 DIAGNOSIS — E11.9 TYPE 2 DIABETES MELLITUS WITHOUT COMPLICATIONS (H): ICD-10-CM

## 2022-03-15 LAB
ALBUMIN SERPL-MCNC: 3.3 G/DL (ref 3.5–5)
ALP SERPL-CCNC: 107 U/L (ref 45–120)
ALT SERPL W P-5'-P-CCNC: 13 U/L (ref 0–45)
ANION GAP SERPL CALCULATED.3IONS-SCNC: 9 MMOL/L (ref 5–18)
AST SERPL W P-5'-P-CCNC: 18 U/L (ref 0–40)
BILIRUB DIRECT SERPL-MCNC: 0.1 MG/DL
BILIRUB SERPL-MCNC: 0.4 MG/DL (ref 0–1)
BUN SERPL-MCNC: 15 MG/DL (ref 8–28)
CALCIUM SERPL-MCNC: 9.5 MG/DL (ref 8.5–10.5)
CHLORIDE BLD-SCNC: 103 MMOL/L (ref 98–107)
CO2 SERPL-SCNC: 27 MMOL/L (ref 22–31)
CREAT SERPL-MCNC: 0.84 MG/DL (ref 0.6–1.1)
ERYTHROCYTE [DISTWIDTH] IN BLOOD BY AUTOMATED COUNT: 13.2 % (ref 10–15)
GFR SERPL CREATININE-BSD FRML MDRD: 71 ML/MIN/1.73M2
GLUCOSE BLD-MCNC: 399 MG/DL (ref 70–125)
HBA1C MFR BLD: 10.6 %
HCT VFR BLD AUTO: 41.4 % (ref 35–47)
HGB BLD-MCNC: 13.4 G/DL (ref 11.7–15.7)
MCH RBC QN AUTO: 30.2 PG (ref 26.5–33)
MCHC RBC AUTO-ENTMCNC: 32.4 G/DL (ref 31.5–36.5)
MCV RBC AUTO: 93 FL (ref 78–100)
PLATELET # BLD AUTO: 188 10E3/UL (ref 150–450)
POTASSIUM BLD-SCNC: 4.5 MMOL/L (ref 3.5–5)
PROT SERPL-MCNC: 6.5 G/DL (ref 6–8)
RBC # BLD AUTO: 4.44 10E6/UL (ref 3.8–5.2)
SODIUM SERPL-SCNC: 139 MMOL/L (ref 136–145)
TSH SERPL DL<=0.005 MIU/L-ACNC: 1.07 UIU/ML (ref 0.3–5)
WBC # BLD AUTO: 4.6 10E3/UL (ref 4–11)

## 2022-03-15 PROCEDURE — 85027 COMPLETE CBC AUTOMATED: CPT | Mod: ORL | Performed by: NURSE PRACTITIONER

## 2022-03-15 PROCEDURE — P9603 ONE-WAY ALLOW PRORATED MILES: HCPCS | Mod: ORL | Performed by: NURSE PRACTITIONER

## 2022-03-15 PROCEDURE — 36415 COLL VENOUS BLD VENIPUNCTURE: CPT | Mod: ORL | Performed by: NURSE PRACTITIONER

## 2022-03-15 PROCEDURE — 83036 HEMOGLOBIN GLYCOSYLATED A1C: CPT | Mod: ORL | Performed by: NURSE PRACTITIONER

## 2022-03-15 PROCEDURE — 84443 ASSAY THYROID STIM HORMONE: CPT | Mod: ORL | Performed by: NURSE PRACTITIONER

## 2022-03-15 PROCEDURE — 82248 BILIRUBIN DIRECT: CPT | Mod: ORL | Performed by: NURSE PRACTITIONER

## 2022-03-15 PROCEDURE — 80053 COMPREHEN METABOLIC PANEL: CPT | Mod: ORL | Performed by: NURSE PRACTITIONER

## 2022-03-17 DIAGNOSIS — E10.65 TYPE 1 DIABETES MELLITUS WITH HYPERGLYCEMIA (H): ICD-10-CM

## 2022-03-17 RX ORDER — INSULIN GLARGINE 100 [IU]/ML
INJECTION, SOLUTION SUBCUTANEOUS
Qty: 15 ML | Refills: 3 | Status: SHIPPED | OUTPATIENT
Start: 2022-03-17 | End: 2022-10-03

## 2022-03-18 NOTE — PROGRESS NOTES
A1c 10.6% and greater than goal. Insulin pump not feasible to patient and has historically refused. Currently taking combination RA and G insulin with sliding scale before meals and bedtime and lantus 20 units AM.     Plan:  1. Continue rapid acting without change.   2. Lantus 13 units AM and Lantus 13 units with dinner (this is still within 1/3 total insulin intake for day per weight.)  3. A1c 8 weeks  4. Continue blood sugar checks without change.     VANDANA Munguia [facility]

## 2022-04-07 DIAGNOSIS — E10.65 TYPE 1 DIABETES MELLITUS WITH HYPERGLYCEMIA (H): Primary | ICD-10-CM

## 2022-04-07 RX ORDER — LANCETS 30 GAUGE
EACH MISCELLANEOUS
Qty: 100 EACH | Refills: 11 | Status: SHIPPED | OUTPATIENT
Start: 2022-04-07 | End: 2023-11-06

## 2022-04-12 DIAGNOSIS — F41.9 ANXIETY: ICD-10-CM

## 2022-04-12 RX ORDER — ALPRAZOLAM 0.25 MG
0.25 TABLET ORAL 2 TIMES DAILY
Qty: 60 TABLET | Refills: 0 | Status: SHIPPED | OUTPATIENT
Start: 2022-04-12 | End: 2022-05-26

## 2022-05-02 ENCOUNTER — TRANSFERRED RECORDS (OUTPATIENT)
Dept: HEALTH INFORMATION MANAGEMENT | Facility: CLINIC | Age: 77
End: 2022-05-02
Payer: COMMERCIAL

## 2022-05-02 LAB — RETINOPATHY: POSITIVE

## 2022-05-06 ENCOUNTER — ASSISTED LIVING VISIT (OUTPATIENT)
Dept: GERIATRICS | Facility: CLINIC | Age: 77
End: 2022-05-06
Payer: COMMERCIAL

## 2022-05-06 VITALS — OXYGEN SATURATION: 96 % | TEMPERATURE: 97.9 F

## 2022-05-06 DIAGNOSIS — I10 ESSENTIAL HYPERTENSION: ICD-10-CM

## 2022-05-06 DIAGNOSIS — I48.0 PAF (PAROXYSMAL ATRIAL FIBRILLATION) (H): ICD-10-CM

## 2022-05-06 DIAGNOSIS — F32.A ANXIETY AND DEPRESSION: ICD-10-CM

## 2022-05-06 DIAGNOSIS — F51.01 PRIMARY INSOMNIA: ICD-10-CM

## 2022-05-06 DIAGNOSIS — E10.65 TYPE 1 DIABETES MELLITUS WITH HYPERGLYCEMIA (H): Primary | ICD-10-CM

## 2022-05-06 DIAGNOSIS — F41.9 ANXIETY AND DEPRESSION: ICD-10-CM

## 2022-05-06 NOTE — PROGRESS NOTES
[facility] GERIATRICS  Chief Complaint   Patient presents with     Annual Comprehensive Nursing Home     FVP Care Coordination - Home Visit-Annual Assessment     [facility] Medical Record Number:  [id number]  Place of Service where encounter took place:  [facility] ([facility]) [[id number]]    HPI:    Kimberly Stephenson  is a 76 year old  (1945), who is being seen today for an annual comprehensive visit. HPI information obtained from: facility chart records, facility staff and patient report.   Patient eating at dining table, pleasant with writer. Has been experiencing low blood sugars at random times of day. Some nausea and dizziness noted with hypoglycemia. No further concerns at this time.     BP Readings from Last 3 Encounters:   02/22/22 (!) 156/86   08/22/21 (!) 161/72   08/22/21 (!) 161/72     Pulse Readings from Last 4 Encounters:   02/22/22 83   08/22/21 75   08/22/21 75   07/30/21 71     Wt Readings from Last 4 Encounters:   02/22/22 96.4 kg (212 lb 9.6 oz)   11/22/21 109.1 kg (240 lb 8 oz)   09/22/21 98.9 kg (218 lb)   08/22/21 97.3 kg (214 lb 6.4 oz)     ALLERGIES: Cymbalta, Fentanyl, and Versed [midazolam]  PAST MEDICAL HISTORY:   Past Medical History:   Diagnosis Date     Anxiety      Depression      Diabetes mellitus (H)     Diabetes Type 1     DJD (degenerative joint disease) of knee     left     Hyperlipidemia      Hypertension      PONV (postoperative nausea and vomiting)      TMJ syndrome       PAST SURGICAL HISTORY:  has a past surgical history that includes orthopedic surgery; Arthroplasty knee; and Open reduction internal fixation ankle (3/27/2013).  IMMUNIZATIONS:  Immunization History   Administered Date(s) Administered     COVID-19,PF,Moderna 01/13/2021, 02/08/2021     Flu 65+ Years 09/25/2017, 09/17/2018, 09/13/2019     Influenza (High Dose) 3 valent vaccine 08/30/2016, 09/17/2018     Influenza (IIV3) PF 11/28/2005, 10/08/2007, 11/23/2010, 12/07/2011,  09/20/2012, 11/15/2013     Influenza Vaccine IM > 6 months Valent IIV4 (Alfuria,Fluzone) 09/08/2015     Influenza, Quad, High Dose, Pf, 65yr+ (Fluzone HD) 09/22/2020, 10/07/2021     Pneumo Conj 13-V (2010&after) 08/19/2015     Pneumococcal 23 valent 10/02/2009     TD (ADULT, 7+) 01/01/2004     TDAP Vaccine (Adacel) 09/20/2012, 02/22/2014     Above immunizations pulled from [facility]. MIIC and facility records also reconciled. Outstanding information sent to  to update [facility].  Fuure immunizations are not needed at this point as all recommended immunizations are up to date.       Current Outpatient Medications:      acetaminophen (TYLENOL) 325 MG tablet, Take 650 mg by mouth every 4 hours as needed for mild pain, Disp: , Rfl:      ACETAMINOPHEN EXTRA STRENGTH 500 MG tablet, take 2 tablets (1000mg) EVERY DAY, Disp: 60 tablet, Rfl: 11     ALPRAZolam (XANAX) 0.25 MG tablet, Take 1 tablet (0.25 mg) by mouth 2 times daily, Disp: 60 tablet, Rfl: 0     amLODIPine (NORVASC) 10 MG tablet, Take 1 tablet (10 mg) by mouth every evening, Disp: 90 tablet, Rfl: 3     atorvastatin (LIPITOR) 40 MG tablet, take one tablet EVERY DAY, Disp: 30 tablet, Rfl: 11     BD AUTOSHIELD DUO 30G X 5 MM, AS DIRECTED, Disp: 100 each, Rfl: 3     busPIRone (BUSPAR) 15 MG tablet, take one tablet TWICE DAILY, Disp: 60 tablet, Rfl: 11     ELIQUIS ANTICOAGULANT 2.5 MG tablet, take one tablet TWICE DAILY, Disp: 60 tablet, Rfl: 11     escitalopram (LEXAPRO) 20 MG tablet, take one tablet EVERY DAY, Disp: 30 tablet, Rfl: 11     insulin aspart (NOVOLOG PEN) 100 UNIT/ML pen, Inject 2 Units Subcutaneous 3 times daily (with meals), Disp: 3 mL, Rfl: 3     insulin aspart (NOVOLOG PEN) 100 UNIT/ML pen, Inject 0-7 Units Subcutaneous At Bedtime Do Not give Bedtime Correction Insulin if BG less than 200 For  to 250 give 2 units. For  to 300 give 3 units. For  to 350 give 4 units. For  to 400 give 5 units. For  to  450 give 6 units. For  to 500 give 7 units. Notify provider if glucose greater than or equal to 350 mg/dL after administration., Disp: 3 mL, Rfl: 0     insulin aspart (NOVOLOG PEN) 100 UNIT/ML pen, Inject 0-13 Units Subcutaneous 3 times daily (before meals) Correction Scale - custom DOSING    Do Not give Correction Insulin if Pre-Meal BG less than 100 For Pre-Meal  to 150 give 10 units. For Pre-Meal  to 200 give 11 units. For Pre-Meal  to 250 give 12 units. For Pre-Meal  to 300 give 13 units. For Pre-Meal  to 350 give 14 units. For Pre-Meal  to 400 give 15 units. To be given with prandial insulin, and based on pre-meal blood glucose.  Notify provider if glucose greater than or equal 350 mg/dL after administration. If given at mealtime, administer within 30 minutes of start of meal, Disp: 3 mL, Rfl: 0     insulin glargine (LANTUS SOLOSTAR) 100 UNIT/ML pen, Inject 13 Units Subcutaneous every morning AND 13 Units daily (with dinner)., Disp: 15 mL, Rfl: 3     NOVOLOG FLEXPEN 100 UNIT/ML soln, USE AS DIRECTED AT SCHEDULED TIME 7:30AM,11:40AM AND 4:40PM AND 8:30PM tdd 75units, Disp: 15 mL, Rfl: 11     Sure Comfort Lancets 30G MISC, AS DIRECTED, Disp: 100 each, Rfl: 11     Case Management:  I have reviewed the Assisted Living care plan, current immunizations and preventive care/cancer screening.. Future cancer screening is not clinically indicated secondary to age/goals of care Patient's desire to return to the community is present, but is not able due to care needs . Current Level of Care is appropriate.    Advance Directive Discussion:    I reviewed the current advanced directives as reflected in EPIC, the POLST and the facility chart, and verified the congruency of orders. I contacted the first party  and discussed the plan of Care.  I did review the advance directives with the resident. Patient's goal is pain control and comfort.    Team Discussion:  I communicated with the  appropriate disciplines involved with the Plan of Care:   Nursing    Information reviewed:  Medications, vital signs, orders, and nursing notes.    ROS:  10 point ROS of systems including Constitutional, Eyes, Respiratory, Cardiovascular, Gastroenterology, Genitourinary, Integumentary, Musculoskeletal, Psychiatric were all negative except for pertinent positives noted in my HPI.    Vitals:  Temp 97.9  F (36.6  C)   SpO2 96%  There is no height or weight on file to calculate BMI.  Exam:  GENERAL APPEARANCE:  Alert, in no distress  RESP:  respiratory effort and palpation of chest normal, lungs clear to auscultation   CV:  Palpation and auscultation of heart done , regular rate and rhythm, no murmur, rub, or gallop  ABDOMEN:  normal bowel sounds, soft, nontender, no hepatosplenomegaly or other masses  M/S:   Gait and station normal  Digits and nails normal  SKIN:  Inspection of skin and subcutaneous tissue baseline, Palpation of skin and subcutaneous tissue baseline  NEURO:   Cranial nerves 2-12 are normal tested and grossly at patient's baseline  PSYCH:  oriented X 3, affect and mood normal     Lab/Diagnostic data:   Labs done in SNF are in [facility]. Please refer to them using [facility] Everywhere. and Recent labs in [facility] reviewed by me today.     ASSESSMENT/PLAN  (E10.65) Type 1 diabetes mellitus with hyperglycemia (H)  (primary encounter diagnosis)  Chronic, patient no longer following with endocrinology. Intermittent, random hypoglycemia.    Reduce novolog insulin by 4 units per scale.     Continue to monitor BS as ordered     Some A1c improvement but not at goal <8.5%     (F41.9,  F32.A) Anxiety and depression  Longstanding; unitizes PRN xanax     No changes at this time; adjustments as clinically indicated.     (I48.0) PAF (paroxysmal atrial fibrillation) (H)  Chronic, anticoagulated with Eliquis 2.5 mg/BID    (I10) Essential hypertension  BP Readings from Last 3 Encounters:   02/22/22 (!) 156/86   08/22/21  (!) 161/72   08/22/21 (!) 161/72   Chronic, less than goal 150/90.    No change       (F51.01) Primary insomnia  Improved, longstanding history    Off medication at this time. Monitor         Electronically signed by:  Li Arana NP

## 2022-05-06 NOTE — LETTER
5/6/2022        RE: Kimberly Stephenson  [place]  [address] [address]        [facility] GERIATRICS  Chief Complaint   Patient presents with     Annual Comprehensive Nursing Home     FVP Care Coordination - Home Visit-Annual Assessment     [place] Medical Record Number:  [id number]  Place of Service where encounter took place:  [facility] ([facility]) [[id number]]    HPI:    Kimberly Stephenson  is a 76 year old  (1945), who is being seen today for an annual comprehensive visit. HPI information obtained from: facility chart records, facility staff and patient report.   Patient eating at dining table, pleasant with writer. Has been experiencing low blood sugars at random times of day. Some nausea and dizziness noted with hypoglycemia. No further concerns at this time.     BP Readings from Last 3 Encounters:   02/22/22 (!) 156/86   08/22/21 (!) 161/72   08/22/21 (!) 161/72     Pulse Readings from Last 4 Encounters:   02/22/22 83   08/22/21 75   08/22/21 75   07/30/21 71     Wt Readings from Last 4 Encounters:   02/22/22 96.4 kg (212 lb 9.6 oz)   11/22/21 109.1 kg (240 lb 8 oz)   09/22/21 98.9 kg (218 lb)   08/22/21 97.3 kg (214 lb 6.4 oz)     ALLERGIES: Cymbalta, Fentanyl, and Versed [midazolam]  PAST MEDICAL HISTORY:   Past Medical History:   Diagnosis Date     Anxiety      Depression      Diabetes mellitus (H)     Diabetes Type 1     DJD (degenerative joint disease) of knee     left     Hyperlipidemia      Hypertension      PONV (postoperative nausea and vomiting)      TMJ syndrome       PAST SURGICAL HISTORY:  has a past surgical history that includes orthopedic surgery; Arthroplasty knee; and Open reduction internal fixation ankle (3/27/2013).  IMMUNIZATIONS:  Immunization History   Administered Date(s) Administered     COVID-19,PF,Moderna 01/13/2021, 02/08/2021     Flu 65+ Years 09/25/2017, 09/17/2018, 09/13/2019     Influenza (High Dose)  3 valent vaccine 08/30/2016, 09/17/2018     Influenza (IIV3) PF 11/28/2005, 10/08/2007, 11/23/2010, 12/07/2011, 09/20/2012, 11/15/2013     Influenza Vaccine IM > 6 months Valent IIV4 (Alfuria,Fluzone) 09/08/2015     Influenza, Quad, High Dose, Pf, 65yr+ (Fluzone HD) 09/22/2020, 10/07/2021     Pneumo Conj 13-V (2010&after) 08/19/2015     Pneumococcal 23 valent 10/02/2009     TD (ADULT, 7+) 01/01/2004     TDAP Vaccine (Adacel) 09/20/2012, 02/22/2014     Above immunizations pulled from [facility]. MIIC and facility records also reconciled. Outstanding information sent to  to update [facility].  Fuure immunizations are not needed at this point as all recommended immunizations are up to date.       Current Outpatient Medications:      acetaminophen (TYLENOL) 325 MG tablet, Take 650 mg by mouth every 4 hours as needed for mild pain, Disp: , Rfl:      ACETAMINOPHEN EXTRA STRENGTH 500 MG tablet, take 2 tablets (1000mg) EVERY DAY, Disp: 60 tablet, Rfl: 11     ALPRAZolam (XANAX) 0.25 MG tablet, Take 1 tablet (0.25 mg) by mouth 2 times daily, Disp: 60 tablet, Rfl: 0     amLODIPine (NORVASC) 10 MG tablet, Take 1 tablet (10 mg) by mouth every evening, Disp: 90 tablet, Rfl: 3     atorvastatin (LIPITOR) 40 MG tablet, take one tablet EVERY DAY, Disp: 30 tablet, Rfl: 11     BD AUTOSHIELD DUO 30G X 5 MM, AS DIRECTED, Disp: 100 each, Rfl: 3     busPIRone (BUSPAR) 15 MG tablet, take one tablet TWICE DAILY, Disp: 60 tablet, Rfl: 11     ELIQUIS ANTICOAGULANT 2.5 MG tablet, take one tablet TWICE DAILY, Disp: 60 tablet, Rfl: 11     escitalopram (LEXAPRO) 20 MG tablet, take one tablet EVERY DAY, Disp: 30 tablet, Rfl: 11     insulin aspart (NOVOLOG PEN) 100 UNIT/ML pen, Inject 2 Units Subcutaneous 3 times daily (with meals), Disp: 3 mL, Rfl: 3     insulin aspart (NOVOLOG PEN) 100 UNIT/ML pen, Inject 0-7 Units Subcutaneous At Bedtime Do Not give Bedtime Correction Insulin if BG less than 200 For  to 250 give 2  units. For  to 300 give 3 units. For  to 350 give 4 units. For  to 400 give 5 units. For  to 450 give 6 units. For  to 500 give 7 units. Notify provider if glucose greater than or equal to 350 mg/dL after administration., Disp: 3 mL, Rfl: 0     insulin aspart (NOVOLOG PEN) 100 UNIT/ML pen, Inject 0-13 Units Subcutaneous 3 times daily (before meals) Correction Scale - custom DOSING    Do Not give Correction Insulin if Pre-Meal BG less than 100 For Pre-Meal  to 150 give 10 units. For Pre-Meal  to 200 give 11 units. For Pre-Meal  to 250 give 12 units. For Pre-Meal  to 300 give 13 units. For Pre-Meal  to 350 give 14 units. For Pre-Meal  to 400 give 15 units. To be given with prandial insulin, and based on pre-meal blood glucose.  Notify provider if glucose greater than or equal 350 mg/dL after administration. If given at mealtime, administer within 30 minutes of start of meal, Disp: 3 mL, Rfl: 0     insulin glargine (LANTUS SOLOSTAR) 100 UNIT/ML pen, Inject 13 Units Subcutaneous every morning AND 13 Units daily (with dinner)., Disp: 15 mL, Rfl: 3     NOVOLOG FLEXPEN 100 UNIT/ML soln, USE AS DIRECTED AT SCHEDULED TIME 7:30AM,11:40AM AND 4:40PM AND 8:30PM tdd 75units, Disp: 15 mL, Rfl: 11     Sure Comfort Lancets 30G MISC, AS DIRECTED, Disp: 100 each, Rfl: 11     Case Management:  I have reviewed the Assisted Living care plan, current immunizations and preventive care/cancer screening.. Future cancer screening is not clinically indicated secondary to age/goals of care Patient's desire to return to the community is present, but is not able due to care needs . Current Level of Care is appropriate.    Advance Directive Discussion:    I reviewed the current advanced directives as reflected in EPIC, the POLST and the facility chart, and verified the congruency of orders. I contacted the first party  and discussed the plan of Care.  I did review the advance  directives with the resident. Patient's goal is pain control and comfort.    Team Discussion:  I communicated with the appropriate disciplines involved with the Plan of Care:   Nursing    Information reviewed:  Medications, vital signs, orders, and nursing notes.    ROS:  10 point ROS of systems including Constitutional, Eyes, Respiratory, Cardiovascular, Gastroenterology, Genitourinary, Integumentary, Musculoskeletal, Psychiatric were all negative except for pertinent positives noted in my HPI.    Vitals:  Temp 97.9  F (36.6  C)   SpO2 96%  There is no height or weight on file to calculate BMI.  Exam:  GENERAL APPEARANCE:  Alert, in no distress  RESP:  respiratory effort and palpation of chest normal, lungs clear to auscultation   CV:  Palpation and auscultation of heart done , regular rate and rhythm, no murmur, rub, or gallop  ABDOMEN:  normal bowel sounds, soft, nontender, no hepatosplenomegaly or other masses  M/S:   Gait and station normal  Digits and nails normal  SKIN:  Inspection of skin and subcutaneous tissue baseline, Palpation of skin and subcutaneous tissue baseline  NEURO:   Cranial nerves 2-12 are normal tested and grossly at patient's baseline  PSYCH:  oriented X 3, affect and mood normal     Lab/Diagnostic data:   Labs done in SNF are in [place] [facility]. Please refer to them using [facility] Everywhere. and Recent labs in [facility] reviewed by me today.     ASSESSMENT/PLAN  (E10.65) Type 1 diabetes mellitus with hyperglycemia (H)  (primary encounter diagnosis)  Chronic, patient no longer following with endocrinology. Intermittent, random hypoglycemia.    Reduce novolog insulin by 4 units per scale.     Continue to monitor BS as ordered     Some A1c improvement but not at goal <8.5%     (F41.9,  F32.A) Anxiety and depression  Longstanding; unitizes PRN xanax     No changes at this time; adjustments as clinically indicated.     (I48.0) PAF (paroxysmal atrial fibrillation) (H)  Chronic, anticoagulated with  Eliquis 2.5 mg/BID    (I10) Essential hypertension  BP Readings from Last 3 Encounters:   02/22/22 (!) 156/86   08/22/21 (!) 161/72   08/22/21 (!) 161/72   Chronic, less than goal 150/90.    No change       (F51.01) Primary insomnia  Improved, longstanding history    Off medication at this time. Monitor         Electronically signed by:  Li Arana NP          Sincerely,        Li Arana NP

## 2022-05-07 ENCOUNTER — LAB REQUISITION (OUTPATIENT)
Dept: LAB | Facility: CLINIC | Age: 77
End: 2022-05-07
Payer: COMMERCIAL

## 2022-05-07 DIAGNOSIS — E11.9 TYPE 2 DIABETES MELLITUS WITHOUT COMPLICATIONS (H): ICD-10-CM

## 2022-05-10 LAB — HBA1C MFR BLD: 10.1 %

## 2022-05-10 PROCEDURE — P9603 ONE-WAY ALLOW PRORATED MILES: HCPCS | Mod: ORL | Performed by: NURSE PRACTITIONER

## 2022-05-10 PROCEDURE — 83036 HEMOGLOBIN GLYCOSYLATED A1C: CPT | Mod: ORL | Performed by: NURSE PRACTITIONER

## 2022-05-10 PROCEDURE — 36415 COLL VENOUS BLD VENIPUNCTURE: CPT | Mod: ORL | Performed by: NURSE PRACTITIONER

## 2022-05-26 DIAGNOSIS — F41.9 ANXIETY: ICD-10-CM

## 2022-05-26 RX ORDER — ALPRAZOLAM 0.25 MG
0.25 TABLET ORAL 2 TIMES DAILY
Qty: 60 TABLET | Refills: 0 | Status: SHIPPED | OUTPATIENT
Start: 2022-05-26 | End: 2022-06-10

## 2022-06-10 DIAGNOSIS — F41.9 ANXIETY: ICD-10-CM

## 2022-06-10 RX ORDER — ALPRAZOLAM 0.25 MG
0.25 TABLET ORAL 2 TIMES DAILY
Qty: 60 TABLET | Refills: 0 | Status: SHIPPED | OUTPATIENT
Start: 2022-06-10 | End: 2022-07-18

## 2022-06-12 DIAGNOSIS — E10.65 TYPE 1 DIABETES MELLITUS WITH HYPERGLYCEMIA (H): ICD-10-CM

## 2022-06-13 RX ORDER — PEN NEEDLE, DIABETIC, SAFETY 30 GX3/16"
NEEDLE, DISPOSABLE MISCELLANEOUS
Qty: 100 EACH | Refills: 3 | Status: SHIPPED | OUTPATIENT
Start: 2022-06-13 | End: 2022-08-15

## 2022-06-14 ENCOUNTER — LAB REQUISITION (OUTPATIENT)
Dept: LAB | Facility: CLINIC | Age: 77
End: 2022-06-14
Payer: COMMERCIAL

## 2022-06-14 DIAGNOSIS — U07.1 COVID-19: ICD-10-CM

## 2022-06-14 PROCEDURE — U0003 INFECTIOUS AGENT DETECTION BY NUCLEIC ACID (DNA OR RNA); SEVERE ACUTE RESPIRATORY SYNDROME CORONAVIRUS 2 (SARS-COV-2) (CORONAVIRUS DISEASE [COVID-19]), AMPLIFIED PROBE TECHNIQUE, MAKING USE OF HIGH THROUGHPUT TECHNOLOGIES AS DESCRIBED BY CMS-2020-01-R: HCPCS | Mod: ORL | Performed by: NURSE PRACTITIONER

## 2022-06-15 LAB — SARS-COV-2 RNA RESP QL NAA+PROBE: NEGATIVE

## 2022-06-17 ENCOUNTER — LAB REQUISITION (OUTPATIENT)
Dept: LAB | Facility: CLINIC | Age: 77
End: 2022-06-17
Payer: COMMERCIAL

## 2022-06-17 DIAGNOSIS — U07.1 COVID-19: ICD-10-CM

## 2022-06-21 PROCEDURE — U0005 INFEC AGEN DETEC AMPLI PROBE: HCPCS | Mod: ORL | Performed by: NURSE PRACTITIONER

## 2022-06-22 LAB — SARS-COV-2 RNA RESP QL NAA+PROBE: NEGATIVE

## 2022-07-11 DIAGNOSIS — F41.9 ANXIETY: ICD-10-CM

## 2022-07-12 ENCOUNTER — PATIENT OUTREACH (OUTPATIENT)
Dept: GERIATRIC MEDICINE | Facility: CLINIC | Age: 77
End: 2022-07-12

## 2022-07-12 NOTE — PROGRESS NOTES
[facility] [employer] Chart review      chart   No triggering events at this time   CM will follow-up as needed     Jyoti Almanzar MA [facility] Care Coordinator   [phone] - trks cell phone   [phone] - work fax

## 2022-07-18 ENCOUNTER — ASSISTED LIVING VISIT (OUTPATIENT)
Dept: GERIATRICS | Facility: CLINIC | Age: 77
End: 2022-07-18
Payer: COMMERCIAL

## 2022-07-18 VITALS
OXYGEN SATURATION: 99 % | BODY MASS INDEX: 34.65 KG/M2 | TEMPERATURE: 97.1 F | HEIGHT: 66 IN | RESPIRATION RATE: 18 BRPM | HEART RATE: 83 BPM | SYSTOLIC BLOOD PRESSURE: 156 MMHG | DIASTOLIC BLOOD PRESSURE: 86 MMHG | WEIGHT: 215.6 LBS

## 2022-07-18 DIAGNOSIS — E66.01 MORBID OBESITY (H): ICD-10-CM

## 2022-07-18 DIAGNOSIS — F41.9 ANXIETY: ICD-10-CM

## 2022-07-18 DIAGNOSIS — F51.01 PRIMARY INSOMNIA: Primary | ICD-10-CM

## 2022-07-18 DIAGNOSIS — F32.5 MAJOR DEPRESSIVE DISORDER WITH SINGLE EPISODE, IN FULL REMISSION (H): ICD-10-CM

## 2022-07-18 RX ORDER — BUSPIRONE HYDROCHLORIDE 15 MG/1
15 TABLET ORAL 3 TIMES DAILY
Qty: 60 TABLET | Refills: 11 | Status: SHIPPED | OUTPATIENT
Start: 2022-07-18 | End: 2023-02-20

## 2022-07-18 RX ORDER — ALPRAZOLAM 0.25 MG
0.25 TABLET ORAL 2 TIMES DAILY PRN
Qty: 60 TABLET | Refills: 0 | Status: SHIPPED | OUTPATIENT
Start: 2022-07-18 | End: 2023-02-11

## 2022-07-18 NOTE — PROGRESS NOTES
[facility] GERIATRICS  ACUTE/EPISODIC VISIT    [facility] Medical Record Number:  [id number]  Place of Service where encounter took place:  [facility] ([facility]) [[id number]]    Chief Complaint   Patient presents with     RECHECK       HPI:    Kimberly Stephenson is a 77 year old  (1945), who is being seen today for an episodic care visit.  HPI information obtained from: facility chart records, facility staff and patient report.    Today's concern is: patient up eating in dining area. Reports she is feeling well today. Mood is fluctuating but her medications help. Scheduled xanax recently changed to PRN but is ok with that. Denies CP, SOB and lightheadedness.     BP Readings from Last 3 Encounters:   07/18/22 (!) 156/86   02/22/22 (!) 156/86   08/22/21 (!) 161/72     Pulse Readings from Last 4 Encounters:   07/18/22 83   02/22/22 83   08/22/21 75   08/22/21 75     215 lbs 9.6 oz       ALLERGIES:    Allergies   Allergen Reactions     Cymbalta      Fentanyl Nausea and Vomiting     Versed [Midazolam] Nausea and Vomiting        MEDICATIONS:  Post Discharge Medication Reconciliation Status: patient was not discharged from an inpatient facility.     Current Outpatient Medications   Medication Sig Dispense Refill     acetaminophen (TYLENOL) 325 MG tablet Take 650 mg by mouth every 4 hours as needed for mild pain       ACETAMINOPHEN EXTRA STRENGTH 500 MG tablet take 2 tablets (1000mg) EVERY DAY 60 tablet 11     ALPRAZolam (XANAX) 0.25 MG tablet Take 1 tablet (0.25 mg) by mouth 2 times daily 60 tablet 0     amLODIPine (NORVASC) 10 MG tablet Take 1 tablet (10 mg) by mouth every evening 90 tablet 3     atorvastatin (LIPITOR) 40 MG tablet take one tablet EVERY DAY 30 tablet 11     BD AUTOSHIELD DUO 30G X 5 MM AS DIRECTED 100 each 3     busPIRone (BUSPAR) 15 MG tablet take one tablet TWICE DAILY 60 tablet 11     ELIQUIS ANTICOAGULANT 2.5 MG tablet take one tablet TWICE DAILY 60  tablet 11     escitalopram (LEXAPRO) 20 MG tablet take one tablet EVERY DAY 30 tablet 11     insulin aspart (NOVOLOG PEN) 100 UNIT/ML pen Inject 2 Units Subcutaneous 3 times daily (with meals) 3 mL 3     insulin aspart (NOVOLOG PEN) 100 UNIT/ML pen Inject 0-7 Units Subcutaneous At Bedtime Do Not give Bedtime Correction Insulin if BG less than 200 For  to 250 give 2 units. For  to 300 give 3 units. For  to 350 give 4 units. For  to 400 give 5 units. For  to 450 give 6 units. For  to 500 give 7 units. Notify provider if glucose greater than or equal to 350 mg/dL after administration. 3 mL 0     insulin aspart (NOVOLOG PEN) 100 UNIT/ML pen Inject 0-13 Units Subcutaneous 3 times daily (before meals) Correction Scale - custom DOSING     Do Not give Correction Insulin if Pre-Meal BG less than 100  For Pre-Meal  to 150 give 10 units.  For Pre-Meal  to 200 give 11 units.  For Pre-Meal  to 250 give 12 units.  For Pre-Meal  to 300 give 13 units.  For Pre-Meal  to 350 give 14 units.  For Pre-Meal  to 400 give 15 units.  To be given with prandial insulin, and based on pre-meal blood glucose.   Notify provider if glucose greater than or equal 350 mg/dL after administration. If given at mealtime, administer within 30 minutes of start of meal 3 mL 0     insulin glargine (LANTUS SOLOSTAR) 100 UNIT/ML pen Inject 13 Units Subcutaneous every morning AND 13 Units daily (with dinner). 15 mL 3     NOVOLOG FLEXPEN 100 UNIT/ML soln USE AS DIRECTED AT SCHEDULED TIME 7:30AM,11:40AM AND 4:40PM AND 8:30PM tdd 75units 15 mL 11     Sure Comfort Lancets 30G MISC AS DIRECTED 100 each 11     Medications reviewed:  Medications reconciled to facility chart and changes were made to reflect current medications as identified as above med list. Below are the changes that were made:   Medications stopped since last EPIC medication reconciliation:   There are no discontinued  "medications.    Medications started since last [facility] medication reconciliation:  No orders of the defined types were placed in this encounter.    REVIEW OF SYSTEMS:  4 point ROS neg other than the symptoms noted above in the HPI.    Review of Systems   All other systems reviewed and are negative.      PHYSICAL EXAM:  BP (!) 156/86   Pulse 83   Temp 97.1  F (36.2  C)   Resp 18   Ht 1.676 m (5' 6\")   Wt 97.8 kg (215 lb 9.6 oz)   SpO2 99%   BMI 34.80 kg/m    Physical Exam  Constitutional:       Appearance: She is normal weight.   Cardiovascular:      Rate and Rhythm: Normal rate and regular rhythm.      Pulses: Normal pulses.      Heart sounds: Normal heart sounds.   Pulmonary:      Effort: Pulmonary effort is normal.      Breath sounds: Normal breath sounds.   Abdominal:      General: Bowel sounds are normal.      Palpations: Abdomen is soft.   Musculoskeletal:      Cervical back: Normal range of motion and neck supple.      Right lower leg: Right lower leg edema: .diag.   Neurological:      Mental Status: She is alert.           ASSESSMENT / PLAN:  (F51.01) Primary insomnia  (primary encounter diagnosis)  Comment: Longstanding, improved with medication.   Plan:   -Continue antianxiety medications, monitor and update NP with changes.       (E66.01) Morbid obesity (H)  Comment: Body mass index is 34.8 kg/m .  Plan: Encourage healthy snacks and meals.     (F41.9) Anxiety  (F32.5) Major depressive disorder with single episode, in full remission (H)  Comment: Longstanding history of anxiety. Increased depression and tearfulness. Ok with increased medication.   Plan: ALPRAZolam (XANAX) 0.25 MG tablet BID PRN   busPIRone (BUSPAR) 15 MG tablet TID    Orders:  Change buspirone 15 mg PO TID  Discontinue scheduled xanax and make PRN    Electronically signed by  Li Arana NP          "

## 2022-07-18 NOTE — LETTER
7/18/2022        RE: Kimberly Stephenson  [address] [address]        [facility] GERIATRICS  ACUTE/EPISODIC VISIT    [facility] Medical Record Number:  [id number]  Place of Service where encounter took place:  [facility] ([facility]) [[id number]]    Chief Complaint   Patient presents with     RECHECK       HPI:    Kimberly Stephenson is a 77 year old  (1945), who is being seen today for an episodic care visit.  HPI information obtained from: facility chart records, facility staff and patient report.    Today's concern is: patient up eating in dining area. Reports she is feeling well today. Mood is fluctuating but her medications help. Scheduled xanax recently changed to PRN but is ok with that. Denies CP, SOB and lightheadedness.     BP Readings from Last 3 Encounters:   07/18/22 (!) 156/86   02/22/22 (!) 156/86   08/22/21 (!) 161/72     Pulse Readings from Last 4 Encounters:   07/18/22 83   02/22/22 83   08/22/21 75   08/22/21 75     215 lbs 9.6 oz       ALLERGIES:    Allergies   Allergen Reactions     Cymbalta      Fentanyl Nausea and Vomiting     Versed [Midazolam] Nausea and Vomiting        MEDICATIONS:  Post Discharge Medication Reconciliation Status: patient was not discharged from an inpatient facility.     Current Outpatient Medications   Medication Sig Dispense Refill     acetaminophen (TYLENOL) 325 MG tablet Take 650 mg by mouth every 4 hours as needed for mild pain       ACETAMINOPHEN EXTRA STRENGTH 500 MG tablet take 2 tablets (1000mg) EVERY DAY 60 tablet 11     ALPRAZolam (XANAX) 0.25 MG tablet Take 1 tablet (0.25 mg) by mouth 2 times daily 60 tablet 0     amLODIPine (NORVASC) 10 MG tablet Take 1 tablet (10 mg) by mouth every evening 90 tablet 3     atorvastatin (LIPITOR) 40 MG tablet take one tablet EVERY DAY 30 tablet 11     BD AUTOSHIELD DUO 30G X 5 MM AS DIRECTED 100 each 3     busPIRone (BUSPAR) 15 MG tablet take one tablet TWICE DAILY  60 tablet 11     ELIQUIS ANTICOAGULANT 2.5 MG tablet take one tablet TWICE DAILY 60 tablet 11     escitalopram (LEXAPRO) 20 MG tablet take one tablet EVERY DAY 30 tablet 11     insulin aspart (NOVOLOG PEN) 100 UNIT/ML pen Inject 2 Units Subcutaneous 3 times daily (with meals) 3 mL 3     insulin aspart (NOVOLOG PEN) 100 UNIT/ML pen Inject 0-7 Units Subcutaneous At Bedtime Do Not give Bedtime Correction Insulin if BG less than 200 For  to 250 give 2 units. For  to 300 give 3 units. For  to 350 give 4 units. For  to 400 give 5 units. For  to 450 give 6 units. For  to 500 give 7 units. Notify provider if glucose greater than or equal to 350 mg/dL after administration. 3 mL 0     insulin aspart (NOVOLOG PEN) 100 UNIT/ML pen Inject 0-13 Units Subcutaneous 3 times daily (before meals) Correction Scale - custom DOSING     Do Not give Correction Insulin if Pre-Meal BG less than 100  For Pre-Meal  to 150 give 10 units.  For Pre-Meal  to 200 give 11 units.  For Pre-Meal  to 250 give 12 units.  For Pre-Meal  to 300 give 13 units.  For Pre-Meal  to 350 give 14 units.  For Pre-Meal  to 400 give 15 units.  To be given with prandial insulin, and based on pre-meal blood glucose.   Notify provider if glucose greater than or equal 350 mg/dL after administration. If given at mealtime, administer within 30 minutes of start of meal 3 mL 0     insulin glargine (LANTUS SOLOSTAR) 100 UNIT/ML pen Inject 13 Units Subcutaneous every morning AND 13 Units daily (with dinner). 15 mL 3     NOVOLOG FLEXPEN 100 UNIT/ML soln USE AS DIRECTED AT SCHEDULED TIME 7:30AM,11:40AM AND 4:40PM AND 8:30PM tdd 75units 15 mL 11     Sure Comfort Lancets 30G MISC AS DIRECTED 100 each 11     Medications reviewed:  Medications reconciled to facility chart and changes were made to reflect current medications as identified as above med list. Below are the changes that were made:   Medications  "stopped since last EPIC medication reconciliation:   There are no discontinued medications.    Medications started since last [facility] medication reconciliation:  No orders of the defined types were placed in this encounter.    REVIEW OF SYSTEMS:  4 point ROS neg other than the symptoms noted above in the HPI.    Review of Systems   All other systems reviewed and are negative.      PHYSICAL EXAM:  BP (!) 156/86   Pulse 83   Temp 97.1  F (36.2  C)   Resp 18   Ht 1.676 m (5' 6\")   Wt 97.8 kg (215 lb 9.6 oz)   SpO2 99%   BMI 34.80 kg/m    Physical Exam  Constitutional:       Appearance: She is normal weight.   Cardiovascular:      Rate and Rhythm: Normal rate and regular rhythm.      Pulses: Normal pulses.      Heart sounds: Normal heart sounds.   Pulmonary:      Effort: Pulmonary effort is normal.      Breath sounds: Normal breath sounds.   Abdominal:      General: Bowel sounds are normal.      Palpations: Abdomen is soft.   Musculoskeletal:      Cervical back: Normal range of motion and neck supple.      Right lower leg: Right lower leg edema: .diag.   Neurological:      Mental Status: She is alert.           ASSESSMENT / PLAN:  (F51.01) Primary insomnia  (primary encounter diagnosis)  Comment: Longstanding, improved with medication.   Plan:   -Continue antianxiety medications, monitor and update NP with changes.       (E66.01) Morbid obesity (H)  Comment: Body mass index is 34.8 kg/m .  Plan: Encourage healthy snacks and meals.     (F41.9) Anxiety  (F32.5) Major depressive disorder with single episode, in full remission (H)  Comment: Longstanding history of anxiety. Increased depression and tearfulness. Ok with increased medication.   Plan: ALPRAZolam (XANAX) 0.25 MG tablet BID PRN   busPIRone (BUSPAR) 15 MG tablet TID    Orders:  Change buspirone 15 mg PO TID  Discontinue scheduled xanax and make PRN    Electronically signed by  Li Arana NP                Sincerely,        Li Arana NP    "

## 2022-07-20 RX ORDER — ALPRAZOLAM 0.25 MG
TABLET ORAL
Qty: 60 TABLET | Refills: 0 | Status: SHIPPED | OUTPATIENT
Start: 2022-07-20 | End: 2023-11-08

## 2022-08-13 DIAGNOSIS — E10.65 TYPE 1 DIABETES MELLITUS WITH HYPERGLYCEMIA (H): ICD-10-CM

## 2022-08-16 RX ORDER — PEN NEEDLE, DIABETIC, SAFETY 30 GX3/16"
NEEDLE, DISPOSABLE MISCELLANEOUS
Qty: 100 EACH | Refills: 3 | Status: SHIPPED | OUTPATIENT
Start: 2022-08-16 | End: 2022-11-04

## 2022-08-21 ENCOUNTER — TELEPHONE (OUTPATIENT)
Dept: GERIATRICS | Facility: CLINIC | Age: 77
End: 2022-08-21

## 2022-08-21 NOTE — PROGRESS NOTES
Patient's blood sugar is 573, she receives NovoLog 13 units sliding scale for blood sugar up to 500.  Also gets Lantus 20 units at at bedtime.  Gave order for extra 5 units of NovoLog with her 13 units sliding scale, recheck blood sugar in 1 hour, if greater than 450 call back.  Jo Ann Merlos, CNP

## 2022-08-24 ENCOUNTER — TELEPHONE (OUTPATIENT)
Dept: GERIATRICS | Facility: CLINIC | Age: 77
End: 2022-08-24

## 2022-08-24 NOTE — TELEPHONE ENCOUNTER
"[facility] Triage Nurse Telephone Encounter    Provider: VANDANA Solano CNP  Facility: [facility] Facility Type:  AL    Caller: Maribel  Call Back Number: [phone]    Allergies:    Allergies   Allergen Reactions     Cymbalta      Fentanyl Nausea and Vomiting     Versed [Midazolam] Nausea and Vomiting        Reason for call: Nurse is calling to report that patient's BG prior to dinner is reading \"error\" due to hyperglycemia.  Notable meds:  Lantus 6 units Q AM and 20 units Q PM, Novolog sliding scale QID:  201-250= 2 units, 251-300= 3 units, 301-350= 4 units, 351-400= 5 units, 401-450= 6 units, 451-500= 7 units.  Instructions to call if BG is greater than 350 and 500.      Verbal Order/Direction given by Provider: Give Novolog 7 units and call back if patient develops symptoms of hyperglycemia.      Provider giving Order:  VANDANA Solano CNP    Verbal Order given to: Maribel Lipscomb RN      "

## 2022-09-01 ENCOUNTER — PATIENT OUTREACH (OUTPATIENT)
Dept: GERIATRIC MEDICINE | Facility: CLINIC | Age: 77
End: 2022-09-01

## 2022-09-01 NOTE — PROGRESS NOTES
Opening [facility] Medicare program and updating targets and tasks per [employer] launch.    Hilary Gleason  Care Management Specialist   [facility], [place]   [phone]

## 2022-09-27 ENCOUNTER — ASSISTED LIVING VISIT (OUTPATIENT)
Dept: GERIATRICS | Facility: CLINIC | Age: 77
End: 2022-09-27
Payer: COMMERCIAL

## 2022-09-27 VITALS
BODY MASS INDEX: 34.65 KG/M2 | HEART RATE: 65 BPM | OXYGEN SATURATION: 93 % | HEIGHT: 66 IN | RESPIRATION RATE: 16 BRPM | TEMPERATURE: 96.4 F | SYSTOLIC BLOOD PRESSURE: 118 MMHG | WEIGHT: 215.6 LBS | DIASTOLIC BLOOD PRESSURE: 59 MMHG

## 2022-09-27 DIAGNOSIS — E66.01 MORBID OBESITY (H): ICD-10-CM

## 2022-09-27 DIAGNOSIS — E10.65 TYPE 1 DIABETES MELLITUS WITH HYPERGLYCEMIA (H): ICD-10-CM

## 2022-09-27 DIAGNOSIS — F32.5 MAJOR DEPRESSIVE DISORDER WITH SINGLE EPISODE, IN FULL REMISSION (H): Primary | ICD-10-CM

## 2022-09-27 PROBLEM — R79.89 ELEVATED TROPONIN: Status: ACTIVE | Noted: 2022-09-27

## 2022-09-27 PROBLEM — Z01.419 ENCOUNTER FOR GYNECOLOGICAL EXAMINATION WITHOUT ABNORMAL FINDING: Status: ACTIVE | Noted: 2018-07-11

## 2022-09-27 PROBLEM — E87.29 HIGH ANION GAP METABOLIC ACIDOSIS: Status: ACTIVE | Noted: 2019-12-28

## 2022-09-27 NOTE — PROGRESS NOTES
"[facility] GERIATRICS    Chief Complaint   Patient presents with     Wellness Visit     HPI:  Kimberly Stephenson is a 77 year old  (1945), who is being seen today for an episodic care visit at: [facility] ([facility]) [[id number]]. Today's concern is:   -Patient resting in recliner in room today.  Reports she is doing really well and does not wish for any medication adjustments at this time.  Understands that her type 1 diabetes is unmanaged secondary to inability to follow food restrictions.  Has not followed with endocrinology for some time and refusing insulin pump.  Remains asymptomatic at this time and does feel her diabetes is doing well.  Denies pain, shortness of breath, chest pain.  No complaints or concerns per patient, staff,  provider at this time.    Allergies, and PMH/PSH reviewed in EPIC today.  REVIEW OF SYSTEMS:  10 point ROS of systems including Constitutional, Eyes, Respiratory, Cardiovascular, Gastroenterology, Genitourinary, Integumentary, Musculoskeletal, Psychiatric were all negative except for pertinent positives noted in my HPI.    Objective:   /59   Pulse 65   Temp (!) 96.4  F (35.8  C)   Resp 16   Ht 1.676 m (5' 6\")   Wt 97.8 kg (215 lb 9.6 oz)   SpO2 93%   BMI 34.80 kg/m    A & O x 3, NAD. Lungs CTA, non labored. RRR, S1/S2 w/o murmur,gallop or rub.  edema.  Abdomen soft, nontender, +BT'S. No focal neurological deficits.        Labs done in SNF are in [facility]. Please refer to them using [employer]/Care Everywhere. and Recent labs in [facility] reviewed by me today.     Assessment/Plan:  (F32.5) Major depressive disorder with single episode, in full remission (H)  (primary encounter diagnosis)  Comment: Longstanding history of anxiety and depression, stable on current medications.  Plan:   -No adjustment at this time given stability.  Gradual dose reduction would be detrimental to patient's overall wellbeing.    (E66.01) Morbid obesity " (H)  Comment:   Body mass index is 34.8 kg/m .  Plan:   Encourage healthy snacking and exercise.    (E10.65) Type 1 diabetes mellitus with hyperglycemia (H)  Comment: Chronic, varying blood sugar readings dependent on oral intake.  Plan:   -Continue to monitor and update NP as indicated  -Continue current regimen at this time with no changes  -Encourage follow-up with endocrinology.          Post Medication Reconciliation Status: Patient was not discharged from an inpatient facility or TCU        Orders:  The current medical regimen is effective; continue present plan and medications.      Electronically signed by:   Li Arana NP

## 2022-09-27 NOTE — LETTER
"    9/27/2022        RE: Kimberly Stephenson  [address] [address]        [facility] GERIATRICS    Chief Complaint   Patient presents with     Wellness Visit     HPI:  Kimberly Stephenson is a 77 year old  (1945), who is being seen today for an episodic care visit at: [facility] ([facility]) [[id number]]. Today's concern is:   -Patient resting in recliner in room today.  Reports she is doing really well and does not wish for any medication adjustments at this time.  Understands that her type 1 diabetes is unmanaged secondary to inability to follow food restrictions.  Has not followed with endocrinology for some time and refusing insulin pump.  Remains asymptomatic at this time and does feel her diabetes is doing well.  Denies pain, shortness of breath, chest pain.  No complaints or concerns per patient, staff,  provider at this time.    Allergies, and PMH/PSH reviewed in EPIC today.  REVIEW OF SYSTEMS:  10 point ROS of systems including Constitutional, Eyes, Respiratory, Cardiovascular, Gastroenterology, Genitourinary, Integumentary, Musculoskeletal, Psychiatric were all negative except for pertinent positives noted in my HPI.    Objective:   /59   Pulse 65   Temp (!) 96.4  F (35.8  C)   Resp 16   Ht 1.676 m (5' 6\")   Wt 97.8 kg (215 lb 9.6 oz)   SpO2 93%   BMI 34.80 kg/m    A & O x 3, NAD. Lungs CTA, non labored. RRR, S1/S2 w/o murmur,gallop or rub.  edema.  Abdomen soft, nontender, +BT'S. No focal neurological deficits.        Labs done in SNF are in [facility]. Please refer to them using EPIC/Care Everywhere. and Recent labs in [facility] reviewed by me today.     Assessment/Plan:  (F32.5) Major depressive disorder with single episode, in full remission (H)  (primary encounter diagnosis)  Comment: Longstanding history of anxiety and depression, stable on current medications.  Plan:   -No adjustment at this time given stability.  Gradual dose reduction " would be detrimental to patient's overall wellbeing.    (E66.01) Morbid obesity (H)  Comment:   Body mass index is 34.8 kg/m .  Plan:   Encourage healthy snacking and exercise.    (E10.65) Type 1 diabetes mellitus with hyperglycemia (H)  Comment: Chronic, varying blood sugar readings dependent on oral intake.  Plan:   -Continue to monitor and update NP as indicated  -Continue current regimen at this time with no changes  -Encourage follow-up with endocrinology.          Post Medication Reconciliation Status: Patient was not discharged from an inpatient facility or TCU        Orders:  The current medical regimen is effective; continue present plan and medications.      Electronically signed by:   Li Arana NP            Sincerely,        Li Arana NP

## 2022-10-02 DIAGNOSIS — E10.65 TYPE 1 DIABETES MELLITUS WITH HYPERGLYCEMIA (H): ICD-10-CM

## 2022-10-03 DIAGNOSIS — R73.9 HYPERGLYCEMIA: ICD-10-CM

## 2022-10-03 RX ORDER — ATORVASTATIN CALCIUM 40 MG/1
TABLET, FILM COATED ORAL
Qty: 30 TABLET | Refills: 11 | Status: SHIPPED | OUTPATIENT
Start: 2022-10-03 | End: 2023-09-05

## 2022-10-03 RX ORDER — INSULIN GLARGINE 100 [IU]/ML
INJECTION, SOLUTION SUBCUTANEOUS
Qty: 15 ML | Refills: 3 | Status: SHIPPED | OUTPATIENT
Start: 2022-10-03 | End: 2023-03-01

## 2022-11-04 DIAGNOSIS — I21.4 NSTEMI (NON-ST ELEVATED MYOCARDIAL INFARCTION) (H): ICD-10-CM

## 2022-11-04 DIAGNOSIS — F41.9 ANXIETY AND DEPRESSION: ICD-10-CM

## 2022-11-04 DIAGNOSIS — E10.65 TYPE 1 DIABETES MELLITUS WITH HYPERGLYCEMIA (H): ICD-10-CM

## 2022-11-04 DIAGNOSIS — F32.A ANXIETY AND DEPRESSION: ICD-10-CM

## 2022-11-04 DIAGNOSIS — I10 ESSENTIAL HYPERTENSION: ICD-10-CM

## 2022-11-04 RX ORDER — PEN NEEDLE, DIABETIC, SAFETY 30 GX3/16"
NEEDLE, DISPOSABLE MISCELLANEOUS
Qty: 100 EACH | Refills: 3 | Status: SHIPPED | OUTPATIENT
Start: 2022-11-04 | End: 2023-01-23

## 2022-11-04 RX ORDER — ACETAMINOPHEN 500 MG
TABLET ORAL
Qty: 60 TABLET | Refills: 11 | Status: SHIPPED | OUTPATIENT
Start: 2022-11-04 | End: 2023-09-29

## 2022-11-04 RX ORDER — AMLODIPINE BESYLATE 10 MG/1
10 TABLET ORAL EVERY EVENING
Qty: 90 TABLET | Refills: 3 | Status: SHIPPED | OUTPATIENT
Start: 2022-11-04 | End: 2023-09-29

## 2022-11-04 RX ORDER — ESCITALOPRAM OXALATE 20 MG/1
TABLET ORAL
Qty: 30 TABLET | Refills: 11 | Status: SHIPPED | OUTPATIENT
Start: 2022-11-04 | End: 2023-09-29

## 2022-12-08 DIAGNOSIS — E10.65 TYPE 1 DIABETES MELLITUS WITH HYPERGLYCEMIA (H): Primary | ICD-10-CM

## 2022-12-08 RX ORDER — BLOOD SUGAR DIAGNOSTIC
STRIP MISCELLANEOUS
Qty: 100 STRIP | Refills: 6 | Status: SHIPPED | OUTPATIENT
Start: 2022-12-08

## 2022-12-13 ENCOUNTER — ASSISTED LIVING VISIT (OUTPATIENT)
Dept: GERIATRICS | Facility: CLINIC | Age: 77
End: 2022-12-13
Payer: COMMERCIAL

## 2022-12-13 VITALS
BODY MASS INDEX: 34.33 KG/M2 | DIASTOLIC BLOOD PRESSURE: 90 MMHG | RESPIRATION RATE: 16 BRPM | WEIGHT: 213.6 LBS | OXYGEN SATURATION: 94 % | SYSTOLIC BLOOD PRESSURE: 146 MMHG | TEMPERATURE: 97.6 F | HEIGHT: 66 IN | HEART RATE: 68 BPM

## 2022-12-13 DIAGNOSIS — E66.01 MORBID OBESITY (H): ICD-10-CM

## 2022-12-13 DIAGNOSIS — F32.0 CURRENT MILD EPISODE OF MAJOR DEPRESSIVE DISORDER, UNSPECIFIED WHETHER RECURRENT (H): Primary | ICD-10-CM

## 2022-12-13 DIAGNOSIS — E10.9 TYPE 1 DIABETES MELLITUS WITHOUT COMPLICATION (H): ICD-10-CM

## 2022-12-13 NOTE — LETTER
"    12/13/2022        RE: Kimberly Stephenson  [facility]  [address] [address]        [facility] GERIATRICS    Chief Complaint   Patient presents with     RECHECK     Routine      HPI:  Kimberly Stephenson is a 77 year old  (1945), who is being seen today for an episodic care visit at: No question data found.. Today's concern is: Patient is a 77-year-old female with past medical history including type 1 diabetes with PATRICIA, depression, cognitive decline, hypertension, moderate depression, morbid obesity, DJD, and senile osteoporosis.    Today seen for regulatory visit, patient is resting in dining room.  Reports she is feeling well and her mood is stable.  Understands her blood sugars have been elevated but no longer wants to be seen by endocrinology and does not want insulin pump at this time.  Was educated regarding diet and lifestyle and reports she is not interested in any further recommendations.  Denies chest pain, shortness of breath, lightheadedness.  Up ambulating without assistive device.  No further complaints per staff or patient.    Allergies, and PMH/PSH reviewed in [employer] today.  REVIEW OF SYSTEMS:  4 point ROS including Respiratory, CV, GI and , other than that noted in the HPI,  is negative    Objective:   BP (!) 146/90   Pulse 68   Temp 97.6  F (36.4  C)   Resp 16   Ht 1.676 m (5' 6\")   Wt 96.9 kg (213 lb 9.6 oz)   SpO2 94%   BMI 34.48 kg/m    A & O x 3, NAD. Lungs CTA, non labored. RRR, S1/S2 w/o murmur,gallop or rub.  edema.  Abdomen soft, nontender, +BT'S. No focal neurological deficits.        Labs done in SNF are in [facility]. Please refer to them using [employer]/Care Everywhere. and Recent labs in [facility] reviewed by me today.       MED REC REQUIRED  Post Medication Reconciliation Status: patient was not discharged from an inpatient facility or TCU      Assessment and Plan:   (F32.0) Current mild episode of major depressive disorder, unspecified whether " recurrent (H)  (primary encounter diagnosis)  Comment: Longstanding history of depression with anxiety, reports her mood and behaviors been stable.  Plan: Continue current medication regimen without change at this time monitor make adjustments as clinically indicated.    (E10.9) Type 1 diabetes mellitus without complication (H)  Comment: Chronic, patient is noncompliant with diet or lifestyle changes.  Last A1c on 5/10/2022 was 10.1 this is improved from the last A1c of 10.6.  Plan: Continue insulin as ordered.  A1c  Today.  Discussed dietary modifications today, patient uninterested in information.    (E66.01) Morbid obesity (H)  Comment: Body mass index is 34.48 kg/m .  Plan:   -Dietary and lifestyle change recommendations, light exercise as tolerated.    Electronically signed by:   Li Arana NP            Sincerely,        Li Arana NP

## 2022-12-13 NOTE — PROGRESS NOTES
"[facility] GERIATRICS    Chief Complaint   Patient presents with     RECHECK     Routine      HPI:  Kimberly Stephenson is a 77 year old  (1945), who is being seen today for an episodic care visit at: [facility] ([facility]) [[id number]]. Today's concern is: Patient is a 77-year-old female with past medical history including type 1 diabetes with PATRICIA, depression, cognitive decline, hypertension, moderate depression, morbid obesity, DJD, and senile osteoporosis.    Today seen for regulatory visit, patient is resting in dining room.  Reports she is feeling well and her mood is stable.  Understands her blood sugars have been elevated but no longer wants to be seen by endocrinology and does not want insulin pump at this time.  Was educated regarding diet and lifestyle and reports she is not interested in any further recommendations.  Denies chest pain, shortness of breath, lightheadedness.  Up ambulating without assistive device.  No further complaints per staff or patient.    Allergies, and PMH/PSH reviewed in [employer] today.  REVIEW OF SYSTEMS:  4 point ROS including Respiratory, CV, GI and , other than that noted in the HPI,  is negative    Objective:   BP (!) 146/90   Pulse 68   Temp 97.6  F (36.4  C)   Resp 16   Ht 1.676 m (5' 6\")   Wt 96.9 kg (213 lb 9.6 oz)   SpO2 94%   BMI 34.48 kg/m    A & O x 3, NAD. Lungs CTA, non labored. RRR, S1/S2 w/o murmur,gallop or rub.  edema.  Abdomen soft, nontender, +BT'S. No focal neurological deficits.        Labs done in SNF are in [facility]. Please refer to them using [employer]/Care Everywhere. and Recent labs in [facility] reviewed by me today.       MED REC REQUIRED  Post Medication Reconciliation Status: patient was not discharged from an inpatient facility or TCU      Assessment and Plan:   (F32.0) Current mild episode of major depressive disorder, unspecified whether recurrent (H)  (primary encounter diagnosis)  Comment: Longstanding " history of depression with anxiety, reports her mood and behaviors been stable.  Plan: Continue current medication regimen without change at this time monitor make adjustments as clinically indicated.    (E10.9) Type 1 diabetes mellitus without complication (H)  Comment: Chronic, patient is noncompliant with diet or lifestyle changes.  Last A1c on 5/10/2022 was 10.1 this is improved from the last A1c of 10.6.  Plan: Continue insulin as ordered.  A1c  Today.  Discussed dietary modifications today, patient uninterested in information.    (E66.01) Morbid obesity (H)  Comment: Body mass index is 34.48 kg/m .  Plan:   -Dietary and lifestyle change recommendations, light exercise as tolerated.    Electronically signed by:   Li Arana NP

## 2023-01-22 DIAGNOSIS — E10.65 TYPE 1 DIABETES MELLITUS WITH HYPERGLYCEMIA (H): ICD-10-CM

## 2023-01-23 RX ORDER — PEN NEEDLE, DIABETIC, SAFETY 30 GX3/16"
NEEDLE, DISPOSABLE MISCELLANEOUS
Qty: 100 EACH | Refills: 3 | Status: SHIPPED | OUTPATIENT
Start: 2023-01-23 | End: 2023-02-11

## 2023-02-02 NOTE — PROGRESS NOTES
"[facility]    Chief Complaint   Patient presents with     RECHECK     Routine/OT, BS, nursing     HPI:  Kimberly Stephenson is a 77 year old  (1945), who is being seen today for an episodic care visit at: [facility] ([facility]) [[id number]]. Today's concern is: Patient in room today getting ready to go for lunch. Reports she is feeling well and hoping to have her stove fixed. Has been unplugged by facility 2/2 safety concerns. Saw ophthalmology this past week and had injection for diabetic retinopathy, no vision concerns. Denies pain, SOB and lightheadedness.    BP Readings from Last 3 Encounters:   02/10/23 (!) 168/72   02/03/23 (!) 168/72   12/13/22 (!) 146/90     Pulse Readings from Last 4 Encounters:   02/10/23 64   02/03/23 64   12/13/22 68   09/27/22 65     Wt Readings from Last 4 Encounters:   02/10/23 98.3 kg (216 lb 12.8 oz)   02/03/23 98.3 kg (216 lb 12.8 oz)   12/13/22 96.9 kg (213 lb 9.6 oz)   09/27/22 97.8 kg (215 lb 9.6 oz)           Allergies, and PMH/PSH reviewed in EPIC today.  REVIEW OF SYSTEMS:  4 point ROS including Respiratory, CV, GI and , other than that noted in the HPI,  is negative    Objective:   BP (!) 168/72   Pulse 64   Temp (!) 96.6  F (35.9  C)   Resp 18   Ht 1.676 m (5' 6\")   Wt 98.3 kg (216 lb 12.8 oz)   SpO2 94%   BMI 34.99 kg/m    A & O x 3, NAD. Lungs CTA, non labored. RRR, S1/S2 w/o murmur,gallop or rub.  +1 edema.  Abdomen soft, nontender, +BT'S. No focal neurological deficits.      Recent labs in EPIC reviewed by me today.     Assessment/Plan:  (R41.0) Confusion  (primary encounter diagnosis)  Comment: Chronic, progressive. Safety concerns 2/2 intermittent confusion.   Plan:  Occupational therapy consult for cognitive testing.   -Continue AL support with medication set up, meals and safety.     (E10.9) Type 1 diabetes mellitus without complication (H)  Comment: Chronic, stable. Elevated hemoglobin A1c, patient has " difficulty adhering to diet modifications. No longer followed by endocrinology.    Plan:   -A1c 2/14/2023  -RN home care consult for diabetic education     (F41.1) CELIA (generalized anxiety disorder)  Comment: Chronic, stable.   Plan:   -Continue with plan of care no changes at this time, adjustment as needed    MED REC REQUIRED  Post Medication Reconciliation Status: patient was not discharged from an inpatient facility or TCU      Orders:  1. Occupational therapy consult and RN consult.     Electronically signed by: Li Arana NP      Documentation of Face to Face and Certification for Home Health Services    I certify that patient: Kimberly Stephenson is under my care and that I, or a nurse practitioner or physician's assistant working with me, had a face-to-face encounter that meets the physician face-to-face encounter requirements with this patient on: 2/3/2023.    This encounter with the patient was in whole, or in part, for the following medical condition, which is the primary reason for home health care: RN for education on diabetes, OT for cognitive testing.    I certify that, based on my findings, the following services are medically necessary home health services: Nursing and Occupational Therapy.    My clinical findings support the need for the above services because: Nurse is needed: To teach and train about the disease and treatments for type I diabetes illness, because poor adherance to dietary restriction.. and Occupational Therapy Services are needed to assess and treat cognitive ability and address ADL safety due to impairment in cognitive testing.    Further, I certify that my clinical findings support that this patient is homebound (i.e. absences from home require considerable and taxing effort and are for medical reasons or Spiritism services or infrequently or of short duration when for other reasons) because: Requires assistance of another person or specialized equipment to access medical  services because patient: Requires supervision of another for safe transfer...    Based on the above findings. I certify that this patient is confined to the home and needs intermittent skilled nursing care, physical therapy and/or speech therapy.  The patient is under my care, and I have initiated the establishment of the plan of care.  This patient will be followed by a physician who will periodically review the plan of care.  Physician/Provider to provide follow up care: Li Arana    Attending hospital physician (the Medicare certified [facility] provider): Li Arana NP  Physician Signature: See electronic signature associated with these discharge orders.  Date: 2/12/2023

## 2023-02-03 ENCOUNTER — ASSISTED LIVING VISIT (OUTPATIENT)
Dept: GERIATRICS | Facility: CLINIC | Age: 78
End: 2023-02-03
Payer: COMMERCIAL

## 2023-02-03 VITALS
RESPIRATION RATE: 18 BRPM | OXYGEN SATURATION: 94 % | BODY MASS INDEX: 34.84 KG/M2 | HEART RATE: 64 BPM | WEIGHT: 216.8 LBS | HEIGHT: 66 IN | DIASTOLIC BLOOD PRESSURE: 72 MMHG | SYSTOLIC BLOOD PRESSURE: 168 MMHG | TEMPERATURE: 96.6 F

## 2023-02-03 DIAGNOSIS — R41.0 CONFUSION: Primary | ICD-10-CM

## 2023-02-03 DIAGNOSIS — E10.9 TYPE 1 DIABETES MELLITUS WITHOUT COMPLICATION (H): ICD-10-CM

## 2023-02-03 DIAGNOSIS — F41.1 GAD (GENERALIZED ANXIETY DISORDER): ICD-10-CM

## 2023-02-03 PROCEDURE — 99350 HOME/RES VST EST HIGH MDM 60: CPT | Performed by: NURSE PRACTITIONER

## 2023-02-03 NOTE — LETTER
"    2/3/2023        RE: Kimberly Stephenson  [facility]  [address] [address]        [facility] GERIATRICS    Chief Complaint   Patient presents with     RECHECK     Routine/OT, BS, nursing     HPI:  Kimberly Stephenson is a 77 year old  (1945), who is being seen today for an episodic care visit at: [facility] [[id number]]. Today's concern is: Patient in room today getting ready to go for lunch. Reports she is feeling well and hoping to have her stove fixed. Has been unplugged by facility 2/2 safety concerns. Saw ophthalmology this past week and had injection for diabetic retinopathy, no vision concerns. Denies pain, SOB and lightheadedness.    BP Readings from Last 3 Encounters:   02/10/23 (!) 168/72   02/03/23 (!) 168/72   12/13/22 (!) 146/90     Pulse Readings from Last 4 Encounters:   02/10/23 64   02/03/23 64   12/13/22 68   09/27/22 65     Wt Readings from Last 4 Encounters:   02/10/23 98.3 kg (216 lb 12.8 oz)   02/03/23 98.3 kg (216 lb 12.8 oz)   12/13/22 96.9 kg (213 lb 9.6 oz)   09/27/22 97.8 kg (215 lb 9.6 oz)           Allergies, and PMH/PSH reviewed in EPIC today.  REVIEW OF SYSTEMS:  4 point ROS including Respiratory, CV, GI and , other than that noted in the HPI,  is negative    Objective:   BP (!) 168/72   Pulse 64   Temp (!) 96.6  F (35.9  C)   Resp 18   Ht 1.676 m (5' 6\")   Wt 98.3 kg (216 lb 12.8 oz)   SpO2 94%   BMI 34.99 kg/m    A & O x 3, NAD. Lungs CTA, non labored. RRR, S1/S2 w/o murmur,gallop or rub.  +1 edema.  Abdomen soft, nontender, +BT'S. No focal neurological deficits.      Recent labs in [facility] reviewed by me today.     Assessment/Plan:  (R41.0) Confusion  (primary encounter diagnosis)  Comment: Chronic, progressive. Safety concerns 2/2 intermittent confusion.   Plan:  Occupational therapy consult for cognitive testing.   -Continue AL support with medication set up, meals and safety.     (E10.9) Type " 1 diabetes mellitus without complication (H)  Comment: Chronic, stable. Elevated hemoglobin A1c, patient has difficulty adhering to diet modifications. No longer followed by endocrinology.    Plan:   -A1c 2/14/2023  -RN home care consult for diabetic education     (F41.1) CELIA (generalized anxiety disorder)  Comment: Chronic, stable.   Plan:   -Continue with plan of care no changes at this time, adjustment as needed    MED REC REQUIRED  Post Medication Reconciliation Status: patient was not discharged from an inpatient facility or TCU      Orders:  1. Occupational therapy consult and RN consult.     Electronically signed by: Li Arana NP      Documentation of Face to Face and Certification for Home Health Services    I certify that patient: Kimberly Stephenson is under my care and that I, or a nurse practitioner or physician's assistant working with me, had a face-to-face encounter that meets the physician face-to-face encounter requirements with this patient on: 2/3/2023.    This encounter with the patient was in whole, or in part, for the following medical condition, which is the primary reason for home health care: RN for education on diabetes, OT for cognitive testing.    I certify that, based on my findings, the following services are medically necessary home health services: Nursing and Occupational Therapy.    My clinical findings support the need for the above services because: Nurse is needed: To teach and train about the disease and treatments for type I diabetes illness, because poor adherance to dietary restriction.. and Occupational Therapy Services are needed to assess and treat cognitive ability and address ADL safety due to impairment in cognitive testing.    Further, I certify that my clinical findings support that this patient is homebound (i.e. absences from home require considerable and taxing effort and are for medical reasons or Taoism services or infrequently or of short duration when for  other reasons) because: Requires assistance of another person or specialized equipment to access medical services because patient: Requires supervision of another for safe transfer...    Based on the above findings. I certify that this patient is confined to the home and needs intermittent skilled nursing care, physical therapy and/or speech therapy.  The patient is under my care, and I have initiated the establishment of the plan of care.  This patient will be followed by a physician who will periodically review the plan of care.  Physician/Provider to provide follow up care: Li Arana    Attending hospital physician (the Medicare certified PEC provider): Li Arana NP  Physician Signature: See electronic signature associated with these discharge orders.  Date: 2/12/2023            Sincerely,        Li Arana NP

## 2023-02-08 ENCOUNTER — TELEPHONE (OUTPATIENT)
Dept: GERIATRICS | Facility: CLINIC | Age: 78
End: 2023-02-08
Payer: COMMERCIAL

## 2023-02-08 NOTE — TELEPHONE ENCOUNTER
[facility] Triage Nurse Telephone Encounter    Provider: VANDANA Solano CNP  Facility: [facility] Facility Type:  AL    Caller: Janeth ordonez/ Shawna   Call Back Number: [phone]    Allergies:    Allergies   Allergen Reactions     Cymbalta      Fentanyl Nausea and Vomiting     Versed [Midazolam] Nausea and Vomiting        Reason for call: Home care RN reported that pt is confused (not baseline). Vitals are stable. Pt has not had A1c checked in over 1 year.    Verbal Order/Direction given by Provider:   1) Obtain UA/UC  2) Check CBC w/ diff and A1c    Provider giving Order:  VANDANA Solano CNP    Verbal Order given to: Ashley Dunn RN

## 2023-02-09 ENCOUNTER — LAB REQUISITION (OUTPATIENT)
Dept: LAB | Facility: CLINIC | Age: 78
End: 2023-02-09
Payer: COMMERCIAL

## 2023-02-09 DIAGNOSIS — N39.0 URINARY TRACT INFECTION, SITE NOT SPECIFIED: ICD-10-CM

## 2023-02-09 LAB
ALBUMIN UR-MCNC: NEGATIVE MG/DL
APPEARANCE UR: CLEAR
BACTERIA #/AREA URNS HPF: ABNORMAL /HPF
BILIRUB UR QL STRIP: NEGATIVE
COLOR UR AUTO: ABNORMAL
GLUCOSE UR STRIP-MCNC: NEGATIVE MG/DL
HGB UR QL STRIP: NEGATIVE
KETONES UR STRIP-MCNC: NEGATIVE MG/DL
LEUKOCYTE ESTERASE UR QL STRIP: ABNORMAL
NITRATE UR QL: NEGATIVE
PH UR STRIP: 8 [PH] (ref 5–7)
RBC URINE: 0 /HPF
SP GR UR STRIP: 1.01 (ref 1–1.03)
TRANSITIONAL EPI: <1 /HPF
UROBILINOGEN UR STRIP-MCNC: NORMAL MG/DL
WBC URINE: 9 /HPF

## 2023-02-09 PROCEDURE — 81001 URINALYSIS AUTO W/SCOPE: CPT | Mod: ORL | Performed by: NURSE PRACTITIONER

## 2023-02-10 ENCOUNTER — ASSISTED LIVING VISIT (OUTPATIENT)
Dept: GERIATRICS | Facility: CLINIC | Age: 78
End: 2023-02-10
Payer: COMMERCIAL

## 2023-02-10 VITALS
HEIGHT: 66 IN | HEART RATE: 64 BPM | SYSTOLIC BLOOD PRESSURE: 168 MMHG | TEMPERATURE: 96.6 F | BODY MASS INDEX: 34.84 KG/M2 | WEIGHT: 216.8 LBS | RESPIRATION RATE: 18 BRPM | DIASTOLIC BLOOD PRESSURE: 72 MMHG | OXYGEN SATURATION: 94 %

## 2023-02-10 DIAGNOSIS — I48.0 PAF (PAROXYSMAL ATRIAL FIBRILLATION) (H): ICD-10-CM

## 2023-02-10 DIAGNOSIS — F32.0 CURRENT MILD EPISODE OF MAJOR DEPRESSIVE DISORDER, UNSPECIFIED WHETHER RECURRENT (H): ICD-10-CM

## 2023-02-10 DIAGNOSIS — E66.01 MORBID OBESITY (H): ICD-10-CM

## 2023-02-10 DIAGNOSIS — E10.65 TYPE 1 DIABETES MELLITUS WITH HYPERGLYCEMIA (H): ICD-10-CM

## 2023-02-10 DIAGNOSIS — R41.0 CONFUSION: Primary | ICD-10-CM

## 2023-02-10 PROCEDURE — 99350 HOME/RES VST EST HIGH MDM 60: CPT | Performed by: NURSE PRACTITIONER

## 2023-02-10 NOTE — PROGRESS NOTES
"[facility] GERIATRICS    Chief Complaint   Patient presents with     RECHECK     HPI:  Kimberly Stephenson is a 77 year old  (1945), who is being seen today for an episodic care visit at: [facility] [[id number]].     Today's concern is:   Patient has asked several times for her stove to be plugged back in. Was originally discontinued 2/2 cognitive impairment and safety concerns. OT/RN was ordered for cognitive assessment.     Writer was called by RN to report increased confusion this week. Ordered UA/UC, BMP, CBC and A1c. Today, resident was eating in dining room, appeared at baseline. Pleasant with writer and without any concerns.     BP Readings from Last 3 Encounters:   02/10/23 (!) 168/72   02/03/23 (!) 168/72   12/13/22 (!) 146/90     Pulse Readings from Last 4 Encounters:   02/10/23 64   02/03/23 64   12/13/22 68   09/27/22 65     Wt Readings from Last 4 Encounters:   02/10/23 98.3 kg (216 lb 12.8 oz)   02/03/23 98.3 kg (216 lb 12.8 oz)   12/13/22 96.9 kg (213 lb 9.6 oz)   09/27/22 97.8 kg (215 lb 9.6 oz)     Body mass index is 34.99 kg/m .    Allergies, and PMH/PSH reviewed in EPIC today.  REVIEW OF SYSTEMS:  10 point ROS of systems including Constitutional, Eyes, Respiratory, Cardiovascular, Gastroenterology, Genitourinary, Integumentary, Musculoskeletal, Psychiatric were all negative except for pertinent positives noted in my HPI.    Objective:   BP (!) 168/72   Pulse 64   Temp (!) 96.6  F (35.9  C)   Resp 18   Ht 1.676 m (5' 6\")   Wt 98.3 kg (216 lb 12.8 oz)   SpO2 94%   BMI 34.99 kg/m    A & O x 3, intermittent confusion, NAD. Lungs CTA, non labored. RRR, S1/S2 w/o murmur,gallop or rub.  edema.  Abdomen soft, nontender, +BT'S. No focal neurological deficits.        Recent labs in [facility] reviewed by me today.     Assessment/Plan:  (R41.0) Confusion  (primary encounter diagnosis)  Comment: Chronic confusion, baseline today. UC pending, labs to be " drawn Monday 2/13/22  Plan:   -OT for cognitive assessment  -Pending UA/UC and labs. Appears at baseline.     (F32.0) Current mild episode of major depressive disorder, unspecified whether recurrent (H)  Comment: Longstanding, stable.   Plan:   -Lexapro 20 mg/day, xanax 0.25 mg bid prn, Buspar 15 mg PO TID.     (E10.65) Type 1 diabetes mellitus with hyperglycemia (H)  Comment: Chronic, Last A1c 10.1. Patient non-compliant with dietary recommendations, with frequent highs and lows. Refusing further management with endocrinology.   Plan:   -A1c next lab day. Will encourage further medication management at that time if remains >9%    (I48.0) PAF (paroxysmal atrial fibrillation) (H)  Comment: Chronic, HR stable off medication.   Plan:   -Eliquis 2.5 mg PO BID     (E66.01) Morbid obesity (H)  Comment: Body mass index is 34.99 kg/m .  Plan: Encourage healthy meals and snacking. Gentle exercise as tolerated.         MED REC REQUIRED  Post Medication Reconciliation Status: patient was not discharged from an inpatient facility or TCU      Orders:  See above, otherwise the current medical regimen is effective;  continue present plan and medications.      Electronically signed by:   Li Arana NP

## 2023-02-10 NOTE — LETTER
"    2/10/2023        RE: Kimberly Stephenson  [facility]  [address] [address]        [facility] GERIATRICS    Chief Complaint   Patient presents with     RECHECK     HPI:  Kimberly Stephenson is a 77 year old  (1945), who is being seen today for an episodic care visit at: [facility] ([facility] [[id number]].     Today's concern is:   Patient has asked several times for her stove to be plugged back in. Was originally discontinued 2/2 cognitive impairment and safety concerns. OT/RN was ordered for cognitive assessment.     Writer was called by RN to report increased confusion this week. Ordered UA/UC, BMP, CBC and A1c. Today, resident was eating in dining room, appeared at baseline. Pleasant with writer and without any concerns.     BP Readings from Last 3 Encounters:   02/10/23 (!) 168/72   02/03/23 (!) 168/72   12/13/22 (!) 146/90     Pulse Readings from Last 4 Encounters:   02/10/23 64   02/03/23 64   12/13/22 68   09/27/22 65     Wt Readings from Last 4 Encounters:   02/10/23 98.3 kg (216 lb 12.8 oz)   02/03/23 98.3 kg (216 lb 12.8 oz)   12/13/22 96.9 kg (213 lb 9.6 oz)   09/27/22 97.8 kg (215 lb 9.6 oz)     Body mass index is 34.99 kg/m .    Allergies, and PMH/PSH reviewed in EPIC today.  REVIEW OF SYSTEMS:  10 point ROS of systems including Constitutional, Eyes, Respiratory, Cardiovascular, Gastroenterology, Genitourinary, Integumentary, Musculoskeletal, Psychiatric were all negative except for pertinent positives noted in my HPI.    Objective:   BP (!) 168/72   Pulse 64   Temp (!) 96.6  F (35.9  C)   Resp 18   Ht 1.676 m (5' 6\")   Wt 98.3 kg (216 lb 12.8 oz)   SpO2 94%   BMI 34.99 kg/m    A & O x 3, intermittent confusion, NAD. Lungs CTA, non labored. RRR, S1/S2 w/o murmur,gallop or rub.  edema.  Abdomen soft, nontender, +BT'S. No focal neurological deficits.        Recent labs in [facility] reviewed by me today.     Assessment/Plan:  (R41.0) " Confusion  (primary encounter diagnosis)  Comment: Chronic confusion, baseline today. UC pending, labs to be drawn Monday 2/13/22  Plan:   -OT for cognitive assessment  -Pending UA/UC and labs. Appears at baseline.     (F32.0) Current mild episode of major depressive disorder, unspecified whether recurrent (H)  Comment: Longstanding, stable.   Plan:   -Lexapro 20 mg/day, xanax 0.25 mg bid prn, Buspar 15 mg PO TID.     (E10.65) Type 1 diabetes mellitus with hyperglycemia (H)  Comment: Chronic, Last A1c 10.1. Patient non-compliant with dietary recommendations, with frequent highs and lows. Refusing further management with endocrinology.   Plan:   -A1c next lab day. Will encourage further medication management at that time if remains >9%    (I48.0) PAF (paroxysmal atrial fibrillation) (H)  Comment: Chronic, HR stable off medication.   Plan:   -Eliquis 2.5 mg PO BID     (E66.01) Morbid obesity (H)  Comment: Body mass index is 34.99 kg/m .  Plan: Encourage healthy meals and snacking. Gentle exercise as tolerated.         MED REC REQUIRED  Post Medication Reconciliation Status: patient was not discharged from an inpatient facility or TCU      Orders:  See above, otherwise the current medical regimen is effective;  continue present plan and medications.      Electronically signed by:   Li Arana NP            Sincerely,        Li Arana NP

## 2023-02-11 ENCOUNTER — LAB REQUISITION (OUTPATIENT)
Dept: LAB | Facility: CLINIC | Age: 78
End: 2023-02-11
Payer: COMMERCIAL

## 2023-02-11 DIAGNOSIS — R73.09 OTHER ABNORMAL GLUCOSE: ICD-10-CM

## 2023-02-11 DIAGNOSIS — R68.89 OTHER GENERAL SYMPTOMS AND SIGNS: ICD-10-CM

## 2023-02-14 LAB
BASOPHILS # BLD AUTO: 0 10E3/UL (ref 0–0.2)
BASOPHILS NFR BLD AUTO: 1 %
EOSINOPHIL # BLD AUTO: 0.1 10E3/UL (ref 0–0.7)
EOSINOPHIL NFR BLD AUTO: 2 %
ERYTHROCYTE [DISTWIDTH] IN BLOOD BY AUTOMATED COUNT: 13.7 % (ref 10–15)
HBA1C MFR BLD: 11.1 %
HCT VFR BLD AUTO: 41.9 % (ref 35–47)
HGB BLD-MCNC: 13.4 G/DL (ref 11.7–15.7)
IMM GRANULOCYTES # BLD: 0 10E3/UL
IMM GRANULOCYTES NFR BLD: 0 %
LYMPHOCYTES # BLD AUTO: 2 10E3/UL (ref 0.8–5.3)
LYMPHOCYTES NFR BLD AUTO: 44 %
MCH RBC QN AUTO: 30 PG (ref 26.5–33)
MCHC RBC AUTO-ENTMCNC: 32 G/DL (ref 31.5–36.5)
MCV RBC AUTO: 94 FL (ref 78–100)
MONOCYTES # BLD AUTO: 0.4 10E3/UL (ref 0–1.3)
MONOCYTES NFR BLD AUTO: 9 %
NEUTROPHILS # BLD AUTO: 2 10E3/UL (ref 1.6–8.3)
NEUTROPHILS NFR BLD AUTO: 44 %
NRBC # BLD AUTO: 0 10E3/UL
NRBC BLD AUTO-RTO: 0 /100
PLATELET # BLD AUTO: 188 10E3/UL (ref 150–450)
RBC # BLD AUTO: 4.46 10E6/UL (ref 3.8–5.2)
WBC # BLD AUTO: 4.6 10E3/UL (ref 4–11)

## 2023-02-14 PROCEDURE — 83036 HEMOGLOBIN GLYCOSYLATED A1C: CPT | Mod: ORL | Performed by: NURSE PRACTITIONER

## 2023-02-14 PROCEDURE — 85025 COMPLETE CBC W/AUTO DIFF WBC: CPT | Mod: ORL | Performed by: NURSE PRACTITIONER

## 2023-02-14 PROCEDURE — P9603 ONE-WAY ALLOW PRORATED MILES: HCPCS | Mod: ORL | Performed by: NURSE PRACTITIONER

## 2023-02-14 PROCEDURE — 36415 COLL VENOUS BLD VENIPUNCTURE: CPT | Mod: ORL | Performed by: NURSE PRACTITIONER

## 2023-02-20 ENCOUNTER — ASSISTED LIVING VISIT (OUTPATIENT)
Dept: GERIATRICS | Facility: CLINIC | Age: 78
End: 2023-02-20
Payer: COMMERCIAL

## 2023-02-20 VITALS
HEART RATE: 67 BPM | DIASTOLIC BLOOD PRESSURE: 69 MMHG | HEIGHT: 66 IN | SYSTOLIC BLOOD PRESSURE: 137 MMHG | BODY MASS INDEX: 34.91 KG/M2 | OXYGEN SATURATION: 94 % | WEIGHT: 217.2 LBS | TEMPERATURE: 97.2 F | RESPIRATION RATE: 18 BRPM

## 2023-02-20 DIAGNOSIS — R41.89 COGNITIVE DECLINE: ICD-10-CM

## 2023-02-20 DIAGNOSIS — F41.9 ANXIETY DISORDER, UNSPECIFIED: ICD-10-CM

## 2023-02-20 DIAGNOSIS — F41.1 GAD (GENERALIZED ANXIETY DISORDER): ICD-10-CM

## 2023-02-20 DIAGNOSIS — E10.65 TYPE 1 DIABETES MELLITUS WITH HYPERGLYCEMIA (H): Primary | ICD-10-CM

## 2023-02-20 DIAGNOSIS — I21.4 NSTEMI (NON-ST ELEVATED MYOCARDIAL INFARCTION) (H): ICD-10-CM

## 2023-02-20 PROCEDURE — 99350 HOME/RES VST EST HIGH MDM 60: CPT | Performed by: NURSE PRACTITIONER

## 2023-02-20 RX ORDER — APIXABAN 2.5 MG/1
TABLET, FILM COATED ORAL
Qty: 60 TABLET | Refills: 11 | Status: SHIPPED | OUTPATIENT
Start: 2023-02-20 | End: 2024-01-24

## 2023-02-20 RX ORDER — BUSPIRONE HYDROCHLORIDE 15 MG/1
15 TABLET ORAL 3 TIMES DAILY
Qty: 60 TABLET | Refills: 11 | Status: SHIPPED | OUTPATIENT
Start: 2023-02-20 | End: 2023-09-29

## 2023-02-20 NOTE — PROGRESS NOTES
"[facility] GERIATRICS    No chief complaint on file.    HPI:  Kimberly Stephenson is a 77 year old  (1945), who [facility] [[id number][facility] Today's concern is: A 77-year-old female with past medical history significant for type 1 diabetes, and anxiety.  Recent A1c elevated 11.1, patient denies signs or symptoms of hyper- or hypoglycemia.  Patient declines further follow-up with endocrinology and use of insulin pump.  Has been working with home health nurse regarding diabetic education and diet.  Patient is currently taking Lantus as well as NovoLog sliding scale during meals.  Mood is stable and improved over past year.  Patient has no concerns at today's visit.    Allergies, and PMH/PSH reviewed in EPIC today.  REVIEW OF SYSTEMS:  4 point ROS including Respiratory, CV, GI and , other than that noted in the HPI,  is negative    Objective:   /69   Pulse 67   Temp 97.2  F (36.2  C)   Resp 18   Ht 1.676 m (5' 6\")   Wt 98.5 kg (217 lb 3.2 oz)   SpO2 94%   BMI 35.06 kg/m    A & O x 3, NAD. Lungs CTA, non labored. RRR, S1/S2 w/o murmur,gallop or rub.  edema.  Abdomen soft, nontender, +BT'S. No focal neurological deficits.        Recent labs in EPIC reviewed by me today.     Assessment/Plan:  (E10.65) Type 1 diabetes mellitus with hyperglycemia (H)  (primary encounter diagnosis)  Comment: Chronic, recent A1c 11.1.  Plan:   Increase NovoLog sliding scale with meals.  A1c in 8 weeks    (F41.1) CELIA (generalized anxiety disorder)  Comment: Longstanding history, improved  Plan: No change in medications at this time    (R41.89) Cognitive decline  Comment: Chronic, progressive.  OT ordered for cog testing  Plan:   -Assisted living support for safety.      MED REC REQUIRED  Post Medication Reconciliation Status: patient was not discharged from an inpatient facility or TCU      Orders:      Electronically signed by: "   Li Arana, NP

## 2023-02-20 NOTE — LETTER
"    2/20/2023        RE: Kimberly Stephenson  [place]  [address] [address]        [facility] GERIATRICS    No chief complaint on file.    HPI:  Kimberly Stephenson is a 77 year old  (1945), who is being seen today for an episodic care visit at: [facility] [[id number]]. Today's concern is: A 77-year-old female with past medical history significant for type 1 diabetes, and anxiety.  Recent A1c elevated 11.1, patient denies signs or symptoms of hyper- or hypoglycemia.  Patient declines further follow-up with endocrinology and use of insulin pump.  Has been working with home health nurse regarding diabetic education and diet.  Patient is currently taking Lantus as well as NovoLog sliding scale during meals.  Mood is stable and improved over past year.  Patient has no concerns at today's visit.    Allergies, and PMH/PSH reviewed in [facility] today.  REVIEW OF SYSTEMS:  4 point ROS including Respiratory, CV, GI and , other than that noted in the HPI,  is negative    Objective:   /69   Pulse 67   Temp 97.2  F (36.2  C)   Resp 18   Ht 1.676 m (5' 6\")   Wt 98.5 kg (217 lb 3.2 oz)   SpO2 94%   BMI 35.06 kg/m    A & O x 3, NAD. Lungs CTA, non labored. RRR, S1/S2 w/o murmur,gallop or rub.  edema.  Abdomen soft, nontender, +BT'S. No focal neurological deficits.        Recent labs in [facility] reviewed by me today.     Assessment/Plan:  (E10.65) Type 1 diabetes mellitus with hyperglycemia (H)  (primary encounter diagnosis)  Comment: Chronic, recent A1c 11.1.  Plan:   Increase NovoLog sliding scale with meals.  A1c in 8 weeks    (F41.1) CELIA (generalized anxiety disorder)  Comment: Longstanding history, improved  Plan: No change in medications at this time    (R41.89) Cognitive decline  Comment: Chronic, progressive.  OT ordered for cog testing  Plan:   -Assisted living support for safety.      MED REC REQUIRED  Post Medication Reconciliation " Status: patient was not discharged from an inpatient facility or TCU      Orders:      Electronically signed by:   Li Arana NP            Sincerely,        Li Arana NP

## 2023-02-27 ENCOUNTER — ASSISTED LIVING VISIT (OUTPATIENT)
Dept: GERIATRICS | Facility: CLINIC | Age: 78
End: 2023-02-27
Payer: COMMERCIAL

## 2023-02-27 VITALS
OXYGEN SATURATION: 94 % | DIASTOLIC BLOOD PRESSURE: 69 MMHG | HEIGHT: 66 IN | TEMPERATURE: 97.2 F | HEART RATE: 67 BPM | RESPIRATION RATE: 18 BRPM | SYSTOLIC BLOOD PRESSURE: 137 MMHG | BODY MASS INDEX: 34.91 KG/M2 | WEIGHT: 217.2 LBS

## 2023-02-27 DIAGNOSIS — E10.65 TYPE 1 DIABETES MELLITUS WITH HYPERGLYCEMIA (H): ICD-10-CM

## 2023-02-27 DIAGNOSIS — E10.8 TYPE 1 DIABETES MELLITUS WITH COMPLICATIONS (H): Primary | ICD-10-CM

## 2023-02-27 PROCEDURE — 99349 HOME/RES VST EST MOD MDM 40: CPT | Performed by: NURSE PRACTITIONER

## 2023-02-27 NOTE — LETTER
"    2/27/2023        RE: Kimberly Stephenson  [place]  [address] [address]        M [facility] GERIATRICS    Chief Complaint   Patient presents with     RECHECK     DM 1 f/u     HPI:  Kimberly Stephenson is a 77 year old  (1945), who is being seen today for an episodic care visit at: [facility] (S) [[id number]]. Today's concern is: Per staff, patient is having low blood sugars in the 60s at 11 AM.  Patient continues to remain asymptomatic at this time and denies dizziness or lightheadedness.  States I am not quite sure what low blood sugar means.  No longer followed by endocrinology.    Allergies, and PMH/PSH reviewed in [facility] today.  REVIEW OF SYSTEMS:  4 point ROS including Respiratory, CV, GI and , other than that noted in the HPI,  is negative    Objective:   /69   Pulse 67   Temp 97.2  F (36.2  C)   Resp 18   Ht 1.676 m (5' 6\")   Wt 98.5 kg (217 lb 3.2 oz)   SpO2 94%   BMI 35.06 kg/m    A & O x 3, forgetful, NAD. Lungs CTA, non labored. RRR, S1/S2 w/o murmur,gallop or rub.  edema.  Abdomen soft, nontender, +BT'S. No focal neurological deficits.        Recent labs in [facility] reviewed by me today.     Assessment/Plan:  (E10.8) Type 1 diabetes mellitus with complications (H)  (primary encounter diagnosis)  Comment: Chronic, with hypoglycemia at 11 AM daily.  Plan:   -Reduce Lantus to 4 units every morning        MED REC REQUIRED  Post Medication Reconciliation Status: patient was not discharged from an inpatient facility or TCU    Electronically signed by:   Li Arana NP            Sincerely,        Li Arana NP    "

## 2023-02-27 NOTE — PROGRESS NOTES
"[facility] GERIATRICS    Chief Complaint   Patient presents with     RECHECK     DM 1 f/u     HPI:  Kimberly Stephenson is a 77 year old  (1945), who is being seen today for an episodic care visit at: [facility] [[id number]]. Today's concern is: Per staff, patient is having low blood sugars in the 60s at 11 AM.  Patient continues to remain asymptomatic at this time and denies dizziness or lightheadedness.  States I am not quite sure what low blood sugar means.  No longer followed by endocrinology.    Allergies, and PMH/PSH reviewed in [facility] today.  REVIEW OF SYSTEMS:  4 point ROS including Respiratory, CV, GI and , other than that noted in the HPI,  is negative    Objective:   /69   Pulse 67   Temp 97.2  F (36.2  C)   Resp 18   Ht 1.676 m (5' 6\")   Wt 98.5 kg (217 lb 3.2 oz)   SpO2 94%   BMI 35.06 kg/m    A & O x 3, forgetful, NAD. Lungs CTA, non labored. RRR, S1/S2 w/o murmur,gallop or rub.  edema.  Abdomen soft, nontender, +BT'S. No focal neurological deficits.        Recent labs in [facility] reviewed by me today.     Assessment/Plan:  (E10.8) Type 1 diabetes mellitus with complications (H)  (primary encounter diagnosis)  Comment: Chronic, with hypoglycemia at 11 AM daily.  Plan:   -Reduce Lantus to 4 units every morning        MED REC REQUIRED  Post Medication Reconciliation Status: patient was not discharged from an inpatient facility or TCU    Electronically signed by:   Li Arana NP      "

## 2023-03-01 RX ORDER — INSULIN GLARGINE 100 [IU]/ML
4 INJECTION, SOLUTION SUBCUTANEOUS EVERY MORNING
Qty: 15 ML | Refills: 3
Start: 2023-03-01 | End: 2023-07-24 | Stop reason: DRUGHIGH

## 2023-03-06 DIAGNOSIS — E10.65 TYPE 1 DIABETES MELLITUS WITH HYPERGLYCEMIA (H): ICD-10-CM

## 2023-03-06 RX ORDER — INSULIN ASPART 100 [IU]/ML
INJECTION, SOLUTION INTRAVENOUS; SUBCUTANEOUS
Qty: 15 ML | Refills: 0 | Status: SHIPPED | OUTPATIENT
Start: 2023-03-06 | End: 2023-06-02

## 2023-04-06 ENCOUNTER — ASSISTED LIVING VISIT (OUTPATIENT)
Dept: GERIATRICS | Facility: CLINIC | Age: 78
End: 2023-04-06
Payer: COMMERCIAL

## 2023-04-06 DIAGNOSIS — R41.89 COGNITIVE DECLINE: ICD-10-CM

## 2023-04-06 DIAGNOSIS — E66.01 MORBID OBESITY (H): ICD-10-CM

## 2023-04-06 DIAGNOSIS — E10.65 TYPE 1 DIABETES MELLITUS WITH HYPERGLYCEMIA (H): Primary | ICD-10-CM

## 2023-04-06 DIAGNOSIS — I48.0 PAF (PAROXYSMAL ATRIAL FIBRILLATION) (H): ICD-10-CM

## 2023-04-06 DIAGNOSIS — F41.1 GAD (GENERALIZED ANXIETY DISORDER): ICD-10-CM

## 2023-04-06 PROCEDURE — 99350 HOME/RES VST EST HIGH MDM 60: CPT | Performed by: NURSE PRACTITIONER

## 2023-04-06 NOTE — LETTER
4/6/2023        RE: Kimberly Stephenson  [facility]  [address]  [facility] [address]        M [facility] GERIATRICS  Chief Complaint   Patient presents with     Annual Comprehensive Nursing Home     [facility] Record Number:  [id number]  Place of Service where encounter took place:  [facility] ([facility]) [[id number]]    HPI:    Kimberly Stephenson  is a 77 year old  (1945), who is being seen today for an annual comprehensive visit. HPI information obtained from: facility chart records, facility staff and patient report.   Patient participating with [employer]. Reports she has been feeling really well and is without concerns at this time. Patient was experiencing some hypoglycemia in the mid-morning blood sugar checks, blood sugars <60. Lantus was reduced in AM and blood sugars have improved. Denies anxiety or use of xanax. Has become more involved with activities and now has several friends within the facility. Denies CP, SOB and lightheadedness.        ALLERGIES: Cymbalta, Fentanyl, and Versed [midazolam]  PAST MEDICAL HISTORY:   Past Medical History:   Diagnosis Date     Anxiety      Depression      Diabetes mellitus (H)     Diabetes Type 1     DJD (degenerative joint disease) of knee     left     Hyperlipidemia      Hypertension      PONV (postoperative nausea and vomiting)      TMJ syndrome       PAST SURGICAL HISTORY:  has a past surgical history that includes orthopedic surgery; Arthroplasty knee; and Open reduction internal fixation ankle (3/27/2013).  IMMUNIZATIONS:  Immunization History   Administered Date(s) Administered     COVID-19 Vaccine 12+ (Pfizer 2022) 05/27/2022     COVID-19 Vaccine 18+ (Moderna) 01/13/2021, 02/08/2021     COVID-19 Vaccine Bivalent Booster 18+ (Moderna) 12/19/2022     FLUAD(HD)65+ QUAD 10/07/2022     Flu 65+ Years 09/25/2017, 09/17/2018, 09/13/2019     Influenza (High Dose) 3 valent vaccine 08/30/2016, 09/17/2018     Influenza (IIV3) PF  11/28/2005, 10/08/2007, 11/23/2010, 12/07/2011, 09/20/2012, 11/15/2013     Influenza Vaccine 65+ (Fluzone HD) 09/22/2020, 10/07/2021     Influenza Vaccine >6 months (Alfuria,Fluzone) 09/08/2015     Pneumo Conj 13-V (2010&after) 08/19/2015     Pneumococcal 23 valent 10/02/2009     TD,PF 7+ (Tenivac) 01/01/2004     TDAP Vaccine (Adacel) 09/20/2012, 02/22/2014     Above immunizations pulled from [facility]. [facility] and facility records also reconciled. Outstanding information sent to  to update [facility] .  Future immunizations needed:  yearly influenza per facility protocol      Current Outpatient Medications:      acetaminophen (TYLENOL) 325 MG tablet, Take 650 mg by mouth every 4 hours as needed for mild pain, Disp: , Rfl:      acetaminophen (TYLENOL) 500 MG tablet, take 2 tablets (1000mg) EVERY DAY, Disp: 60 tablet, Rfl: 11     ALPRAZolam (XANAX) 0.25 MG tablet, Take 1 tablet (0.25 mg) by mouth 2 times daily AS NEEDED FOR ANXIETY, Disp: 60 tablet, Rfl: 0     amLODIPine (NORVASC) 10 MG tablet, Take 1 tablet (10 mg) by mouth every evening, Disp: 90 tablet, Rfl: 3     atorvastatin (LIPITOR) 40 MG tablet, take one tablet EVERY DAY, Disp: 30 tablet, Rfl: 11     busPIRone (BUSPAR) 15 MG tablet, Take 1 tablet (15 mg) by mouth 3 times daily, Disp: 60 tablet, Rfl: 11     ELIQUIS ANTICOAGULANT 2.5 MG tablet, take one tablet TWICE DAILY, Disp: 60 tablet, Rfl: 11     escitalopram (LEXAPRO) 20 MG tablet, take one tablet EVERY DAY, Disp: 30 tablet, Rfl: 11     insulin aspart (NOVOLOG PEN) 100 UNIT/ML pen, Inject 2 Units Subcutaneous 3 times daily (with meals), Disp: 3 mL, Rfl: 3     insulin aspart (NOVOLOG PEN) 100 UNIT/ML pen, Inject 0-7 Units Subcutaneous At Bedtime Do Not give Bedtime Correction Insulin if BG less than 200 For  to 250 give 2 units. For  to 300 give 3 units. For  to 350 give 4 units. For  to 400 give 5 units. For  to 450 give 6 units. For  to 500 give 7  units. Notify provider if glucose greater than or equal to 350 mg/dL after administration., Disp: 3 mL, Rfl: 0     insulin aspart (NOVOLOG PEN) 100 UNIT/ML pen, Inject 0-13 Units Subcutaneous 3 times daily (before meals) Correction Scale - custom DOSING    Do Not give Correction Insulin if Pre-Meal BG less than 100 For Pre-Meal  to 150 give 10 units. For Pre-Meal  to 200 give 11 units. For Pre-Meal  to 250 give 12 units. For Pre-Meal  to 300 give 13 units. For Pre-Meal  to 350 give 14 units. For Pre-Meal  to 400 give 15 units. To be given with prandial insulin, and based on pre-meal blood glucose.  Notify provider if glucose greater than or equal 350 mg/dL after administration. If given at mealtime, administer within 30 minutes of start of meal, Disp: 3 mL, Rfl: 0     insulin glargine (LANTUS SOLOSTAR) 100 UNIT/ML pen, Inject 4 Units Subcutaneous every morning, Disp: 15 mL, Rfl: 3     NOVOLOG FLEXPEN 100 UNIT/ML soln, USE AS DIRECTED AT SCHEDULED TIME 7:30AM,11:40AM AND 4:40PM AND 8:30PM tdd 75units USE PER erum russell, Disp: 15 mL, Rfl: 0     ONETOUCH ULTRA test strip, AS DIRECTED, Disp: 100 strip, Rfl: 6     Sure Comfort Lancets 30G MISC, AS DIRECTED, Disp: 100 each, Rfl: 11     MED REC REQUIRED  Post Medication Reconciliation Status: patient was not discharged from an inpatient facility or TCU      Case Management:  I have reviewed the Assisted Living care plan, current immunizations and preventive care/cancer screening.. Future cancer screening is not clinically indicated secondary to age/goals of care Patient's desire to return to the community is not present. Current Level of Care is appropriate.    Advance Directive Discussion:    I reviewed the current advanced directives as reflected in EPIC, the POLST and the facility chart, and verified the congruency of orders. I did not contacted the first party and discussed the plan of Care.  I did review the advance  directives with the resident. Patient's goal is pain control and comfort.    Team Discussion:  I communicated with the appropriate disciplines involved with the Plan of Care:   Nursing    Information reviewed:  Medications, vital signs, orders, and nursing notes.    ROS:  4 point ROS including Respiratory, CV, GI and , other than that noted in the HPI,  is negative    Vitals:  There were no vitals taken for this visit. There is no height or weight on file to calculate BMI.  Exam:  GENERAL APPEARANCE:  Alert, in no distress  ENT:  Mouth and posterior oropharynx normal, moist mucous membranes, hearing acuity normal  EYES:  EOM, conjunctivae, lids, pupils and irises normal  NECK:  No adenopathy,masses or thyromegaly  RESP:  respiratory effort and palpation of chest normal, no respiratory distress, Lung sounds clear  CV:  Palpation and auscultation of heart done , rate and rhythm regular, no murmur, no rub or gallop, Edema +1 baseline   ABDOMEN:  normal bowel sounds, soft, nontender, no hepatosplenomegaly or other masses  M/S:   Gait and station baseline, Digits and nails normal   SKIN:  Inspection/Palpation of skin and subcutaneous tissue WNL  NEURO: 2-12 in normal limits and at patient's baseline  PSYCH:  insight and judgement, memory intact , affect and mood normal    Lab/Diagnostic data:   Recent labs in [facility] reviewed by me today.     ASSESSMENT/PLAN  (E10.65) Type 1 diabetes mellitus with hyperglycemia (H)  (primary encounter diagnosis)  Comment: Chronic, stable at this time on current regimen.   Lab Results   Component Value Date    A1C 11.1 02/14/2023    A1C 10.1 05/10/2022    A1C 10.6 03/15/2022    A1C 10.5 12/20/2021    A1C 10.2 06/24/2021    A1C 9.8 07/20/2020    A1C 9.6 04/04/2020    A1C 9.9 02/20/2020   Plan:   -no change at this time.   A1c yearly and PRN    (R41.89) Cognitive decline  Comment: Chronic, progressive. No behaviors noted.   Plan:   -Continue AL support with medication administration, meals  and safety.     (F41.1) CELIA (generalized anxiety disorder)  Comment: Improved.   Plan:   Discontinue PRN xanax     (I48.0) PAF (paroxysmal atrial fibrillation) (H)  Comment: Chronic, anticoagulated with Eliquis. HR sable   Plan:   Continue with plan of care no changes at this time, adjustment as needed    (E66.01) Morbid obesity (H)  Comment: There is no height or weight on file to calculate BMI.  Plan:   -Encourage healthy meals and snacking. Light exercise as tolerated.     Orders:  NNO    Electronically signed by:  Li Arana NP            Sincerely,        Li Arana, NP

## 2023-04-06 NOTE — PROGRESS NOTES
[facility] GERIATRICS  Chief Complaint   Patient presents with     Annual Comprehensive Nursing Home     [place] Medical Record Number:  [id number]  Place of Service where encounter took place:  [facility] ([facility]) [[id number]]    HPI:    Kimberly Stephenson  is a 77 year old  (1945), who is being seen today for an annual comprehensive visit. HPI information obtained from: facility chart records, facility staff and patient report.   Patient participating with [employer]. Reports she has been feeling really well and is without concerns at this time. Patient was experiencing some hypoglycemia in the mid-morning blood sugar checks, blood sugars <60. Lantus was reduced in AM and blood sugars have improved. Denies anxiety or use of xanax. Has become more involved with activities and now has several friends within the facility. Denies CP, SOB and lightheadedness.        ALLERGIES: Cymbalta, Fentanyl, and Versed [midazolam]  PAST MEDICAL HISTORY:   Past Medical History:   Diagnosis Date     Anxiety      Depression      Diabetes mellitus (H)     Diabetes Type 1     DJD (degenerative joint disease) of knee     left     Hyperlipidemia      Hypertension      PONV (postoperative nausea and vomiting)      TMJ syndrome       PAST SURGICAL HISTORY:  has a past surgical history that includes orthopedic surgery; Arthroplasty knee; and Open reduction internal fixation ankle (3/27/2013).  IMMUNIZATIONS:  Immunization History   Administered Date(s) Administered     COVID-19 Vaccine 12+ (Pfizer 2022) 05/27/2022     COVID-19 Vaccine 18+ (Moderna) 01/13/2021, 02/08/2021     COVID-19 Vaccine Bivalent Booster 18+ (Moderna) 12/19/2022     FLUAD(HD)65+ QUAD 10/07/2022     Flu 65+ Years 09/25/2017, 09/17/2018, 09/13/2019     Influenza (High Dose) 3 valent vaccine 08/30/2016, 09/17/2018     Influenza (IIV3) PF 11/28/2005, 10/08/2007, 11/23/2010, 12/07/2011, 09/20/2012, 11/15/2013     Influenza Vaccine 65+ (Fluzone HD) 09/22/2020,  10/07/2021     Influenza Vaccine >6 months (Alfuria,Fluzone) 09/08/2015     Pneumo Conj 13-V (2010&after) 08/19/2015     Pneumococcal 23 valent 10/02/2009     TD,PF 7+ (Tenivac) 01/01/2004     TDAP Vaccine (Adacel) 09/20/2012, 02/22/2014     Above immunizations pulled from [facility]. [facility]IC and facility records also reconciled. Outstanding information sent to  to update [facility] .  Future immunizations needed:  yearly influenza per facility protocol      Current Outpatient Medications:      acetaminophen (TYLENOL) 325 MG tablet, Take 650 mg by mouth every 4 hours as needed for mild pain, Disp: , Rfl:      acetaminophen (TYLENOL) 500 MG tablet, take 2 tablets (1000mg) EVERY DAY, Disp: 60 tablet, Rfl: 11     ALPRAZolam (XANAX) 0.25 MG tablet, Take 1 tablet (0.25 mg) by mouth 2 times daily AS NEEDED FOR ANXIETY, Disp: 60 tablet, Rfl: 0     amLODIPine (NORVASC) 10 MG tablet, Take 1 tablet (10 mg) by mouth every evening, Disp: 90 tablet, Rfl: 3     atorvastatin (LIPITOR) 40 MG tablet, take one tablet EVERY DAY, Disp: 30 tablet, Rfl: 11     busPIRone (BUSPAR) 15 MG tablet, Take 1 tablet (15 mg) by mouth 3 times daily, Disp: 60 tablet, Rfl: 11     ELIQUIS ANTICOAGULANT 2.5 MG tablet, take one tablet TWICE DAILY, Disp: 60 tablet, Rfl: 11     escitalopram (LEXAPRO) 20 MG tablet, take one tablet EVERY DAY, Disp: 30 tablet, Rfl: 11     insulin aspart (NOVOLOG PEN) 100 UNIT/ML pen, Inject 2 Units Subcutaneous 3 times daily (with meals), Disp: 3 mL, Rfl: 3     insulin aspart (NOVOLOG PEN) 100 UNIT/ML pen, Inject 0-7 Units Subcutaneous At Bedtime Do Not give Bedtime Correction Insulin if BG less than 200 For  to 250 give 2 units. For  to 300 give 3 units. For  to 350 give 4 units. For  to 400 give 5 units. For  to 450 give 6 units. For  to 500 give 7 units. Notify provider if glucose greater than or equal to 350 mg/dL after administration., Disp: 3 mL, Rfl: 0     insulin  aspart (NOVOLOG PEN) 100 UNIT/ML pen, Inject 0-13 Units Subcutaneous 3 times daily (before meals) Correction Scale - custom DOSING    Do Not give Correction Insulin if Pre-Meal BG less than 100 For Pre-Meal  to 150 give 10 units. For Pre-Meal  to 200 give 11 units. For Pre-Meal  to 250 give 12 units. For Pre-Meal  to 300 give 13 units. For Pre-Meal  to 350 give 14 units. For Pre-Meal  to 400 give 15 units. To be given with prandial insulin, and based on pre-meal blood glucose.  Notify provider if glucose greater than or equal 350 mg/dL after administration. If given at mealtime, administer within 30 minutes of start of meal, Disp: 3 mL, Rfl: 0     insulin glargine (LANTUS SOLOSTAR) 100 UNIT/ML pen, Inject 4 Units Subcutaneous every morning, Disp: 15 mL, Rfl: 3     NOVOLOG FLEXPEN 100 UNIT/ML soln, USE AS DIRECTED AT SCHEDULED TIME 7:30AM,11:40AM AND 4:40PM AND 8:30PM tdd 75units USE PER erum russell, Disp: 15 mL, Rfl: 0     ONETOUCH ULTRA test strip, AS DIRECTED, Disp: 100 strip, Rfl: 6     Sure Comfort Lancets 30G MISC, AS DIRECTED, Disp: 100 each, Rfl: 11     MED REC REQUIRED  Post Medication Reconciliation Status: patient was not discharged from an inpatient facility or TCU      Case Management:  I have reviewed the Assisted Living care plan, current immunizations and preventive care/cancer screening.. Future cancer screening is not clinically indicated secondary to age/goals of care Patient's desire to return to the community is not present. Current Level of Care is appropriate.    Advance Directive Discussion:    I reviewed the current advanced directives as reflected in EPIC, the POLST and the facility chart, and verified the congruency of orders. I did not contacted the first party and discussed the plan of Care.  I did review the advance directives with the resident. Patient's goal is pain control and comfort.    Team Discussion:  I communicated with the appropriate  disciplines involved with the Plan of Care:   Nursing    Information reviewed:  Medications, vital signs, orders, and nursing notes.    ROS:  4 point ROS including Respiratory, CV, GI and , other than that noted in the HPI,  is negative    Vitals:  There were no vitals taken for this visit. There is no height or weight on file to calculate BMI.  Exam:  GENERAL APPEARANCE:  Alert, in no distress  ENT:  Mouth and posterior oropharynx normal, moist mucous membranes, hearing acuity normal  EYES:  EOM, conjunctivae, lids, pupils and irises normal  NECK:  No adenopathy,masses or thyromegaly  RESP:  respiratory effort and palpation of chest normal, no respiratory distress, Lung sounds clear  CV:  Palpation and auscultation of heart done , rate and rhythm regular, no murmur, no rub or gallop, Edema +1 baseline   ABDOMEN:  normal bowel sounds, soft, nontender, no hepatosplenomegaly or other masses  M/S:   Gait and station baseline, Digits and nails normal   SKIN:  Inspection/Palpation of skin and subcutaneous tissue WNL  NEURO: 2-12 in normal limits and at patient's baseline  PSYCH:  insight and judgement, memory intact , affect and mood normal    Lab/Diagnostic data:   Recent labs in [facility] reviewed by me today.     ASSESSMENT/PLAN  (E10.65) Type 1 diabetes mellitus with hyperglycemia (H)  (primary encounter diagnosis)  Comment: Chronic, stable at this time on current regimen.   Lab Results   Component Value Date    A1C 11.1 02/14/2023    A1C 10.1 05/10/2022    A1C 10.6 03/15/2022    A1C 10.5 12/20/2021    A1C 10.2 06/24/2021    A1C 9.8 07/20/2020    A1C 9.6 04/04/2020    A1C 9.9 02/20/2020   Plan:   -no change at this time.   A1c yearly and PRN    (R41.89) Cognitive decline  Comment: Chronic, progressive. No behaviors noted.   Plan:   -Continue AL support with medication administration, meals and safety.     (F41.1) CELIA (generalized anxiety disorder)  Comment: Improved.   Plan:   Discontinue PRN xanax     (I48.0) PAF  (paroxysmal atrial fibrillation) (H)  Comment: Chronic, anticoagulated with Eliquis. HR sable   Plan:   Continue with plan of care no changes at this time, adjustment as needed    (E66.01) Morbid obesity (H)  Comment: There is no height or weight on file to calculate BMI.  Plan:   -Encourage healthy meals and snacking. Light exercise as tolerated.     Orders:  NNO    Electronically signed by:  Li Arana NP

## 2023-04-08 DIAGNOSIS — E10.65 TYPE 1 DIABETES MELLITUS WITH HYPERGLYCEMIA (H): ICD-10-CM

## 2023-04-10 RX ORDER — PEN NEEDLE, DIABETIC, SAFETY 30 GX3/16"
NEEDLE, DISPOSABLE MISCELLANEOUS
Qty: 100 EACH | Refills: 11 | Status: SHIPPED | OUTPATIENT
Start: 2023-04-10 | End: 2023-11-09

## 2023-04-24 ENCOUNTER — ASSISTED LIVING VISIT (OUTPATIENT)
Dept: GERIATRICS | Facility: CLINIC | Age: 78
End: 2023-04-24
Payer: COMMERCIAL

## 2023-04-24 ENCOUNTER — TELEPHONE (OUTPATIENT)
Dept: GERIATRICS | Facility: CLINIC | Age: 78
End: 2023-04-24

## 2023-04-24 VITALS
RESPIRATION RATE: 20 BRPM | HEIGHT: 66 IN | OXYGEN SATURATION: 94 % | WEIGHT: 215.4 LBS | DIASTOLIC BLOOD PRESSURE: 67 MMHG | SYSTOLIC BLOOD PRESSURE: 138 MMHG | BODY MASS INDEX: 34.62 KG/M2 | HEART RATE: 68 BPM | TEMPERATURE: 96.9 F

## 2023-04-24 DIAGNOSIS — F41.1 GAD (GENERALIZED ANXIETY DISORDER): ICD-10-CM

## 2023-04-24 DIAGNOSIS — R73.9 HYPERGLYCEMIA: Primary | ICD-10-CM

## 2023-04-24 DIAGNOSIS — E10.9 TYPE 1 DIABETES MELLITUS WITHOUT COMPLICATION (H): ICD-10-CM

## 2023-04-24 PROCEDURE — 99349 HOME/RES VST EST MOD MDM 40: CPT | Performed by: NURSE PRACTITIONER

## 2023-04-24 NOTE — TELEPHONE ENCOUNTER
[facility] Triage Nurse Telephone Encounter    Provider: VANDANA Solano CNP  Facility: [facility] Facility Type:  AL    Caller: Gem  Call Back Number: [phone]    Allergies:    Allergies   Allergen Reactions     Cymbalta      Fentanyl Nausea and Vomiting     Versed [Midazolam] Nausea and Vomiting        Reason for call: Nurse is reporting that patient's BG prior to dinner is 527.  The top of the sliding scale goes up to 500 for 13 units.  She did say she ate a bunch of desserts for lunch.  Of note, the Lantus was just increased today so the new dose will start tonight.      Verbal Order/Direction given by Provider: Give an extra 2 units of Novolog now plus the 13 units for a grand total of 15 units now.      Provider giving Order:  VANDANA Solano CNP    Verbal Order given to: Armand Lipscomb RN

## 2023-04-24 NOTE — LETTER
"    4/24/2023        RE: Kimberly Stephenson  [facility]  [address]  [facility] [address]        M [facility] GERIATRICS    Chief Complaint   Patient presents with     RECHECK     HPI:  Kimberly Stephenson is a 77 year old  (1945), who is being seen today for an episodic care visit at: [facility] ([facility]) [[id number]]. Today's concern is: Patient is resting in recliner in room. Reports she is feeling ok today, having some days of increased anxiety and requested a PRN medication she can have if needed. Denies pain, SOB and chest pain. Per chart review having variable blood sugars throughout the day.     BP Readings from Last 3 Encounters:   04/24/23 138/67   02/27/23 137/69   02/20/23 137/69     Pulse Readings from Last 4 Encounters:   04/24/23 68   02/27/23 67   02/20/23 67   02/10/23 64     Wt Readings from Last 4 Encounters:   04/24/23 97.7 kg (215 lb 6.4 oz)   02/27/23 98.5 kg (217 lb 3.2 oz)   02/20/23 98.5 kg (217 lb 3.2 oz)   02/10/23 98.3 kg (216 lb 12.8 oz)     Allergies, and PMH/PSH reviewed in EPIC today.  REVIEW OF SYSTEMS:  4 point ROS including Respiratory, CV, GI and , other than that noted in the HPI,  is negative    Objective:   /67   Pulse 68   Temp 96.9  F (36.1  C)   Resp 20   Ht 1.676 m (5' 6\")   Wt 97.7 kg (215 lb 6.4 oz)   SpO2 94%   BMI 34.77 kg/m    A & O x 3, NAD. Lungs CTA, non labored. RRR, S1/S2 w/o murmur,gallop or rub.  edema.  Abdomen soft, nontender, +BT'S. No focal neurological deficits.      Recent labs in [facility] reviewed by me today.     Assessment/Plan:     Hyperglycemia  Type 1 diabetes mellitus without complication (H)  CELIA (generalized anxiety disorder)    Chronic type I diabetes, poorly managed 2/2 difficulty adhering to dietary adjustments. Early afternoon hypoglycemia. Last A1c 11.1.    Discontinue AM lantus and increase HS to 24 units.    Continue daily BS as ordered.    Longstanding history of anxiety, with improvement. " Continues to has acute panic attacks.     Xanax 0.25 mg PO daily PRN.        MED REC REQUIRED  Post Medication Reconciliation Status: patient was not discharged from an inpatient facility or TCU      Orders:  1. Discontinue AM lantus   2. Increase HS lantus 24 units  3. Xanax 0.25 mg PO daily PRN    Electronically signed by:   Li Arana NP            Sincerely,        Li Arana, NP

## 2023-04-24 NOTE — PROGRESS NOTES
"[facility] GERIATRICS    Chief Complaint   Patient presents with     RECHECK     HPI:  Kimberly Stephenson is a 77 year old  (1945), who is being seen today for an episodic care visit at: [facility] [[id number]]. Today's concern is: Patient is resting in recliner in room. Reports she is feeling ok today, having some days of increased anxiety and requested a PRN medication she can have if needed. Denies pain, SOB and chest pain. Per chart review having variable blood sugars throughout the day.     BP Readings from Last 3 Encounters:   04/24/23 138/67   02/27/23 137/69   02/20/23 137/69     Pulse Readings from Last 4 Encounters:   04/24/23 68   02/27/23 67   02/20/23 67   02/10/23 64     Wt Readings from Last 4 Encounters:   04/24/23 97.7 kg (215 lb 6.4 oz)   02/27/23 98.5 kg (217 lb 3.2 oz)   02/20/23 98.5 kg (217 lb 3.2 oz)   02/10/23 98.3 kg (216 lb 12.8 oz)     Allergies, and PMH/PSH reviewed in EPIC today.  REVIEW OF SYSTEMS:  4 point ROS including Respiratory, CV, GI and , other than that noted in the HPI,  is negative    Objective:   /67   Pulse 68   Temp 96.9  F (36.1  C)   Resp 20   Ht 1.676 m (5' 6\")   Wt 97.7 kg (215 lb 6.4 oz)   SpO2 94%   BMI 34.77 kg/m    A & O x 3, NAD. Lungs CTA, non labored. RRR, S1/S2 w/o murmur,gallop or rub.  edema.  Abdomen soft, nontender, +BT'S. No focal neurological deficits.      Recent labs in [facility] reviewed by me today.     Assessment/Plan:     Hyperglycemia  Type 1 diabetes mellitus without complication (H)  CELIA (generalized anxiety disorder)    Chronic type I diabetes, poorly managed 2/2 difficulty adhering to dietary adjustments. Early afternoon hypoglycemia. Last A1c 11.1.    Discontinue AM lantus and increase HS to 24 units.    Continue daily BS as ordered.    Longstanding history of anxiety, with improvement. Continues to has acute panic attacks.     Xanax 0.25 mg PO daily PRN.        MED REC REQUIRED  Post Medication Reconciliation " Status: patient was not discharged from an inpatient facility or TCU      Orders:  1. Discontinue AM lantus   2. Increase HS lantus 24 units  3. Xanax 0.25 mg PO daily PRN    Electronically signed by:   Li Arana NP

## 2023-04-25 RX ORDER — ALPRAZOLAM 0.25 MG
0.25 TABLET ORAL DAILY PRN
Qty: 25 TABLET | Refills: 0 | Status: SHIPPED | OUTPATIENT
Start: 2023-04-25 | End: 2023-10-25

## 2023-05-21 ENCOUNTER — LAB REQUISITION (OUTPATIENT)
Dept: LAB | Facility: CLINIC | Age: 78
End: 2023-05-21
Payer: COMMERCIAL

## 2023-05-21 DIAGNOSIS — E10.9 TYPE 1 DIABETES MELLITUS WITHOUT COMPLICATIONS (H): ICD-10-CM

## 2023-05-23 LAB — HBA1C MFR BLD: 10.7 %

## 2023-05-23 PROCEDURE — 83036 HEMOGLOBIN GLYCOSYLATED A1C: CPT | Mod: ORL | Performed by: NURSE PRACTITIONER

## 2023-05-23 PROCEDURE — P9604 ONE-WAY ALLOW PRORATED TRIP: HCPCS | Mod: ORL | Performed by: NURSE PRACTITIONER

## 2023-05-23 PROCEDURE — 36415 COLL VENOUS BLD VENIPUNCTURE: CPT | Mod: ORL | Performed by: NURSE PRACTITIONER

## 2023-06-01 ENCOUNTER — ASSISTED LIVING VISIT (OUTPATIENT)
Dept: GERIATRICS | Facility: CLINIC | Age: 78
End: 2023-06-01
Payer: COMMERCIAL

## 2023-06-01 VITALS
HEIGHT: 66 IN | OXYGEN SATURATION: 94 % | BODY MASS INDEX: 34.97 KG/M2 | RESPIRATION RATE: 18 BRPM | TEMPERATURE: 97.2 F | WEIGHT: 217.6 LBS | SYSTOLIC BLOOD PRESSURE: 155 MMHG | DIASTOLIC BLOOD PRESSURE: 74 MMHG | HEART RATE: 62 BPM

## 2023-06-01 DIAGNOSIS — K59.01 SLOW TRANSIT CONSTIPATION: Primary | ICD-10-CM

## 2023-06-01 DIAGNOSIS — K59.00 CONSTIPATION: Primary | ICD-10-CM

## 2023-06-01 PROCEDURE — 99349 HOME/RES VST EST MOD MDM 40: CPT | Performed by: NURSE PRACTITIONER

## 2023-06-01 NOTE — PROGRESS NOTES
"[facility] GERIATRICS    Chief Complaint   Patient presents with     RECHECK     HPI:  Kimberly Stephenson is a 78 year old  (1945), who is being seen today for an episodic care visit at: [facility] ([facility]) [[id number]]. Today's concern is: Patient participating in book club. Denies pain, SOB and lightheadedness. Per staff, patient is having increased constipation and requested more medication. No further concerns at this time.    BP Readings from Last 3 Encounters:   06/01/23 (!) 155/74   04/24/23 138/67   02/27/23 137/69     Pulse Readings from Last 4 Encounters:   06/01/23 62   04/24/23 68   02/27/23 67   02/20/23 67     Wt Readings from Last 4 Encounters:   06/01/23 98.7 kg (217 lb 9.6 oz)   04/24/23 97.7 kg (215 lb 6.4 oz)   02/27/23 98.5 kg (217 lb 3.2 oz)   02/20/23 98.5 kg (217 lb 3.2 oz)     Allergies, and PMH/PSH reviewed in EPIC today.  REVIEW OF SYSTEMS:  4 point ROS including Respiratory, CV, GI and , other than that noted in the HPI,  is negative    Objective:   BP (!) 155/74   Pulse 62   Temp 97.2  F (36.2  C)   Resp 18   Ht 1.676 m (5' 6\")   Wt 98.7 kg (217 lb 9.6 oz)   SpO2 94%   BMI 35.12 kg/m    A & O x 3, NAD. Lungs CTA, non labored. RRR, S1/S2 w/o murmur,gallop or rub.  edema.  Abdomen soft, nontender, +BT'S. No focal neurological deficits.      Recent labs in [facility] reviewed by me today.     Assessment/Plan:  (K59.01) Slow transit constipation  (primary encounter diagnosis)  Has been previously managed and now complaining of more difficulty with BMs.  -Senna 8.6 mg PO q HS.      MED REC REQUIRED  Post Medication Reconciliation Status: patient was not discharged from an inpatient facility or TCU      Orders:  1. Senna 8.6 mg PO qHS    Electronically signed by:   Li Arana NP      "

## 2023-06-01 NOTE — LETTER
"    6/1/2023        RE: Kimberly Stephenson  [facility]  [address]  [place] [address]        M [facility] GERIATRICS    Chief Complaint   Patient presents with     RECHECK     HPI:  Kimberly Stephenson is a 78 year old  (1945), who is being seen today for an episodic care visit at: [facility] ([facility]) [[id number]]. Today's concern is: Patient participating in book club. Denies pain, SOB and lightheadedness. Per staff, patient is having increased constipation and requested more medication. No further concerns at this time.    BP Readings from Last 3 Encounters:   06/01/23 (!) 155/74   04/24/23 138/67   02/27/23 137/69     Pulse Readings from Last 4 Encounters:   06/01/23 62   04/24/23 68   02/27/23 67   02/20/23 67     Wt Readings from Last 4 Encounters:   06/01/23 98.7 kg (217 lb 9.6 oz)   04/24/23 97.7 kg (215 lb 6.4 oz)   02/27/23 98.5 kg (217 lb 3.2 oz)   02/20/23 98.5 kg (217 lb 3.2 oz)     Allergies, and PMH/PSH reviewed in EPIC today.  REVIEW OF SYSTEMS:  4 point ROS including Respiratory, CV, GI and , other than that noted in the HPI,  is negative    Objective:   BP (!) 155/74   Pulse 62   Temp 97.2  F (36.2  C)   Resp 18   Ht 1.676 m (5' 6\")   Wt 98.7 kg (217 lb 9.6 oz)   SpO2 94%   BMI 35.12 kg/m    A & O x 3, NAD. Lungs CTA, non labored. RRR, S1/S2 w/o murmur,gallop or rub.  edema.  Abdomen soft, nontender, +BT'S. No focal neurological deficits.      Recent labs in [facility] reviewed by me today.     Assessment/Plan:  (K59.01) Slow transit constipation  (primary encounter diagnosis)  Has been previously managed and now complaining of more difficulty with BMs.  -Senna 8.6 mg PO q HS.      MED REC REQUIRED  Post Medication Reconciliation Status: patient was not discharged from an inpatient facility or TCU      Orders:  1. Senna 8.6 mg PO qHS    Electronically signed by:   Li Arana NP            Sincerely,        Li Arana NP      "

## 2023-06-02 DIAGNOSIS — E10.65 TYPE 1 DIABETES MELLITUS WITH HYPERGLYCEMIA (H): ICD-10-CM

## 2023-06-02 RX ORDER — INSULIN ASPART 100 [IU]/ML
INJECTION, SOLUTION INTRAVENOUS; SUBCUTANEOUS
Qty: 15 ML | Refills: 11 | Status: SHIPPED | OUTPATIENT
Start: 2023-06-02 | End: 2023-10-25

## 2023-06-02 RX ORDER — SENNOSIDES 8.6 MG
TABLET ORAL
Qty: 28 TABLET | Refills: 11 | Status: SHIPPED | OUTPATIENT
Start: 2023-06-02 | End: 2024-05-28

## 2023-07-11 ENCOUNTER — ASSISTED LIVING VISIT (OUTPATIENT)
Dept: GERIATRICS | Facility: CLINIC | Age: 78
End: 2023-07-11
Payer: COMMERCIAL

## 2023-07-11 VITALS
OXYGEN SATURATION: 94 % | DIASTOLIC BLOOD PRESSURE: 62 MMHG | SYSTOLIC BLOOD PRESSURE: 132 MMHG | HEIGHT: 66 IN | HEART RATE: 64 BPM | TEMPERATURE: 96.9 F | RESPIRATION RATE: 17 BRPM | WEIGHT: 207 LBS | BODY MASS INDEX: 33.27 KG/M2

## 2023-07-11 DIAGNOSIS — F41.1 GAD (GENERALIZED ANXIETY DISORDER): ICD-10-CM

## 2023-07-11 DIAGNOSIS — R42 DIZZINESS: Primary | ICD-10-CM

## 2023-07-11 DIAGNOSIS — E10.9 TYPE 1 DIABETES MELLITUS WITHOUT COMPLICATION (H): ICD-10-CM

## 2023-07-11 DIAGNOSIS — K59.01 SLOW TRANSIT CONSTIPATION: ICD-10-CM

## 2023-07-11 PROCEDURE — 99350 HOME/RES VST EST HIGH MDM 60: CPT | Performed by: NURSE PRACTITIONER

## 2023-07-11 NOTE — LETTER
"    7/11/2023        RE: Kimberly Stephenson  [facility]  [address]  [place] [address]        M [facility] GERIATRICS    Chief Complaint   Patient presents with     RECHECK     Inner ear     HPI:  Kimberly Stephenson is a 78 year old  (1945), who is being seen today for an episodic care visit at: [facility] ([facility]) [[id number]]. Today's concern is: Patient is a 78 year old with PMH significant for generalized anxiety disorder, diabetes type I, depression, NSTEMI; complained of isolated dizziness x2 days in AM upon waking. Episodes lasted 10 seconds then spontaneously resolved. Denies dizziness, lightheadedness, nausea, chest pain today. Additionally, had episode of constipation yesterday, added PRN senna to regimen and has since resolved. Feels good today.     BP Readings from Last 3 Encounters:   07/11/23 132/62   06/01/23 (!) 155/74   04/24/23 138/67     Pulse Readings from Last 4 Encounters:   07/11/23 64   06/01/23 62   04/24/23 68   02/27/23 67     Wt Readings from Last 4 Encounters:   07/11/23 93.9 kg (207 lb)   06/01/23 98.7 kg (217 lb 9.6 oz)   04/24/23 97.7 kg (215 lb 6.4 oz)   02/27/23 98.5 kg (217 lb 3.2 oz)         Allergies, and PMH/PSH reviewed in EPIC today.  REVIEW OF SYSTEMS:  10 point ROS of systems including Constitutional, Eyes, Respiratory, Cardiovascular, Gastroenterology, Genitourinary, Integumentary, Musculoskeletal, Psychiatric were all negative except for pertinent positives noted in my HPI.    Objective:   /62   Pulse 64   Temp 96.9  F (36.1  C)   Resp 17   Ht 1.676 m (5' 6\")   Wt 93.9 kg (207 lb)   SpO2 94%   BMI 33.41 kg/m    A & O x 3, NAD. Lungs CTA, non labored. RRR, S1/S2 w/o murmur,gallop or rub.  edema.  Abdomen soft, nontender, +BT'S. No focal neurological deficits.        Recent labs in [facility] reviewed by me today.     Assessment/Plan:  (R42) Dizziness  (primary encounter diagnosis)  Acute, isolated x2 events. Has since " resolved.  -Monitor     (E10.9) Type 1 diabetes mellitus without complication (H)  Chronic, last hemoglobin A1c 10.7. Noncompliant with diet.   -continue current regiment without change.     (F41.1) CELIA (generalized anxiety disorder)  Chronic, stable.   Continue with plan of care no changes at this time, adjustment as needed    (K59.01) Slow transit constipation  Chronic, resolved with adding PRN senna daily  Continue without change.         MED REC REQUIRED  Post Medication Reconciliation Status: discharge medications reconciled, continue medications without change      Orders:  NNO    Electronically signed by:   iL Arana NP            Sincerely,        Li Arana, NP

## 2023-07-11 NOTE — PROGRESS NOTES
"[facility] GERIATRICS    Chief Complaint   Patient presents with     RECHECK     Inner ear     HPI:  Kimberly Stephenson is a 78 year old  (1945), who is being seen today for an episodic care visit at: [facility] ([facility]) [[id number]]. Today's concern is: Patient is a 78 year old with PMH significant for generalized anxiety disorder, diabetes type I, depression, NSTEMI; complained of isolated dizziness x2 days in AM upon waking. Episodes lasted 10 seconds then spontaneously resolved. Denies dizziness, lightheadedness, nausea, chest pain today. Additionally, had episode of constipation yesterday, added PRN senna to regimen and has since resolved. Feels good today.     BP Readings from Last 3 Encounters:   07/11/23 132/62   06/01/23 (!) 155/74   04/24/23 138/67     Pulse Readings from Last 4 Encounters:   07/11/23 64   06/01/23 62   04/24/23 68   02/27/23 67     Wt Readings from Last 4 Encounters:   07/11/23 93.9 kg (207 lb)   06/01/23 98.7 kg (217 lb 9.6 oz)   04/24/23 97.7 kg (215 lb 6.4 oz)   02/27/23 98.5 kg (217 lb 3.2 oz)         Allergies, and PMH/PSH reviewed in EPIC today.  REVIEW OF SYSTEMS:  10 point ROS of systems including Constitutional, Eyes, Respiratory, Cardiovascular, Gastroenterology, Genitourinary, Integumentary, Musculoskeletal, Psychiatric were all negative except for pertinent positives noted in my HPI.    Objective:   /62   Pulse 64   Temp 96.9  F (36.1  C)   Resp 17   Ht 1.676 m (5' 6\")   Wt 93.9 kg (207 lb)   SpO2 94%   BMI 33.41 kg/m    A & O x 3, NAD. Lungs CTA, non labored. RRR, S1/S2 w/o murmur,gallop or rub.  edema.  Abdomen soft, nontender, +BT'S. No focal neurological deficits.        Recent labs in [facility] reviewed by me today.     Assessment/Plan:  (R42) Dizziness  (primary encounter diagnosis)  Acute, isolated x2 events. Has since resolved.  -Monitor     (E10.9) Type 1 diabetes mellitus without complication (H)  Chronic, last hemoglobin A1c 10.7. Noncompliant " with diet.   -continue current regiment without change.     (F41.1) CELIA (generalized anxiety disorder)  Chronic, stable.   Continue with plan of care no changes at this time, adjustment as needed    (K59.01) Slow transit constipation  Chronic, resolved with adding PRN senna daily  Continue without change.         MED REC REQUIRED  Post Medication Reconciliation Status: discharge medications reconciled, continue medications without change      Orders:  NNO    Electronically signed by:   Li Arana NP

## 2023-07-24 ENCOUNTER — DOCUMENTATION ONLY (OUTPATIENT)
Dept: GERIATRICS | Facility: CLINIC | Age: 78
End: 2023-07-24
Payer: COMMERCIAL

## 2023-07-24 DIAGNOSIS — E10.65 TYPE 1 DIABETES MELLITUS WITH HYPERGLYCEMIA (H): ICD-10-CM

## 2023-07-24 RX ORDER — INSULIN GLARGINE 100 [IU]/ML
INJECTION, SOLUTION SUBCUTANEOUS
Qty: 15 ML | Refills: 0 | OUTPATIENT
Start: 2023-07-24

## 2023-07-24 RX ORDER — INSULIN GLARGINE 100 [IU]/ML
24 INJECTION, SOLUTION SUBCUTANEOUS EVERY EVENING
Qty: 15 ML | Refills: 11 | Status: SHIPPED | OUTPATIENT
Start: 2023-07-24 | End: 2023-09-19

## 2023-08-22 ENCOUNTER — ASSISTED LIVING VISIT (OUTPATIENT)
Dept: GERIATRICS | Facility: CLINIC | Age: 78
End: 2023-08-22
Payer: COMMERCIAL

## 2023-08-22 VITALS
SYSTOLIC BLOOD PRESSURE: 110 MMHG | TEMPERATURE: 96.8 F | BODY MASS INDEX: 34.55 KG/M2 | DIASTOLIC BLOOD PRESSURE: 58 MMHG | WEIGHT: 215 LBS | HEART RATE: 63 BPM | OXYGEN SATURATION: 94 % | HEIGHT: 66 IN | RESPIRATION RATE: 14 BRPM

## 2023-08-22 DIAGNOSIS — E10.65 TYPE 1 DIABETES MELLITUS WITH HYPERGLYCEMIA (H): Primary | ICD-10-CM

## 2023-08-22 PROCEDURE — 99349 HOME/RES VST EST MOD MDM 40: CPT | Performed by: NURSE PRACTITIONER

## 2023-08-22 NOTE — LETTER
"    8/22/2023        RE: Kimberly Stephenson  [facility]  [address]  [place] [address]        M [facility] GERIATRICS    Chief Complaint   Patient presents with     RECHECK     HPI:  Kimberly Stephenson is a 78 year old  (1945), who is being seen today for an episodic care visit at: [facility] ([facility]) [[id number]]. Today's concern is: Patient resting in bed. Staff report she has had low blood sugars at 11 am. Patient remains asymptomatic.     BP Readings from Last 3 Encounters:   08/22/23 110/58   07/11/23 132/62   06/01/23 (!) 155/74       Allergies, and PMH/PSH reviewed in EPIC today.  REVIEW OF SYSTEMS:  4 point ROS including Respiratory, CV, GI and , other than that noted in the HPI,  is negative    Objective:   /58   Pulse 63   Temp 96.8  F (36  C)   Resp 14   Ht 1.676 m (5' 6\")   Wt 97.5 kg (215 lb)   SpO2 94%   BMI 34.70 kg/m    GENERAL APPEARANCE:  Alert  RESP:  respiratory effort and palpation of chest normal, lungs clear to auscultation , no respiratory distress  CV:  Palpation and auscultation of heart done , regular rate and rhythm, no murmur, rub, or gallop  ABDOMEN:  normal bowel sounds, soft, nontender, no hepatosplenomegaly or other masses  M/S:   Gait and station normal  Digits and nails normal  SKIN:  Inspection of skin and subcutaneous tissue baseline, Palpation of skin and subcutaneous tissue baseline  NEURO:   Cranial nerves 2-12 are normal tested and grossly at patient's baseline  PSYCH:  oriented X 3    Labs done in SNF are in [facility]. Please refer to them using [employer]/Care Everywhere.    Assessment/Plan:  (E10.65) Type 1 diabetes mellitus with hyperglycemia (H)  (primary encounter diagnosis)  Comment: Chronic, noted hypoglycemia mid day.   -Change lantus 12 untis BID.     MED REC REQUIRED  Post Medication Reconciliation Status: patient was not discharged from an inpatient facility or TCU      Orders:-Change lantus 12 units " BID    Electronically signed by: Li Arana, OSCAR           Sincerely,        Li Arana, NP

## 2023-08-22 NOTE — PROGRESS NOTES
"[facility] GERIATRICS    Chief Complaint   Patient presents with    RECHECK     HPI:  Kimberly Stephenson is a 78 year old  (1945), who is being seen today for an episodic care visit at: [facility]) [[id number]]. Today's concern is: Patient resting in bed. Staff report she has had low blood sugars at 11 am. Patient remains asymptomatic.     BP Readings from Last 3 Encounters:   08/22/23 110/58   07/11/23 132/62   06/01/23 (!) 155/74       Allergies, and PMH/PSH reviewed in EPIC today.  REVIEW OF SYSTEMS:  4 point ROS including Respiratory, CV, GI and , other than that noted in the HPI,  is negative    Objective:   /58   Pulse 63   Temp 96.8  F (36  C)   Resp 14   Ht 1.676 m (5' 6\")   Wt 97.5 kg (215 lb)   SpO2 94%   BMI 34.70 kg/m    GENERAL APPEARANCE:  Alert  RESP:  respiratory effort and palpation of chest normal, lungs clear to auscultation , no respiratory distress  CV:  Palpation and auscultation of heart done , regular rate and rhythm, no murmur, rub, or gallop  ABDOMEN:  normal bowel sounds, soft, nontender, no hepatosplenomegaly or other masses  M/S:   Gait and station normal  Digits and nails normal  SKIN:  Inspection of skin and subcutaneous tissue baseline, Palpation of skin and subcutaneous tissue baseline  NEURO:   Cranial nerves 2-12 are normal tested and grossly at patient's baseline  PSYCH:  oriented X 3    Labs done in SNF are in [facility]. Please refer to them using [facility]/Care Everywhere.    Assessment/Plan:  (E10.65) Type 1 diabetes mellitus with hyperglycemia (H)  (primary encounter diagnosis)  Comment: Chronic, noted hypoglycemia mid day.   -Change lantus 12 untis BID.     MED REC REQUIRED  Post Medication Reconciliation Status: patient was not discharged from an inpatient facility or TCU      Orders:-Change lantus 12 units BID    Electronically signed by: Li Arana NP       "

## 2023-09-01 ENCOUNTER — LAB REQUISITION (OUTPATIENT)
Dept: LAB | Facility: CLINIC | Age: 78
End: 2023-09-01
Payer: COMMERCIAL

## 2023-09-01 DIAGNOSIS — E11.9 TYPE 2 DIABETES MELLITUS WITHOUT COMPLICATIONS (H): ICD-10-CM

## 2023-09-04 DIAGNOSIS — R73.9 HYPERGLYCEMIA: ICD-10-CM

## 2023-09-05 LAB — HBA1C MFR BLD: 10.9 %

## 2023-09-05 PROCEDURE — 36415 COLL VENOUS BLD VENIPUNCTURE: CPT | Mod: ORL | Performed by: NURSE PRACTITIONER

## 2023-09-05 PROCEDURE — P9604 ONE-WAY ALLOW PRORATED TRIP: HCPCS | Mod: ORL | Performed by: NURSE PRACTITIONER

## 2023-09-05 PROCEDURE — 83036 HEMOGLOBIN GLYCOSYLATED A1C: CPT | Mod: ORL | Performed by: NURSE PRACTITIONER

## 2023-09-05 RX ORDER — ATORVASTATIN CALCIUM 40 MG/1
TABLET, FILM COATED ORAL
Qty: 30 TABLET | Refills: 11 | Status: SHIPPED | OUTPATIENT
Start: 2023-09-05 | End: 2024-08-05

## 2023-09-12 ENCOUNTER — ASSISTED LIVING VISIT (OUTPATIENT)
Dept: GERIATRICS | Facility: CLINIC | Age: 78
End: 2023-09-12
Payer: COMMERCIAL

## 2023-09-12 VITALS
HEIGHT: 66 IN | WEIGHT: 215 LBS | HEART RATE: 63 BPM | BODY MASS INDEX: 34.55 KG/M2 | OXYGEN SATURATION: 94 % | SYSTOLIC BLOOD PRESSURE: 110 MMHG | RESPIRATION RATE: 14 BRPM | DIASTOLIC BLOOD PRESSURE: 58 MMHG | TEMPERATURE: 96.8 F

## 2023-09-12 DIAGNOSIS — E10.65 TYPE 1 DIABETES MELLITUS WITH HYPERGLYCEMIA (H): Primary | ICD-10-CM

## 2023-09-12 PROCEDURE — 99348 HOME/RES VST EST LOW MDM 30: CPT | Performed by: NURSE PRACTITIONER

## 2023-09-12 NOTE — PROGRESS NOTES
"[facility] GERIATRICS    Chief Complaint   Patient presents with    Nursing Home Acute    Wellness Visit     HPI:  Kimberly Stephenson is a 78 year old  (1945), who is being seen today for an episodic care visit at: [facility]) [[id number]]. Today's concern is: Patient walking to lunch at time of visit. Pleasant with writer and cooperative with exam. A1c was drawn and continues to be elevated at 10.9. Remains asymptomatic while received sliding scale insulin and Lantus.     Allergies, and PMH/PSH reviewed in [facility] today.  REVIEW OF SYSTEMS:  10 point ROS of systems including Constitutional, Eyes, Respiratory, Cardiovascular, Gastroenterology, Genitourinary, Integumentary, Musculoskeletal, Psychiatric were all negative except for pertinent positives noted in my HPI.    Objective:   /58   Pulse 63   Temp 96.8  F (36  C)   Resp 14   Ht 1.676 m (5' 6\")   Wt 97.5 kg (215 lb)   SpO2 94%   BMI 34.70 kg/m    GENERAL APPEARANCE:  Alert, in no distress  ENT:  Mouth and posterior oropharynx normal, moist mucous membranes  RESP:  respiratory effort and palpation of chest normal, lungs clear to auscultation   CV:  Palpation and auscultation of heart done , regular rate and rhythm, no murmur, rub, or gallop  ABDOMEN:  normal bowel sounds, soft, nontender, no hepatosplenomegaly or other masses  M/S:   Gait and station normal  SKIN:  Inspection of skin and subcutaneous tissue baseline, Palpation of skin and subcutaneous tissue baseline  NEURO:   Cranial nerves 2-12 are normal tested and grossly at patient's baseline  PSYCH:  oriented X 3, affect and mood normal    Recent labs in [facility] reviewed by me today.     Assessment/Plan:  (E10.65) Type 1 diabetes mellitus with hyperglycemia (H)  (primary encounter diagnosis)  Comment: Chronic, Difficult to manage BS  -Increase lantus 13 units BID  -A1c in 8 weeks.     MED REC REQUIRED  Post Medication Reconciliation Status: patient was not discharged from an " inpatient facility or TCU                   Orders:  New orders above  Electronically signed by: Li Arana, NP

## 2023-09-12 NOTE — LETTER
"    9/12/2023        RE: Kimberly Stephenson  [facility]  [address]  [place] [address]        M [facility] GERIATRICS    Chief Complaint   Patient presents with     Nursing Home Acute     Wellness Visit     HPI:  Kimberly Stephenson is a 78 year old  (1945), who is being seen today for an episodic care visit at: [facility] ([facility]) [[id number]]. Today's concern is: Patient walking to lunch at time of visit. Pleasant with writer and cooperative with exam. A1c was drawn and continues to be elevated at 10.9. Remains asymptomatic while received sliding scale insulin and Lantus.     Allergies, and PMH/PSH reviewed in EPIC today.  REVIEW OF SYSTEMS:  10 point ROS of systems including Constitutional, Eyes, Respiratory, Cardiovascular, Gastroenterology, Genitourinary, Integumentary, Musculoskeletal, Psychiatric were all negative except for pertinent positives noted in my HPI.    Objective:   /58   Pulse 63   Temp 96.8  F (36  C)   Resp 14   Ht 1.676 m (5' 6\")   Wt 97.5 kg (215 lb)   SpO2 94%   BMI 34.70 kg/m    GENERAL APPEARANCE:  Alert, in no distress  ENT:  Mouth and posterior oropharynx normal, moist mucous membranes  RESP:  respiratory effort and palpation of chest normal, lungs clear to auscultation   CV:  Palpation and auscultation of heart done , regular rate and rhythm, no murmur, rub, or gallop  ABDOMEN:  normal bowel sounds, soft, nontender, no hepatosplenomegaly or other masses  M/S:   Gait and station normal  SKIN:  Inspection of skin and subcutaneous tissue baseline, Palpation of skin and subcutaneous tissue baseline  NEURO:   Cranial nerves 2-12 are normal tested and grossly at patient's baseline  PSYCH:  oriented X 3, affect and mood normal    Recent labs in [facility] reviewed by me today.     Assessment/Plan:  (E10.65) Type 1 diabetes mellitus with hyperglycemia (H)  (primary encounter diagnosis)  Comment: Chronic, Difficult to manage BS  -Increase lantus 13 " units BID  -A1c in 8 weeks.     MED REC REQUIRED  Post Medication Reconciliation Status: patient was not discharged from an inpatient facility or TCU                   Orders:  New orders above  Electronically signed by: Li Arana NP           Sincerely,        Li Arana NP

## 2023-09-19 ENCOUNTER — ASSISTED LIVING VISIT (OUTPATIENT)
Dept: GERIATRICS | Facility: CLINIC | Age: 78
End: 2023-09-19
Payer: COMMERCIAL

## 2023-09-19 VITALS
BODY MASS INDEX: 34.55 KG/M2 | RESPIRATION RATE: 14 BRPM | HEART RATE: 63 BPM | TEMPERATURE: 96.8 F | DIASTOLIC BLOOD PRESSURE: 58 MMHG | WEIGHT: 215 LBS | HEIGHT: 66 IN | SYSTOLIC BLOOD PRESSURE: 110 MMHG | OXYGEN SATURATION: 94 %

## 2023-09-19 DIAGNOSIS — E10.65 TYPE 1 DIABETES MELLITUS WITH HYPERGLYCEMIA (H): Primary | ICD-10-CM

## 2023-09-19 PROCEDURE — 99349 HOME/RES VST EST MOD MDM 40: CPT | Performed by: NURSE PRACTITIONER

## 2023-09-19 RX ORDER — INSULIN GLARGINE 100 [IU]/ML
INJECTION, SOLUTION SUBCUTANEOUS
Qty: 15 ML
Start: 2023-09-19 | End: 2024-04-01

## 2023-09-19 NOTE — LETTER
"    9/19/2023        RE: Kimberly Stephenson  [facility]  [address]  [place] [address]        [facility]  [facility] Record Number: [id number]  Chief Complaint   Patient presents with     RECHECK     HPI: Kimberly Stephenson is a 78 year old (1945), who is being seen today for an episodic care visit at: [facility] ([facility]) [[id number]]. Today's concern is: Patient continues to have elevated BS in AM>300.   Hemoglobin A1C   Date Value Ref Range Status   09/05/2023 10.9 (H) <5.7 % Final     Comment:     Normal <5.7%   Prediabetes 5.7-6.4%    Diabetes 6.5% or higher     Note: Adopted from ADA consensus guidelines.   06/24/2021 10.2 (H) 0 - 5.6 % Final     Comment:     Normal <5.7% Prediabetes 5.7-6.4%  Diabetes 6.5% or higher - adopted from ADA   consensus guidelines.       Remains asymptomatic.     Allergies and PMH/PSH reviewed in [facility] today.    REVIEW OF SYSTEMS:  4 point ROS including Respiratory, CV, GI and , other than that noted in the HPI,  is negative    Objective:   /58   Pulse 63   Temp 96.8  F (36  C)   Resp 14   Ht 1.676 m (5' 6\")   Wt 97.5 kg (215 lb)   SpO2 94%   BMI 34.70 kg/m    Exam:  GENERAL APPEARANCE:  Alert, in no distress  ENT:  Mouth and posterior oropharynx normal, moist mucous membranes, normal hearing acuity  RESP:  respiratory effort and palpation of chest normal, lungs clear to auscultation   CV:  Palpation and auscultation of heart done , regular rate and rhythm, no murmur, rub, or gallop  SKIN:  Inspection of skin and subcutaneous tissue baseline, Palpation of skin and subcutaneous tissue baseline  NEURO:   Cranial nerves 2-12 are normal tested and grossly at patient's baseline  PSYCH:  oriented X 3    Labs:   Recent labs in [facility] reviewed by me today.     Assessment/Plan:  (E10.65) Type 1 diabetes mellitus with hyperglycemia (H)  (primary encounter diagnosis)  Comment: Chronic, not managed on current medications. No " longer would like to be seen by endocrinology.   -Increase evening lantus 15 units.   -A1c is scheduled.     **Talked with son Usman regarding POC, no concerns at this time. Phone number was given for future concerns or questions.     MED REC REQUIRED  Post Medication Reconciliation Status: patient was not discharged from an inpatient facility or TCU    Orders:  Increase NOC lantus 15 units    Electronically signed by: Li Arana NP      Sincerely,        Li Arana NP

## 2023-09-19 NOTE — PROGRESS NOTES
"[facility] GERIATRICS  [place] Medical Record Number: [id number]  Chief Complaint   Patient presents with    RECHECK     HPI: Kimberly Stephenson is a 78 year old (1945), who is being seen today for an episodic care visit at: [facility] ([facility]) [[id number]]. Today's concern is: Patient continues to have elevated BS in AM>300.   Hemoglobin A1C   Date Value Ref Range Status   09/05/2023 10.9 (H) <5.7 % Final     Comment:     Normal <5.7%   Prediabetes 5.7-6.4%    Diabetes 6.5% or higher     Note: Adopted from ADA consensus guidelines.   06/24/2021 10.2 (H) 0 - 5.6 % Final     Comment:     Normal <5.7% Prediabetes 5.7-6.4%  Diabetes 6.5% or higher - adopted from ADA   consensus guidelines.       Remains asymptomatic.     Allergies and PMH/PSH reviewed in [id number] today.    REVIEW OF SYSTEMS:  4 point ROS including Respiratory, CV, GI and , other than that noted in the HPI,  is negative    Objective:   /58   Pulse 63   Temp 96.8  F (36  C)   Resp 14   Ht 1.676 m (5' 6\")   Wt 97.5 kg (215 lb)   SpO2 94%   BMI 34.70 kg/m    Exam:  GENERAL APPEARANCE:  Alert, in no distress  ENT:  Mouth and posterior oropharynx normal, moist mucous membranes, normal hearing acuity  RESP:  respiratory effort and palpation of chest normal, lungs clear to auscultation   CV:  Palpation and auscultation of heart done , regular rate and rhythm, no murmur, rub, or gallop  SKIN:  Inspection of skin and subcutaneous tissue baseline, Palpation of skin and subcutaneous tissue baseline  NEURO:   Cranial nerves 2-12 are normal tested and grossly at patient's baseline  PSYCH:  oriented X 3    Labs:   Recent labs in [facility] reviewed by me today.     Assessment/Plan:  (E10.65) Type 1 diabetes mellitus with hyperglycemia (H)  (primary encounter diagnosis)  Comment: Chronic, not managed on current medications. No longer would like to be seen by endocrinology.   -Increase evening lantus 15 units.   -A1c is scheduled.     **Talked with " son Usman regarding POC, no concerns at this time. Phone number was given for future concerns or questions.     MED REC REQUIRED  Post Medication Reconciliation Status: patient was not discharged from an inpatient facility or TCU    Orders:  Increase NOC lantus 15 units    Electronically signed by: Li Arana NP

## 2023-09-19 NOTE — LETTER
"    9/19/2023        RE: Kimberly Stephenson  [facility]  [address]  [facility] [address]        [facility]  [place] Medical Record Number: [id number]  Chief Complaint   Patient presents with     RECHECK     HPI: Kimberly Stephenson is a 78 year old (1945), who is being seen today for an episodic care visit at: [facility] ([facility]) [[id number]]. Today's concern is: Patient continues to have elevated BS in AM>300.   Hemoglobin A1C   Date Value Ref Range Status   09/05/2023 10.9 (H) <5.7 % Final     Comment:     Normal <5.7%   Prediabetes 5.7-6.4%    Diabetes 6.5% or higher     Note: Adopted from ADA consensus guidelines.   06/24/2021 10.2 (H) 0 - 5.6 % Final     Comment:     Normal <5.7% Prediabetes 5.7-6.4%  Diabetes 6.5% or higher - adopted from ADA   consensus guidelines.       Remains asymptomatic.     Allergies and PMH/PSH reviewed in [facility] today.    REVIEW OF SYSTEMS:  4 point ROS including Respiratory, CV, GI and , other than that noted in the HPI,  is negative    Objective:   /58   Pulse 63   Temp 96.8  F (36  C)   Resp 14   Ht 1.676 m (5' 6\")   Wt 97.5 kg (215 lb)   SpO2 94%   BMI 34.70 kg/m    Exam:  GENERAL APPEARANCE:  Alert, in no distress  ENT:  Mouth and posterior oropharynx normal, moist mucous membranes, normal hearing acuity  RESP:  respiratory effort and palpation of chest normal, lungs clear to auscultation   CV:  Palpation and auscultation of heart done , regular rate and rhythm, no murmur, rub, or gallop  SKIN:  Inspection of skin and subcutaneous tissue baseline, Palpation of skin and subcutaneous tissue baseline  NEURO:   Cranial nerves 2-12 are normal tested and grossly at patient's baseline  PSYCH:  oriented X 3    Labs:   Recent labs in [facility] reviewed by me today.     Assessment/Plan:  (E10.65) Type 1 diabetes mellitus with hyperglycemia (H)  (primary encounter diagnosis)  Comment: Chronic, not managed on current medications. No " longer would like to be seen by endocrinology.   -Increase evening lantus 15 units.   -A1c is scheduled.     **Talked with son Usman regarding POC, no concerns at this time. Phone number was given for future concerns or questions.     MED REC REQUIRED  Post Medication Reconciliation Status: patient was not discharged from an inpatient facility or TCU    Orders:  Increase NOC lantus 15 units    Electronically signed by: Li Arana NP      Sincerely,        Li Arana NP

## 2023-09-26 ENCOUNTER — ASSISTED LIVING VISIT (OUTPATIENT)
Dept: GERIATRICS | Facility: CLINIC | Age: 78
End: 2023-09-26
Payer: COMMERCIAL

## 2023-09-26 VITALS
HEART RATE: 63 BPM | SYSTOLIC BLOOD PRESSURE: 110 MMHG | TEMPERATURE: 96.8 F | HEIGHT: 66 IN | DIASTOLIC BLOOD PRESSURE: 68 MMHG | RESPIRATION RATE: 14 BRPM | BODY MASS INDEX: 34.55 KG/M2 | OXYGEN SATURATION: 94 % | WEIGHT: 215 LBS

## 2023-09-26 DIAGNOSIS — R35.0 URINARY FREQUENCY: ICD-10-CM

## 2023-09-26 DIAGNOSIS — E66.01 MORBID OBESITY (H): ICD-10-CM

## 2023-09-26 DIAGNOSIS — E10.65 TYPE 1 DIABETES MELLITUS WITH HYPERGLYCEMIA (H): Primary | ICD-10-CM

## 2023-09-26 PROCEDURE — 99350 HOME/RES VST EST HIGH MDM 60: CPT | Performed by: NURSE PRACTITIONER

## 2023-09-26 NOTE — PROGRESS NOTES
"[facility] GERIATRICS    Chief Complaint   Patient presents with    RECHECK     HPI:  Kimberly Stephenson is a 78 year old  (1945), who is being seen today for an episodic care visit at: [facility]) [[id number]]. Today's concern is: Patient resting in chair upon visit, reports she is slightly tired due to frequent urination overnight.  Patient was up 5-6 times.  No further signs or symptoms of acute illness but reports this is unlike her.  Insulin adjustments were made last week minimum improvement when reviewing blood sugars from the past week.  Patient remains asymptomatic with having lows.  Otherwise reports feeling okay, denies chest pain, shortness of breath and dizziness.    BP Readings from Last 3 Encounters:   09/26/23 110/68   09/19/23 110/58   09/12/23 110/58     Pulse Readings from Last 4 Encounters:   09/26/23 63   09/19/23 63   09/12/23 63   08/22/23 63     Wt Readings from Last 4 Encounters:   09/26/23 97.5 kg (215 lb)   09/19/23 97.5 kg (215 lb)   09/12/23 97.5 kg (215 lb)   08/22/23 97.5 kg (215 lb)         Allergies, and PMH/PSH reviewed in [facility] today.  REVIEW OF SYSTEMS:  4 point ROS including Respiratory, CV, GI and , other than that noted in the HPI,  is negative    Objective:   /68   Pulse 63   Temp 96.8  F (36  C)   Resp 14   Ht 1.676 m (5' 6\")   Wt 97.5 kg (215 lb)   SpO2 94%   BMI 34.70 kg/m    GENERAL APPEARANCE:  Alert, in no distress  RESP:  respiratory effort and palpation of chest normal, lungs clear to auscultation   CV:  Palpation and auscultation of heart done , regular rate and rhythm, no murmur, rub, or gallop  ABDOMEN:  normal bowel sounds, soft, nontender, no hepatosplenomegaly or other masses  M/S:   Gait and station normal  Digits and nails normal  SKIN:  Inspection of skin and subcutaneous tissue baseline, Palpation of skin and subcutaneous tissue baseline  NEURO:   Cranial nerves 2-12 are normal tested and grossly at patient's baseline  PSYCH: "  oriented X 3    Recent labs in [facility] reviewed by me today.     Assessment/Plan:  (E10.65) Type 1 diabetes mellitus with hyperglycemia (H)  (primary encounter diagnosis)  Comment: Chronic, variable blood sugars.  Continue insulin as ordered.  A1c is scheduled for 11/7/2023.    (R35.0) Urinary frequency  Comment: New onset of urinary frequency overnight.  -UA/UC    (E66.01) Morbid obesity (H)  Comment: Body mass index is 34.7 kg/m .  -Encourage healthy meals and snacks, light exercise as tolerated.    MED REC REQUIRED  Post Medication Reconciliation Status: patient was not discharged from an inpatient facility or TCU      Orders:  UA/UC    Electronically signed by: Li Arana NP

## 2023-09-26 NOTE — LETTER
"    9/26/2023        RE: Kimberly Stephenson  [facility]  [address]  [facility] [address]        M [facility] GERIATRICS    Chief Complaint   Patient presents with     RECHECK     HPI:  Kimberly Stephenson is a 78 year old  (1945), who is being seen today for an episodic care visit at: [facility] ([facility]) [[id number]]. Today's concern is: Patient resting in chair upon visit, reports she is slightly tired due to frequent urination overnight.  Patient was up 5-6 times.  No further signs or symptoms of acute illness but reports this is unlike her.  Insulin adjustments were made last week minimum improvement when reviewing blood sugars from the past week.  Patient remains asymptomatic with having lows.  Otherwise reports feeling okay, denies chest pain, shortness of breath and dizziness.    BP Readings from Last 3 Encounters:   09/26/23 110/68   09/19/23 110/58   09/12/23 110/58     Pulse Readings from Last 4 Encounters:   09/26/23 63   09/19/23 63   09/12/23 63   08/22/23 63     Wt Readings from Last 4 Encounters:   09/26/23 97.5 kg (215 lb)   09/19/23 97.5 kg (215 lb)   09/12/23 97.5 kg (215 lb)   08/22/23 97.5 kg (215 lb)         Allergies, and PMH/PSH reviewed in [facility] today.  REVIEW OF SYSTEMS:  4 point ROS including Respiratory, CV, GI and , other than that noted in the HPI,  is negative    Objective:   /68   Pulse 63   Temp 96.8  F (36  C)   Resp 14   Ht 1.676 m (5' 6\")   Wt 97.5 kg (215 lb)   SpO2 94%   BMI 34.70 kg/m    GENERAL APPEARANCE:  Alert, in no distress  RESP:  respiratory effort and palpation of chest normal, lungs clear to auscultation   CV:  Palpation and auscultation of heart done , regular rate and rhythm, no murmur, rub, or gallop  ABDOMEN:  normal bowel sounds, soft, nontender, no hepatosplenomegaly or other masses  M/S:   Gait and station normal  Digits and nails normal  SKIN:  Inspection of skin and subcutaneous tissue baseline, Palpation of skin " and subcutaneous tissue baseline  NEURO:   Cranial nerves 2-12 are normal tested and grossly at patient's baseline  PSYCH:  oriented X 3    Recent labs in [facility] reviewed by me today.     Assessment/Plan:  (E10.65) Type 1 diabetes mellitus with hyperglycemia (H)  (primary encounter diagnosis)  Comment: Chronic, variable blood sugars.  Continue insulin as ordered.  A1c is scheduled for 11/7/2023.    (R35.0) Urinary frequency  Comment: New onset of urinary frequency overnight.  -UA/UC    (E66.01) Morbid obesity (H)  Comment: Body mass index is 34.7 kg/m .  -Encourage healthy meals and snacks, light exercise as tolerated.    MED REC REQUIRED  Post Medication Reconciliation Status: patient was not discharged from an inpatient facility or TCU      Orders:  UA/UC    Electronically signed by: Li Arana NP           Sincerely,        Li Arana NP

## 2023-09-27 ENCOUNTER — LAB REQUISITION (OUTPATIENT)
Dept: LAB | Facility: CLINIC | Age: 78
End: 2023-09-27
Payer: COMMERCIAL

## 2023-09-27 DIAGNOSIS — R35.0 FREQUENCY OF MICTURITION: ICD-10-CM

## 2023-09-27 LAB
ALBUMIN UR-MCNC: NEGATIVE MG/DL
APPEARANCE UR: CLEAR
BACTERIA #/AREA URNS HPF: ABNORMAL /HPF
BILIRUB UR QL STRIP: NEGATIVE
COLOR UR AUTO: ABNORMAL
GLUCOSE UR STRIP-MCNC: NEGATIVE MG/DL
HGB UR QL STRIP: NEGATIVE
KETONES UR STRIP-MCNC: NEGATIVE MG/DL
LEUKOCYTE ESTERASE UR QL STRIP: ABNORMAL
NITRATE UR QL: NEGATIVE
PH UR STRIP: 7 [PH] (ref 5–7)
RBC URINE: <1 /HPF
SP GR UR STRIP: 1.01 (ref 1–1.03)
SQUAMOUS EPITHELIAL: <1 /HPF
TRANSITIONAL EPI: <1 /HPF
UROBILINOGEN UR STRIP-MCNC: NORMAL MG/DL
WBC URINE: 1 /HPF

## 2023-09-27 PROCEDURE — 87086 URINE CULTURE/COLONY COUNT: CPT | Mod: ORL | Performed by: NURSE PRACTITIONER

## 2023-09-27 PROCEDURE — 81001 URINALYSIS AUTO W/SCOPE: CPT | Mod: ORL | Performed by: NURSE PRACTITIONER

## 2023-09-28 LAB — BACTERIA SPEC CULT: ABNORMAL

## 2023-09-29 ENCOUNTER — TELEPHONE (OUTPATIENT)
Dept: GERIATRICS | Facility: CLINIC | Age: 78
End: 2023-09-29
Payer: COMMERCIAL

## 2023-09-29 DIAGNOSIS — I10 ESSENTIAL HYPERTENSION: ICD-10-CM

## 2023-09-29 DIAGNOSIS — F32.A ANXIETY AND DEPRESSION: ICD-10-CM

## 2023-09-29 DIAGNOSIS — I21.4 NSTEMI (NON-ST ELEVATED MYOCARDIAL INFARCTION) (H): ICD-10-CM

## 2023-09-29 DIAGNOSIS — F41.9 ANXIETY DISORDER, UNSPECIFIED: ICD-10-CM

## 2023-09-29 DIAGNOSIS — F41.9 ANXIETY AND DEPRESSION: ICD-10-CM

## 2023-09-29 RX ORDER — ESCITALOPRAM OXALATE 20 MG/1
TABLET ORAL
Qty: 30 TABLET | Refills: 11 | Status: SHIPPED | OUTPATIENT
Start: 2023-09-29 | End: 2024-09-04

## 2023-09-29 RX ORDER — ACETAMINOPHEN 500 MG
TABLET ORAL
Qty: 60 TABLET | Refills: 11 | Status: SHIPPED | OUTPATIENT
Start: 2023-09-29 | End: 2024-09-04

## 2023-09-29 RX ORDER — AMLODIPINE BESYLATE 10 MG/1
10 TABLET ORAL EVERY EVENING
Qty: 90 TABLET | Refills: 3 | Status: SHIPPED | OUTPATIENT
Start: 2023-09-29 | End: 2024-09-04

## 2023-09-29 RX ORDER — BUSPIRONE HYDROCHLORIDE 15 MG/1
15 TABLET ORAL 3 TIMES DAILY
Qty: 60 TABLET | Refills: 11 | Status: SHIPPED | OUTPATIENT
Start: 2023-09-29 | End: 2024-05-15

## 2023-09-29 RX ORDER — LEVOFLOXACIN 500 MG/1
500 TABLET, FILM COATED ORAL DAILY
COMMUNITY
Start: 2023-09-29 | End: 2023-10-09

## 2023-09-29 NOTE — TELEPHONE ENCOUNTER
[facility]     Provider: VANDANA Solano CNP  Facility: [facility] Facility Type:  LTC    Allergies   Allergen Reactions    Duloxetine Hcl Unknown    Fentanyl Nausea and Vomiting    Versed [Midazolam] Nausea and Vomiting       Labs Reviewed by provider: VINICIO       Verbal Order/Direction given by Provider: Levaquin 500mg BID x 10 days     Provider giving Order:  VANDANA Solano CNP    Verbal Order given to: Lela Stockton RN

## 2023-09-29 NOTE — TELEPHONE ENCOUNTER
[facility] [place] [facility] Triage Nurse Telephone Encounter    Provider: VANDANA Solano CNP  Facility: [facility] Facility Type:  AL      Call Back Number: [phone]    Allergies:    Allergies   Allergen Reactions    Duloxetine Hcl Unknown    Fentanyl Nausea and Vomiting    Versed [Midazolam] Nausea and Vomiting        Reason for call: Order clarification    Verbal Order/Direction given by Provider: Change Levaquin to 500mg daily x 10 days for UTI.      Provider giving Order:  VANDANA Solano CNP    Verbal Order given to: Lela Lipscomb RN

## 2023-10-24 ENCOUNTER — TELEPHONE (OUTPATIENT)
Dept: GERIATRICS | Facility: CLINIC | Age: 78
End: 2023-10-24

## 2023-10-25 ENCOUNTER — ASSISTED LIVING VISIT (OUTPATIENT)
Dept: GERIATRICS | Facility: CLINIC | Age: 78
End: 2023-10-25
Payer: COMMERCIAL

## 2023-10-25 VITALS
HEIGHT: 66 IN | RESPIRATION RATE: 18 BRPM | WEIGHT: 212 LBS | OXYGEN SATURATION: 94 % | TEMPERATURE: 97.1 F | BODY MASS INDEX: 34.07 KG/M2 | DIASTOLIC BLOOD PRESSURE: 65 MMHG | HEART RATE: 71 BPM | SYSTOLIC BLOOD PRESSURE: 138 MMHG

## 2023-10-25 DIAGNOSIS — R41.0 CONFUSION: Primary | ICD-10-CM

## 2023-10-25 DIAGNOSIS — E10.9 TYPE 1 DIABETES MELLITUS WITHOUT COMPLICATION (H): ICD-10-CM

## 2023-10-25 DIAGNOSIS — F22 PARANOIA (H): ICD-10-CM

## 2023-10-25 DIAGNOSIS — E10.65 TYPE 1 DIABETES MELLITUS WITH HYPERGLYCEMIA (H): ICD-10-CM

## 2023-10-25 PROCEDURE — 87086 URINE CULTURE/COLONY COUNT: CPT | Mod: ORL | Performed by: NURSE PRACTITIONER

## 2023-10-25 PROCEDURE — 99350 HOME/RES VST EST HIGH MDM 60: CPT | Performed by: NURSE PRACTITIONER

## 2023-10-25 PROCEDURE — 81001 URINALYSIS AUTO W/SCOPE: CPT | Mod: ORL | Performed by: NURSE PRACTITIONER

## 2023-10-25 RX ORDER — INSULIN ASPART 100 [IU]/ML
INJECTION, SOLUTION INTRAVENOUS; SUBCUTANEOUS
Start: 2023-10-25 | End: 2024-06-24

## 2023-10-25 NOTE — LETTER
"    10/25/2023        RE: Kimberly Stephenson  [facility]  [address]  [facility] [address]        M [facility] GERIATRICS    Chief Complaint   Patient presents with     RECHECK     Intake, molar pain     HPI:  Kimberly Stephenson is a 78 year old  (1945), who is being seen today for an episodic care visit at: [facility] ([facility]) [[id number]]. Today's concern is: Patient resting in room upon visit. Appears to be more confused and unaware of who writer is. Not at patient's baseline. Per staff, more difficult with insulin administration and not wanting to eat post-injection. Has  had a recent UTI and completed course of antibiotics. Feeling ok and denies generalized malaise. Some paranoia noted and concerned about my intentions. Blood sugars fluctuate.     BP Readings from Last 3 Encounters:   10/25/23 138/65   09/26/23 110/68   09/19/23 110/58     Pulse Readings from Last 4 Encounters:   10/25/23 71   09/26/23 63   09/19/23 63   09/12/23 63     Wt Readings from Last 4 Encounters:   10/25/23 96.2 kg (212 lb)   09/26/23 97.5 kg (215 lb)   09/19/23 97.5 kg (215 lb)   09/12/23 97.5 kg (215 lb)      Allergies, and PMH/PSH reviewed in [facility] today.  REVIEW OF SYSTEMS:  10 point ROS of systems including Constitutional, Eyes, Respiratory, Cardiovascular, Gastroenterology, Genitourinary, Integumentary, Musculoskeletal, Psychiatric were all negative except for pertinent positives noted in my HPI.    Objective:   /65   Pulse 71   Temp 97.1  F (36.2  C)   Resp 18   Ht 1.676 m (5' 6\")   Wt 96.2 kg (212 lb)   SpO2 94%   BMI 34.22 kg/m    A & O x 3 with intermittent confusion and paranoia, NAD. Lungs CTA, non labored. RRR, S1/S2 w/o murmur,gallop or rub.  edema.  Abdomen soft, nontender, +BT'S. No focal neurological deficits.          Recent labs in [facility] reviewed by me today.     Assessment/Plan:  1. Confusion    2. Paranoia (H): new onset of increased confusion and paranoia. "   -UA/UC  -BMP and CBC next lab day    3. Type 1 diabetes mellitus without complication (H): chronic, with fluctuating BS. Adjustments recently made to insulin regimen given increased A1c.   -Increase Lantus 15 units AM  -Increase Lantus 17 units PM  -Increase sliding Scale Insulin by one unit both with meals and at night.   Hemoglobin A1C   Date Value Ref Range Status   09/05/2023 10.9 (H) <5.7 % Final     Comment:     Normal <5.7%   Prediabetes 5.7-6.4%    Diabetes 6.5% or higher     Note: Adopted from ADA consensus guidelines.   06/24/2021 10.2 (H) 0 - 5.6 % Final     Comment:     Normal <5.7% Prediabetes 5.7-6.4%  Diabetes 6.5% or higher - adopted from ADA   consensus guidelines.             MED REC REQUIRED  Post Medication Reconciliation Status: patient was not discharged from an inpatient facility or TCU      Orders:  See above for new orders.       Electronically signed by: Li Arana NP           Sincerely,        Li Arana NP

## 2023-10-25 NOTE — PROGRESS NOTES
"[facility] GERIATRICS    Chief Complaint   Patient presents with    RECHECK     Intake, molar pain     HPI:  Kimberly Stephenson is a 78 year old  (1945), who is being seen today for an episodic care visit at: [facility]) [[id number]]. Today's concern is: Patient resting in room upon visit. Appears to be more confused and unaware of who writer is. Not at patient's baseline. Per staff, more difficult with insulin administration and not wanting to eat post-injection. Has  had a recent UTI and completed course of antibiotics. Feeling ok and denies generalized malaise. Some paranoia noted and concerned about my intentions. Blood sugars fluctuate.     BP Readings from Last 3 Encounters:   10/25/23 138/65   09/26/23 110/68   09/19/23 110/58     Pulse Readings from Last 4 Encounters:   10/25/23 71   09/26/23 63   09/19/23 63   09/12/23 63     Wt Readings from Last 4 Encounters:   10/25/23 96.2 kg (212 lb)   09/26/23 97.5 kg (215 lb)   09/19/23 97.5 kg (215 lb)   09/12/23 97.5 kg (215 lb)      Allergies, and PMH/PSH reviewed in [facility] today.  REVIEW OF SYSTEMS:  10 point ROS of systems including Constitutional, Eyes, Respiratory, Cardiovascular, Gastroenterology, Genitourinary, Integumentary, Musculoskeletal, Psychiatric were all negative except for pertinent positives noted in my HPI.    Objective:   /65   Pulse 71   Temp 97.1  F (36.2  C)   Resp 18   Ht 1.676 m (5' 6\")   Wt 96.2 kg (212 lb)   SpO2 94%   BMI 34.22 kg/m    A & O x 3 with intermittent confusion and paranoia, NAD. Lungs CTA, non labored. RRR, S1/S2 w/o murmur,gallop or rub.  edema.  Abdomen soft, nontender, +BT'S. No focal neurological deficits.          Recent labs in [facility] reviewed by me today.     Assessment/Plan:  1. Confusion    2. Paranoia (H): new onset of increased confusion and paranoia.   -UA/UC  -BMP and CBC next lab day    3. Type 1 diabetes mellitus without complication (H): chronic, with fluctuating BS. Adjustments " recently made to insulin regimen given increased A1c.   -Increase Lantus 15 units AM  -Increase Lantus 17 units PM  -Increase sliding Scale Insulin by one unit both with meals and at night.   Hemoglobin A1C   Date Value Ref Range Status   09/05/2023 10.9 (H) <5.7 % Final     Comment:     Normal <5.7%   Prediabetes 5.7-6.4%    Diabetes 6.5% or higher     Note: Adopted from ADA consensus guidelines.   06/24/2021 10.2 (H) 0 - 5.6 % Final     Comment:     Normal <5.7% Prediabetes 5.7-6.4%  Diabetes 6.5% or higher - adopted from ADA   consensus guidelines.             MED REC REQUIRED  Post Medication Reconciliation Status: patient was not discharged from an inpatient facility or TCU      Orders:  See above for new orders.       Electronically signed by: Li Arana NP

## 2023-10-26 ENCOUNTER — LAB REQUISITION (OUTPATIENT)
Dept: LAB | Facility: CLINIC | Age: 78
End: 2023-10-26
Payer: COMMERCIAL

## 2023-10-26 DIAGNOSIS — E10.9 TYPE 1 DIABETES MELLITUS WITHOUT COMPLICATIONS (H): ICD-10-CM

## 2023-10-26 LAB
ALBUMIN UR-MCNC: NEGATIVE MG/DL
APPEARANCE UR: CLEAR
BILIRUB UR QL STRIP: NEGATIVE
COLOR UR AUTO: ABNORMAL
GLUCOSE UR STRIP-MCNC: >=1000 MG/DL
HGB UR QL STRIP: NEGATIVE
KETONES UR STRIP-MCNC: NEGATIVE MG/DL
LEUKOCYTE ESTERASE UR QL STRIP: NEGATIVE
NITRATE UR QL: NEGATIVE
PH UR STRIP: 6.5 [PH] (ref 5–7)
RBC URINE: 1 /HPF
SP GR UR STRIP: 1.02 (ref 1–1.03)
SQUAMOUS EPITHELIAL: 1 /HPF
TRANSITIONAL EPI: <1 /HPF
UROBILINOGEN UR STRIP-MCNC: NORMAL MG/DL
WBC URINE: 6 /HPF

## 2023-10-27 LAB — BACTERIA UR CULT: NO GROWTH

## 2023-10-30 ENCOUNTER — LAB REQUISITION (OUTPATIENT)
Dept: LAB | Facility: CLINIC | Age: 78
End: 2023-10-30
Payer: COMMERCIAL

## 2023-10-30 DIAGNOSIS — E10.9 TYPE 1 DIABETES MELLITUS WITHOUT COMPLICATIONS (H): ICD-10-CM

## 2023-10-31 LAB
ANION GAP SERPL CALCULATED.3IONS-SCNC: 10 MMOL/L (ref 7–15)
BASOPHILS # BLD AUTO: 0 10E3/UL (ref 0–0.2)
BASOPHILS NFR BLD AUTO: 0 %
BUN SERPL-MCNC: 10.7 MG/DL (ref 8–23)
CALCIUM SERPL-MCNC: 9 MG/DL (ref 8.8–10.2)
CHLORIDE SERPL-SCNC: 104 MMOL/L (ref 98–107)
CREAT SERPL-MCNC: 0.65 MG/DL (ref 0.51–0.95)
DEPRECATED HCO3 PLAS-SCNC: 28 MMOL/L (ref 22–29)
EGFRCR SERPLBLD CKD-EPI 2021: 89 ML/MIN/1.73M2
EOSINOPHIL # BLD AUTO: 0.1 10E3/UL (ref 0–0.7)
EOSINOPHIL NFR BLD AUTO: 2 %
ERYTHROCYTE [DISTWIDTH] IN BLOOD BY AUTOMATED COUNT: 13.3 % (ref 10–15)
GLUCOSE SERPL-MCNC: 234 MG/DL (ref 70–99)
HCT VFR BLD AUTO: 39.6 % (ref 35–47)
HGB BLD-MCNC: 12.8 G/DL (ref 11.7–15.7)
IMM GRANULOCYTES # BLD: 0 10E3/UL
IMM GRANULOCYTES NFR BLD: 0 %
LYMPHOCYTES # BLD AUTO: 2.2 10E3/UL (ref 0.8–5.3)
LYMPHOCYTES NFR BLD AUTO: 49 %
MCH RBC QN AUTO: 30.1 PG (ref 26.5–33)
MCHC RBC AUTO-ENTMCNC: 32.3 G/DL (ref 31.5–36.5)
MCV RBC AUTO: 93 FL (ref 78–100)
MONOCYTES # BLD AUTO: 0.5 10E3/UL (ref 0–1.3)
MONOCYTES NFR BLD AUTO: 12 %
NEUTROPHILS # BLD AUTO: 1.7 10E3/UL (ref 1.6–8.3)
NEUTROPHILS NFR BLD AUTO: 37 %
NRBC # BLD AUTO: 0 10E3/UL
NRBC BLD AUTO-RTO: 0 /100
PLATELET # BLD AUTO: 199 10E3/UL (ref 150–450)
POTASSIUM SERPL-SCNC: 4.9 MMOL/L (ref 3.4–5.3)
RBC # BLD AUTO: 4.25 10E6/UL (ref 3.8–5.2)
SODIUM SERPL-SCNC: 142 MMOL/L (ref 135–145)
WBC # BLD AUTO: 4.6 10E3/UL (ref 4–11)

## 2023-10-31 PROCEDURE — P9603 ONE-WAY ALLOW PRORATED MILES: HCPCS | Mod: ORL | Performed by: NURSE PRACTITIONER

## 2023-10-31 PROCEDURE — 80048 BASIC METABOLIC PNL TOTAL CA: CPT | Mod: ORL | Performed by: NURSE PRACTITIONER

## 2023-10-31 PROCEDURE — 36415 COLL VENOUS BLD VENIPUNCTURE: CPT | Mod: ORL | Performed by: NURSE PRACTITIONER

## 2023-10-31 PROCEDURE — 85025 COMPLETE CBC W/AUTO DIFF WBC: CPT | Mod: ORL | Performed by: NURSE PRACTITIONER

## 2023-11-05 ENCOUNTER — LAB REQUISITION (OUTPATIENT)
Dept: LAB | Facility: CLINIC | Age: 78
End: 2023-11-05
Payer: COMMERCIAL

## 2023-11-05 DIAGNOSIS — E11.9 TYPE 2 DIABETES MELLITUS WITHOUT COMPLICATIONS (H): ICD-10-CM

## 2023-11-06 DIAGNOSIS — E10.65 TYPE 1 DIABETES MELLITUS WITH HYPERGLYCEMIA (H): ICD-10-CM

## 2023-11-06 RX ORDER — LANCETS 30 GAUGE
EACH MISCELLANEOUS
Qty: 100 EACH | Refills: 6 | Status: SHIPPED | OUTPATIENT
Start: 2023-11-06 | End: 2024-05-01

## 2023-11-07 LAB — HBA1C MFR BLD: 11 %

## 2023-11-07 PROCEDURE — 83036 HEMOGLOBIN GLYCOSYLATED A1C: CPT | Mod: ORL | Performed by: NURSE PRACTITIONER

## 2023-11-07 PROCEDURE — 36415 COLL VENOUS BLD VENIPUNCTURE: CPT | Mod: ORL | Performed by: NURSE PRACTITIONER

## 2023-11-08 ENCOUNTER — ASSISTED LIVING VISIT (OUTPATIENT)
Dept: GERIATRICS | Facility: CLINIC | Age: 78
End: 2023-11-08
Payer: COMMERCIAL

## 2023-11-08 VITALS
WEIGHT: 212 LBS | HEART RATE: 71 BPM | TEMPERATURE: 97.1 F | BODY MASS INDEX: 34.07 KG/M2 | OXYGEN SATURATION: 94 % | DIASTOLIC BLOOD PRESSURE: 65 MMHG | SYSTOLIC BLOOD PRESSURE: 138 MMHG | RESPIRATION RATE: 18 BRPM | HEIGHT: 66 IN

## 2023-11-08 DIAGNOSIS — E10.65 TYPE 1 DIABETES MELLITUS WITH HYPERGLYCEMIA (H): Primary | ICD-10-CM

## 2023-11-08 DIAGNOSIS — F41.9 ANXIETY: ICD-10-CM

## 2023-11-08 PROCEDURE — 99349 HOME/RES VST EST MOD MDM 40: CPT | Performed by: NURSE PRACTITIONER

## 2023-11-08 RX ORDER — ALPRAZOLAM 0.25 MG
0.25 TABLET ORAL DAILY PRN
Start: 2023-11-08 | End: 2024-04-19

## 2023-11-08 NOTE — LETTER
"    11/8/2023        RE: Kimberly Stephenson  [facility]  [address]  [facility] [address]        M [facility] GERIATRICS    Chief Complaint   Patient presents with     RECHECK     HPI:  Kimberly Stephenson is a 78 year old  (1945), who is being seen today for an episodic care visit at: [facility] ([facility]) [[id number]]. Today's concern is: Patient with elevated A1c of 11.0. Remains asymptomatic. No longer seeing endocrinology.     BP Readings from Last 3 Encounters:   11/08/23 138/65   10/25/23 138/65   09/26/23 110/68     Pulse Readings from Last 4 Encounters:   11/08/23 71   10/25/23 71   09/26/23 63   09/19/23 63     Wt Readings from Last 4 Encounters:   11/08/23 96.2 kg (212 lb)   10/25/23 96.2 kg (212 lb)   09/26/23 97.5 kg (215 lb)   09/19/23 97.5 kg (215 lb)     Allergies, and PMH/PSH reviewed in [facility] today.  REVIEW OF SYSTEMS:  4 point ROS including Respiratory, CV, GI and , other than that noted in the HPI,  is negative    Objective:   /65   Pulse 71   Temp 97.1  F (36.2  C)   Resp 18   Ht 1.676 m (5' 6\")   Wt 96.2 kg (212 lb)   SpO2 94%   BMI 34.22 kg/m    A & O x 3, NAD. Lungs CTA, non labored. RRR, S1/S2 w/o murmur,gallop or rub.  No edema.  Abdomen soft, nontender, +BT'S. No focal neurological deficits.        Recent labs in [facility] reviewed by me today.     Assessment/Plan:  (E10.65) Type 1 diabetes mellitus with hyperglycemia (H)  (primary encounter diagnosis)  Comment: Chronic, not at A1c goal. Remains asymptomatic.   Plan: insulin glargine (LANTUS PEN) 100 UNIT/ML pen: Increase lantus 17 units in AM and Lantus 19 units PM  A1c in 8 weeks.            MED REC REQUIRED  Post Medication Reconciliation Status: patient was not discharged from an inpatient facility or TCU      Orders:  Increase lantus 17 units AM and increase lantus 19 units PM  A1c in 8 weeks    Electronically signed by: Li Arana NP           Sincerely,        Li Arana NP      "

## 2023-11-08 NOTE — PROGRESS NOTES
"[facility] GERIATRICS    Chief Complaint   Patient presents with    RECHECK     HPI:  Kimberly Stephenson is a 78 year old  (1945), who is being seen today for an episodic care visit at: [facility]) [[id number]]. Today's concern is: Patient with elevated A1c of 11.0. Remains asymptomatic. No longer seeing endocrinology.     BP Readings from Last 3 Encounters:   11/08/23 138/65   10/25/23 138/65   09/26/23 110/68     Pulse Readings from Last 4 Encounters:   11/08/23 71   10/25/23 71   09/26/23 63   09/19/23 63     Wt Readings from Last 4 Encounters:   11/08/23 96.2 kg (212 lb)   10/25/23 96.2 kg (212 lb)   09/26/23 97.5 kg (215 lb)   09/19/23 97.5 kg (215 lb)     Allergies, and PMH/PSH reviewed in [facility] today.  REVIEW OF SYSTEMS:  4 point ROS including Respiratory, CV, GI and , other than that noted in the HPI,  is negative    Objective:   /65   Pulse 71   Temp 97.1  F (36.2  C)   Resp 18   Ht 1.676 m (5' 6\")   Wt 96.2 kg (212 lb)   SpO2 94%   BMI 34.22 kg/m    A & O x 3, NAD. Lungs CTA, non labored. RRR, S1/S2 w/o murmur,gallop or rub.  No edema.  Abdomen soft, nontender, +BT'S. No focal neurological deficits.        Recent labs in [facility] reviewed by me today.     Assessment/Plan:  (E10.65) Type 1 diabetes mellitus with hyperglycemia (H)  (primary encounter diagnosis)  Comment: Chronic, not at A1c goal. Remains asymptomatic.   Plan: insulin glargine (LANTUS PEN) 100 UNIT/ML pen: Increase lantus 17 units in AM and Lantus 19 units PM  A1c in 8 weeks.            MED REC REQUIRED  Post Medication Reconciliation Status: patient was not discharged from an inpatient facility or TCU      Orders:  Increase lantus 17 units AM and increase lantus 19 units PM  A1c in 8 weeks    Electronically signed by: Li Arana NP       "

## 2023-11-09 DIAGNOSIS — E10.65 TYPE 1 DIABETES MELLITUS WITH HYPERGLYCEMIA (H): ICD-10-CM

## 2023-11-09 RX ORDER — PEN NEEDLE, DIABETIC, SAFETY 30 GX3/16"
NEEDLE, DISPOSABLE MISCELLANEOUS
Qty: 100 EACH | Refills: 11 | Status: SHIPPED | OUTPATIENT
Start: 2023-11-09 | End: 2024-06-18

## 2023-12-27 ENCOUNTER — DOCUMENTATION ONLY (OUTPATIENT)
Dept: GERIATRICS | Facility: CLINIC | Age: 78
End: 2023-12-27
Payer: COMMERCIAL

## 2024-01-17 ENCOUNTER — ASSISTED LIVING VISIT (OUTPATIENT)
Dept: GERIATRICS | Facility: CLINIC | Age: 79
End: 2024-01-17
Payer: COMMERCIAL

## 2024-01-17 VITALS
WEIGHT: 221 LBS | DIASTOLIC BLOOD PRESSURE: 76 MMHG | SYSTOLIC BLOOD PRESSURE: 132 MMHG | RESPIRATION RATE: 14 BRPM | BODY MASS INDEX: 35.52 KG/M2 | TEMPERATURE: 96.4 F | OXYGEN SATURATION: 94 % | HEIGHT: 66 IN | HEART RATE: 65 BPM

## 2024-01-17 DIAGNOSIS — E10.65 TYPE 1 DIABETES MELLITUS WITH HYPERGLYCEMIA (H): Primary | ICD-10-CM

## 2024-01-17 PROCEDURE — 99348 HOME/RES VST EST LOW MDM 30: CPT | Performed by: NURSE PRACTITIONER

## 2024-01-17 NOTE — PROGRESS NOTES
"[facility] GERIATRICS    Chief Complaint   Patient presents with    RECHECK     HPI:  Kimberly Stephenson is a 78 year old  (1945), who is being seen today for an episodic care visit at: [facility]) [[id number]]. Today's concern is:   Patient resting in room, pleasant with writer.   Reviewing blood sugars remains low at lunch and high in AM. Otherwise <200's.   Patient denies CP, SOB and lightheadedness.     BP Readings from Last 3 Encounters:   01/17/24 132/76   11/08/23 138/65   10/25/23 138/65     Pulse Readings from Last 4 Encounters:   01/17/24 65   11/08/23 71   10/25/23 71   09/26/23 63     Wt Readings from Last 4 Encounters:   01/17/24 100.2 kg (221 lb)   11/08/23 96.2 kg (212 lb)   10/25/23 96.2 kg (212 lb)   09/26/23 97.5 kg (215 lb)         Allergies, and PMH/PSH reviewed in [facility] today.  REVIEW OF SYSTEMS:  4 point ROS including Respiratory, CV, GI and , other than that noted in the HPI,  is negative    Objective:   /76   Pulse 65   Temp (!) 96.4  F (35.8  C)   Resp 14   Ht 1.676 m (5' 6\")   Wt 100.2 kg (221 lb)   SpO2 94%   BMI 35.67 kg/m    A & O x 3, NAD. Lungs CTA, non labored. RRR, S1/S2 w/o murmur,gallop or rub.  +1 bilateral LE edema.  Abdomen soft, nontender, +BT'S. No focal neurological deficits.        Recent labs in [facility] reviewed by me today.     Assessment/Plan:  1. Type 1 diabetes mellitus with hyperglycemia (H)    Reduce AM dose lantus 14 units  Increase PM dose lantus 22 units  A1c next lab day   -Chronic type I diabetes with fluctuating A1c. No longer followed by endocrinology. Will monitor and make adjustments as indicated.     MED REC REQUIRED  Post Medication Reconciliation Status: patient was not discharged from an inpatient facility or TCU      Orders:  See new lantus orders above     Electronically signed by:   Li Arana NP       "

## 2024-01-17 NOTE — LETTER
"    1/17/2024        RE: Kimberly Stephenson  [facility]  [address]  [facility] [address]        M [facility] GERIATRICS    Chief Complaint   Patient presents with     RECHECK     HPI:  Kimberly Stephenson is a 78 year old  (1945), who is being seen today for an episodic care visit at: [facility] ([facility]) [[id number]]. Today's concern is:   Patient resting in room, pleasant with writer.   Reviewing blood sugars remains low at lunch and high in AM. Otherwise <200's.   Patient denies CP, SOB and lightheadedness.     BP Readings from Last 3 Encounters:   01/17/24 132/76   11/08/23 138/65   10/25/23 138/65     Pulse Readings from Last 4 Encounters:   01/17/24 65   11/08/23 71   10/25/23 71   09/26/23 63     Wt Readings from Last 4 Encounters:   01/17/24 100.2 kg (221 lb)   11/08/23 96.2 kg (212 lb)   10/25/23 96.2 kg (212 lb)   09/26/23 97.5 kg (215 lb)         Allergies, and PMH/PSH reviewed in EPIC today.  REVIEW OF SYSTEMS:  4 point ROS including Respiratory, CV, GI and , other than that noted in the HPI,  is negative    Objective:   /76   Pulse 65   Temp (!) 96.4  F (35.8  C)   Resp 14   Ht 1.676 m (5' 6\")   Wt 100.2 kg (221 lb)   SpO2 94%   BMI 35.67 kg/m    A & O x 3, NAD. Lungs CTA, non labored. RRR, S1/S2 w/o murmur,gallop or rub.  +1 bilateral LE edema.  Abdomen soft, nontender, +BT'S. No focal neurological deficits.        Recent labs in [facility] reviewed by me today.     Assessment/Plan:  1. Type 1 diabetes mellitus with hyperglycemia (H)    Reduce AM dose lantus 14 units  Increase PM dose lantus 22 units  A1c next lab day   -Chronic type I diabetes with fluctuating A1c. No longer followed by endocrinology. Will monitor and make adjustments as indicated.     MED REC REQUIRED  Post Medication Reconciliation Status: patient was not discharged from an inpatient facility or TCU      Orders:  See new lantus orders above     Electronically signed by:   Li Arana, " NP           Sincerely,        Li Arana, NP

## 2024-01-18 ENCOUNTER — PATIENT OUTREACH (OUTPATIENT)
Dept: GERIATRIC MEDICINE | Facility: CLINIC | Age: 79
End: 2024-01-18
Payer: COMMERCIAL

## 2024-01-18 NOTE — PROGRESS NOTES
[facility] Care Coordination Contact    Annual chart review preformed for [facility] Medicare member.     Reviewed Epic notes and no triggering events noted. No hospitalizations or ED visits in the last 6 months. Daily assistance (including med management) provided by assisted living facility. Closely followed by onsite NP.     Writer will continue to follow via EMR and is available as needed.    Gem Johnson RN  [facility]  Cell: [phone]

## 2024-01-22 ENCOUNTER — LAB REQUISITION (OUTPATIENT)
Dept: LAB | Facility: CLINIC | Age: 79
End: 2024-01-22
Payer: COMMERCIAL

## 2024-01-22 DIAGNOSIS — E10.9 TYPE 1 DIABETES MELLITUS WITHOUT COMPLICATIONS (H): ICD-10-CM

## 2024-01-23 LAB — HBA1C MFR BLD: 10.4 %

## 2024-01-23 PROCEDURE — 83036 HEMOGLOBIN GLYCOSYLATED A1C: CPT | Mod: ORL | Performed by: NURSE PRACTITIONER

## 2024-01-23 PROCEDURE — 36415 COLL VENOUS BLD VENIPUNCTURE: CPT | Mod: ORL | Performed by: NURSE PRACTITIONER

## 2024-01-23 PROCEDURE — P9604 ONE-WAY ALLOW PRORATED TRIP: HCPCS | Mod: ORL | Performed by: NURSE PRACTITIONER

## 2024-01-24 DIAGNOSIS — I21.4 NSTEMI (NON-ST ELEVATED MYOCARDIAL INFARCTION) (H): ICD-10-CM

## 2024-01-24 RX ORDER — APIXABAN 2.5 MG/1
TABLET, FILM COATED ORAL
Qty: 60 TABLET | Refills: 11 | Status: SHIPPED | OUTPATIENT
Start: 2024-01-24

## 2024-03-06 ENCOUNTER — ASSISTED LIVING VISIT (OUTPATIENT)
Dept: GERIATRICS | Facility: CLINIC | Age: 79
End: 2024-03-06
Payer: COMMERCIAL

## 2024-03-06 VITALS
HEIGHT: 66 IN | DIASTOLIC BLOOD PRESSURE: 76 MMHG | WEIGHT: 221 LBS | TEMPERATURE: 96.4 F | HEART RATE: 65 BPM | BODY MASS INDEX: 35.52 KG/M2 | RESPIRATION RATE: 14 BRPM | OXYGEN SATURATION: 94 % | SYSTOLIC BLOOD PRESSURE: 132 MMHG

## 2024-03-06 DIAGNOSIS — E10.65 TYPE 1 DIABETES MELLITUS WITH HYPERGLYCEMIA (H): ICD-10-CM

## 2024-03-06 DIAGNOSIS — R41.0 CONFUSION: Primary | ICD-10-CM

## 2024-03-06 PROCEDURE — 99349 HOME/RES VST EST MOD MDM 40: CPT | Performed by: NURSE PRACTITIONER

## 2024-03-06 NOTE — PROGRESS NOTES
"[facility] GERIATRICS    Chief Complaint   Patient presents with    RECHECK     HPI:  Kimberly Stephenson is a 78 year old  (1945), who is being seen today for an episodic care visit at: [facility]) [[id number]]. Today's concern is:   -Patient eating in dining room. Pleasant with writer. Is demonstrating increased confusion since last visit. This can happen when she has UTI.   -variable blood sugars, Doesn't like to eat breakfast but receives insulin in AM. Doesn't show symptoms of shakiness when this happens. Encouraged patient to eat 3 meals and day.   -no further concerns at this time.     BP Readings from Last 3 Encounters:   03/06/24 132/76   01/31/24 132/76   01/17/24 132/76         Allergies, and PMH/PSH reviewed in EP[facility] today.  REVIEW OF SYSTEMS:  10 point ROS of systems including Constitutional, Eyes, Respiratory, Cardiovascular, Gastroenterology, Genitourinary, Integumentary, Musculoskeletal, Psychiatric were all negative except for pertinent positives noted in my HPI.    Objective:   /76   Pulse 65   Temp (!) 96.4  F (35.8  C)   Resp 14   Ht 1.676 m (5' 6\")   Wt 100.2 kg (221 lb)   SpO2 94%   BMI 35.67 kg/m    A & O x 3, intermittent forgetfulness, NAD. Lungs CTA, non labored. RRR, S1/S2 w/o murmur,gallop or rub.  edema.  Abdomen soft, nontender, +BT'S. No focal neurological deficits.        Recent labs in [facility] reviewed by me today.     Assessment/Plan:     Confusion  Type 1 diabetes mellitus with hyperglycemia (H)  Provider reviewed records from facility, and interpreted most recent imaging/lab work, and vital signs.   History of forgetfulness, acute confusion.   UA/UC  Continue to monitor and update provider with changes.     MED REC REQUIRED  Post Medication Reconciliation Status: patient was not discharged from an inpatient facility or TCU      Orders:  UA/UC    Electronically signed by:   Li Arana NP       "

## 2024-03-06 NOTE — LETTER
"    3/6/2024        RE: Kimberly Stephenson  [facility]  [address]  [facility] [address]        M [facility] GERIATRICS    Chief Complaint   Patient presents with     RECHECK     HPI:  Kimberly Stephenson is a 78 year old  (1945), who is being seen today for an episodic care visit at: [facility] ([facility]) [[id number]]. Today's concern is:   -Patient eating in dining room. Pleasant with writer. Is demonstrating increased confusion since last visit. This can happen when she has UTI.   -variable blood sugars, Doesn't like to eat breakfast but receives insulin in AM. Doesn't show symptoms of shakiness when this happens. Encouraged patient to eat 3 meals and day.   -no further concerns at this time.     BP Readings from Last 3 Encounters:   03/06/24 132/76   01/31/24 132/76   01/17/24 132/76         Allergies, and PMH/PSH reviewed in EP[facility] today.  REVIEW OF SYSTEMS:  10 point ROS of systems including Constitutional, Eyes, Respiratory, Cardiovascular, Gastroenterology, Genitourinary, Integumentary, Musculoskeletal, Psychiatric were all negative except for pertinent positives noted in my HPI.    Objective:   /76   Pulse 65   Temp (!) 96.4  F (35.8  C)   Resp 14   Ht 1.676 m (5' 6\")   Wt 100.2 kg (221 lb)   SpO2 94%   BMI 35.67 kg/m    A & O x 3, intermittent forgetfulness, NAD. Lungs CTA, non labored. RRR, S1/S2 w/o murmur,gallop or rub.  edema.  Abdomen soft, nontender, +BT'S. No focal neurological deficits.        Recent labs in [facility] reviewed by me today.     Assessment/Plan:     Confusion  Type 1 diabetes mellitus with hyperglycemia (H)  Provider reviewed records from facility, and interpreted most recent imaging/lab work, and vital signs.   History of forgetfulness, acute confusion.   UA/UC  Continue to monitor and update provider with changes.     MED REC REQUIRED  Post Medication Reconciliation Status: patient was not discharged from an inpatient facility or " TCU      Orders:  UA/UC    Electronically signed by:   Li Arana NP           Sincerely,        Li Arana, NP

## 2024-03-07 ENCOUNTER — LAB REQUISITION (OUTPATIENT)
Dept: LAB | Facility: CLINIC | Age: 79
End: 2024-03-07
Payer: COMMERCIAL

## 2024-03-07 DIAGNOSIS — R30.0 DYSURIA: ICD-10-CM

## 2024-03-07 LAB
ALBUMIN UR-MCNC: NEGATIVE MG/DL
APPEARANCE UR: CLEAR
BILIRUB UR QL STRIP: NEGATIVE
COLOR UR AUTO: YELLOW
GLUCOSE UR STRIP-MCNC: NEGATIVE MG/DL
HGB UR QL STRIP: NEGATIVE
KETONES UR STRIP-MCNC: NEGATIVE MG/DL
LEUKOCYTE ESTERASE UR QL STRIP: ABNORMAL
MUCOUS THREADS #/AREA URNS LPF: PRESENT /LPF
NITRATE UR QL: NEGATIVE
PH UR STRIP: 8 [PH] (ref 5–7)
RBC URINE: <1 /HPF
SP GR UR STRIP: 1.02 (ref 1–1.03)
SQUAMOUS EPITHELIAL: 1 /HPF
TRANSITIONAL EPI: <1 /HPF
UROBILINOGEN UR STRIP-MCNC: NORMAL MG/DL
WBC URINE: 5 /HPF

## 2024-03-07 PROCEDURE — 87086 URINE CULTURE/COLONY COUNT: CPT | Mod: ORL | Performed by: NURSE PRACTITIONER

## 2024-03-07 PROCEDURE — 81001 URINALYSIS AUTO W/SCOPE: CPT | Mod: ORL | Performed by: NURSE PRACTITIONER

## 2024-03-09 LAB — BACTERIA UR CULT: NORMAL

## 2024-03-14 ENCOUNTER — LAB REQUISITION (OUTPATIENT)
Dept: LAB | Facility: CLINIC | Age: 79
End: 2024-03-14
Payer: COMMERCIAL

## 2024-03-14 DIAGNOSIS — E10.9 TYPE 1 DIABETES MELLITUS WITHOUT COMPLICATIONS (H): ICD-10-CM

## 2024-03-18 ENCOUNTER — LAB REQUISITION (OUTPATIENT)
Dept: LAB | Facility: CLINIC | Age: 79
End: 2024-03-18
Payer: COMMERCIAL

## 2024-03-18 DIAGNOSIS — E10.9 TYPE 1 DIABETES MELLITUS WITHOUT COMPLICATIONS (H): ICD-10-CM

## 2024-03-19 LAB — HBA1C MFR BLD: 9.5 %

## 2024-03-19 PROCEDURE — 36415 COLL VENOUS BLD VENIPUNCTURE: CPT | Mod: ORL | Performed by: NURSE PRACTITIONER

## 2024-03-19 PROCEDURE — P9603 ONE-WAY ALLOW PRORATED MILES: HCPCS | Mod: ORL | Performed by: NURSE PRACTITIONER

## 2024-03-19 PROCEDURE — 83036 HEMOGLOBIN GLYCOSYLATED A1C: CPT | Mod: ORL | Performed by: NURSE PRACTITIONER

## 2024-03-27 ENCOUNTER — ASSISTED LIVING VISIT (OUTPATIENT)
Dept: GERIATRICS | Facility: CLINIC | Age: 79
End: 2024-03-27
Payer: COMMERCIAL

## 2024-03-27 VITALS
WEIGHT: 210.5 LBS | RESPIRATION RATE: 16 BRPM | HEART RATE: 63 BPM | DIASTOLIC BLOOD PRESSURE: 81 MMHG | TEMPERATURE: 97.1 F | OXYGEN SATURATION: 94 % | SYSTOLIC BLOOD PRESSURE: 150 MMHG | HEIGHT: 66 IN | BODY MASS INDEX: 33.83 KG/M2

## 2024-03-27 DIAGNOSIS — E10.65 TYPE 1 DIABETES MELLITUS WITH HYPERGLYCEMIA (H): ICD-10-CM

## 2024-03-27 DIAGNOSIS — E10.9 TYPE 1 DIABETES MELLITUS WITHOUT COMPLICATION (H): Primary | ICD-10-CM

## 2024-03-27 PROCEDURE — 99348 HOME/RES VST EST LOW MDM 30: CPT | Performed by: NURSE PRACTITIONER

## 2024-03-27 NOTE — LETTER
"    3/27/2024        RE: Kimberly Stephenson  [facility]  [address]  [facility] [address]        M [facility] GERIATRICS    Chief Complaint   Patient presents with     RECHECK     Diabetes insulin adjustment      HPI:  Kimberly Stephenson is a 78 year old  (1945), who is being seen today for an episodic care visit at: [facility] ([facility]) [[id number]]. Today's concern is:   Patient eating lunch upon exam, pleasant with writer. Per chart review and staff, patient has been having low blood sugars prior to lunch. Receiving both sliding scale insulin and lantus in AM. A1c continues to not be at goal <8.5%. Intermittently symptomatic. No further concerns.      Allergies, and PMH/PSH reviewed in [facility] today.  REVIEW OF SYSTEMS:  10 point ROS of systems including Constitutional, Eyes, Respiratory, Cardiovascular, Gastroenterology, Genitourinary, Integumentary, Musculoskeletal, Psychiatric were all negative except for pertinent positives noted in my HPI.    Objective:   BP (!) 150/81   Pulse 63   Temp 97.1  F (36.2  C)   Resp 16   Ht 1.676 m (5' 6\")   Wt 95.5 kg (210 lb 8 oz)   SpO2 94%   BMI 33.98 kg/m    A & O x 3, forgetful, NAD. Lungs CTA, non labored. RRR, S1/S2 w/o murmur,gallop or rub.  edema.  Abdomen soft, nontender, +BT'S. No focal neurological deficits.        Recent labs in [facility] reviewed by me today.     Assessment/Plan:  Type 1 diabetes mellitus without complication (H)  Acute and chronic conditions reviewed and managed by writer.   Provider reviewed records from facility, and interpreted most recent imaging/lab work, and vital signs.   DM1: Variable BS with intermittent mid morning lows.   Reduce AM lantus to 10 units AM  Match novolog to HS dose.     MED REC REQUIRED  Post Medication Reconciliation Status: patient was not discharged from an inpatient facility or TCU      Orders:  See new orders above.     Electronically signed by: Li Arana NP     "       Sincerely,        Li Arana, NP

## 2024-03-27 NOTE — PROGRESS NOTES
"[facility] GERIATRICS    Chief Complaint   Patient presents with    RECHECK     Diabetes insulin adjustment      HPI:  Kimberly Stephenson is a 78 year old  (1945), who is being seen today for an episodic care visit at: [facility]) [[id number]]. Today's concern is:   Patient eating lunch upon exam, pleasant with writer. Per chart review and staff, patient has been having low blood sugars prior to lunch. Receiving both sliding scale insulin and lantus in AM. A1c continues to not be at goal <8.5%. Intermittently symptomatic. No further concerns.      Allergies, and PMH/PSH reviewed in [facility] today.  REVIEW OF SYSTEMS:  10 point ROS of systems including Constitutional, Eyes, Respiratory, Cardiovascular, Gastroenterology, Genitourinary, Integumentary, Musculoskeletal, Psychiatric were all negative except for pertinent positives noted in my HPI.    Objective:   BP (!) 150/81   Pulse 63   Temp 97.1  F (36.2  C)   Resp 16   Ht 1.676 m (5' 6\")   Wt 95.5 kg (210 lb 8 oz)   SpO2 94%   BMI 33.98 kg/m    A & O x 3, forgetful, NAD. Lungs CTA, non labored. RRR, S1/S2 w/o murmur,gallop or rub.  edema.  Abdomen soft, nontender, +BT'S. No focal neurological deficits.        Recent labs in [facility] reviewed by me today.     Assessment/Plan:  Type 1 diabetes mellitus without complication (H)  Acute and chronic conditions reviewed and managed by writer.   Provider reviewed records from facility, and interpreted most recent imaging/lab work, and vital signs.   DM1: Variable BS with intermittent mid morning lows.   Reduce AM lantus to 10 units AM  Match novolog to HS dose.     MED REC REQUIRED  Post Medication Reconciliation Status: patient was not discharged from an inpatient facility or TCU      Orders:  See new orders above.     Electronically signed by: Li Arana NP       "

## 2024-04-01 RX ORDER — INSULIN GLARGINE 100 [IU]/ML
INJECTION, SOLUTION SUBCUTANEOUS
Status: SHIPPED
Start: 2024-04-01

## 2024-04-10 DIAGNOSIS — E10.65 TYPE 1 DIABETES MELLITUS WITH HYPERGLYCEMIA (H): ICD-10-CM

## 2024-04-10 RX ORDER — INSULIN GLARGINE 100 [IU]/ML
INJECTION, SOLUTION SUBCUTANEOUS
Qty: 15 ML | Refills: 11 | Status: SHIPPED | OUTPATIENT
Start: 2024-04-10

## 2024-04-11 ENCOUNTER — TELEPHONE (OUTPATIENT)
Dept: GERIATRICS | Facility: CLINIC | Age: 79
End: 2024-04-11
Payer: COMMERCIAL

## 2024-04-11 NOTE — TELEPHONE ENCOUNTER
[facility] Triage Nurse Telephone Encounter    Provider: VANDANA Solano CNP  Facility: [facility] Facility Type:  AL    Caller: Alexandra  Call Back Number: [phone]    Allergies:    Allergies   Allergen Reactions    Duloxetine Hcl Unknown    Fentanyl Nausea and Vomiting    Versed [Midazolam] Nausea and Vomiting        Reason for call: Nurse unsure if patient received entire dose of Lantus 22u at 4:40pm. Appears to have leaked out the needle and the needle is bent after trying to administer the dose. BG was 125 and received Novolog 9u.    Verbal Order/Direction given by Provider: Recheck BG at 7pm, if BG >200 give Lantus 22u. If BG <200 do not give.    Provider giving Order:  Marifer Field MD    Verbal Order given to: Alexandra Burnett RN

## 2024-04-19 ENCOUNTER — ASSISTED LIVING VISIT (OUTPATIENT)
Dept: GERIATRICS | Facility: CLINIC | Age: 79
End: 2024-04-19
Payer: COMMERCIAL

## 2024-04-19 VITALS
DIASTOLIC BLOOD PRESSURE: 81 MMHG | RESPIRATION RATE: 16 BRPM | SYSTOLIC BLOOD PRESSURE: 150 MMHG | OXYGEN SATURATION: 94 % | HEIGHT: 66 IN | BODY MASS INDEX: 33.83 KG/M2 | HEART RATE: 63 BPM | TEMPERATURE: 97.1 F | WEIGHT: 210.5 LBS

## 2024-04-19 DIAGNOSIS — F22 PARANOIA (H): Primary | ICD-10-CM

## 2024-04-19 DIAGNOSIS — F33.1 MODERATE EPISODE OF RECURRENT MAJOR DEPRESSIVE DISORDER (H): ICD-10-CM

## 2024-04-19 DIAGNOSIS — I48.0 PAF (PAROXYSMAL ATRIAL FIBRILLATION) (H): ICD-10-CM

## 2024-04-19 DIAGNOSIS — E66.01 MORBID OBESITY (H): ICD-10-CM

## 2024-04-19 DIAGNOSIS — E10.9 TYPE 1 DIABETES MELLITUS WITHOUT COMPLICATION (H): ICD-10-CM

## 2024-04-19 PROCEDURE — 99349 HOME/RES VST EST MOD MDM 40: CPT | Performed by: NURSE PRACTITIONER

## 2024-04-19 NOTE — PROGRESS NOTES
"[facility] GERIATRICS    Chief Complaint   Patient presents with    Nursing Home Acute     HPI:  Kimberly Stephenson is a 78 year old  (1945), who is being seen today for an episodic care visit at: [facility]) [[id number]]. Today's concern is:   Patient recently evaluated for hyperglycemia sliding scale insulin was adjusted and now with improved BS. Per chart review, BS . Continues to intermittently skip breakfast and indulge in stacks at HS. A1c improving 11.0-->9.5.     BP Readings from Last 3 Encounters:   04/19/24 (!) 150/81   03/27/24 (!) 150/81   03/06/24 132/76     Pulse Readings from Last 4 Encounters:   04/19/24 63   03/27/24 63   03/06/24 65   01/31/24 65     Wt Readings from Last 4 Encounters:   04/19/24 95.5 kg (210 lb 8 oz)   03/27/24 95.5 kg (210 lb 8 oz)   03/06/24 100.2 kg (221 lb)   01/31/24 100.2 kg (221 lb)     Allergies, and PMH/PSH reviewed in [facility] today.  REVIEW OF SYSTEMS:  10 point ROS of systems including Constitutional, Eyes, Respiratory, Cardiovascular, Gastroenterology, Genitourinary, Integumentary, Musculoskeletal, Psychiatric were all negative except for pertinent positives noted in my HPI.    Objective:   BP (!) 150/81   Pulse 63   Temp 97.1  F (36.2  C)   Resp 16   Ht 1.676 m (5' 6\")   Wt 95.5 kg (210 lb 8 oz)   SpO2 94%   BMI 33.98 kg/m    A & O x 3, NAD. Lungs CTA, non labored. RRR, S1/S2 w/o murmur,gallop or rub.  No edema.  Abdomen soft, nontender, +BT'S. No focal neurological deficits.      Past Medical History:   Diagnosis Date    Anxiety     Depression     Diabetes mellitus (H)     Diabetes Type 1    DJD (degenerative joint disease) of knee     left    Hyperlipidemia     Hypertension     PONV (postoperative nausea and vomiting)     TMJ syndrome      Social History     Tobacco Use    Smoking status: Former    Smokeless tobacco: Never    Tobacco comments:     previous marijuana use    Substance Use Topics    Alcohol use: Yes     Comment: 2-3 per " week, wine    Drug use: No     Recent labs in EP[facility] reviewed by me today.   Most Recent 3 CBC's:  Recent Labs   Lab Test 10/31/23  0625 02/14/23  1043 03/15/22  0741   WBC 4.6 4.6 4.6   HGB 12.8 13.4 13.4   MCV 93 94 93    188 188     Most Recent 3 BMP's:  Recent Labs   Lab Test 10/31/23  0625 03/15/22  0741 06/24/21  1620    139 140   POTASSIUM 4.9 4.5 4.8   CHLORIDE 104 103 106   CO2 28 27 27   BUN 10.7 15 18   CR 0.65 0.84 0.82   ANIONGAP 10 9 7   SLIME 9.0 9.5 9.2   * 399* 198*     Most Recent 3 Creatinines:  Recent Labs   Lab Test 10/31/23  0625 03/15/22  0741 06/24/21  1620   CR 0.65 0.84 0.82     Most Recent Hemoglobin A1c:  Recent Labs   Lab Test 03/19/24  1142   A1C 9.5*        Assessment/Plan:  (F22) Paranoia (H)  (primary encounter diagnosis)  Comment: History of paranoia progressing with worsening cognitive impairment.   Plan:   Continue to anticipate needs. Chronic condition, ongoing decline expected.   Continue to provide redirection and reassurance as needed. Maintain safe living situation with goals focused on comfort.    (F33.1) Moderate episode of recurrent major depressive disorder (H)  Comment: Longstanding history, mood and behavior fluctuate. Stable on busperone 15 mg PO TID and lexapro.   Plan: No change given stability.     (I48.0) PAF (paroxysmal atrial fibrillation) (H)  Comment: Chronic, HR stable off rate controller. Anticoagulated with Eliquis.   Plan:   -Continue with plan of care no changes at this time, adjustment as needed    (E66.01) Morbid obesity (H)  Comment: chronic. Body mass index is 33.98 kg/m .  Plan: encourage healtny meals and snacks.     (E10.9) Type 1 diabetes mellitus without complication (H)  Comment: Chronic, stable on current insulin regimen.   Plan:   -Continue sliding scale insulin  -Continue Lantus 22 units HS and 10 units AM.     MED REC REQUIRED  Post Medication Reconciliation Status: patient was not discharged from an inpatient facility or  TCU      Orders:  Discontinue alprazolam due to non-use.     Electronically signed by: Li Arana NP

## 2024-04-19 NOTE — LETTER
"    4/19/2024        RE: Kimberly Stephenson  [facility]  [address]  [facility] [address]        M [facility] GERIATRICS    Chief Complaint   Patient presents with     Nursing Home Acute     HPI:  Kimberly Stephenson is a 78 year old  (1945), who is being seen today for an episodic care visit at: [facility] ([facility]) [[id number]]. Today's concern is:   Patient recently evaluated for hyperglycemia sliding scale insulin was adjusted and now with improved BS. Per chart review, BS . Continues to intermittently skip breakfast and indulge in stacks at HS. A1c improving 11.0-->9.5.     BP Readings from Last 3 Encounters:   04/19/24 (!) 150/81   03/27/24 (!) 150/81   03/06/24 132/76     Pulse Readings from Last 4 Encounters:   04/19/24 63   03/27/24 63   03/06/24 65   01/31/24 65     Wt Readings from Last 4 Encounters:   04/19/24 95.5 kg (210 lb 8 oz)   03/27/24 95.5 kg (210 lb 8 oz)   03/06/24 100.2 kg (221 lb)   01/31/24 100.2 kg (221 lb)     Allergies, and PMH/PSH reviewed in [facility] today.  REVIEW OF SYSTEMS:  10 point ROS of systems including Constitutional, Eyes, Respiratory, Cardiovascular, Gastroenterology, Genitourinary, Integumentary, Musculoskeletal, Psychiatric were all negative except for pertinent positives noted in my HPI.    Objective:   BP (!) 150/81   Pulse 63   Temp 97.1  F (36.2  C)   Resp 16   Ht 1.676 m (5' 6\")   Wt 95.5 kg (210 lb 8 oz)   SpO2 94%   BMI 33.98 kg/m    A & O x 3, NAD. Lungs CTA, non labored. RRR, S1/S2 w/o murmur,gallop or rub.  No edema.  Abdomen soft, nontender, +BT'S. No focal neurological deficits.      Past Medical History:   Diagnosis Date     Anxiety      Depression      Diabetes mellitus (H)     Diabetes Type 1     DJD (degenerative joint disease) of knee     left     Hyperlipidemia      Hypertension      PONV (postoperative nausea and vomiting)      TMJ syndrome      Social History     Tobacco Use     Smoking status: Former     " Smokeless tobacco: Never     Tobacco comments:     previous marijuana use    Substance Use Topics     Alcohol use: Yes     Comment: 2-3 per week, wine     Drug use: No     Recent labs in EPIC reviewed by me today.   Most Recent 3 CBC's:  Recent Labs   Lab Test 10/31/23  0625 02/14/23  1043 03/15/22  0741   WBC 4.6 4.6 4.6   HGB 12.8 13.4 13.4   MCV 93 94 93    188 188     Most Recent 3 BMP's:  Recent Labs   Lab Test 10/31/23  0625 03/15/22  0741 06/24/21  1620    139 140   POTASSIUM 4.9 4.5 4.8   CHLORIDE 104 103 106   CO2 28 27 27   BUN 10.7 15 18   CR 0.65 0.84 0.82   ANIONGAP 10 9 7   SLIME 9.0 9.5 9.2   * 399* 198*     Most Recent 3 Creatinines:  Recent Labs   Lab Test 10/31/23  0625 03/15/22  0741 06/24/21  1620   CR 0.65 0.84 0.82     Most Recent Hemoglobin A1c:  Recent Labs   Lab Test 03/19/24  1142   A1C 9.5*        Assessment/Plan:  (F22) Paranoia (H)  (primary encounter diagnosis)  Comment: History of paranoia progressing with worsening cognitive impairment.   Plan:   Continue to anticipate needs. Chronic condition, ongoing decline expected.   Continue to provide redirection and reassurance as needed. Maintain safe living situation with goals focused on comfort.    (F33.1) Moderate episode of recurrent major depressive disorder (H)  Comment: Longstanding history, mood and behavior fluctuate. Stable on busperone 15 mg PO TID and lexapro.   Plan: No change given stability.     (I48.0) PAF (paroxysmal atrial fibrillation) (H)  Comment: Chronic, HR stable off rate controller. Anticoagulated with Eliquis.   Plan:   -Continue with plan of care no changes at this time, adjustment as needed    (E66.01) Morbid obesity (H)  Comment: chronic. Body mass index is 33.98 kg/m .  Plan: encourage healtny meals and snacks.     (E10.9) Type 1 diabetes mellitus without complication (H)  Comment: Chronic, stable on current insulin regimen.   Plan:   -Continue sliding scale insulin  -Continue Lantus 22 units  HS and 10 units AM.     MED REC REQUIRED  Post Medication Reconciliation Status: patient was not discharged from an inpatient facility or TCU      Orders:  Discontinue alprazolam due to non-use.     Electronically signed by: Li Arana NP           Sincerely,        Li Arana NP

## 2024-04-22 PROBLEM — F22 PARANOIA (H): Status: ACTIVE | Noted: 2024-04-22

## 2024-05-01 DIAGNOSIS — E10.65 TYPE 1 DIABETES MELLITUS WITH HYPERGLYCEMIA (H): ICD-10-CM

## 2024-05-01 RX ORDER — LANCETS 30 GAUGE
EACH MISCELLANEOUS
Qty: 100 EACH | Refills: 6 | Status: SHIPPED | OUTPATIENT
Start: 2024-05-01

## 2024-05-15 DIAGNOSIS — F41.9 ANXIETY DISORDER, UNSPECIFIED: ICD-10-CM

## 2024-05-15 RX ORDER — BUSPIRONE HYDROCHLORIDE 15 MG/1
15 TABLET ORAL 3 TIMES DAILY
Qty: 90 TABLET | Refills: 11 | Status: SHIPPED | OUTPATIENT
Start: 2024-05-15

## 2024-05-28 DIAGNOSIS — K59.00 CONSTIPATION: ICD-10-CM

## 2024-05-28 RX ORDER — SENNOSIDES 8.6 MG
TABLET ORAL
Qty: 90 TABLET | Refills: 3 | Status: SHIPPED | OUTPATIENT
Start: 2024-05-28 | End: 2024-06-25

## 2024-06-18 DIAGNOSIS — E10.65 TYPE 1 DIABETES MELLITUS WITH HYPERGLYCEMIA (H): ICD-10-CM

## 2024-06-18 RX ORDER — PEN NEEDLE, DIABETIC, SAFETY 30 GX3/16"
NEEDLE, DISPOSABLE MISCELLANEOUS
Qty: 100 EACH | Refills: 11 | Status: SHIPPED | OUTPATIENT
Start: 2024-06-18

## 2024-06-22 DIAGNOSIS — E10.65 TYPE 1 DIABETES MELLITUS WITH HYPERGLYCEMIA (H): ICD-10-CM

## 2024-06-24 RX ORDER — INSULIN ASPART 100 [IU]/ML
INJECTION, SOLUTION INTRAVENOUS; SUBCUTANEOUS
Qty: 15 ML | Refills: 11 | Status: SHIPPED | OUTPATIENT
Start: 2024-06-24

## 2024-06-25 DIAGNOSIS — K59.00 CONSTIPATION: ICD-10-CM

## 2024-06-25 RX ORDER — SENNOSIDES 8.6 MG/1
1 TABLET ORAL DAILY PRN
Qty: 30 TABLET | Refills: 1 | Status: SHIPPED | OUTPATIENT
Start: 2024-06-25

## 2024-07-12 ENCOUNTER — NURSING HOME VISIT (OUTPATIENT)
Dept: GERIATRICS | Facility: CLINIC | Age: 79
End: 2024-07-12
Payer: COMMERCIAL

## 2024-07-12 VITALS
HEIGHT: 66 IN | DIASTOLIC BLOOD PRESSURE: 71 MMHG | HEART RATE: 79 BPM | SYSTOLIC BLOOD PRESSURE: 125 MMHG | BODY MASS INDEX: 35.2 KG/M2 | RESPIRATION RATE: 16 BRPM | TEMPERATURE: 96.9 F | WEIGHT: 219 LBS

## 2024-07-12 DIAGNOSIS — E10.9 TYPE 1 DIABETES MELLITUS WITHOUT COMPLICATION (H): Primary | ICD-10-CM

## 2024-07-12 DIAGNOSIS — F41.9 ANXIETY: ICD-10-CM

## 2024-07-12 DIAGNOSIS — E66.01 MORBID OBESITY (H): ICD-10-CM

## 2024-07-12 DIAGNOSIS — I48.0 PAF (PAROXYSMAL ATRIAL FIBRILLATION) (H): ICD-10-CM

## 2024-07-12 PROCEDURE — 99349 HOME/RES VST EST MOD MDM 40: CPT | Performed by: NURSE PRACTITIONER

## 2024-07-12 NOTE — LETTER
7/12/2024      Kimberly Webb [facility]  [address]  [place] [address]        No notes on file      Sincerely,        Li Arana, NP

## 2024-07-30 ENCOUNTER — ASSISTED LIVING VISIT (OUTPATIENT)
Dept: GERIATRICS | Facility: CLINIC | Age: 79
End: 2024-07-30
Payer: COMMERCIAL

## 2024-07-30 VITALS
OXYGEN SATURATION: 96 % | RESPIRATION RATE: 16 BRPM | HEART RATE: 79 BPM | BODY MASS INDEX: 35.26 KG/M2 | WEIGHT: 219.4 LBS | SYSTOLIC BLOOD PRESSURE: 125 MMHG | DIASTOLIC BLOOD PRESSURE: 71 MMHG | HEIGHT: 66 IN | TEMPERATURE: 96.9 F

## 2024-07-30 DIAGNOSIS — E10.9 TYPE 1 DIABETES MELLITUS WITHOUT COMPLICATION (H): Primary | ICD-10-CM

## 2024-07-30 DIAGNOSIS — F41.9 ANXIETY: ICD-10-CM

## 2024-07-30 DIAGNOSIS — E66.01 MORBID OBESITY (H): ICD-10-CM

## 2024-07-30 PROCEDURE — 99349 HOME/RES VST EST MOD MDM 40: CPT | Performed by: NURSE PRACTITIONER

## 2024-07-30 NOTE — PROGRESS NOTES
"[facility] GERIATRICS    Chief Complaint   Patient presents with    RECHECK     Increased depression     HPI:  Kimberly Stephenson is a 79 year old  (1945), who is being seen today for an episodic care visit at: [facility]) [[id number]]. Today's concern is:   Patient resting in dining room, reports she is feeling well.  Per chart review, patient is having low morning blood sugars.  Currently taking Lantus 20 units at bedtime and 12 units in the morning.  Reports she is not symptomatic with these low readings.  Last A1c on 3/19/2024 was 9.5 which is improved from 11.0.  Has difficulty balancing her diet and reports she loves her sweets.  Having increased depression and anxiety.  Patient does not know why there has been a change.  Currently taking escitalopram 20 mg p.o. twice daily and buspirone 15 mg 3 times daily.  Requesting an increase or change in medications.  No further concerns at this time by staff or patient.    Vital signs reviewed by me today  BP Readings from Last 3 Encounters:   07/30/24 125/71   07/12/24 125/71   04/19/24 (!) 150/81     Pulse Readings from Last 4 Encounters:   07/30/24 79   07/12/24 79   04/19/24 63   03/27/24 63     Wt Readings from Last 4 Encounters:   07/30/24 99.5 kg (219 lb 6.4 oz)   07/12/24 99.3 kg (219 lb)   04/19/24 95.5 kg (210 lb 8 oz)   03/27/24 95.5 kg (210 lb 8 oz)         Allergies, and PMH/PSH reviewed in [facility] today.  REVIEW OF SYSTEMS:  10 point ROS of systems including Constitutional, Eyes, Respiratory, Cardiovascular, Gastroenterology, Genitourinary, Integumentary, Musculoskeletal, Psychiatric were all negative except for pertinent positives noted in my HPI.    Objective:   /71   Pulse 79   Temp 96.9  F (36.1  C)   Resp 16   Ht 1.676 m (5' 6\")   Wt 99.5 kg (219 lb 6.4 oz)   SpO2 96%   BMI 35.41 kg/m    A & O x 3, forgetful, NAD. Lungs CTA, non labored. RRR, S1/S2 w/o murmur,gallop or rub. +1 edema.  Abdomen soft, nontender, +BT'S. No " focal neurological deficits.        Recent labs in [facility] reviewed by me today.   Hemoglobin A1C   Date Value Ref Range Status   03/19/2024 9.5 (H) <5.7 % Final     Comment:     Normal <5.7%   Prediabetes 5.7-6.4%    Diabetes 6.5% or higher     Note: Adopted from ADA consensus guidelines.   01/23/2024 10.4 (H) <5.7 % Final     Comment:     Normal <5.7%   Prediabetes 5.7-6.4%    Diabetes 6.5% or higher     Note: Adopted from ADA consensus guidelines.   11/07/2023 11.0 (H) <5.7 % Final     Comment:     Normal <5.7%   Prediabetes 5.7-6.4%    Diabetes 6.5% or higher     Note: Adopted from ADA consensus guidelines.   06/24/2021 10.2 (H) 0 - 5.6 % Final     Comment:     Normal <5.7% Prediabetes 5.7-6.4%  Diabetes 6.5% or higher - adopted from ADA   consensus guidelines.     04/04/2020 9.6 (H) 0 - 5.6 % Final     Comment:     Normal <5.7% Prediabetes 5.7-6.4%  Diabetes 6.5% or higher - adopted from ADA   consensus guidelines.     02/20/2020 9.9 (H) 0 - 5.6 % Final     Comment:     Normal <5.7% Prediabetes 5.7-6.4%  Diabetes 6.5% or higher - adopted from ADA   consensus guidelines.           Assessment/Plan:  1. Type 1 diabetes mellitus without complication (H)    2. Anxiety    3. Morbid obesity (H)    Provider reviewed records from facility, and interpreted most recent imaging/lab work, and vital signs.   Chronic type 1 diabetes A1c higher than goal at this time.  Patient having low a.m. blood sugars.  Change Lantus to 20 mg p.o. every morning and Lantus 12 units p.m.  Continue to monitor blood sugars and update NP as indicated  A1c on 9/17/2024.  Longstanding history of anxiety with depression.  Will trial gabapentin 100 mg p.o. 3 times daily for anxiety.  Continue to monitor and make changes as needed.  Body mass index is 35.41 kg/m .  Encourage healthy meals and snacks.  Light exercise as tolerated given mild impaired mobility.    MED REC REQUIRED  Post Medication Reconciliation Status: patient was not discharged from an  inpatient facility or TCU      Orders:  Change Lantus to 20 units in the a.m. and Lantus 12 units p.m.  A1c on 9/17/2024  Gabapentin 100 mg p.o. 3 times daily    44 minutes spent on the date of the encounter doing chart review, history and exam, documentation and further activities as noted above.       Electronically signed by: Li Arana NP

## 2024-07-30 NOTE — LETTER
" 7/30/2024      Kimberly Stephenson  [facility]  [address]  [facility] [address]        [facility] GERIATRICS    Chief Complaint   Patient presents with     RECHECK     Increased depression     HPI:  Kimberly Stephenson is a 79 year old  (1945), who is being seen today for an episodic care visit at: [place] [facility] ([facility]) [[id number]]. Today's concern is:   Patient resting in dining room, reports she is feeling well.  Per chart review, patient is having low morning blood sugars.  Currently taking Lantus 20 units at bedtime and 12 units in the morning.  Reports she is not symptomatic with these low readings.  Last A1c on 3/19/2024 was 9.5 which is improved from 11.0.  Has difficulty balancing her diet and reports she loves her sweets.  Having increased depression and anxiety.  Patient does not know why there has been a change.  Currently taking escitalopram 20 mg p.o. twice daily and buspirone 15 mg 3 times daily.  Requesting an increase or change in medications.  No further concerns at this time by staff or patient.    Vital signs reviewed by me today  BP Readings from Last 3 Encounters:   07/30/24 125/71   07/12/24 125/71   04/19/24 (!) 150/81     Pulse Readings from Last 4 Encounters:   07/30/24 79   07/12/24 79   04/19/24 63   03/27/24 63     Wt Readings from Last 4 Encounters:   07/30/24 99.5 kg (219 lb 6.4 oz)   07/12/24 99.3 kg (219 lb)   04/19/24 95.5 kg (210 lb 8 oz)   03/27/24 95.5 kg (210 lb 8 oz)         Allergies, and PMH/PSH reviewed in EPIC today.  REVIEW OF SYSTEMS:  10 point ROS of systems including Constitutional, Eyes, Respiratory, Cardiovascular, Gastroenterology, Genitourinary, Integumentary, Musculoskeletal, Psychiatric were all negative except for pertinent positives noted in my HPI.    Objective:   /71   Pulse 79   Temp 96.9  F (36.1  C)   Resp 16   Ht 1.676 m (5' 6\")   Wt 99.5 kg (219 lb 6.4 oz)   SpO2 96%   BMI 35.41 kg/m    A & O x 3, forgetful, " NAD. Lungs CTA, non labored. RRR, S1/S2 w/o murmur,gallop or rub. +1 edema.  Abdomen soft, nontender, +BT'S. No focal neurological deficits.        Recent labs in [facility] reviewed by me today.   Hemoglobin A1C   Date Value Ref Range Status   03/19/2024 9.5 (H) <5.7 % Final     Comment:     Normal <5.7%   Prediabetes 5.7-6.4%    Diabetes 6.5% or higher     Note: Adopted from ADA consensus guidelines.   01/23/2024 10.4 (H) <5.7 % Final     Comment:     Normal <5.7%   Prediabetes 5.7-6.4%    Diabetes 6.5% or higher     Note: Adopted from ADA consensus guidelines.   11/07/2023 11.0 (H) <5.7 % Final     Comment:     Normal <5.7%   Prediabetes 5.7-6.4%    Diabetes 6.5% or higher     Note: Adopted from ADA consensus guidelines.   06/24/2021 10.2 (H) 0 - 5.6 % Final     Comment:     Normal <5.7% Prediabetes 5.7-6.4%  Diabetes 6.5% or higher - adopted from ADA   consensus guidelines.     04/04/2020 9.6 (H) 0 - 5.6 % Final     Comment:     Normal <5.7% Prediabetes 5.7-6.4%  Diabetes 6.5% or higher - adopted from ADA   consensus guidelines.     02/20/2020 9.9 (H) 0 - 5.6 % Final     Comment:     Normal <5.7% Prediabetes 5.7-6.4%  Diabetes 6.5% or higher - adopted from ADA   consensus guidelines.           Assessment/Plan:  1. Type 1 diabetes mellitus without complication (H)    2. Anxiety    3. Morbid obesity (H)    Provider reviewed records from facility, and interpreted most recent imaging/lab work, and vital signs.   Chronic type 1 diabetes A1c higher than goal at this time.  Patient having low a.m. blood sugars.  Change Lantus to 20 mg p.o. every morning and Lantus 12 units p.m.  Continue to monitor blood sugars and update NP as indicated  A1c on 9/17/2024.  Longstanding history of anxiety with depression.  Will trial gabapentin 100 mg p.o. 3 times daily for anxiety.  Continue to monitor and make changes as needed.  Body mass index is 35.41 kg/m .  Encourage healthy meals and snacks.  Light exercise as tolerated given mild  impaired mobility.    MED REC REQUIRED  Post Medication Reconciliation Status: patient was not discharged from an inpatient facility or TCU      Orders:  Change Lantus to 20 units in the a.m. and Lantus 12 units p.m.  A1c on 9/17/2024  Gabapentin 100 mg p.o. 3 times daily    44 minutes spent on the date of the encounter doing chart review, history and exam, documentation and further activities as noted above.       Electronically signed by: Li Arana NP           Sincerely,        Li Arana NP

## 2024-08-01 RX ORDER — GABAPENTIN 100 MG/1
100 CAPSULE ORAL 3 TIMES DAILY
Status: SHIPPED
Start: 2024-08-01

## 2024-08-05 ENCOUNTER — LAB REQUISITION (OUTPATIENT)
Dept: LAB | Facility: CLINIC | Age: 79
End: 2024-08-05
Payer: COMMERCIAL

## 2024-08-05 ENCOUNTER — TELEPHONE (OUTPATIENT)
Dept: GERIATRICS | Facility: CLINIC | Age: 79
End: 2024-08-05
Payer: COMMERCIAL

## 2024-08-05 DIAGNOSIS — I10 ESSENTIAL (PRIMARY) HYPERTENSION: ICD-10-CM

## 2024-08-05 DIAGNOSIS — R73.9 HYPERGLYCEMIA: ICD-10-CM

## 2024-08-05 RX ORDER — ATORVASTATIN CALCIUM 40 MG/1
TABLET, FILM COATED ORAL
Qty: 90 TABLET | Refills: 3 | Status: SHIPPED | OUTPATIENT
Start: 2024-08-05

## 2024-08-05 NOTE — TELEPHONE ENCOUNTER
[facility] Triage Nurse Telephone Encounter    Provider: VANDANA Solano CNP  Facility: [facility] Facility Type:  AL    Allergies:    Allergies   Allergen Reactions    Duloxetine Hcl Unknown    Fentanyl Nausea and Vomiting    Versed [Midazolam] Nausea and Vomiting        Verbal Order/Direction given by Provider: Check lipid profile next lab day.      Provider giving Order:  Marifer Field MD    Verbal Order given to: Lela Lama RN

## 2024-08-06 LAB
CHOLEST SERPL-MCNC: 149 MG/DL
FASTING STATUS PATIENT QL REPORTED: NO
HDLC SERPL-MCNC: 64 MG/DL
LDLC SERPL CALC-MCNC: 76 MG/DL
NONHDLC SERPL-MCNC: 85 MG/DL
TRIGL SERPL-MCNC: 47 MG/DL

## 2024-08-06 PROCEDURE — 80061 LIPID PANEL: CPT | Mod: ORL | Performed by: FAMILY MEDICINE

## 2024-08-06 PROCEDURE — P9603 ONE-WAY ALLOW PRORATED MILES: HCPCS | Mod: ORL | Performed by: FAMILY MEDICINE

## 2024-08-06 PROCEDURE — 36415 COLL VENOUS BLD VENIPUNCTURE: CPT | Mod: ORL | Performed by: FAMILY MEDICINE

## 2024-09-04 DIAGNOSIS — I10 ESSENTIAL HYPERTENSION: ICD-10-CM

## 2024-09-04 DIAGNOSIS — F41.9 ANXIETY AND DEPRESSION: ICD-10-CM

## 2024-09-04 DIAGNOSIS — I21.4 NSTEMI (NON-ST ELEVATED MYOCARDIAL INFARCTION) (H): ICD-10-CM

## 2024-09-04 DIAGNOSIS — F32.A ANXIETY AND DEPRESSION: ICD-10-CM

## 2024-09-04 RX ORDER — PSEUDOEPHED/ACETAMINOPH/DIPHEN 30MG-500MG
TABLET ORAL
Qty: 60 TABLET | Refills: 11 | Status: SHIPPED | OUTPATIENT
Start: 2024-09-04

## 2024-09-04 RX ORDER — AMLODIPINE BESYLATE 10 MG/1
10 TABLET ORAL EVERY EVENING
Qty: 90 TABLET | Refills: 3 | Status: SHIPPED | OUTPATIENT
Start: 2024-09-04

## 2024-09-04 RX ORDER — ESCITALOPRAM OXALATE 20 MG/1
TABLET ORAL
Qty: 30 TABLET | Refills: 11 | Status: SHIPPED | OUTPATIENT
Start: 2024-09-04

## 2024-09-16 ENCOUNTER — LAB REQUISITION (OUTPATIENT)
Dept: LAB | Facility: CLINIC | Age: 79
End: 2024-09-16
Payer: COMMERCIAL

## 2024-09-16 DIAGNOSIS — E11.9 TYPE 2 DIABETES MELLITUS WITHOUT COMPLICATIONS (H): ICD-10-CM

## 2024-09-17 LAB — HBA1C MFR BLD: 9.7 %

## 2024-09-17 PROCEDURE — P9603 ONE-WAY ALLOW PRORATED MILES: HCPCS | Mod: ORL | Performed by: NURSE PRACTITIONER

## 2024-09-17 PROCEDURE — 83036 HEMOGLOBIN GLYCOSYLATED A1C: CPT | Mod: ORL | Performed by: NURSE PRACTITIONER

## 2024-09-17 PROCEDURE — 36415 COLL VENOUS BLD VENIPUNCTURE: CPT | Mod: ORL | Performed by: NURSE PRACTITIONER

## 2024-09-24 ENCOUNTER — ASSISTED LIVING VISIT (OUTPATIENT)
Dept: GERIATRICS | Facility: CLINIC | Age: 79
End: 2024-09-24
Payer: COMMERCIAL

## 2024-09-24 VITALS
OXYGEN SATURATION: 96 % | HEIGHT: 66 IN | RESPIRATION RATE: 20 BRPM | SYSTOLIC BLOOD PRESSURE: 163 MMHG | DIASTOLIC BLOOD PRESSURE: 70 MMHG | HEART RATE: 67 BPM | BODY MASS INDEX: 33.75 KG/M2 | WEIGHT: 210 LBS | TEMPERATURE: 98 F

## 2024-09-24 DIAGNOSIS — E10.9 TYPE 1 DIABETES MELLITUS WITHOUT COMPLICATION (H): Primary | ICD-10-CM

## 2024-09-24 PROCEDURE — 99348 HOME/RES VST EST LOW MDM 30: CPT | Performed by: NURSE PRACTITIONER

## 2024-09-24 NOTE — LETTER
" 9/24/2024      Kimberly Stephenson  [facility]  [address]  [facility] [address]        [facility] GERIATRICS    Chief Complaint   Patient presents with     RECHECK     HPI:  Kimberly Stephenson is a 79 year old  (1945), who is being seen today for an episodic care visit at: [facility] ([facility]) [[id number]]. Today's concern is:   Patient with a history of type 1 diabetes that is currently not managed on current regimen.  Recent hemoglobin A1c was 9.7 slightly lowered from previous value.  Patient continues to have some low blood sugars with symptoms, these are intermittent and follow no pattern.  Patient reports she takes her insulin but sometimes forgets to eat.  Understands she needs to have a snack even if she decides not to have her meals.  No recent falls.  Currently taking Lantus 12 units in the evening and 20 units in the a.m.  Additionally has NovoLog per sliding scale.  Staff is taking blood sugars 4 times daily.        Allergies, and PMH/PSH reviewed in [facility] today.  REVIEW OF SYSTEMS:  10 point ROS of systems including Constitutional, Eyes, Respiratory, Cardiovascular, Gastroenterology, Genitourinary, Integumentary, Musculoskeletal, Psychiatric were all negative except for pertinent positives noted in my HPI.    Objective:   BP (!) 163/70   Pulse 67   Temp 98  F (36.7  C)   Resp 20   Ht 1.676 m (5' 6\")   Wt 95.3 kg (210 lb)   SpO2 96%   BMI 33.89 kg/m    A & O x 3, forgetful, NAD. Lungs CTA, non labored. RRR, S1/S2 w/o murmur,gallop or rub.  edema.  Abdomen soft, nontender, +BT'S. No focal neurological deficits.        Recent labs in [facility] reviewed by me today.   Lab Requisition on 09/17/2024   Component Date Value Ref Range Status     Hemoglobin A1C 09/17/2024 9.7 (H)  <5.7 % Final    Normal <5.7%   Prediabetes 5.7-6.4%    Diabetes 6.5% or higher     Note: Adopted from ADA consensus guidelines.     Assessment/Plan:  Type 1 diabetes mellitus without complication " (H)  Chronic type 1 diabetes.  Difficult to manage given eating patterns and nonadherence to diet.  Will continue current regimen at this time.  Encourage patient to have snacks with insulin administration.  A1c Q 6 months and as needed.    MED REC REQUIRED  Post Medication Reconciliation Status: patient was not discharged from an inpatient facility or TCU      Orders:  The current medical regimen is effective; continue present plan and medications.      Electronically signed by: Li Arana NP           Sincerely,        Li Arana NP

## 2024-09-24 NOTE — PROGRESS NOTES
"[facility] GERIATRICS    Chief Complaint   Patient presents with    RECHECK     HPI:  Kimberly Stephenson is a 79 year old  (1945), who is being seen today for an episodic care visit at: [facility] ([facility]) [[id number]]. Today's concern is:   Patient with a history of type 1 diabetes that is currently not managed on current regimen.  Recent hemoglobin A1c was 9.7 slightly lowered from previous value.  Patient continues to have some low blood sugars with symptoms, these are intermittent and follow no pattern.  Patient reports she takes her insulin but sometimes forgets to eat.  Understands she needs to have a snack even if she decides not to have her meals.  No recent falls.  Currently taking Lantus 12 units in the evening and 20 units in the a.m.  Additionally has NovoLog per sliding scale.  Staff is taking blood sugars 4 times daily.        Allergies, and PMH/PSH reviewed in [facility] today.  REVIEW OF SYSTEMS:  10 point ROS of systems including Constitutional, Eyes, Respiratory, Cardiovascular, Gastroenterology, Genitourinary, Integumentary, Musculoskeletal, Psychiatric were all negative except for pertinent positives noted in my HPI.    Objective:   BP (!) 163/70   Pulse 67   Temp 98  F (36.7  C)   Resp 20   Ht 1.676 m (5' 6\")   Wt 95.3 kg (210 lb)   SpO2 96%   BMI 33.89 kg/m    A & O x 3, forgetful, NAD. Lungs CTA, non labored. RRR, S1/S2 w/o murmur,gallop or rub.  edema.  Abdomen soft, nontender, +BT'S. No focal neurological deficits.        Recent labs in [facility] reviewed by me today.   Lab Requisition on 09/17/2024   Component Date Value Ref Range Status    Hemoglobin A1C 09/17/2024 9.7 (H)  <5.7 % Final    Normal <5.7%   Prediabetes 5.7-6.4%    Diabetes 6.5% or higher     Note: Adopted from ADA consensus guidelines.     Assessment/Plan:  Type 1 diabetes mellitus without complication (H)  Chronic type 1 diabetes.  Difficult to manage given eating patterns and nonadherence to diet.  Will continue " current regimen at this time.  Encourage patient to have snacks with insulin administration.  A1c Q 6 months and as needed.    MED REC REQUIRED  Post Medication Reconciliation Status: patient was not discharged from an inpatient facility or TCU      Orders:  The current medical regimen is effective; continue present plan and medications.      Electronically signed by: Li Arana NP

## 2024-10-25 ENCOUNTER — ASSISTED LIVING VISIT (OUTPATIENT)
Dept: GERIATRICS | Facility: CLINIC | Age: 79
End: 2024-10-25
Payer: COMMERCIAL

## 2024-10-25 VITALS
WEIGHT: 202 LBS | OXYGEN SATURATION: 96 % | DIASTOLIC BLOOD PRESSURE: 70 MMHG | HEIGHT: 66 IN | TEMPERATURE: 96.6 F | SYSTOLIC BLOOD PRESSURE: 149 MMHG | RESPIRATION RATE: 16 BRPM | HEART RATE: 64 BPM | BODY MASS INDEX: 32.47 KG/M2

## 2024-10-25 DIAGNOSIS — E10.65 TYPE 1 DIABETES MELLITUS WITH HYPERGLYCEMIA (H): ICD-10-CM

## 2024-10-25 DIAGNOSIS — E10.9 TYPE 1 DIABETES MELLITUS WITHOUT COMPLICATION (H): Primary | ICD-10-CM

## 2024-10-25 PROCEDURE — 99349 HOME/RES VST EST MOD MDM 40: CPT | Performed by: NURSE PRACTITIONER

## 2024-10-25 NOTE — LETTER
" 10/25/2024      Kimberly Stephenson  [facility]  [address]  [facility] [address]        [facility] GERIATRICS    Chief Complaint   Patient presents with     RECHECK     Blood sugars      HPI:  Kimberly Stephenson is a 79 year old  (1945), who is being seen today for an episodic care visit at: [facility] ([facility]) [[id number]]. Today's concern is:   Patient resting alone at the dining table.  Reports she is feeling well.  HPI is difficult to obtain secondary to baseline mild cognitive impairment.  Per staff patient continues to have changes in blood sugars with highs and lows intermittently throughout the day.  Sometime refuses to eat breakfast and/or lunch after receiving insulin resulting in low BPs.  A1c is greater than goal of 8.5%.  No longer followed by endocrinology.    Vital signs reviewed by me today  BP Readings from Last 3 Encounters:   10/25/24 (!) 149/70   09/24/24 (!) 163/70   07/30/24 125/71     Pulse Readings from Last 4 Encounters:   10/25/24 64   09/24/24 67   07/30/24 79   07/12/24 79     Wt Readings from Last 4 Encounters:   10/25/24 91.6 kg (202 lb)   09/24/24 95.3 kg (210 lb)   07/30/24 99.5 kg (219 lb 6.4 oz)   07/12/24 99.3 kg (219 lb)         Allergies, and PMH/PSH reviewed in EPIC today.  REVIEW OF SYSTEMS:  10 point ROS of systems including Constitutional, Eyes, Respiratory, Cardiovascular, Gastroenterology, Genitourinary, Integumentary, Musculoskeletal, Psychiatric were all negative except for pertinent positives noted in my HPI.    Objective:   BP (!) 149/70   Pulse 64   Temp (!) 96.6  F (35.9  C)   Resp 16   Ht 1.676 m (5' 6\")   Wt 91.6 kg (202 lb)   SpO2 96%   BMI 32.60 kg/m    A & O x 2-3 forgetful, NAD. Lungs CTA, non labored. RRR, S1/S2 w/o murmur,gallop or rub.  No edema.  Abdomen soft, nontender, +BT'S. No focal neurological deficits.        Recent labs in [facility] reviewed by me today.   Lab Requisition on 09/17/2024   Component Date Value " Ref Range Status     Hemoglobin A1C 09/17/2024 9.7 (H)  <5.7 % Final    Normal <5.7%   Prediabetes 5.7-6.4%    Diabetes 6.5% or higher     Note: Adopted from ADA consensus guidelines.     Assessment/Plan:  1. Type 1 diabetes mellitus without complication (H)    Chronic type 1 diabetes not managed on current insulin regimen.  -Metformin 5 mg p.o. twice daily  -Change Lantus to 32 units at at bedtime.  -Hemoglobin A1c in November 19, 2024.  Continue to monitor and update NP with changes.  Will follow blood sugars.    MED REC REQUIRED  Post Medication Reconciliation Status: patient was not discharged from an inpatient facility or TCU      Orders:  Metformin 5 mg p.o. twice daily  Change Lantus to a daily dose of 32 units at at bedtime  A1c November 19, 2024    43 minutes spent on the date of the encounter doing chart review, history and exam, documentation and further activities as noted above.     Electronically signed by: Li Arana NP           Sincerely,        Li Arana NP

## 2024-10-25 NOTE — PROGRESS NOTES
"[facility] GERIATRICS    Chief Complaint   Patient presents with    RECHECK     Blood sugars      HPI:  Kimberly Stephenson is a 79 year old  (1945), who is being seen today for an episodic care visit at: [facility]) [[id number]]. Today's concern is:   Patient resting alone at the dining table.  Reports she is feeling well.  HPI is difficult to obtain secondary to baseline mild cognitive impairment.  Per staff patient continues to have changes in blood sugars with highs and lows intermittently throughout the day.  Sometime refuses to eat breakfast and/or lunch after receiving insulin resulting in low BPs.  A1c is greater than goal of 8.5%.  No longer followed by endocrinology.    Vital signs reviewed by me today  BP Readings from Last 3 Encounters:   10/25/24 (!) 149/70   09/24/24 (!) 163/70   07/30/24 125/71     Pulse Readings from Last 4 Encounters:   10/25/24 64   09/24/24 67   07/30/24 79   07/12/24 79     Wt Readings from Last 4 Encounters:   10/25/24 91.6 kg (202 lb)   09/24/24 95.3 kg (210 lb)   07/30/24 99.5 kg (219 lb 6.4 oz)   07/12/24 99.3 kg (219 lb)         Allergies, and PMH/PSH reviewed in [facility] today.  REVIEW OF SYSTEMS:  10 point ROS of systems including Constitutional, Eyes, Respiratory, Cardiovascular, Gastroenterology, Genitourinary, Integumentary, Musculoskeletal, Psychiatric were all negative except for pertinent positives noted in my HPI.    Objective:   BP (!) 149/70   Pulse 64   Temp (!) 96.6  F (35.9  C)   Resp 16   Ht 1.676 m (5' 6\")   Wt 91.6 kg (202 lb)   SpO2 96%   BMI 32.60 kg/m    A & O x 2-3 forgetful, NAD. Lungs CTA, non labored. RRR, S1/S2 w/o murmur,gallop or rub.  No edema.  Abdomen soft, nontender, +BT'S. No focal neurological deficits.        Recent labs in [facility] reviewed by me today.   Lab Requisition on 09/17/2024   Component Date Value Ref Range Status    Hemoglobin A1C 09/17/2024 9.7 (H)  <5.7 % Final    Normal <5.7%   Prediabetes 5.7-6.4%  "   Diabetes 6.5% or higher     Note: Adopted from ADA consensus guidelines.     Assessment/Plan:  1. Type 1 diabetes mellitus without complication (H)    Chronic type 1 diabetes not managed on current insulin regimen.  -Metformin 5 mg p.o. twice daily  -Change Lantus to 32 units at at bedtime.  -Hemoglobin A1c in November 19, 2024.  Continue to monitor and update NP with changes.  Will follow blood sugars.    MED REC REQUIRED  Post Medication Reconciliation Status: patient was not discharged from an inpatient facility or TCU      Orders:  Metformin 5 mg p.o. twice daily  Change Lantus to a daily dose of 32 units at at bedtime  A1c November 19, 2024    43 minutes spent on the date of the encounter doing chart review, history and exam, documentation and further activities as noted above.     Electronically signed by: Li Araan NP

## 2024-11-08 DIAGNOSIS — E10.65 TYPE 1 DIABETES MELLITUS WITH HYPERGLYCEMIA (H): ICD-10-CM

## 2024-11-08 RX ORDER — LANCETS 30 GAUGE
EACH MISCELLANEOUS
Qty: 100 EACH | Refills: 11 | Status: SHIPPED | OUTPATIENT
Start: 2024-11-08

## 2024-11-18 ENCOUNTER — LAB REQUISITION (OUTPATIENT)
Dept: LAB | Facility: CLINIC | Age: 79
End: 2024-11-18
Payer: COMMERCIAL

## 2024-11-18 DIAGNOSIS — E11.9 TYPE 2 DIABETES MELLITUS WITHOUT COMPLICATIONS (H): ICD-10-CM

## 2024-11-19 LAB
EST. AVERAGE GLUCOSE BLD GHB EST-MCNC: 217 MG/DL
HBA1C MFR BLD: 9.2 %

## 2024-12-04 DIAGNOSIS — E10.65 TYPE 1 DIABETES MELLITUS WITH HYPERGLYCEMIA (H): ICD-10-CM

## 2024-12-04 RX ORDER — INSULIN GLARGINE 100 [IU]/ML
INJECTION, SOLUTION SUBCUTANEOUS
Qty: 15 ML | Refills: 0 | Status: SHIPPED | OUTPATIENT
Start: 2024-12-04

## 2024-12-21 DIAGNOSIS — I21.4 NSTEMI (NON-ST ELEVATED MYOCARDIAL INFARCTION) (H): ICD-10-CM

## 2024-12-23 RX ORDER — APIXABAN 2.5 MG/1
TABLET, FILM COATED ORAL
Qty: 60 TABLET | Refills: 11 | Status: SHIPPED | OUTPATIENT
Start: 2024-12-23

## 2024-12-31 ENCOUNTER — TELEPHONE (OUTPATIENT)
Dept: GERIATRICS | Facility: CLINIC | Age: 79
End: 2024-12-31
Payer: COMMERCIAL

## 2024-12-31 NOTE — TELEPHONE ENCOUNTER
By the time medication would be delivered from the pharmacy and set up patient would be past the 5 day window.      Verbal order/direction given to: alma rosa left for nursing station  Gilda Lama RN

## 2024-12-31 NOTE — TELEPHONE ENCOUNTER
"[facility] Triage Call    Provider: VANDANA Solano CNP  Facility: [facility] Facility Type:  AL    Caller: Alma Delia  Call Back Number: [phone]      Allergies:    Allergies   Allergen Reactions    Duloxetine Hcl Unknown    Fentanyl Nausea and Vomiting    Versed [Midazolam] Nausea and Vomiting        SBAR:     S-(situation): Nurse called to report that patient tested positive for COVID-19 via rapid testing today.      B-(background):     A-(assessment): Symptoms started on Friday.  Patient presents with cough, fatigue, sneezing, and poor appetite.  VS: 137/82, 77, 99.3, and O2 96% on RA.  Patient is requesting antiviral treatment.      R-(recommendations): Please advise       Telephone encounter sent to:  VANDANA Solano CNP    Please send response/orders to \"Geriatrics Nurse Pool\"    Gilda Lama RN      "

## 2025-01-08 ENCOUNTER — TELEPHONE (OUTPATIENT)
Dept: ANTICOAGULATION | Facility: CLINIC | Age: 80
End: 2025-01-08
Payer: COMMERCIAL

## 2025-01-08 NOTE — TELEPHONE ENCOUNTER
ANTICOAGULATION DIRECT ORAL ANTICOAGULANT MONITORING    SUBJECTIVE     The [facility] is evaluating Kimberly Stephenson's Apixaban (Eliquis) as part of its Anticoagulation Monitoring Program.    Indication:Atrial Fibrillation  Current dose per medication list: Apixaban 2.5 mg TWICE daily  Recent hospitalizations/ED/Office Visits for bleeding/clotting concerns: No  Other bleeding or side effect concerns: No  Additional findings: Has history of falls.    OBJECTIVE     Age: 79 year old    Wt Readings from Last 2 Encounters:   10/25/24 91.6 kg (202 lb)   09/24/24 95.3 kg (210 lb)      Lab Results   Component Value Date    CR 0.65 10/31/2023    CR 0.84 03/15/2022    CR 0.82 06/24/2021       Lab Results   Component Value Date    HGB 12.8 10/31/2023    HGB 13.2 06/24/2021     10/31/2023     06/24/2021     ASSESSMENT/PLAN     A chart review for Direct Oral Anticoagulant (DOAC) Stewardship has been completed for:     Dosing: recheck serum creatinine. If creatinine persistently under 1.5, recommend adjustment to Apixaban 5 mg TWICE daily for NOT meeting 2 of 3 criteria for dose adjustment. Per the 2023 ACC/AHA Atrial Fibrillation guidelines, non-evidence based doses of DOACs should be avoided to minimize risks of preventable thromboembolism or major bleeding and to improve survival (Circulation. 2024;149:e1-e156.)  (consistent with package insert dosing)    Plan made per ACC anticoagulation protocol.    Suzie Estrada RN  Anticoagulation Clinic

## 2025-01-09 ENCOUNTER — PATIENT OUTREACH (OUTPATIENT)
Dept: GERIATRIC MEDICINE | Facility: CLINIC | Age: 80
End: 2025-01-09
Payer: COMMERCIAL

## 2025-01-09 NOTE — PROGRESS NOTES
[facility] Care Coordination Contact    [employer] Care Coordination    Annual chart review preformed for [employer] member.     Reviewed Epic notes and no triggering events noted. No hospitalizations or ED visits in the last 6 months. Daily assistance (including med management) provided by assisted living facility. Closely followed by onsite NP.     Writer will continue to follow via EMR and is available as needed.    Gem Johnson RN  [facility]  Cell: [phone]

## 2025-01-22 ENCOUNTER — TELEPHONE (OUTPATIENT)
Dept: GERIATRICS | Facility: CLINIC | Age: 80
End: 2025-01-22
Payer: COMMERCIAL

## 2025-01-22 NOTE — CONFIDENTIAL NOTE
Orders given 1/22/25    BMP next lab day dx: CKD      This is recommended by Suzie Estrada regarding anticoagulation therapy.       Li Arana NP

## 2025-01-22 NOTE — LETTER
1/22/2025      Kimberly Webb [facility]  [address]  [facility] [address]      Orders given 1/22/25     BMP next lab day dx: CKD        This is recommended by Suzie Estrada regarding anticoagulation therapy.         Li Arana, NP

## 2025-02-05 NOTE — CONFIDENTIAL NOTE
BMP next lab day dx: ckd  (Lab to to assess the qualifications of DOAC and dose for patient)      Li Arana, NP

## 2025-02-06 ENCOUNTER — LAB REQUISITION (OUTPATIENT)
Dept: LAB | Facility: CLINIC | Age: 80
End: 2025-02-06
Payer: COMMERCIAL

## 2025-02-06 ENCOUNTER — TELEPHONE (OUTPATIENT)
Dept: PHARMACY | Facility: OTHER | Age: 80
End: 2025-02-06
Payer: COMMERCIAL

## 2025-02-06 DIAGNOSIS — N18.9 CHRONIC KIDNEY DISEASE, UNSPECIFIED: ICD-10-CM

## 2025-02-06 NOTE — TELEPHONE ENCOUNTER
MT[facility] Recruitment: [facility] insurance     Referral outreach attempt #1 on February 6, 2025      Outcome: phone number is for AL facility.  Spoke to staff at facility who left message with nurse who helps with patient's care - gave my number below.    Janeth Aldana, Pharm.D.,[facility]  Board Certified Geriatric Pharmacist  Medication Therapy Management Pharmacist  [phone]

## 2025-02-10 NOTE — PROGRESS NOTES
Medication Therapy Management (MTM) Encounter    ASSESSMENT:                            Medication Adherence/Access: No issues identified.    Diabetes  Patient is not meeting A1c goal of < 8%.  May benefit from repeat A1c (almost 3 months since last), and may also benefit from discontinuation of metformin due to no indication for this if truly Type 1 diabetes.  As noted below, also having some low blood sugars at times and may benefit from changing Lantus to morning administration to reduce risk of nocturnal hypoglycemia, and consider continuous glucose monitor to aid with blood sugar control. May also benefit from having prn glucose tabs available for blood sugar <70.  Also discussed importance of not skipping meals, and encouraged eating at least a small breakfast.    Cognitive decline, Depression, Anxiety:   Stable.  Patient preference to continue current regimen.  Continue to monitor for adverse effects as well as efficacy.    Hypertension, Afib   Appears blood pressure will vary.  Goal <150/90mmHg reasonable in this elderly patient at risk of hypotension, dizziness, orthostasis, falls, tissue/cerebral hypoperfusion.  Would benefit from continued monitoring.  Unclear why not on ACEi or ARB given history of diabetes, however I do note last BMP in 2023 potassium was on higher end of normal.  May be of benefit to consider addition of low dose ACEi or ARB in future if potassium allows, with follow-up BMP in one week from potential addition.  Unclear to me why she has been on 2.5mg twice daily of Eliquis as not meeting eligibility for dose reduction for indication of afib.  If pending BMP comes back with Scr<1.5, would be of benefit to increase dose to 5mg twice daily to ensure she is adequately anticoagulated, and continue to monitor renal function periodically as well as CBC and liver function (at least annually and sooner if clinically indicated).  Appears she is due for CBC and LFTs.      Hyperlipidemia    Stable.    Constipation: stable.    Pain: due to no pain concerns and to reduce polypharmacy, may benefit from stopping scheduled Tylenol and monitor.    PLAN:                            Provider may consider discontinuation of metformin.    Provider may consider changing Lantus to be given in the morning instead of bedtime.      Please consider continuous glucose monitor/Freestyle Nicki to aid with blood sugar control.    Provider may consider addition of glucose tablet as needed for blood sugar <70.    Encouraged patient to not skip breakfast to aid with blood sugar control/consistency.    If Scr returns at <1.5, provider may consider increase in Eliquis to 5mg twice daily.    Provider may consider checking CBC, LFTs, A1c.    Provider may consider discontinuation of scheduled Tylenol.      Follow-up: 3-6 months, sooner if necessary    SUBJECTIVE/OBJECTIVE:                          Kimberly Stephenson is a 79 year old female seen for an initial visit. She was referred to me from her health plan/[facility]. Patient was accompanied by Lela, facility staff nurse.     Reason for visit: comprehensive med review.    Allergies/ADRs: Reviewed in chart  Past Medical History: Reviewed in chart  Tobacco: She reports that she has quit smoking. She has never used smokeless tobacco.  Alcohol: not currently using  Caffeine: 2 cups per day  Assistive Devices: ambulates independently  Recent Falls: no  Medication Adherence/Access: Patient has assistance with medication administration: assisted living.    Diabetes   Type 1 Diabetes  Metformin 500mg twice daily  Lantus 32u every evening  Novolog sliding scale prior to meals and at bedtime    History of Type 1 DM noted in chart with history of DKA.  Type 2 DM also noted in chart.  Lela reports difficulty controlling blood sugars - quite variable.  Reports lows - see below.  She is noting often < 100 in the mornings.  Seeing more lows than highs.  Current diabetes symptoms: none .occasional  "sweaty with lower blood sugars  Diet/Exercise: appetite \"ok.\"  Eats even if doesn't have appetite.  Lunch and dinner in dining room.  Often skips breakfast, but will drink orange juice (1-2 6oz containers in the morning; typically 1).  This is sometimes due to nursing asking her to drink OJ due to lower blood sugar.   Not interested in food in the morning.  Doesn't snack much between meals.     Blood sugar monitoring: per facility records:      Lab Results   Component Value Date    A1C 9.2 11/19/2024    A1C 9.7 09/17/2024    A1C 9.5 03/19/2024    A1C 10.4 01/23/2024       Mental Health   Cognitive decline, Depression, Anxiety  Escitalopram 20mg daily  Buspirone 15mg three times daily   Patient reports no current medication side effects.  States mood \"Ok. I don't feel depressed.\"  Not feeling anxious.  No confusion per her report.  History of prn alprazolam use.  \"Everything seems to be working in a way that makes me feel normal.\"       Hypertension , Afib:  Amlodipine 10mg daily  Eliquis 2.5mg twice daily    Denies dizziness, headaches, chest pain, shortness of breath, edema.  Occasional palpitations - rarely.  No dark/sticky stools, bleeding, bruising.  Patient reports no current medication side effects  Patient  has blood pressure monitored by facility:           Hyperlipidemia   atorvastatin 40mg daily  Patient reports no significant myalgias or other side effects.  Recent Labs   Lab Test 08/06/24  1121 06/24/21  1620   CHOL 149  --    HDL 64  --    LDL 76 64   TRIG 47  --             Constipation   Senna - 1 tablet daily & daily as needed  Patient is having bowel movements every 1 days.   Patient feels that current therapy is effective.   Patient reports no current medication side effects.       Pain  Acetaminophen 1000mg daily & 650mg every 4hr as needed    Denies pain.      ----------------      I spent 42 minutes with this patient today.     A summary of these recommendations was sent to facility nurse per " her request.    Janeth Aldana, Pharm.D.,[facility]  Board Certified Geriatric Pharmacist  Medication Therapy Management Pharmacist  [phone]      Telemedicine Visit Details  The patient's medications can be safely assessed via a telemedicine encounter.  Type of service:  Telephone visit  Originating Location (pt. Location): Assisted Living    Distant Location (provider location):  Off-site  Start Time:  1:00pm  End Time:  1:42pm     Medication Therapy Recommendations  PAF (paroxysmal atrial fibrillation) (H)   1 Current Medication: ELIQUIS ANTICOAGULANT 2.5 MG tablet   Current Medication Sig: take one tablet TWICE DAILY   Rationale: Dose too low - Dosage too low - Effectiveness   Recommendation: Increase Dose   Status: Contact Provider - Awaiting Response   Identified Date: 2/11/2025   Note: please increase dose to 5mg twice daily if Scr comes back at <1.5         2 Current Medication: ELIQUIS ANTICOAGULANT 2.5 MG tablet   Current Medication Sig: take one tablet TWICE DAILY   Rationale: Medication requires monitoring - Needs additional monitoring   Recommendation: Order Lab   Status: Contact Provider - Awaiting Response   Identified Date: 2/11/2025         Pain   1 Current Medication: acetaminophen (TYLENOL) 500 MG tablet   Current Medication Sig: take 2 tablets (1000mg) EVERY DAY   Rationale: Frequency inappropriate - Dosage too high - Safety   Recommendation: Decrease Frequency   Status: Contact Provider - Awaiting Response   Identified Date: 2/11/2025         Type 1 diabetes mellitus without complication (H)   1 Current Medication: insulin glargine (LANTUS PEN) 100 UNIT/ML pen   Current Medication Sig: Inject 32 Units subcutaneously at bedtime.   Rationale: Undesirable effect - Adverse medication event - Safety   Recommendation: Change Administration Time   Status: Contact Provider - Awaiting Response   Identified Date: 2/11/2025         2 Current Medication: metFORMIN (GLUCOPHAGE) 500 MG tablet   Current Medication  Sig: Take 500 mg by mouth 2 times daily (with meals).   Rationale: No medical indication at this time - Unnecessary medication therapy - Indication   Recommendation: Discontinue Medication   Status: Contact Provider - Awaiting Response   Identified Date: 2/11/2025         3 Rationale: Preventive therapy - Needs additional medication therapy - Indication   Recommendation: Start Medication - Glucose Management Tabs   Status: Contact Provider - Awaiting Response   Identified Date: 2/11/2025

## 2025-02-11 ENCOUNTER — VIRTUAL VISIT (OUTPATIENT)
Dept: PHARMACY | Facility: CLINIC | Age: 80
End: 2025-02-11
Payer: COMMERCIAL

## 2025-02-11 DIAGNOSIS — R41.89 COGNITIVE DECLINE: ICD-10-CM

## 2025-02-11 DIAGNOSIS — F41.1 GAD (GENERALIZED ANXIETY DISORDER): ICD-10-CM

## 2025-02-11 DIAGNOSIS — E10.9 TYPE 1 DIABETES MELLITUS WITHOUT COMPLICATION (H): Primary | ICD-10-CM

## 2025-02-11 DIAGNOSIS — K59.00 CONSTIPATION, UNSPECIFIED CONSTIPATION TYPE: ICD-10-CM

## 2025-02-11 DIAGNOSIS — E78.5 HYPERLIPIDEMIA LDL GOAL <100: ICD-10-CM

## 2025-02-11 DIAGNOSIS — I48.0 PAF (PAROXYSMAL ATRIAL FIBRILLATION) (H): ICD-10-CM

## 2025-02-11 DIAGNOSIS — F32.5 MAJOR DEPRESSIVE DISORDER WITH SINGLE EPISODE, IN FULL REMISSION: ICD-10-CM

## 2025-02-11 DIAGNOSIS — I10 ESSENTIAL HYPERTENSION: ICD-10-CM

## 2025-02-11 DIAGNOSIS — R52 PAIN: ICD-10-CM

## 2025-02-11 LAB
ANION GAP SERPL CALCULATED.3IONS-SCNC: 15 MMOL/L (ref 7–15)
BUN SERPL-MCNC: 9.7 MG/DL (ref 8–23)
CALCIUM SERPL-MCNC: 9.3 MG/DL (ref 8.8–10.4)
CHLORIDE SERPL-SCNC: 103 MMOL/L (ref 98–107)
CREAT SERPL-MCNC: 0.57 MG/DL (ref 0.51–0.95)
EGFRCR SERPLBLD CKD-EPI 2021: >90 ML/MIN/1.73M2
GLUCOSE SERPL-MCNC: 217 MG/DL (ref 70–99)
HCO3 SERPL-SCNC: 19 MMOL/L (ref 22–29)
POTASSIUM SERPL-SCNC: 4.9 MMOL/L (ref 3.4–5.3)
SODIUM SERPL-SCNC: 137 MMOL/L (ref 135–145)

## 2025-02-11 PROCEDURE — 99607 MTMS BY PHARM ADDL 15 MIN: CPT | Mod: 93 | Performed by: PHARMACIST

## 2025-02-11 PROCEDURE — 99605 MTMS BY PHARM NP 15 MIN: CPT | Mod: 93 | Performed by: PHARMACIST

## 2025-02-11 RX ORDER — LATANOPROST 50 UG/ML
1 SOLUTION/ DROPS OPHTHALMIC DAILY
COMMUNITY

## 2025-02-11 NOTE — Clinical Note
Fernandez Jefferson  - she was referred by her health plan, so I had an MTM Visit with her and Lela/nurse from facility today.  Please let me know if questions.  STUARTI that I encouraged her not to skip breakfast.  Want to make sure she is receiving meal-time insulin prior to breakfast as well since she is Type 1 per notes.   Thanks!

## 2025-02-11 NOTE — LETTER
"Recommended To-Do List      Prepared on: Feb 11, 2025       You can get the best results from your medications by completing the items on this \"To-Do List.\"      Bring your To-Do List when you go to your doctor. And, share it with your family or caregivers.    My To-Do List:  What we talked about: What I should do:   Your medication dosage being too low    Increase your dosage of ELIQUIS ANTICOAGULANT          What we talked about: What I should do:   Needing additional monitoring    Get the following lab test(s): CBC, liver function tests, A1c - IF provider agrees           What we talked about: What I should do:   Your medication dosage being too high    Decrease how often you take acetaminophen (TYLENOL)- stop scheduled Tylenol - IF provider agrees          What we talked about: What I should do:   An issue with your medication    Change when you are taking insulin glargine (LANTUS PEN) - from bedtime to morning - IF Provider agrees          What we talked about: What I should do:   A medication that you may no longer need    Stop taking metFORMIN (GLUCOPHAGE) - IF provider agrees          What we talked about: What I should do:   A new medication that may be of benefit to you    Start taking Glucose tablets (as needed for blood sugar <70) - IF provider agrees          What we talked about: What I should do:       Begin using a continuous glucose monitor - IF provider agrees              "

## 2025-02-11 NOTE — LETTER
February 11, 2025  Kimberly COLON CHOICE Posen  [address]   [place] [address]    [facility]     Thank you for talking with me on Feb 11, 2025 about your health and medications. As a follow-up to our conversation, I have included two documents:      Your Recommended To-Do List has steps you should take to get the best results from your medications.  Your Medication List will help you keep track of your medications and how to take them.    If you want to talk about these documents, please call Janeth Aldana RPH at phone: [phone], Monday-Friday 8-4:30pm.    I look forward to working with you and your doctors to make sure your medications work well for you.    Sincerely,  Janeth Aldana RPH  MT Pharmacist, [facility]

## 2025-02-11 NOTE — LETTER
_  Medication List        Prepared on: Feb 11, 2025     Bring your Medication List when you go to the doctor, hospital, or   emergency room. And, share it with your family or caregivers.     Note any changes to how you take your medications.  Cross out medications when you no longer use them.    Medication How I take it Why I use it Prescriber   acetaminophen (TYLENOL) 325 MG tablet Take 650 mg by mouth every 4 hours as needed for mild pain  pain Patient Reported   acetaminophen (TYLENOL) 500 MG tablet take 2 tablets (1000mg) EVERY DAY pain Li Arana NP   amLODIPine (NORVASC) 10 MG tablet Take 1 tablet (10 mg) by mouth every evening Essential Hypertension Li Arana NP   atorvastatin (LIPITOR) 40 MG tablet take one tablet EVERY DAY Hyperlipidemia Marifer Field MD   busPIRone (BUSPAR) 15 MG tablet Take 1 tablet (15 mg) by mouth 3 times daily Anxiety disorder, unspecified Li Arana NP   ELIQUIS ANTICOAGULANT 2.5 MG tablet take one tablet TWICE DAILY Afib Li Arana NP   escitalopram (LEXAPRO) 20 MG tablet take one tablet EVERY DAY Anxiety and Depression Li Arana NP   Insulin Aspart FlexPen 100 UNIT/ML SOPN Sliding scale per facility records prior to meals and at bedtime Type 1 diabetes mellitus with hyperglycemia (H) Marifer Field MD   insulin glargine (LANTUS PEN) 100 UNIT/ML pen Inject 32 Units subcutaneously at bedtime.  diabetes Li Arana NP   latanoprost (XALATAN) 0.005 % ophthalmic solution Place 1 drop into both eyes daily.  glaucoma Patient Reported   metFORMIN (GLUCOPHAGE) 500 MG tablet Take 500 mg by mouth 2 times daily (with meals).  diabetes Patient Reported   senna (SM SENNA LAXATIVE) 8.6 MG tablet take one tablet BY MOUTH EVERY DAY AS NEEDED Constipation Li Arana NP         Add new medications, over-the-counter drugs, herbals, vitamins, or  minerals in the blank rows below.    Medication How I take it Why I use it Prescriber                                       Allergies:      - Duloxetine Hcl - Unknown  - Fentanyl - Nausea and Vomiting  - Versed [midazolam] - Nausea and Vomiting        Side effects I have had:      Not on File        Other Information:              My notes and questions:

## 2025-02-11 NOTE — PATIENT INSTRUCTIONS
"Recommendations from today's MTM visit:                                                    MTM (medication therapy management) is a service provided by a clinical pharmacist designed to help you get the most of out of your medicines.   Today we reviewed what your medicines are for, how to know if they are working, that your medicines are safe and how to make your medicine regimen as easy as possible.      Provider may consider discontinuation of metformin.    Provider may consider changing Lantus to be given in the morning instead of bedtime.    Please consider continuous glucose monitor/Freestyle Nicki to aid with blood sugar control.    Provider may consider addition of glucose tablet as needed for blood sugar <70.    Please do not skip breakfast.      If Scr returns at <1.5, provider may consider increase in Eliquis to 5mg twice daily.    Provider may consider CBC, liver function tests,  HgA1c.    Provider may consider discontinuation of scheduled Tylenol.      Follow-up: 3-6 months, sooner if necessary      It was great speaking with you today.  I value your experience and would be very thankful for your time in providing feedback in our clinic survey. In the next few days, you may receive an email or text message from [employer] with a link to a survey related to your  clinical pharmacist.\"     To schedule another MTM appointment, please call me directly or you may call the MTM scheduling line at [phone] or toll-free at [phone].     My Clinical Pharmacist's contact information:                                                      Please feel free to contact me with any questions or concerns you have.      Janeth Aldana, Pharm.D.,[facility]  Board Certified Geriatric Pharmacist  Medication Therapy Management Pharmacist  [phone]      "

## 2025-02-12 ENCOUNTER — ASSISTED LIVING VISIT (OUTPATIENT)
Dept: GERIATRICS | Facility: CLINIC | Age: 80
End: 2025-02-12
Payer: COMMERCIAL

## 2025-02-12 VITALS
HEART RATE: 77 BPM | WEIGHT: 202 LBS | TEMPERATURE: 98.2 F | DIASTOLIC BLOOD PRESSURE: 80 MMHG | BODY MASS INDEX: 32.47 KG/M2 | RESPIRATION RATE: 18 BRPM | HEIGHT: 66 IN | SYSTOLIC BLOOD PRESSURE: 155 MMHG

## 2025-02-12 NOTE — PROGRESS NOTES
"[facility] GERIATRICS    Chief Complaint   Patient presents with    RECHECK     HPI:  Kimberly Stephenson is a 79 year old  (1945), who is being seen today for an episodic care visit at: [facility]) [[id number]]. Today's concern is: ***    Allergies, and PMH/PSH reviewed in EPIC today.  REVIEW OF SYSTEMS:  {oljsdv05:554705}    Objective:   BP (!) 155/80   Pulse 77   Temp 98.2  F (36.8  C)   Resp 18   Ht 1.676 m (5' 6\")   Wt 91.6 kg (202 lb)   BMI 32.60 kg/m    {Nursing home physical exam :904465}    {fgslab:730576}    Assessment/Plan:  {FGS DX2:199645}    MED REC REQUIRED{TIP  Click the link below to document or use med rec list, use list to pull in response :554192}  Post Medication Reconciliation Status: {MED REC LIST:540243}      Orders:  {fgsorders:104136}  ***    Electronically signed by: Doretha Burnett CNA ***      "

## 2025-02-12 NOTE — LETTER
2/12/2025      Kimberly Webb [facility]  [address]  [place] [address]        No notes on file      Sincerely,        Li Arana NP    Electronically signed

## 2025-02-13 ENCOUNTER — LAB REQUISITION (OUTPATIENT)
Dept: LAB | Facility: CLINIC | Age: 80
End: 2025-02-13
Payer: COMMERCIAL

## 2025-02-13 DIAGNOSIS — E08.00 DIABETES MELLITUS DUE TO UNDERLYING CONDITION WITH HYPEROSMOLARITY WITHOUT NONKETOTIC HYPERGLYCEMIC-HYPEROSMOLAR COMA (NKHHC) (H): ICD-10-CM

## 2025-02-13 DIAGNOSIS — E13.00 OTHER SPECIFIED DIABETES MELLITUS WITH HYPEROSMOLARITY WITHOUT NONKETOTIC HYPERGLYCEMIC-HYPEROSMOLAR COMA (NKHHC) (H): ICD-10-CM

## 2025-02-15 DIAGNOSIS — E10.65 TYPE 1 DIABETES MELLITUS WITH HYPERGLYCEMIA (H): ICD-10-CM

## 2025-02-17 RX ORDER — PEN NEEDLE, DIABETIC, SAFETY 30 GX3/16"
NEEDLE, DISPOSABLE MISCELLANEOUS
Qty: 100 EACH | Refills: 11 | Status: SHIPPED | OUTPATIENT
Start: 2025-02-17

## 2025-02-18 LAB
ALBUMIN SERPL BCG-MCNC: 3.8 G/DL (ref 3.5–5.2)
ALP SERPL-CCNC: 83 U/L (ref 40–150)
ALT SERPL W P-5'-P-CCNC: 15 U/L (ref 0–50)
ANION GAP SERPL CALCULATED.3IONS-SCNC: 14 MMOL/L (ref 7–15)
AST SERPL W P-5'-P-CCNC: 25 U/L (ref 0–45)
BASOPHILS # BLD AUTO: 0 10E3/UL (ref 0–0.2)
BASOPHILS NFR BLD AUTO: 0 %
BILIRUB DIRECT SERPL-MCNC: 0.13 MG/DL (ref 0–0.3)
BILIRUB SERPL-MCNC: 0.5 MG/DL
BUN SERPL-MCNC: 9.5 MG/DL (ref 8–23)
CALCIUM SERPL-MCNC: 9.5 MG/DL (ref 8.8–10.4)
CHLORIDE SERPL-SCNC: 103 MMOL/L (ref 98–107)
CREAT SERPL-MCNC: 0.54 MG/DL (ref 0.51–0.95)
EGFRCR SERPLBLD CKD-EPI 2021: >90 ML/MIN/1.73M2
EOSINOPHIL # BLD AUTO: 0.1 10E3/UL (ref 0–0.7)
EOSINOPHIL NFR BLD AUTO: 2 %
ERYTHROCYTE [DISTWIDTH] IN BLOOD BY AUTOMATED COUNT: 14.4 % (ref 10–15)
GLUCOSE SERPL-MCNC: 261 MG/DL (ref 70–99)
HCO3 SERPL-SCNC: 22 MMOL/L (ref 22–29)
HCT VFR BLD AUTO: 42.8 % (ref 35–47)
HGB BLD-MCNC: 13.2 G/DL (ref 11.7–15.7)
IMM GRANULOCYTES # BLD: 0 10E3/UL
IMM GRANULOCYTES NFR BLD: 0 %
LYMPHOCYTES # BLD AUTO: 1.7 10E3/UL (ref 0.8–5.3)
LYMPHOCYTES NFR BLD AUTO: 37 %
MCH RBC QN AUTO: 29.4 PG (ref 26.5–33)
MCHC RBC AUTO-ENTMCNC: 30.8 G/DL (ref 31.5–36.5)
MCV RBC AUTO: 95 FL (ref 78–100)
MONOCYTES # BLD AUTO: 0.4 10E3/UL (ref 0–1.3)
MONOCYTES NFR BLD AUTO: 9 %
NEUTROPHILS # BLD AUTO: 2.4 10E3/UL (ref 1.6–8.3)
NEUTROPHILS NFR BLD AUTO: 52 %
NRBC # BLD AUTO: 0 10E3/UL
NRBC BLD AUTO-RTO: 0 /100
PLATELET # BLD AUTO: 188 10E3/UL (ref 150–450)
POTASSIUM SERPL-SCNC: 4.8 MMOL/L (ref 3.4–5.3)
PROT SERPL-MCNC: 6.8 G/DL (ref 6.4–8.3)
RBC # BLD AUTO: 4.49 10E6/UL (ref 3.8–5.2)
SODIUM SERPL-SCNC: 139 MMOL/L (ref 135–145)
WBC # BLD AUTO: 4.5 10E3/UL (ref 4–11)

## 2025-02-18 PROCEDURE — 85025 COMPLETE CBC W/AUTO DIFF WBC: CPT | Mod: ORL | Performed by: NURSE PRACTITIONER

## 2025-02-18 PROCEDURE — 80053 COMPREHEN METABOLIC PANEL: CPT | Mod: ORL | Performed by: NURSE PRACTITIONER

## 2025-02-18 PROCEDURE — 82248 BILIRUBIN DIRECT: CPT | Mod: ORL | Performed by: NURSE PRACTITIONER

## 2025-02-18 PROCEDURE — P9604 ONE-WAY ALLOW PRORATED TRIP: HCPCS | Mod: ORL | Performed by: NURSE PRACTITIONER

## 2025-02-18 PROCEDURE — 36415 COLL VENOUS BLD VENIPUNCTURE: CPT | Mod: ORL | Performed by: NURSE PRACTITIONER

## 2025-02-20 DIAGNOSIS — E10.65 TYPE 1 DIABETES MELLITUS WITH HYPERGLYCEMIA (H): ICD-10-CM

## 2025-02-20 RX ORDER — INSULIN ASPART 100 [IU]/ML
0-7 INJECTION, SOLUTION INTRAVENOUS; SUBCUTANEOUS AT BEDTIME
Qty: 3 ML | Refills: 11 | Status: SHIPPED | OUTPATIENT
Start: 2025-02-20

## 2025-02-24 DIAGNOSIS — I10 ESSENTIAL HYPERTENSION: Primary | ICD-10-CM

## 2025-02-25 RX ORDER — LISINOPRIL 2.5 MG/1
2.5 TABLET ORAL DAILY
Qty: 30 TABLET | Refills: 3 | Status: SHIPPED | OUTPATIENT
Start: 2025-02-25

## 2025-03-04 NOTE — NURSING NOTE
"Reason For Visit:   Chief Complaint   Patient presents with     Consult     Lumbar radiculopathy        Primary MD: Stephane Galvez  Ref. MD: Dr. Stuart Bernabe    ?  No  Occupation Retired.  Currently working? No.  Work status?  Retired.  Date of injury: None  Type of injury: NA.  Date of surgery: NA  Type of surgery: NA.  Smoker: No  Request smoking cessation information: No    Resp 16  Ht 1.676 m (5' 6\")  Wt 79.8 kg (176 lb)  BMI 28.41 kg/m2    Pain Assessment  Patient Currently in Pain: Yes  0-10 Pain Scale: 1    Oswestry (KWASI) Questionnaire    OSWESTRY DISABILITY INDEX 6/13/2018   Count 9   Sum 11   Oswestry Score (%) 24.44   Some recent data might be hidden                Visual Analog Pain Scale  Back Pain Scale 0-10: 1  Right leg pain: 4.5  Left leg pain: 0.5    Promis 10 Assessment    PROMIS 10 6/13/2018   In general, would you say your health is: Very good   In general, would you say your quality of life is: Very good   In general, how would you rate your physical health? Very good   In general, how would you rate your mental health, including your mood and your ability to think? Fair   In general, how would you rate your satisfaction with your social activities and relationships? Good   In general, please rate how well you carry out your usual social activities and roles Very good   To what extent are you able to carry out your everyday physical activities such as walking, climbing stairs, carrying groceries, or moving a chair? Mostly   How often have you been bothered by emotional problems such as feeling anxious, depressed or irritable? Sometimes   How would you rate your fatigue on average? Moderate   How would you rate your pain on average?   0 = No Pain  to  10 = Worst Imaginable Pain 2   In general, would you say your health is: 4   In general, would you say your quality of life is: 4   In general, how would you rate your physical health? 4   In general, how would you rate your mental " health, including your mood and your ability to think? 2   In general, how would you rate your satisfaction with your social activities and relationships? 3   In general, please rate how well you carry out your usual social activities and roles. (This includes activities at home, at work and in your community, and responsibilities as a parent, child, spouse, employee, friend, etc.) 4   To what extent are you able to carry out your everyday physical activities such as walking, climbing stairs, carrying groceries, or moving a chair? 4   In the past 7 days, how often have you been bothered by emotional problems such as feeling anxious, depressed, or irritable? 3   In the past 7 days, how would you rate your fatigue on average? 3   In the past 7 days, how would you rate your pain on average, where 0 means no pain, and 10 means worst imaginable pain? 2   Global Mental Health Score 12   Global Physical Health Score 15   PROMIS TOTAL - SUBSCORES 27   Some recent data might be hidden                Chapo Diaz ATC   alert

## 2025-03-06 DIAGNOSIS — E10.65 TYPE 1 DIABETES MELLITUS WITH HYPERGLYCEMIA (H): Primary | ICD-10-CM

## 2025-03-06 RX ORDER — INSULIN GLARGINE 100 [IU]/ML
INJECTION, SOLUTION SUBCUTANEOUS
Qty: 15 ML | Refills: 11 | Status: SHIPPED | OUTPATIENT
Start: 2025-03-06

## 2025-03-19 ENCOUNTER — ASSISTED LIVING VISIT (OUTPATIENT)
Dept: GERIATRICS | Facility: CLINIC | Age: 80
End: 2025-03-19
Payer: COMMERCIAL

## 2025-03-19 VITALS
TEMPERATURE: 96.9 F | BODY MASS INDEX: 33.27 KG/M2 | HEART RATE: 74 BPM | SYSTOLIC BLOOD PRESSURE: 142 MMHG | OXYGEN SATURATION: 96 % | DIASTOLIC BLOOD PRESSURE: 57 MMHG | HEIGHT: 67 IN | RESPIRATION RATE: 18 BRPM | WEIGHT: 212 LBS

## 2025-03-19 DIAGNOSIS — E10.65 TYPE 1 DIABETES MELLITUS WITH HYPERGLYCEMIA (H): Primary | ICD-10-CM

## 2025-03-19 DIAGNOSIS — E66.01 MORBID OBESITY (H): ICD-10-CM

## 2025-03-19 DIAGNOSIS — F41.9 ANXIETY: ICD-10-CM

## 2025-03-19 DIAGNOSIS — F22 PARANOIA (H): ICD-10-CM

## 2025-03-19 PROCEDURE — 99350 HOME/RES VST EST HIGH MDM 60: CPT | Performed by: NURSE PRACTITIONER

## 2025-03-19 NOTE — LETTER
" 3/19/2025      Kimberly Stephenson  [facility]  [address]  [place] [address]        M [facility] GERIATRICS    Chief Complaint   Patient presents with     RECHECK     HPI:  Kimberly Stephenson is a 79 year old  (1945), who is being seen today for an episodic care visit at: [facility] [[id number]]. Today's concern is:   Patient resting in dining room eating lunch, reports she is feeling fine and without concerns.  Per nursing chart review, blood sugars have consistently been greater than 300 at dinnertime and at bedtime.  Patient remains asymptomatic, currently taking sliding scale insulin and Lantus in the afternoon.  Hemoglobin A1C   Date Value Ref Range Status   11/19/2024 9.2 (H) <5.7 % Final     Comment:     Normal <5.7%   Prediabetes 5.7-6.4%    Diabetes 6.5% or higher     Note: Adopted from ADA consensus guidelines.   06/24/2021 10.2 (H) 0 - 5.6 % Final     Comment:     Normal <5.7% Prediabetes 5.7-6.4%  Diabetes 6.5% or higher - adopted from ADA   consensus guidelines.       Vital signs reviewed by me today  BP Readings from Last 3 Encounters:   03/19/25 (!) 142/57   02/12/25 (!) 155/80   10/25/24 (!) 149/70     Pulse Readings from Last 4 Encounters:   03/19/25 74   02/12/25 77   10/25/24 64   09/24/24 67     Wt Readings from Last 4 Encounters:   03/19/25 96.2 kg (212 lb)   02/12/25 91.6 kg (202 lb)   10/25/24 91.6 kg (202 lb)   09/24/24 95.3 kg (210 lb)         Allergies, and PMH/PSH reviewed in EPIC today.  REVIEW OF SYSTEMS:  10 point ROS of systems including Constitutional, Eyes, Respiratory, Cardiovascular, Gastroenterology, Genitourinary, Integumentary, Musculoskeletal, Psychiatric were all negative except for pertinent positives noted in my HPI.    Objective:   BP (!) 142/57   Pulse 74   Temp 96.9  F (36.1  C)   Resp 18   Ht 1.702 m (5' 7\")   Wt 96.2 kg (212 lb)   SpO2 96%   BMI 33.20 kg/m    A & O x 3, NAD. Lungs CTA, non labored. RRR, S1/S2 w/o " murmur,gallop or rub. No edema.  Abdomen soft, nontender, +BT'S. No focal neurological deficits.      Recent labs in [facility] reviewed by me today.   Most Recent 3 CBC's:  Recent Labs   Lab Test 02/18/25  1041 10/31/23  0625 02/14/23  1043   WBC 4.5 4.6 4.6   HGB 13.2 12.8 13.4   MCV 95 93 94    199 188     Most Recent 3 BMP's:  Recent Labs   Lab Test 02/18/25  1041 02/11/25  1238 10/31/23  0625    137 142   POTASSIUM 4.8 4.9 4.9   CHLORIDE 103 103 104   CO2 22 19* 28   BUN 9.5 9.7 10.7   CR 0.54 0.57 0.65   ANIONGAP 14 15 10   SLIME 9.5 9.3 9.0   * 217* 234*     Most Recent 3 Creatinines:  Recent Labs   Lab Test 02/18/25  1041 02/11/25  1238 10/31/23  0625   CR 0.54 0.57 0.65     Most Recent 3 Hemoglobins:  Recent Labs   Lab Test 02/18/25  1041 10/31/23  0625 02/14/23  1043   HGB 13.2 12.8 13.4     Most Recent TSH and T4:  Recent Labs   Lab Test 03/15/22  0741   TSH 1.07     Most Recent Hemoglobin A1c:  Recent Labs   Lab Test 11/19/24  0958   A1C 9.2*         Assessment/Plan:  1. Type 1 diabetes mellitus with hyperglycemia (H)    2. Paranoia (H)    3. Anxiety    4. Morbid obesity (H)      Paranoia (H)   Anxiety  Longstanding history, stable.  Continue Lexapro and buspirone as ordered.  No changes at this time and adjustments as clinically indicated.    Morbid obesity (H)  Body mass index is 33.2 kg/m .  Patient not willing to change dietary habits nightly.  Patient educated on healthy meals and snacks.  Light exercise as tolerated.    Type 1 diabetes mellitus with hyperglycemia (H)  Chronic, not stable on current regimen.  Continues to have elevated blood sugars greater than 300 at dinnertime and at bedtime secondary to dietary lifestyle.  Currently taking Lantus and sliding scale insulin with meals.  -Will give scheduled dose of insulin aspart 5 units at dinner and at bedtime.  Continue sliding scale that is ordered.  A1c in 8 weeks, currently greater than 9    MED REC REQUIRED  Post Medication  Reconciliation Status:       Orders:  See new order above     61 minutes spent on the date of the encounter doing chart review, history and exam, documentation and further activities as noted above.       Electronically signed by: Li Arana NP           Sincerely,        Li Arana NP    Electronically signed

## 2025-03-19 NOTE — PROGRESS NOTES
"[facility] GERIATRICS    Chief Complaint   Patient presents with    RECHECK     HPI:  Kimberly Stephenson is a 79 year old  (1945), who is being seen today for an episodic care visit at: [facility] ([facility]) [[id number]]. Today's concern is:   Patient resting in dining room eating lunch, reports she is feeling fine and without concerns.  Per nursing chart review, blood sugars have consistently been greater than 300 at dinnertime and at bedtime.  Patient remains asymptomatic, currently taking sliding scale insulin and Lantus in the afternoon.  Hemoglobin A1C   Date Value Ref Range Status   11/19/2024 9.2 (H) <5.7 % Final     Comment:     Normal <5.7%   Prediabetes 5.7-6.4%    Diabetes 6.5% or higher     Note: Adopted from ADA consensus guidelines.   06/24/2021 10.2 (H) 0 - 5.6 % Final     Comment:     Normal <5.7% Prediabetes 5.7-6.4%  Diabetes 6.5% or higher - adopted from ADA   consensus guidelines.       Vital signs reviewed by me today  BP Readings from Last 3 Encounters:   03/19/25 (!) 142/57   02/12/25 (!) 155/80   10/25/24 (!) 149/70     Pulse Readings from Last 4 Encounters:   03/19/25 74   02/12/25 77   10/25/24 64   09/24/24 67     Wt Readings from Last 4 Encounters:   03/19/25 96.2 kg (212 lb)   02/12/25 91.6 kg (202 lb)   10/25/24 91.6 kg (202 lb)   09/24/24 95.3 kg (210 lb)         Allergies, and PMH/PSH reviewed in EPIC today.  REVIEW OF SYSTEMS:  10 point ROS of systems including Constitutional, Eyes, Respiratory, Cardiovascular, Gastroenterology, Genitourinary, Integumentary, Musculoskeletal, Psychiatric were all negative except for pertinent positives noted in my HPI.    Objective:   BP (!) 142/57   Pulse 74   Temp 96.9  F (36.1  C)   Resp 18   Ht 1.702 m (5' 7\")   Wt 96.2 kg (212 lb)   SpO2 96%   BMI 33.20 kg/m    A & O x 3, NAD. Lungs CTA, non labored. RRR, S1/S2 w/o murmur,gallop or rub. No edema.  Abdomen soft, nontender, +BT'S. No focal neurological deficits.      Recent labs in " EPIC reviewed by me today.   Most Recent 3 CBC's:  Recent Labs   Lab Test 02/18/25  1041 10/31/23  0625 02/14/23  1043   WBC 4.5 4.6 4.6   HGB 13.2 12.8 13.4   MCV 95 93 94    199 188     Most Recent 3 BMP's:  Recent Labs   Lab Test 02/18/25  1041 02/11/25  1238 10/31/23  0625    137 142   POTASSIUM 4.8 4.9 4.9   CHLORIDE 103 103 104   CO2 22 19* 28   BUN 9.5 9.7 10.7   CR 0.54 0.57 0.65   ANIONGAP 14 15 10   SLIME 9.5 9.3 9.0   * 217* 234*     Most Recent 3 Creatinines:  Recent Labs   Lab Test 02/18/25  1041 02/11/25  1238 10/31/23  0625   CR 0.54 0.57 0.65     Most Recent 3 Hemoglobins:  Recent Labs   Lab Test 02/18/25  1041 10/31/23  0625 02/14/23  1043   HGB 13.2 12.8 13.4     Most Recent TSH and T4:  Recent Labs   Lab Test 03/15/22  0741   TSH 1.07     Most Recent Hemoglobin A1c:  Recent Labs   Lab Test 11/19/24  0958   A1C 9.2*         Assessment/Plan:  1. Type 1 diabetes mellitus with hyperglycemia (H)    2. Paranoia (H)    3. Anxiety    4. Morbid obesity (H)      Paranoia (H)   Anxiety  Longstanding history, stable.  Continue Lexapro and buspirone as ordered.  No changes at this time and adjustments as clinically indicated.    Morbid obesity (H)  Body mass index is 33.2 kg/m .  Patient not willing to change dietary habits nightly.  Patient educated on healthy meals and snacks.  Light exercise as tolerated.    Type 1 diabetes mellitus with hyperglycemia (H)  Chronic, not stable on current regimen.  Continues to have elevated blood sugars greater than 300 at dinnertime and at bedtime secondary to dietary lifestyle.  Currently taking Lantus and sliding scale insulin with meals.  -Will give scheduled dose of insulin aspart 5 units at dinner and at bedtime.  Continue sliding scale that is ordered.  A1c in 8 weeks, currently greater than 9    MED REC REQUIRED  Post Medication Reconciliation Status:       Orders:  See new order above     61 minutes spent on the date of the encounter doing chart  review, history and exam, documentation and further activities as noted above.       Electronically signed by: Li Arana NP

## 2025-03-20 ENCOUNTER — LAB REQUISITION (OUTPATIENT)
Dept: LAB | Facility: CLINIC | Age: 80
End: 2025-03-20
Payer: COMMERCIAL

## 2025-03-20 DIAGNOSIS — E11.9 TYPE 2 DIABETES MELLITUS WITHOUT COMPLICATIONS (H): ICD-10-CM

## 2025-03-20 DIAGNOSIS — F41.1 GENERALIZED ANXIETY DISORDER: ICD-10-CM

## 2025-03-25 LAB — TSH SERPL DL<=0.005 MIU/L-ACNC: 1.16 UIU/ML (ref 0.3–4.2)

## 2025-03-25 PROCEDURE — P9603 ONE-WAY ALLOW PRORATED MILES: HCPCS | Mod: ORL | Performed by: NURSE PRACTITIONER

## 2025-03-25 PROCEDURE — 36415 COLL VENOUS BLD VENIPUNCTURE: CPT | Mod: ORL | Performed by: NURSE PRACTITIONER

## 2025-03-25 PROCEDURE — 84443 ASSAY THYROID STIM HORMONE: CPT | Mod: ORL | Performed by: NURSE PRACTITIONER

## 2025-04-09 NOTE — PROGRESS NOTES
[facility] Medicine Progress Note  Date of Service: 02/22/2020    Assessment & Plan   Kimberly Stephenson is a 74 year old female with DM type 1 who presents with high bs and was in DKA.    DKA    2nd episode last 2 months. Admitted 12/28-1/8 at [facility] in DKA-? Non compliant at that time. Pt sent to TCU. Now presents from [facility]-not missing insulin. DKA  likely due to cellulitis left lateral ankle.Does have B ankle wounds-pressure wounds seen during last admit.     Had positive blood cx last admit with slime montesensis-felt to be skin contaminant. Was rx with antbx for a week or so.     DKA physiology largely resolved. Ketones are still mild up at 2.4 but normal AG and bicarb.    - follow on subcutaneous insulin for recurrence    Diabetes mellitus type 1    Was on Lantus 30 and novolog 4 with meals and no sliding scale insulin at [facility].    - increase Lantus back to 30 today   - Novolog 5 units plus sliding scale insulin today, but will go without sliding scale insulin tomorrow to see how she does    Probable left lateral ANKLE CELLULITIS   started on ceftriaxone. Will follow for improvement. procalcitonin 0.3. will get wocn     PAROXYSMAL AFIB- in nsr now  On eliquis. Not on rate control  But hr in 70's  -will continue eliquis, May be a reason they kept her on it after the NSTEMI as below.     H/O NSTEMI/ DEMAND ISCHEMIA  During last admit. trops as high as 16. Apparently cards seen--was felt to be due to possible sepsis/ demand ischemia. Was to have OP lexiscan-never did. Echo was normal  -will continue amlodipine. Need to schedule lexiscan OP on discharge.      HLD  Continue meds     DEPRESSION/ CELIA  Continue meds    Probable cognitive dysfunction- per family on admit and when I saw pt she repeated the same thing 3 times     Discussion: She has a risk for readmission if we will get decent blood sugar control with scheduled Lantus and NovoLog.  Discussed with son by phone    Disposition:  Anticipate discharge 1-2 more days    Attestation:  Total time: 40 minutes    River Sanchez MD  [facility] Medicine        Interval History   No complaints.  No chest pain, shortness of breath, nausea, abdominal pain.    Physical Exam   Temp:  [97.4  F (36.3  C)-98.2  F (36.8  C)] 98  F (36.7  C)  Pulse:  [71-82] 73  Heart Rate:  [73-89] 73  Resp:  [14-24] 14  BP: (124-158)/(58-99) 143/66  SpO2:  [93 %-98 %] 95 %      Intake/Output Summary (Last 24 hours) at 2/22/2020 0930  Last data filed at 2/22/2020 0847  Gross per 24 hour   Intake 1927 ml   Output 2700 ml   Net -773 ml        Weights:   Vitals:    02/20/20 1800 02/21/20 0400 02/22/20 0200   Weight: 79.3 kg (174 lb 13.2 oz) 79.7 kg (175 lb 11.3 oz) 80.3 kg (177 lb 0.5 oz)    Body mass index is 28.57 kg/m .    Constitutional: alert and oriented, NAD, poor memory  CV: Regular  Respiratory: CTA bilaterally  GI: Soft, nontender  Skin: Warm and dry.  Left lateral malleoli are ankle cellulitis appears to be improving significantly.      Data   Recent Labs   Lab 02/22/20  0800 02/22/20  0556 02/21/20  0454 02/20/20  1831 02/20/20  1430 02/20/20  1310   WBC  --  2.7* 5.0  --   --  8.1   HGB  --  11.1* 10.4*  --   --  12.2   MCV  --  92 93  --   --  96   PLT  --  146* 138*  --   --  200     --  141 139  --  131*   POTASSIUM 4.0  --  4.0 4.1 4.7 5.1   CHLORIDE 105  --  113* 110*  --  100   CO2 27  --  21 13*  --  11*   BUN 4*  --  9 16  --  22   CR 0.50*  --  0.58 0.65  --  0.81   ANIONGAP 4  --  7 16*  --  20*   SLIME 8.5  --  8.4* 8.8  --  9.3   *  --  190* 262*  --  460*   ALBUMIN  --   --   --   --   --  3.5   PROTTOTAL  --   --   --   --   --  7.4   BILITOTAL  --   --   --   --   --  0.6   ALKPHOS  --   --   --   --   --  93   ALT  --   --   --   --   --  32   AST  --   --   --   --   --  29   LIPASE  --   --   --   --  68*  --    TROPI  --   --   --   --  <0.015  --        ABG No lab results found in last 7 days.    VBG   Recent Labs   Lab 02/20/20  1400    PHV 7.22*   PCO2V 30*   PO2V 60*   HCO3V 12*       Recent Labs   Lab 02/22/20  0800 02/22/20  0740 02/22/20  0209 02/21/20  2150 02/21/20  1638 02/21/20  1107 02/21/20  1003  02/21/20  0454  02/20/20  1831  02/20/20  1310   *  --   --   --   --   --   --   --  190*  --  262*  --  460*   BGM  --  313* 278* 309* 302* 242* 242*   < > 181*   < >  --    < >  --     < > = values in this interval not displayed.        Unresulted Labs Ordered in the Past 30 Days of this Admission     Date and Time Order Name Status Description    2/20/2020 1748 Blood culture Preliminary     2/20/2020 1748 Blood culture Preliminary            Imaging: No results found for this or any previous visit (from the past 24 hour(s)).     I reviewed all new labs and imaging results over the last 24 hours. I personally reviewed no images or EKG's today.    Medications     0.9% sodium chloride + KCl 20 mEq/L Stopped (02/21/20 0021)     dextrose 5% and 0.45% NaCl + KCl 20 mEq/L Stopped (02/21/20 1525)     - MEDICATION INSTRUCTIONS -         amLODIPine  10 mg Oral Daily     apixaban ANTICOAGULANT  2.5 mg Oral BID     atorvastatin  40 mg Oral Daily     busPIRone  7.5 mg Oral BID     cefTRIAXone  1 g Intravenous Q24H     escitalopram  10 mg Oral Daily     insulin aspart  1-7 Units Subcutaneous TID AC     insulin aspart  1-5 Units Subcutaneous At Bedtime     insulin glargine  15 Units Subcutaneous QAM AC     sodium chloride (PF)  3 mL Intracatheter Q8H     venlafaxine  150 mg Oral Daily   Reviewed meds    River Sanchez MD  [facility] Medicine             Noah Landa (Physician Assistant)

## 2025-05-07 ENCOUNTER — LAB REQUISITION (OUTPATIENT)
Dept: LAB | Facility: CLINIC | Age: 80
End: 2025-05-07
Payer: COMMERCIAL

## 2025-05-07 DIAGNOSIS — E11.9 TYPE 2 DIABETES MELLITUS WITHOUT COMPLICATIONS (H): ICD-10-CM

## 2025-05-13 LAB
EST. AVERAGE GLUCOSE BLD GHB EST-MCNC: 220 MG/DL
HBA1C MFR BLD: 9.3 %

## 2025-05-29 ENCOUNTER — VIRTUAL VISIT (OUTPATIENT)
Dept: PHARMACY | Facility: CLINIC | Age: 80
End: 2025-05-29
Payer: COMMERCIAL

## 2025-05-29 DIAGNOSIS — I10 ESSENTIAL HYPERTENSION: ICD-10-CM

## 2025-05-29 DIAGNOSIS — E10.9 TYPE 1 DIABETES MELLITUS WITHOUT COMPLICATION (H): ICD-10-CM

## 2025-05-29 DIAGNOSIS — I48.0 PAF (PAROXYSMAL ATRIAL FIBRILLATION) (H): Primary | ICD-10-CM

## 2025-05-29 DIAGNOSIS — K59.00 CONSTIPATION: ICD-10-CM

## 2025-05-29 NOTE — PATIENT INSTRUCTIONS
"Recommendations from today's MTM visit:                                                       Please consider continuous glucose monitor/Freestyle Nicki to aid with blood sugar control.     Provider may consider addition of glucose tablet as needed for blood sugar <70.  Consider reduction in sliding scale insulin by 1u to reduce hypoglycemia risk.    Provider may consider increase in Eliquis to 5mg twice daily.    Follow-up: 3-6 months, sooner if necessary      It was great speaking with you today.  I value your experience and would be very thankful for your time in providing feedback in our clinic survey. In the next few days, you may receive an email or text message from [employer] with a link to a survey related to your  clinical pharmacist.\"     To schedule another MTM appointment, please call me directly or you may call the MTM scheduling line at [phone] or toll-free at [phone].     My Clinical Pharmacist's contact information:                                                      Please feel free to contact me with any questions or concerns you have.      Janeth Aldana, Pharm.D.,[facility]  Board Certified Geriatric Pharmacist  Medication Therapy Management Pharmacist  [phone]      "

## 2025-05-29 NOTE — PROGRESS NOTES
Medication Therapy Management (MTM) Encounter    ASSESSMENT:                            Medication Adherence/Access: No issues identified.    Diabetes  Patient is not meeting A1c goal of < 8%.  Has had a couple blood sugars <70 in the last week that were prior to dinner, and may require reduction in sliding scale insulin to reduce risk of hypoglycemia and variability in blood sugars. As per previous MTM visit, may benefit from continuous glucose monitor to aid with blood sugar control & help reduce variability. May also benefit from having prn glucose tabs available for blood sugar <70.     Hypertension, Afib   Prior to addition of low dose lisinopril, was typically meeting blood pressure goal <150/90mmHg.  I do not have blood pressure readings to assess following the addition of the lisinopril, however I do note that a BMP was checked the week following lisinopril addition, which was stable.  Of note, Scr continues <1.5 and patient does not meet criteria for Eliquis dose reduction (weight is >60kg), and may therefore benefit from increase in Eliquis dose to ensure adequate anticoagulation.  Of note, the lower dose has not been associated with lower risk of bleeding in patients who do not meet eligibility criteria for dose reduction.      PLAN:                            Please consider continuous glucose monitor/Freestyle Nicki to aid with blood sugar control.     Provider may consider addition of glucose tablet as needed for blood sugar <70.  Consider  reduction in sliding scale insulin by 1u to reduce hypoglycemia risk.    Provider may consider increase in Eliquis to 5mg twice daily.    Follow-up: 3-6 months, sooner if necessary    SUBJECTIVE/OBJECTIVE:                          Kimberly Stephenson is a 79 year old female seen for a follow-up visit. Patient was accompanied by facility nurse, Alexandra.      Reason for visit: follow-up regarding addition of lisinopril, blood sugars.    Allergies/ADRs: Reviewed in chart  Past  "Medical History: Reviewed in chart  Tobacco: She reports that she has quit smoking. She has never used smokeless tobacco.  Alcohol: not currently using  Assistive Devices: ambulates independently  Recent Falls: no  Medication Adherence/Access: Patient has assistance with medication administration: assisted living.    Diabetes   Type 1 Diabetes  Lantus 30u once daily (per previous NP note, is given at 2pm)  Novolog 5u at dinner and bedtime (hold if <90)  Novolog sliding scale prior to meals and at bedtime    Metformin 500mg twice daily stopped on 25 and Lantus reduced from 32u daily to current dose (and changed from evening dosing to 2pm).  Scheduled Novolog added at dinner and 8pm on 3/19/25.  History of Type 1 DM noted in chart with history of DKA.  Type 2 DM also noted in chart.  History of difficulty controlling blood sugars - quite variable.  Will occasionally have hypoglycemia symptoms - gets a little sweaty and will have a little bit of sugar to offset this.  Current diabetes symptoms: none  Notes usually doesn't have symptoms of anything.  Diet/Exercise: not eating breakfast every day.  Lunch and dinner in dining room - doesn't ever skip these meals.  appetite \"fine.\"  Does drink ~4oz OJ daily with lunch.  Not usually snacking between meals.       Blood sugar monitoring: four times daily.  Sugars per Alexandra (most recent listed first)  Morning/Fastin, 103, 200, 318, 204, 116, 106, 299  Pre-lunch (11am):245, 131, 218, 279, 171, 86, 95, 235  Pre-dinner:-     234, 160, 68,  294, 68, 174, 327  Pre-HS: -     190, 286, 476, 72,  254, 151, 296        Hypertension , Afib:  Amlodipine 10mg daily  Lisinopril 2.5mg daily (started 25)  Eliquis 2.5mg twice daily    Denies dizziness, chest pain, shortness of breath, edema.   No headaches. Occasional palpitations - doesn't last long.  No dark/sticky stools, bleeding, bruising.  Patient reports no current medication side effects  Patient  has blood pressure " monitored by facility monthly - these are not available to me today, Alexandra doesn't have them on hand.    BP Readings from Last 3 Encounters:   03/19/25 (!) 142/57   02/12/25 (!) 155/80   10/25/24 (!) 149/70              Estimated Creatinine Clearance: 98.9 mL/min (based on SCr of 0.54 mg/dL).    ----------------      I spent 25 minutes with this patient today.     A summary of these recommendations was declined by the patient.    Janeth Aldana, Pharm.D.,[facility]  Board Certified Geriatric Pharmacist  Medication Therapy Management Pharmacist  [phone]      Telemedicine Visit Details  The patient's medications can be safely assessed via a telemedicine encounter.  Type of service:  Telephone visit  Originating Location (pt. Location): Assisted Living    Distant Location (provider location):  Off-site  Start Time: 2:35pm  End Time: 3pm    *remaining MTPs were captured at 2/11/25 MTM visit   Medication Therapy Recommendations  Type 1 diabetes mellitus with hyperglycemia (H)   1 Current Medication: Insulin Aspart FlexPen 100 UNIT/ML SOPN   Current Medication Sig: Inject 0-7 Units subcutaneously at bedtime. Inject 0-7 Units Subcutaneous at bedtime Do Not give Bedtime Correction Insulin if BG less than 200 For  to 250 give 3 units. For  to 300 give 4 units. For  to 350 give 5 units. For  to 400 give 6 units. For  to 450 give 7 units. For  to 500 give 8 units. Notify provider if glucose greater than or equal to 500 mg/dL after administration.   Rationale: Dose too high - Dosage too high - Safety   Recommendation: Decrease Dose   Status: Contact Provider - Awaiting Response   Identified Date: 5/29/2025

## 2025-06-30 DIAGNOSIS — R73.9 HYPERGLYCEMIA: ICD-10-CM

## 2025-06-30 RX ORDER — ATORVASTATIN CALCIUM 40 MG/1
40 TABLET, FILM COATED ORAL DAILY
Qty: 90 TABLET | Refills: 3 | Status: SHIPPED | OUTPATIENT
Start: 2025-06-30

## 2025-07-09 NOTE — PROGRESS NOTES
[facility] GERIATRICS    Chief Complaint   Patient presents with    RECHECK     pt/ot cognitive testing     HPI:  Kimberly Stephenson is a 80 year old  (1945), who is being seen today for an episodic care visit at: [facility]) [[id number]]. Today's concern is: ***    Allergies, and PMH/PSH reviewed in [facility] today.  REVIEW OF SYSTEMS:  {irfoeg05:046264}    Objective:   There were no vitals taken for this visit.  {alf physical exam :294652}    {fgslab:422410}    Assessment/Plan:  {FGS DX2:694297}    MED REC REQUIRED{TIP  Click the link below to document or use med rec list, use list to pull in response :245425}  Post Medication Reconciliation Status: {MED REC LIST:655854}      Orders:  {fgsorders:011151}  ***    Electronically signed by: Sejal Gilbert MA ***      therapy consult    Electronically signed by: Li Arana NP    Documentation of Face to Face and Certification for Home Health Services    I certify that patient: Kimberly Stephenson is under my care and that I, or a nurse practitioner or physician's assistant working with me, had a face-to-face encounter that meets the physician face-to-face encounter requirements with this patient on: 7/10/2025.    This encounter with the patient was in whole, or in part, for the following medical condition, which is the primary reason for home health care: cognitive impairment.    I certify that, based on my findings, the following services are medically necessary home health services: Occupational Therapy.    My clinical findings support the need for the above services because: Occupational Therapy Services are needed to assess and treat cognitive ability and address ADL safety due to impairment in cognition..    Further, I certify that my clinical findings support that this patient is homebound (i.e. absences from home require considerable and taxing effort and are for medical reasons or Faith services or infrequently or of short duration when for other reasons) because: Requires assistance of another person or specialized equipment to access medical services because patient: Requires supervision of another for safe transfer...    Based on the above findings. I certify that this patient is confined to the home and needs intermittent skilled nursing care, physical therapy and/or speech therapy.  The patient is under my care, and I have initiated the establishment of the plan of care.  This patient will be followed by a physician who will periodically review the plan of care.  Physician/Provider to provide follow up care: Li Arana    Attending hospital physician (the [facility] provider): Li Arana NP  Physician Signature: See electronic signature associated with these discharge orders.  Date:  7/20/2025

## 2025-07-10 ENCOUNTER — ASSISTED LIVING VISIT (OUTPATIENT)
Dept: GERIATRICS | Facility: CLINIC | Age: 80
End: 2025-07-10
Payer: COMMERCIAL

## 2025-07-10 VITALS
BODY MASS INDEX: 31.66 KG/M2 | DIASTOLIC BLOOD PRESSURE: 56 MMHG | HEIGHT: 66 IN | WEIGHT: 197 LBS | SYSTOLIC BLOOD PRESSURE: 120 MMHG

## 2025-07-10 DIAGNOSIS — R41.0 CONFUSION: Primary | ICD-10-CM

## 2025-07-10 PROCEDURE — 99348 HOME/RES VST EST LOW MDM 30: CPT | Performed by: NURSE PRACTITIONER

## 2025-07-10 NOTE — LETTER
7/10/2025      Kimberly [facility]  [address]  [place] [address]        No notes on file      Sincerely,        Li Arana NP    Electronically signed   Medication Reconciliation Status: patient was not discharged from an inpatient facility or TCU      Orders:  Occupational therapy consult    Electronically signed by: Li Arana NP    Documentation of Face to Face and Certification for Home Health Services    I certify that patient: Kimberly Stephenson is under my care and that I, or a nurse practitioner or physician's assistant working with me, had a face-to-face encounter that meets the physician face-to-face encounter requirements with this patient on: 7/10/2025.    This encounter with the patient was in whole, or in part, for the following medical condition, which is the primary reason for home health care: cognitive impairment.    I certify that, based on my findings, the following services are medically necessary home health services: Occupational Therapy.    My clinical findings support the need for the above services because: Occupational Therapy Services are needed to assess and treat cognitive ability and address ADL safety due to impairment in cognition..    Further, I certify that my clinical findings support that this patient is homebound (i.e. absences from home require considerable and taxing effort and are for medical reasons or Confucianist services or infrequently or of short duration when for other reasons) because: Requires assistance of another person or specialized equipment to access medical services because patient: Requires supervision of another for safe transfer...    Based on the above findings. I certify that this patient is confined to the home and needs intermittent skilled nursing care, physical therapy and/or speech therapy.  The patient is under my care, and I have initiated the establishment of the plan of care.  This patient will be followed by a physician who will periodically review the plan of care.  Physician/Provider to provide follow up care: Li Arana    Attending hospital physician (the Medicare certified [facility]  provider): Li Arana NP  Physician Signature: See electronic signature associated with these discharge orders.  Date: 7/20/2025         Orders for Kimberly Stephenson    Occupational Therapy and physical therapy consult for cognitive assessment    Electronically signed,  Li Arana NP       Sincerely,        Li Arana NP    Electronically signed

## 2025-07-10 NOTE — PROGRESS NOTES
Orders for Kimberly Stephenson    Occupational Therapy and physical therapy consult for cognitive assessment    Electronically signed,  Li Arana NP

## 2025-07-23 ENCOUNTER — TELEPHONE (OUTPATIENT)
Dept: PHARMACY | Facility: CLINIC | Age: 80
End: 2025-07-23
Payer: COMMERCIAL

## 2025-07-23 NOTE — TELEPHONE ENCOUNTER
Fernandez Jefferson,  I called Alma Delia, facility nurse, to see about scheduling a follow-up MTM visit with Kimberly.  In the past we have done these visits with a nurse present to aid Kimberly in the visit.  Alma Delia mentioned this will be difficult to do going forward, so I will plan to follow-up MTM in the future upon your or Kimberly's request.  I don't think you have had an opportunity to review the recommendations from my last MTM visit in May.  Alma Delia mentioned you will be on site tomorrow, so just thought I would forward you a note to remind you to take a peek at those recommendations.  Please let me know if you have any questions, and let me know if you would like to schedule a time for you and I to go over her meds in the future.  We could always complete a MTM visit/co-visit with me on the phone when you are on site with Kimberly if that works for you and you would like.      Thank you!  Janeth Aldana, Pharm.D.,[facility]  Board Certified Geriatric Pharmacist  Medication Therapy Management Pharmacist  [phone]

## 2025-08-05 ENCOUNTER — TRANSFERRED RECORDS (OUTPATIENT)
Dept: HEALTH INFORMATION MANAGEMENT | Facility: CLINIC | Age: 80
End: 2025-08-05
Payer: COMMERCIAL

## (undated) RX ORDER — BUPIVACAINE HYDROCHLORIDE 5 MG/ML
INJECTION, SOLUTION EPIDURAL; INTRACAUDAL
Status: DISPENSED
Start: 2018-06-13

## (undated) RX ORDER — METHYLPREDNISOLONE ACETATE 40 MG/ML
INJECTION, SUSPENSION INTRA-ARTICULAR; INTRALESIONAL; INTRAMUSCULAR; SOFT TISSUE
Status: DISPENSED
Start: 2018-06-13

## (undated) RX ORDER — LIDOCAINE HYDROCHLORIDE 10 MG/ML
INJECTION, SOLUTION EPIDURAL; INFILTRATION; INTRACAUDAL; PERINEURAL
Status: DISPENSED
Start: 2018-06-13